# Patient Record
Sex: FEMALE | Race: WHITE | NOT HISPANIC OR LATINO | Employment: FULL TIME | URBAN - METROPOLITAN AREA
[De-identification: names, ages, dates, MRNs, and addresses within clinical notes are randomized per-mention and may not be internally consistent; named-entity substitution may affect disease eponyms.]

---

## 2020-05-10 ENCOUNTER — HOSPITAL ENCOUNTER (EMERGENCY)
Facility: HOSPITAL | Age: 55
Discharge: HOME | End: 2020-05-10
Attending: EMERGENCY MEDICINE | Admitting: EMERGENCY MEDICINE
Payer: COMMERCIAL

## 2020-05-10 ENCOUNTER — APPOINTMENT (EMERGENCY)
Dept: RADIOLOGY | Facility: HOSPITAL | Age: 55
End: 2020-05-10
Payer: COMMERCIAL

## 2020-05-10 VITALS
WEIGHT: 210 LBS | DIASTOLIC BLOOD PRESSURE: 98 MMHG | TEMPERATURE: 98.3 F | OXYGEN SATURATION: 100 % | RESPIRATION RATE: 18 BRPM | SYSTOLIC BLOOD PRESSURE: 183 MMHG | HEART RATE: 85 BPM

## 2020-05-10 DIAGNOSIS — M25.552 LEFT HIP PAIN: ICD-10-CM

## 2020-05-10 DIAGNOSIS — M46.1 SACROILIITIS (HCC): Primary | ICD-10-CM

## 2020-05-10 LAB
CLARITY, POC: NORMAL
COLOR, POC: YELLOW
EXT BILIRUBIN, UA: NEGATIVE
EXT BLOOD URINE: NEGATIVE
EXT GLUCOSE, UA: NEGATIVE
EXT KETONES: NEGATIVE
EXT NITRITE, UA: NEGATIVE
EXT PH, UA: 7
EXT PROTEIN, UA: NEGATIVE
EXT SPECIFIC GRAVITY, UA: 1.01
EXT UROBILINOGEN: 0.2
WBC # BLD EST: NEGATIVE 10*3/UL

## 2020-05-10 PROCEDURE — 96372 THER/PROPH/DIAG INJ SC/IM: CPT

## 2020-05-10 PROCEDURE — 99285 EMERGENCY DEPT VISIT HI MDM: CPT | Performed by: EMERGENCY MEDICINE

## 2020-05-10 PROCEDURE — 81002 URINALYSIS NONAUTO W/O SCOPE: CPT | Performed by: EMERGENCY MEDICINE

## 2020-05-10 PROCEDURE — 99284 EMERGENCY DEPT VISIT MOD MDM: CPT

## 2020-05-10 PROCEDURE — 73502 X-RAY EXAM HIP UNI 2-3 VIEWS: CPT

## 2020-05-10 RX ORDER — LIDOCAINE 50 MG/G
1 PATCH TOPICAL ONCE
Status: DISCONTINUED | OUTPATIENT
Start: 2020-05-10 | End: 2020-05-10 | Stop reason: HOSPADM

## 2020-05-10 RX ORDER — METHOCARBAMOL 750 MG/1
750 TABLET, FILM COATED ORAL 3 TIMES DAILY PRN
Qty: 12 TABLET | Refills: 0 | OUTPATIENT
Start: 2020-05-10 | End: 2022-01-06

## 2020-05-10 RX ORDER — KETOROLAC TROMETHAMINE 30 MG/ML
15 INJECTION, SOLUTION INTRAMUSCULAR; INTRAVENOUS ONCE
Status: COMPLETED | OUTPATIENT
Start: 2020-05-10 | End: 2020-05-10

## 2020-05-10 RX ORDER — METHOCARBAMOL 500 MG/1
750 TABLET, FILM COATED ORAL ONCE
Status: COMPLETED | OUTPATIENT
Start: 2020-05-10 | End: 2020-05-10

## 2020-05-10 RX ADMIN — LIDOCAINE 1 PATCH: 50 PATCH TOPICAL at 15:25

## 2020-05-10 RX ADMIN — KETOROLAC TROMETHAMINE 15 MG: 30 INJECTION, SOLUTION INTRAMUSCULAR at 15:03

## 2020-05-10 RX ADMIN — METHOCARBAMOL TABLETS 750 MG: 500 TABLET, COATED ORAL at 15:24

## 2022-01-05 VITALS
OXYGEN SATURATION: 98 % | RESPIRATION RATE: 18 BRPM | SYSTOLIC BLOOD PRESSURE: 166 MMHG | DIASTOLIC BLOOD PRESSURE: 98 MMHG | HEART RATE: 94 BPM

## 2022-01-05 PROCEDURE — 99284 EMERGENCY DEPT VISIT MOD MDM: CPT

## 2022-01-05 RX ORDER — IBUPROFEN 400 MG/1
400 TABLET ORAL ONCE
Status: COMPLETED | OUTPATIENT
Start: 2022-01-05 | End: 2022-01-05

## 2022-01-05 RX ADMIN — IBUPROFEN 400 MG: 400 TABLET, FILM COATED ORAL at 20:29

## 2022-01-06 ENCOUNTER — HOSPITAL ENCOUNTER (EMERGENCY)
Facility: HOSPITAL | Age: 57
Discharge: HOME | End: 2022-01-06
Attending: EMERGENCY MEDICINE | Admitting: EMERGENCY MEDICINE
Payer: COMMERCIAL

## 2022-01-06 ENCOUNTER — APPOINTMENT (EMERGENCY)
Dept: RADIOLOGY | Facility: HOSPITAL | Age: 57
End: 2022-01-06
Payer: COMMERCIAL
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
## 2022-01-06 DIAGNOSIS — S16.1XXA CERVICAL STRAIN, ACUTE, INITIAL ENCOUNTER: ICD-10-CM

## 2022-01-06 PROCEDURE — 99284 EMERGENCY DEPT VISIT MOD MDM: CPT | Performed by: EMERGENCY MEDICINE

## 2022-01-06 RX ORDER — ONDANSETRON 4 MG/1
4 TABLET, ORALLY DISINTEGRATING ORAL ONCE
Status: COMPLETED | OUTPATIENT
Start: 2022-01-06 | End: 2022-01-06

## 2022-01-06 RX ORDER — KETOROLAC TROMETHAMINE 30 MG/ML
30 INJECTION, SOLUTION INTRAMUSCULAR; INTRAVENOUS ONCE
Status: COMPLETED | OUTPATIENT
Start: 2022-01-06 | End: 2022-01-06

## 2022-01-06 RX ORDER — LIDOCAINE 50 MG/G
1 PATCH TOPICAL ONCE
Status: DISCONTINUED | OUTPATIENT
Start: 2022-01-06 | End: 2022-01-06 | Stop reason: HOSPADM

## 2022-01-06 RX ORDER — ACETAMINOPHEN 325 MG/1
650 TABLET ORAL ONCE
Status: COMPLETED | OUTPATIENT
Start: 2022-01-06 | End: 2022-01-06

## 2022-01-06 RX ORDER — METHOCARBAMOL 500 MG/1
500 TABLET, FILM COATED ORAL 2 TIMES DAILY PRN
Qty: 20 TABLET | Refills: 0 | Status: SHIPPED | OUTPATIENT
Start: 2022-01-06 | End: 2022-04-06

## 2022-01-06 RX ADMIN — ACETAMINOPHEN 650 MG: 325 TABLET, FILM COATED ORAL at 04:29

## 2022-01-06 RX ADMIN — KETOROLAC TROMETHAMINE 30 MG: 30 INJECTION, SOLUTION INTRAMUSCULAR; INTRAVENOUS at 04:29

## 2022-01-06 RX ADMIN — ONDANSETRON 4 MG: 4 TABLET, ORALLY DISINTEGRATING ORAL at 04:29

## 2022-01-06 RX ADMIN — LIDOCAINE 5% 1 PATCH: 700 PATCH TOPICAL at 04:29
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
## 2022-04-06 ENCOUNTER — HOSPITAL ENCOUNTER (OUTPATIENT)
Dept: RADIOLOGY | Facility: HOSPITAL | Age: 57
Discharge: HOME | End: 2022-04-06
Payer: COMMERCIAL

## 2022-04-06 ENCOUNTER — OFFICE VISIT (OUTPATIENT)
Dept: FAMILY MEDICINE CLINIC | Facility: HOSPITAL | Age: 57
End: 2022-04-06
Payer: COMMERCIAL

## 2022-04-06 VITALS
BODY MASS INDEX: 36.61 KG/M2 | HEART RATE: 85 BPM | WEIGHT: 206.6 LBS | SYSTOLIC BLOOD PRESSURE: 122 MMHG | TEMPERATURE: 97.6 F | DIASTOLIC BLOOD PRESSURE: 90 MMHG | HEIGHT: 63 IN

## 2022-04-06 DIAGNOSIS — M54.2 NECK PAIN: ICD-10-CM
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
## 2022-04-06 DIAGNOSIS — M25.559 HIP PAIN: ICD-10-CM

## 2022-04-06 DIAGNOSIS — M54.41 BILATERAL LOW BACK PAIN WITH BILATERAL SCIATICA, UNSPECIFIED CHRONICITY: ICD-10-CM

## 2022-04-06 DIAGNOSIS — M62.838 NECK MUSCLE SPASM: ICD-10-CM

## 2022-04-06 DIAGNOSIS — M54.42 BILATERAL LOW BACK PAIN WITH BILATERAL SCIATICA, UNSPECIFIED CHRONICITY: ICD-10-CM

## 2022-04-06 DIAGNOSIS — M62.830 SPASM OF MUSCLE OF LOWER BACK: ICD-10-CM
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
## 2022-04-06 PROCEDURE — 99204 OFFICE O/P NEW MOD 45 MIN: CPT | Performed by: FAMILY MEDICINE

## 2022-04-06 PROCEDURE — 72040 X-RAY EXAM NECK SPINE 2-3 VW: CPT

## 2022-04-06 PROCEDURE — 3008F BODY MASS INDEX DOCD: CPT | Performed by: FAMILY MEDICINE

## 2022-04-06 PROCEDURE — 1036F TOBACCO NON-USER: CPT | Performed by: FAMILY MEDICINE

## 2022-04-06 PROCEDURE — 72100 X-RAY EXAM L-S SPINE 2/3 VWS: CPT

## 2022-04-06 PROCEDURE — 3725F SCREEN DEPRESSION PERFORMED: CPT | Performed by: FAMILY MEDICINE

## 2022-04-06 PROCEDURE — 73521 X-RAY EXAM HIPS BI 2 VIEWS: CPT
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
## 2022-04-08 ENCOUNTER — TELEPHONE (OUTPATIENT)
Dept: FAMILY MEDICINE CLINIC | Facility: HOSPITAL | Age: 57
End: 2022-04-08

## 2022-04-08 DIAGNOSIS — M54.41 BILATERAL LOW BACK PAIN WITH BILATERAL SCIATICA, UNSPECIFIED CHRONICITY: ICD-10-CM
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
## 2022-04-08 DIAGNOSIS — M54.42 BILATERAL LOW BACK PAIN WITH BILATERAL SCIATICA, UNSPECIFIED CHRONICITY: ICD-10-CM

## 2022-04-08 NOTE — TELEPHONE ENCOUNTER
She was referred to PT by Dr Gato Almazan  It is going to cost her $150 /visit for PT  That is too large of an expense for her  She is asking if Dr Gato Almazan has any other suggestions for her? Also, she is asking about getting an MRI? She asked about xray results, results not in chart yet, she is aware of that     PCB

## 2022-05-10 NOTE — TELEPHONE ENCOUNTER
Patient asking why MRI was only ordered for lumbar spine? She thought it was going to be also for her neck since she is having pain there as well    PCB

## 2022-05-11 DIAGNOSIS — M54.2 NECK PAIN: Primary | ICD-10-CM

## 2022-05-11 DIAGNOSIS — M54.12 CERVICAL RADICULOPATHY: ICD-10-CM

## 2022-05-11 NOTE — TELEPHONE ENCOUNTER
Pt aware    Pt asking about the neck pain    reports  pain shoots down her arms especially the left arm   asking if an imaging would be consider       Please advise  thanks

## 2022-05-12 ENCOUNTER — HOSPITAL ENCOUNTER (OUTPATIENT)
Dept: MRI IMAGING | Facility: HOSPITAL | Age: 57
Discharge: HOME | End: 2022-05-12
Payer: COMMERCIAL

## 2022-05-12 DIAGNOSIS — M54.41 BILATERAL LOW BACK PAIN WITH BILATERAL SCIATICA, UNSPECIFIED CHRONICITY: ICD-10-CM
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
## 2022-05-12 DIAGNOSIS — M54.42 BILATERAL LOW BACK PAIN WITH BILATERAL SCIATICA, UNSPECIFIED CHRONICITY: ICD-10-CM

## 2022-05-12 PROCEDURE — 72148 MRI LUMBAR SPINE W/O DYE: CPT

## 2022-05-12 PROCEDURE — G1004 CDSM NDSC: HCPCS

## 2022-05-16 DIAGNOSIS — M51.36 DDD (DEGENERATIVE DISC DISEASE), LUMBAR: ICD-10-CM

## 2022-05-16 DIAGNOSIS — M54.41 BILATERAL LOW BACK PAIN WITH BILATERAL SCIATICA, UNSPECIFIED CHRONICITY: Primary | ICD-10-CM

## 2022-05-16 DIAGNOSIS — M54.42 BILATERAL LOW BACK PAIN WITH BILATERAL SCIATICA, UNSPECIFIED CHRONICITY: Primary | ICD-10-CM

## 2022-06-01 ENCOUNTER — TELEPHONE (OUTPATIENT)
Dept: PAIN MEDICINE | Facility: CLINIC | Age: 57
End: 2022-06-01

## 2022-06-10 ENCOUNTER — TELEPHONE (OUTPATIENT)
Dept: FAMILY MEDICINE CLINIC | Facility: HOSPITAL | Age: 57
End: 2022-06-10

## 2022-06-10 DIAGNOSIS — F41.9 ANXIETY: Primary | ICD-10-CM

## 2022-06-10 NOTE — TELEPHONE ENCOUNTER
Pt went for MRI and was not able to complete due to claustrophia  Asking for an rx for xanax or something to be able to reschedule  Asking for only one pill   PCB

## 2022-06-13 RX ORDER — ALPRAZOLAM 1 MG/1
TABLET ORAL
Qty: 1 TABLET | Refills: 0 | Status: SHIPPED | OUTPATIENT
Start: 2022-06-13 | End: 2022-06-29
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
## 2022-06-14 ENCOUNTER — TELEPHONE (OUTPATIENT)
Dept: PAIN MEDICINE | Facility: CLINIC | Age: 57
End: 2022-06-14

## 2022-06-14 NOTE — TELEPHONE ENCOUNTER
S/W Pt about rescheduling her appt with Dr Davie Ramirez    Pt said that she will call us back  Please transfer her to my office      Thank You

## 2022-06-24 ENCOUNTER — CONSULT (OUTPATIENT)
Dept: PAIN MEDICINE | Facility: CLINIC | Age: 57
End: 2022-06-24
Payer: COMMERCIAL

## 2022-06-24 ENCOUNTER — APPOINTMENT (OUTPATIENT)
Dept: RADIOLOGY | Facility: CLINIC | Age: 57
End: 2022-06-24
Payer: COMMERCIAL

## 2022-06-24 VITALS
HEIGHT: 63 IN | HEART RATE: 90 BPM | SYSTOLIC BLOOD PRESSURE: 140 MMHG | WEIGHT: 207 LBS | DIASTOLIC BLOOD PRESSURE: 92 MMHG | BODY MASS INDEX: 36.68 KG/M2 | TEMPERATURE: 98.1 F

## 2022-06-24 DIAGNOSIS — M25.561 CHRONIC PAIN OF RIGHT KNEE: ICD-10-CM

## 2022-06-24 DIAGNOSIS — G89.29 CHRONIC LEFT SHOULDER PAIN: ICD-10-CM

## 2022-06-24 DIAGNOSIS — G89.29 CHRONIC PAIN OF RIGHT KNEE: ICD-10-CM

## 2022-06-24 DIAGNOSIS — M54.12 CERVICAL RADICULOPATHY: Primary | ICD-10-CM

## 2022-06-24 DIAGNOSIS — M62.89 PSOAS SYNDROME: ICD-10-CM

## 2022-06-24 DIAGNOSIS — M25.512 CHRONIC LEFT SHOULDER PAIN: ICD-10-CM

## 2022-06-24 DIAGNOSIS — M47.816 LUMBAR SPONDYLOSIS: ICD-10-CM

## 2022-06-24 PROCEDURE — 73030 X-RAY EXAM OF SHOULDER: CPT

## 2022-06-24 PROCEDURE — 99205 OFFICE O/P NEW HI 60 MIN: CPT | Performed by: ANESTHESIOLOGY

## 2022-06-24 PROCEDURE — 73562 X-RAY EXAM OF KNEE 3: CPT

## 2022-06-24 RX ORDER — METHYLPREDNISOLONE 4 MG/1
TABLET ORAL
Qty: 21 TABLET | Refills: 0 | Status: SHIPPED | OUTPATIENT
Start: 2022-06-24 | End: 2022-07-21 | Stop reason: ALTCHOICE

## 2022-06-24 RX ORDER — IBUPROFEN 400 MG/1
TABLET ORAL EVERY 6 HOURS PRN
COMMUNITY
End: 2022-06-24 | Stop reason: ALTCHOICE

## 2022-06-24 RX ORDER — ACETAMINOPHEN 500 MG
500 TABLET ORAL EVERY 6 HOURS PRN
COMMUNITY
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
## 2022-06-29 ENCOUNTER — OFFICE VISIT (OUTPATIENT)
Dept: FAMILY MEDICINE CLINIC | Facility: HOSPITAL | Age: 57
End: 2022-06-29
Payer: COMMERCIAL

## 2022-06-29 ENCOUNTER — TELEPHONE (OUTPATIENT)
Dept: FAMILY MEDICINE CLINIC | Facility: HOSPITAL | Age: 57
End: 2022-06-29

## 2022-06-29 ENCOUNTER — TELEPHONE (OUTPATIENT)
Dept: PAIN MEDICINE | Facility: CLINIC | Age: 57
End: 2022-06-29

## 2022-06-29 VITALS
WEIGHT: 210 LBS | DIASTOLIC BLOOD PRESSURE: 102 MMHG | BODY MASS INDEX: 37.21 KG/M2 | HEIGHT: 63 IN | HEART RATE: 84 BPM | SYSTOLIC BLOOD PRESSURE: 180 MMHG | OXYGEN SATURATION: 97 %

## 2022-06-29 DIAGNOSIS — M54.12 CERVICAL RADICULOPATHY: ICD-10-CM

## 2022-06-29 DIAGNOSIS — R03.0 ELEVATED BLOOD PRESSURE READING: ICD-10-CM

## 2022-06-29 DIAGNOSIS — Z13.1 SCREENING FOR DIABETES MELLITUS: ICD-10-CM

## 2022-06-29 DIAGNOSIS — Z13.29 SCREENING FOR THYROID DISORDER: ICD-10-CM

## 2022-06-29 DIAGNOSIS — Z12.31 ENCOUNTER FOR SCREENING MAMMOGRAM FOR BREAST CANCER: ICD-10-CM

## 2022-06-29 DIAGNOSIS — Z13.220 SCREENING FOR HYPERLIPIDEMIA: ICD-10-CM

## 2022-06-29 DIAGNOSIS — N64.4 BREAST PAIN, LEFT: Primary | ICD-10-CM

## 2022-06-29 DIAGNOSIS — Z13.0 SCREENING FOR IRON DEFICIENCY ANEMIA: ICD-10-CM

## 2022-06-29 PROCEDURE — 3008F BODY MASS INDEX DOCD: CPT | Performed by: STUDENT IN AN ORGANIZED HEALTH CARE EDUCATION/TRAINING PROGRAM

## 2022-06-29 PROCEDURE — 1036F TOBACCO NON-USER: CPT | Performed by: STUDENT IN AN ORGANIZED HEALTH CARE EDUCATION/TRAINING PROGRAM

## 2022-06-29 PROCEDURE — 99214 OFFICE O/P EST MOD 30 MIN: CPT | Performed by: STUDENT IN AN ORGANIZED HEALTH CARE EDUCATION/TRAINING PROGRAM

## 2022-06-29 RX ORDER — LOSARTAN POTASSIUM 25 MG/1
25 TABLET ORAL DAILY
Qty: 30 TABLET | Refills: 1 | Status: SHIPPED | OUTPATIENT
Start: 2022-06-29 | End: 2022-07-21 | Stop reason: SDUPTHER

## 2022-06-29 NOTE — TELEPHONE ENCOUNTER
----- Message from Sarahi Pires DO sent at 6/29/2022  4:53 PM EDT -----  Please let patient know that Pain Management Dr Liss Macdonald recommended that she stop using her steroids altogether  I also ordered her losartan 25 mg to [facility], she could take this tonight, and again in the morning before her procedure to try and stay on their schedule without having to postpone it  She should call us in the morning with a home reading

## 2022-06-29 NOTE — TELEPHONE ENCOUNTER
Pt called in she has some questions about her procedure and medications  Please be advised thank you    Pt can be reached @ [phone] or [phone]

## 2022-06-29 NOTE — TELEPHONE ENCOUNTER
Pt at PCP office and they want her to tell Sl her BP is 180-102 pt is having sharp pain on the left side of her chest  She is not sure if SL will want to do the procedure because of the BP    Dr Yissel Preciado sent a text to [facility] ([facility])    Dr # [phone]    Pt # [phone]

## 2022-06-29 NOTE — TELEPHONE ENCOUNTER
Patient called, she has a 1/2 tab of Xanax left from MRI procedure  She is asking if she can take that prior to [facility]? She says she called that office and they do not have a problem with it and told her to check with her PCP    PCB, if she does not answer her cell, call her work [phone]

## 2022-06-29 NOTE — TELEPHONE ENCOUNTER
S/w pt, stated that she has a rx for xanax from her pcp for procedures  Questioned if she can take that before her procedure  Advised pt, no problem with xanax prior to procedure  Refer to the pcp's instructions re: xanax  Pt verbalized understanding and appreciation

## 2022-06-29 NOTE — PROGRESS NOTES
[facility],     Assessment/Plan:      Diagnosis ICD-10-CM Associated Orders   1  Breast pain, left  N64 4    2  Cervical radiculopathy  M54 12    3  Screening for iron deficiency anemia  Z13 0 CBC and differential     CBC and differential   4  Screening for diabetes mellitus  Z13 1 Comprehensive metabolic panel     Comprehensive metabolic panel   5  Screening for hyperlipidemia  Z13 220 Lipid Panel with Direct LDL reflex     Lipid Panel with Direct LDL reflex   6  Screening for thyroid disorder  Z13 29 TSH, 3rd generation     T4, free     TSH, 3rd generation     T4, free   7  Encounter for screening mammogram for breast cancer  Z12 31 Mammo screening bilateral w 3d & cad   8  Elevated blood pressure reading  R03 0 losartan (COZAAR) 25 mg tablet      Blood pressure is significantly elevated, advised her to start losartan today, could be secondary to the steroids which were recommended to be discontinued by pain management as well as myself   Blood work ordered and will review once obtained   Patient overdue for mammogram    Messages sent and discussed with pain management in regards to her procedure tomorrow  Follow-up in 2 weeks for annual wellness   Patient may call or return to office with any questions or concerns  ______________________________________________________________________  Subjective:     Patient ID: Anand Tiwari is a 60 y o  female  HPI  Anand Tiwari  Chief Complaint   Patient presents with    Breast Pain     Left side under breast near rib   Began yesterday      Started with pain yesterday, now on prednisone since Sunday - medrol dose pack  Intermittent & sharp  Came on today, worse with palpation  Chest felt minimally tight today  Uncomfortable - BP elevated  Blood pressures today elevated at work but using risk of in the 649 systolic  Typically in 130-140's over 90's  HR normal    Procedure on neck tmrw with Lo[facility]  In tons of pain  Dad  of stroke  Lived in [id number] [place] [address], but no new BW  Of note patient's jacklyn  suddenly and since then she has not taken as good of care of her own health  The following portions of the patient's history were reviewed and updated as appropriate: allergies, current medications, past medical history and problem list     Review of Systems   Eyes: Positive for visual disturbance (blurry over the past few weeks, worse with bending over)  Negative for pain and redness  Respiratory: Positive for chest tightness  Negative for cough and shortness of breath  Cardiovascular: Negative for chest pain and palpitations  Neurological: Positive for numbness (shoulder pain/ left side with tingling into hand) and headaches (posterior, strong on left occiput)  Negative for dizziness and light-headedness  Objective:      Vitals:    22 1511   BP: (!) 180/102   Pulse: 84   SpO2: 97%   Repeat similar 170/100     Physical Exam  Vitals reviewed  Constitutional:       Appearance: Normal appearance  She is well-developed  She is obese  She is not ill-appearing  HENT:      Head: Normocephalic and atraumatic  Eyes:      General: No scleral icterus  Right eye: No discharge  Left eye: No discharge  Cardiovascular:      Rate and Rhythm: Normal rate and regular rhythm  Pulses: Normal pulses  Heart sounds: Normal heart sounds  No murmur heard  Pulmonary:      Effort: Pulmonary effort is normal  No respiratory distress  Breath sounds: Normal breath sounds  No stridor  No wheezing  Chest:   Breasts:      Right: Normal  No mass, nipple discharge or skin change  Left: No mass, nipple discharge or skin change  Comments: No significant rib pain with motion testing bilaterally  Musculoskeletal:      Cervical back: Normal range of motion  Skin:     General: Skin is warm  Neurological:      Mental Status: She is alert and oriented to person, place, and time  Gait: Gait normal    Psychiatric:         Mood and Affect: Mood normal          Behavior: Behavior normal          Thought Content: Thought content normal          Judgment: Judgment normal            Portions of the record may have been created with voice recognition software  Occasional wrong word or "sound alike" substitutions may have occurred due to the inherent limitations of voice recognition software  Please review the chart carefully and recognize, using context, where substitutions/typographical errors may have occurred

## 2022-06-29 NOTE — TELEPHONE ENCOUNTER
----- Message from Franklin Barron DO sent at 6/29/2022  7:58 AM EDT -----  Normal shoulder x-ray, mild arthritis of the knee

## 2022-06-30 ENCOUNTER — HOSPITAL ENCOUNTER (OUTPATIENT)
Dept: RADIOLOGY | Facility: CLINIC | Age: 57
Discharge: HOME | End: 2022-06-30
Admitting: ANESTHESIOLOGY
Payer: COMMERCIAL

## 2022-06-30 VITALS
SYSTOLIC BLOOD PRESSURE: 130 MMHG | TEMPERATURE: 97.7 F | DIASTOLIC BLOOD PRESSURE: 85 MMHG | HEART RATE: 78 BPM | OXYGEN SATURATION: 97 % | RESPIRATION RATE: 20 BRPM

## 2022-06-30 DIAGNOSIS — M47.816 LUMBAR SPONDYLOSIS: ICD-10-CM

## 2022-06-30 PROCEDURE — 64494 INJ PARAVERT F JNT L/S 2 LEV: CPT | Performed by: ANESTHESIOLOGY

## 2022-06-30 PROCEDURE — 64493 INJ PARAVERT F JNT L/S 1 LEV: CPT | Performed by: ANESTHESIOLOGY

## 2022-06-30 RX ORDER — BUPIVACAINE HCL/PF 2.5 MG/ML
10 VIAL (ML) INJECTION ONCE
Status: COMPLETED | OUTPATIENT
Start: 2022-06-30 | End: 2022-06-30

## 2022-06-30 RX ADMIN — BUPIVACAINE HYDROCHLORIDE 6 ML: 2.5 INJECTION, SOLUTION EPIDURAL; INFILTRATION; INTRACAUDAL at 09:18

## 2022-06-30 NOTE — DISCHARGE INSTRUCTIONS

## 2022-06-30 NOTE — H&P
History of Present Illness: The patient is a 60 y o  female who presents with complaints of low back pain  Patient Active Problem List   Diagnosis    Lumbar spondylosis       Past Medical History:   Diagnosis Date    GERD (gastroesophageal reflux disease)        Past Surgical History:   Procedure Laterality Date    CHOLECYSTECTOMY      HERNIA REPAIR      HYSTERECTOMY      NASAL SEPTUM SURGERY      TONSILLECTOMY           Current Outpatient Medications:     acetaminophen (TYLENOL) 500 mg tablet, Take 500 mg by mouth every 6 (six) hours as needed for mild pain, Disp: , Rfl:     losartan (COZAAR) 25 mg tablet, Take 1 tablet (25 mg total) by mouth daily, Disp: 30 tablet, Rfl: 1    methylPREDNISolone 4 MG tablet therapy pack, Use as directed on package, Disp: 21 tablet, Rfl: 0    Current Facility-Administered Medications:     bupivacaine (PF) (MARCAINE) 0 25 % injection 10 mL, 10 mL, Perineural, Once, Camilo Mccormick DO    Allergies   Allergen Reactions    Ativan [Lorazepam]     Cymbalta [Duloxetine Hcl]     Erythromycin     Keflex [Cephalexin]     Penicillins     Amoxicillin Rash    Ampicillin Rash    Latex Rash       Physical Exam:   General: Awake, Alert, Oriented x 3, Mood and affect appropriate  Respiratory: Respirations even and unlabored  Cardiovascular: Peripheral pulses intact; no edema  Musculoskeletal Exam:  Pain with lumbar extension    ASA Score: III         Assessment:   1   Lumbar spondylosis        Plan: bilateral L3,4,5 MBB with 0 25% bipiv

## 2022-07-01 ENCOUNTER — TELEPHONE (OUTPATIENT)
Dept: FAMILY MEDICINE CLINIC | Facility: HOSPITAL | Age: 57
End: 2022-07-01

## 2022-07-01 NOTE — TELEPHONE ENCOUNTER
Patient wanted to let you know what her BP was before her procedure 166/110   She said she didn't take the bp med yet and when she took it, it was an hour later they rechecked and it was 130/84-   Patient says last night it was high as well but not as high as the 166/110

## 2022-07-01 NOTE — TELEPHONE ENCOUNTER
Please let her know to still start taking the blood pressure pill daily because the target is to be under 130/80  We can review at her 21st appointment

## 2022-07-05 NOTE — TELEPHONE ENCOUNTER
S/w pt, stated that she does not understand the results of her pain diary  Confirmed the pt had NO improvement in the 8 hours after the procedure  Advised pt, this conversation reinforced the pain diary as reviewed by SL  Advised pt that because the pt had NO relief, her pain actually got worse during the 8 hours after the mbb - her pain is coming from another source  This should be discussed at an ov w/ the nurse practitioners  Pt stated that her pain was severe later that evening and the pt is still having difficulty walking  Advised pt, ok to use rest, ice / heat medication as directed / prescribed  The writer will blayne her appt - call if sooner  Pt verbalized understanding and appreciation  Will cb sooner if questions / issues arise

## 2022-07-05 NOTE — TELEPHONE ENCOUNTER
Pt called in to get a Pre Cert for an MRI is needed for the insurance company  Please be advised thank you    Pt can be reached @ [phone]

## 2022-07-05 NOTE — TELEPHONE ENCOUNTER
Pt called in to let SL know that she is not able to get her EMG done until  February 2023  So she would like to know what she could do   Please be advised thank you    Pt can be reached @ [phone]

## 2022-07-21 ENCOUNTER — OFFICE VISIT (OUTPATIENT)
Dept: FAMILY MEDICINE CLINIC | Facility: HOSPITAL | Age: 57
End: 2022-07-21
Payer: COMMERCIAL

## 2022-07-21 VITALS
SYSTOLIC BLOOD PRESSURE: 144 MMHG | BODY MASS INDEX: 36.5 KG/M2 | DIASTOLIC BLOOD PRESSURE: 92 MMHG | HEIGHT: 63 IN | WEIGHT: 206 LBS | HEART RATE: 82 BPM

## 2022-07-21 DIAGNOSIS — M25.512 CHRONIC LEFT SHOULDER PAIN: ICD-10-CM

## 2022-07-21 DIAGNOSIS — R03.0 ELEVATED BLOOD PRESSURE READING: ICD-10-CM

## 2022-07-21 DIAGNOSIS — R06.02 SOB (SHORTNESS OF BREATH): ICD-10-CM

## 2022-07-21 DIAGNOSIS — T17.308A CHOKING, INITIAL ENCOUNTER: ICD-10-CM

## 2022-07-21 DIAGNOSIS — Z00.00 ANNUAL PHYSICAL EXAM: Primary | ICD-10-CM

## 2022-07-21 DIAGNOSIS — M25.552 BILATERAL HIP PAIN: ICD-10-CM

## 2022-07-21 DIAGNOSIS — M47.816 LUMBAR SPONDYLOSIS: ICD-10-CM

## 2022-07-21 DIAGNOSIS — Z12.11 SCREEN FOR COLON CANCER: ICD-10-CM

## 2022-07-21 DIAGNOSIS — G89.29 CHRONIC LEFT SHOULDER PAIN: ICD-10-CM

## 2022-07-21 DIAGNOSIS — M25.551 BILATERAL HIP PAIN: ICD-10-CM

## 2022-07-21 DIAGNOSIS — J30.2 SEASONAL ALLERGIES: ICD-10-CM

## 2022-07-21 PROCEDURE — 99396 PREV VISIT EST AGE 40-64: CPT | Performed by: STUDENT IN AN ORGANIZED HEALTH CARE EDUCATION/TRAINING PROGRAM

## 2022-07-21 PROCEDURE — 99213 OFFICE O/P EST LOW 20 MIN: CPT | Performed by: STUDENT IN AN ORGANIZED HEALTH CARE EDUCATION/TRAINING PROGRAM

## 2022-07-21 RX ORDER — LOSARTAN POTASSIUM 25 MG/1
50 TABLET ORAL DAILY
Qty: 90 TABLET | Refills: 1 | Status: SHIPPED | OUTPATIENT
Start: 2022-07-21 | End: 2022-10-18

## 2022-07-21 RX ORDER — ALBUTEROL SULFATE 90 UG/1
2 AEROSOL, METERED RESPIRATORY (INHALATION) EVERY 6 HOURS PRN
Qty: 18 G | Refills: 0 | Status: SHIPPED | OUTPATIENT
Start: 2022-07-21

## 2022-07-21 RX ORDER — CYCLOBENZAPRINE HCL 5 MG
5 TABLET ORAL
Qty: 30 TABLET | Refills: 0 | Status: SHIPPED | OUTPATIENT
Start: 2022-07-21 | End: 2022-10-24

## 2022-07-21 RX ORDER — OMEPRAZOLE 20 MG/1
20 TABLET, DELAYED RELEASE ORAL DAILY
COMMUNITY
End: 2022-10-24

## 2022-07-21 NOTE — PATIENT INSTRUCTIONS
Call central scheduling - [phone]  [facility] - More open MRI Consider    Dr Lauryn Quintana for Adults   AMBULATORY CARE:   A wellness visit  is when you see your healthcare provider to get screened for health problems  Your healthcare provider will also give you advice on how to stay healthy  Write down your questions so you remember to ask them  Ask your healthcare provider how often you should have a wellness visit  What happens at a wellness visit:  Your healthcare provider will ask about your health, and your family history of health problems  This includes high blood pressure, heart disease, and cancer  He or she will ask if you have symptoms that concern you, if you smoke, and about your mood  You may also be asked about your intake of medicines, supplements, food, and alcohol  Any of the following may be done: Your weight  will be checked  Your height may also be checked so your body mass index (BMI) can be calculated  Your BMI shows if you are at a healthy weight  Your blood pressure  and heart rate will be checked  Your temperature may also be checked  Blood and urine tests  may be done  Blood tests may be done to check your cholesterol levels  Abnormal cholesterol levels increase your risk for heart disease and stroke  You may also need a blood or urine test to check for diabetes if you are at increased risk  Urine tests may be done to look for signs of an infection or kidney disease  A physical exam  includes checking your heartbeat and lungs with a stethoscope  Your healthcare provider may also check your skin to look for sun damage  Screening tests  may be recommended  A screening test is done to check for diseases that may not cause symptoms  The screening tests you may need depend on your age, gender, family history, and lifestyle habits   For example, colorectal screening may be recommended if you are 48years old or older  Screening tests you need if you are a woman:   A Pap smear  is used to screen for cervical cancer  Pap smears are usually done every 3 to 5 years depending on your age  You may need them more often if you have had abnormal Pap smear test results in the past  Ask your healthcare provider how often you should have a Pap smear  A mammogram  is an x-ray of your breasts to screen for breast cancer  Experts recommend mammograms every 2 years starting at age 48 years  You may need a mammogram at age 52 years or younger if you have an increased risk for breast cancer  Talk to your healthcare provider about when you should start having mammograms and how often you need them  Vaccines you may need:   Get an influenza vaccine  every year  The influenza vaccine protects you from the flu  Several types of viruses cause the flu  The viruses change over time, so new vaccines are made each year  Get a tetanus-diphtheria (Td) booster vaccine  every 10 years  This vaccine protects you against tetanus and diphtheria  Tetanus is a severe infection that may cause painful muscle spasms and lockjaw  Diphtheria is a severe bacterial infection that causes a thick covering in the back of your mouth and throat  Get a human papillomavirus (HPV) vaccine  if you are female and aged 23 to 32 or male 23 to 24 and never received it  This vaccine protects you from HPV infection  HPV is the most common infection spread by sexual contact  HPV may also cause vaginal, penile, and anal cancers  Get a pneumococcal vaccine  if you are aged 72 years or older  The pneumococcal vaccine is an injection given to protect you from pneumococcal disease  Pneumococcal disease is an infection caused by pneumococcal bacteria  The infection may cause pneumonia, meningitis, or an ear infection  Get a shingles vaccine  if you are 60 or older, even if you have had shingles before   The shingles vaccine is an injection to protect you from the varicella-zoster virus  This is the same virus that causes chickenpox  Shingles is a painful rash that develops in people who had chickenpox or have been exposed to the virus  How to eat healthy:  My Plate is a model for planning healthy meals  It shows the types and amounts of foods that should go on your plate  Fruits and vegetables make up about half of your plate, and grains and protein make up the other half  A serving of dairy is included on the side of your plate  The amount of calories and serving sizes you need depends on your age, gender, weight, and height  Examples of healthy foods are listed below:  Eat a variety of vegetables  such as dark green, red, and orange vegetables  You can also include canned vegetables low in sodium (salt) and frozen vegetables without added butter or sauces  Eat a variety of fresh fruits , canned fruit in 100% juice, frozen fruit, and dried fruit  Include whole grains  At least half of the grains you eat should be whole grains  Examples include whole-wheat bread, wheat pasta, brown rice, and whole-grain cereals such as oatmeal     Eat a variety of protein foods such as seafood (fish and shellfish), lean meat, and poultry without skin (turkey and chicken)  Examples of lean meats include pork leg, shoulder, or tenderloin, and beef round, sirloin, tenderloin, and extra lean ground beef  Other protein foods include eggs and egg substitutes, beans, peas, soy products, nuts, and seeds  Choose low-fat dairy products such as skim or 1% milk or low-fat yogurt, cheese, and cottage cheese  Limit unhealthy fats  such as butter, hard margarine, and shortening  Exercise:  Exercise at least 30 minutes per day on most days of the week  Some examples of exercise include walking, biking, dancing, and swimming  You can also fit in more physical activity by taking the stairs instead of the elevator or parking farther away from stores   Include muscle strengthening activities 2 days each week  Regular exercise provides many health benefits  It helps you manage your weight, and decreases your risk for type 2 diabetes, heart disease, stroke, and high blood pressure  Exercise can also help improve your mood  Ask your healthcare provider about the best exercise plan for you  General health and safety guidelines:   Do not smoke  Nicotine and other chemicals in cigarettes and cigars can cause lung damage  Ask your healthcare provider for information if you currently smoke and need help to quit  E-cigarettes or smokeless tobacco still contain nicotine  Talk to your healthcare provider before you use these products  Limit alcohol  A drink of alcohol is 12 ounces of beer, 5 ounces of wine, or 1½ ounces of liquor  Lose weight, if needed  Being overweight increases your risk of certain health conditions  These include heart disease, high blood pressure, type 2 diabetes, and certain types of cancer  Protect your skin  Do not sunbathe or use tanning beds  Use sunscreen with a SPF 15 or higher  Apply sunscreen at least 15 minutes before you go outside  Reapply sunscreen every 2 hours  Wear protective clothing, hats, and sunglasses when you are outside  Drive safely  Always wear your seatbelt  Make sure everyone in your car wears a seatbelt  A seatbelt can save your life if you are in an accident  Do not use your cell phone when you are driving  This could distract you and cause an accident  Pull over if you need to make a call or send a text message  Practice safe sex  Use latex condoms if are sexually active and have more than one partner  Your healthcare provider may recommend screening tests for sexually transmitted infections (STIs)  Wear helmets, lifejackets, and protective gear  Always wear a helmet when you ride a bike or motorcycle, go skiing, or play sports that could cause a head injury  Wear protective equipment when you play sports  Wear a lifejacket when you are on a boat or doing water sports  © Copyright [employer] 2022 Information is for End User's use only and may not be sold, redistributed or otherwise used for commercial purposes  All illustrations and images included in CareNotes® are the copyrighted property of [employer]  or Johan Ceedno  The above information is an  only  It is not intended as medical advice for individual conditions or treatments  Talk to your doctor, nurse or pharmacist before following any medical regimen to see if it is safe and effective for you

## 2022-07-21 NOTE — PROGRESS NOTES
[facility] PRIMARY CARE [address]    NAME: Andrew Baldwin  AGE: 59 y o  SEX: female  : 1965   DATE: 2022     Assessment and Plan:      Diagnosis ICD-10-CM Associated Orders   1  Annual physical exam  Z00 00    2  Elevated blood pressure reading  R03 0 losartan (COZAAR) 25 mg tablet   3  Lumbar spondylosis  M47 816 cyclobenzaprine (FLEXERIL) 5 mg tablet   4  Chronic left shoulder pain  M25 512 cyclobenzaprine (FLEXERIL) 5 mg tablet    G89 29    5  Bilateral hip pain  M25 551 cyclobenzaprine (FLEXERIL) 5 mg tablet    M25 552    6  Screen for colon cancer  Z12 11 Cologuard   7  SOB (shortness of breath)  R06 02 albuterol (Proventil HFA) 90 mcg/act inhaler   8  Seasonal allergies  J30 2 albuterol (Proventil HFA) 90 mcg/act inhaler     FL barium swallow ROUTINE esophagus     Allergen Profile, Basic Food     Allergen Profile, Basic Food   9  Choking, initial encounter  T17 308A FL barium swallow ROUTINE esophagus     Allergen Profile, Basic Food     Allergen Profile, Basic Food     Screening & diagnostic bloodwork ordered, our office will reach out with results once obtained  Barium swallow ordered for choking sensation  Due for Cologuard  Medications reviewed and reconciled  Immunizations and preventive care screenings were discussed with patient today  Appropriate education was printed on patient's after visit summary  Counseling:  Alcohol/drug use: discussed moderation in alcohol intake, the recommendations for healthy alcohol use, and avoidance of illicit drug use  Dental Health: discussed importance of regular tooth brushing, flossing, and dental visits  Injury prevention: discussed safety/seat belts, safety helmets, smoke detectors, carbon dioxide detectors, and smoking near bedding or upholstery    Sexual health: discussed sexually transmitted diseases, partner selection, use of condoms, avoidance of unintended pregnancy, and contraceptive alternatives  · Exercise: the importance of regular exercise/physical activity was discussed  Recommend exercise 3-5 times per week for at least 30 minutes  Follow-up in 1 year     Chief Complaint:     Chief Complaint   Patient presents with    Annual Exam      History of Present Illness:     Diet and Physical Activity  · Diet/Nutrition: Using [employer] with breakfast shake  Bowl of veggies at times  · Exercise: Not able to due to pain  · Weight worse since loss of partner - Nya Bye   · Typically weighed 130 or less     General Health  · Sleep: sleeps poorly due to pain  · Hearing: normal - bilateral   · Vision: goes for regular eye exams and readers  · Dental: regular dental visits and brushes teeth twice daily  /GYN Health  · Patient is: postmenopausal, & hysterectomy     Review of Systems:     Review of Systems   Constitutional: Negative for chills and fever  HENT: Negative for congestion and rhinorrhea  Respiratory: Positive for shortness of breath (new, hx of allergies)  Negative for cough  Cardiovascular: Positive for palpitations (occasional)  Negative for chest pain and leg swelling  Genitourinary: Negative for dysuria and frequency  Musculoskeletal: Positive for arthralgias, back pain, gait problem and neck pain  Neurological: Negative for light-headedness and headaches  Psychiatric/Behavioral: Negative for dysphoric mood  The patient is not nervous/anxious            Past Medical History:     Past Medical History:   Diagnosis Date    GERD (gastroesophageal reflux disease)       Past Surgical History:     Past Surgical History:   Procedure Laterality Date    CHOLECYSTECTOMY      HERNIA REPAIR      HYSTERECTOMY      NASAL SEPTUM SURGERY      TONSILLECTOMY        Social History:     Social History     Socioeconomic History    Marital status:      Spouse name: None    Number of children: None    Years of education: None    Highest education level: None   Occupational History    None   Tobacco Use    Smoking status: Former Smoker    Smokeless tobacco: Never Used   Vaping Use    Vaping Use: Never used   Substance and Sexual Activity    Alcohol use: Yes    Drug use: Never    Sexual activity: Never   Other Topics Concern    None   Social History Narrative    None     Social Determinants of Health     Financial Resource Strain: Not on file   Food Insecurity: Not on file   Transportation Needs: Not on file   Physical Activity: Not on file   Stress: Not on file   Social Connections: Not on file   Intimate Partner Violence: Not on file   Housing Stability: Not on file      Family History:     Family History   Problem Relation Age of Onset    Hypertension Mother     Skin cancer Mother     Stroke Father     Hypertension Son       Current Medications:     Current Outpatient Medications   Medication Sig Dispense Refill    acetaminophen (TYLENOL) 500 mg tablet Take 500 mg by mouth every 6 (six) hours as needed for mild pain      albuterol (Proventil HFA) 90 mcg/act inhaler Inhale 2 puffs every 6 (six) hours as needed for wheezing or shortness of breath 18 g 0    cyclobenzaprine (FLEXERIL) 5 mg tablet Take 1 tablet (5 mg total) by mouth daily at bedtime as needed for muscle spasms (Patient not taking: Reported on 7/29/2022) 30 tablet 0    losartan (COZAAR) 25 mg tablet Take 2 tablets (50 mg total) by mouth daily 90 tablet 1    omeprazole (PriLOSEC OTC) 20 MG tablet Take 20 mg by mouth daily      gabapentin (NEURONTIN) 300 mg capsule Take 1 PO HS x 10 days, then 2 PO HS x 10 days, then 1 in AM and 2 PO HS  90 capsule 1     No current facility-administered medications for this visit  Allergies:      Allergies   Allergen Reactions    Ativan [Lorazepam]     Cymbalta [Duloxetine Hcl]     Erythromycin     Keflex [Cephalexin]     Penicillins     Amoxicillin Rash    Ampicillin Rash    Latex Rash      Physical Exam:     /92 Pulse 82   Ht 5' 3" (1 6 m)   Wt 93 4 kg (206 lb)   BMI 36 49 kg/m²     Physical Exam  Vitals and nursing note reviewed  Constitutional:       General: She is not in acute distress  Appearance: Normal appearance  She is well-developed  She is obese  She is not ill-appearing  HENT:      Head: Normocephalic and atraumatic  Right Ear: Tympanic membrane and ear canal normal       Left Ear: Tympanic membrane and ear canal normal       Nose: Nose normal  No congestion  Eyes:      Conjunctiva/sclera: Conjunctivae normal    Cardiovascular:      Rate and Rhythm: Normal rate and regular rhythm  Pulses: Normal pulses  Heart sounds: Normal heart sounds  No murmur heard  No friction rub  Pulmonary:      Effort: Pulmonary effort is normal  No respiratory distress  Breath sounds: Normal breath sounds  Abdominal:      Tenderness: There is no abdominal tenderness  Musculoskeletal:      Cervical back: Normal range of motion and neck supple  No rigidity  Skin:     General: Skin is warm and dry  Neurological:      Mental Status: She is alert and oriented to person, place, and time  Psychiatric:         Mood and Affect: Mood normal          Behavior: Behavior normal          Thought Content:  Thought content normal          Judgment: Judgment normal           DO Josh Cordoba 55 101

## 2022-07-29 ENCOUNTER — OFFICE VISIT (OUTPATIENT)
Dept: PAIN MEDICINE | Facility: CLINIC | Age: 57
End: 2022-07-29
Payer: COMMERCIAL

## 2022-07-29 VITALS
SYSTOLIC BLOOD PRESSURE: 136 MMHG | DIASTOLIC BLOOD PRESSURE: 88 MMHG | HEIGHT: 63 IN | WEIGHT: 207 LBS | BODY MASS INDEX: 36.68 KG/M2 | TEMPERATURE: 98.3 F | HEART RATE: 99 BPM

## 2022-07-29 DIAGNOSIS — M79.18 MYOFASCIAL PAIN SYNDROME: ICD-10-CM

## 2022-07-29 DIAGNOSIS — M70.62 GREATER TROCHANTERIC BURSITIS OF BOTH HIPS: ICD-10-CM

## 2022-07-29 DIAGNOSIS — M25.552 BILATERAL HIP PAIN: Primary | ICD-10-CM

## 2022-07-29 DIAGNOSIS — M25.551 BILATERAL HIP PAIN: Primary | ICD-10-CM

## 2022-07-29 DIAGNOSIS — M70.61 GREATER TROCHANTERIC BURSITIS OF BOTH HIPS: ICD-10-CM

## 2022-07-29 PROCEDURE — 99214 OFFICE O/P EST MOD 30 MIN: CPT | Performed by: ANESTHESIOLOGY

## 2022-07-29 NOTE — PROGRESS NOTES
Assessment  1  Bilateral hip pain    2  Greater trochanteric bursitis of both hips    3  Myofascial pain syndrome        Plan    17-year-old female with low back and lower extremity pain also reports left upper extremity pain underwent diagnostic medial branch blocks which was distinctly negative  Patient was evaluated by Dr Le Abreu, and I agree with assessment that she has right greater than left greater trochanteric bursitis  Will set her up for greater trochanteric bursa injection under fluoroscopic guidance that would serve both diagnostic and hopefully therapeutic purposes  With a negative medial branch block and non impressive lumbar MRI, and physical exam, I believe she has a great deal of myofascial pain, I believe consideration of trigger point therapy would be appropriate, will refer her to Dr Jeffrey Link for evaluation    My impressions and treatment recommendations were discussed in detail with the patient who verbalized understanding and had no further questions  Discharge instructions were provided  I personally saw and examined the patient and I agree with the above discussed plan of care  Orders Placed This Encounter   Procedures    FL spine and pain procedure     Standing Status:   Future     Standing Expiration Date:   7/29/2026     Order Specific Question:   Reason for Exam:     Answer:   bilateral GT bursa injection under fluro     Order Specific Question:   Is the patient pregnant? Answer:   Unknown     Order Specific Question:   Anticoagulant hold needed?      Answer:   no    Ambulatory Referral to Neurology     Standing Status:   Future     Standing Expiration Date:   7/29/2023     Referral Priority:   Routine     Referral Type:   Consult - AMB     Referral Reason:   Specialty Services Required     Referred to Provider:   Colletta Bras Requested Specialty:   Neurology     Number of Visits Requested:   1     Expiration Date:   7/29/2023         History of Present Venkat Gould is a 60 y o  female who was evaluated initially treated chiropractic treatment  Noted to have greater trochanteric bursitis she is presents for evaluation regarding chronic low back and lower extremity pain and a negative diagnostic medial branch block  She rates her pain as 10/10 on the visual analog scale throughout the day and constant describes achy sharp throbbing with shooting and numbing sensation she denies any weakness her pain almost completely interferes with daily living activities  Standing and walking increases symptoms rest does not significantly reduce her pain  Denies any bowel or bladder dysfunction  I have personally reviewed and/or updated the patient's past medical history, past surgical history, family history, social history, current medications, allergies, and vital signs today  Review of Systems   Constitutional: Negative for fever and unexpected weight change  HENT: Negative for trouble swallowing  Eyes: Negative for visual disturbance  Respiratory: Positive for shortness of breath  Negative for wheezing  Cardiovascular: Negative for chest pain and palpitations  Gastrointestinal: Positive for nausea  Negative for constipation, diarrhea and vomiting  Endocrine: Negative for cold intolerance, heat intolerance and polydipsia  Genitourinary: Negative for difficulty urinating and frequency  Musculoskeletal: Positive for gait problem  Negative for arthralgias, joint swelling (joint stiffness) and myalgias  Skin: Negative for rash  Neurological: Negative for dizziness, seizures, syncope, weakness and headaches  Hematological: Does not bruise/bleed easily  Psychiatric/Behavioral: Negative for dysphoric mood  All other systems reviewed and are negative        Patient Active Problem List   Diagnosis    Lumbar spondylosis       Past Medical History:   Diagnosis Date    GERD (gastroesophageal reflux disease)        Past Surgical History:   Procedure Laterality Date    CHOLECYSTECTOMY      HERNIA REPAIR      HYSTERECTOMY      NASAL SEPTUM SURGERY      TONSILLECTOMY         History reviewed  No pertinent family history  Social History     Occupational History    Not on file   Tobacco Use    Smoking status: Former Smoker    Smokeless tobacco: Never Used   Vaping Use    Vaping Use: Never used   Substance and Sexual Activity    Alcohol use: Yes    Drug use: Never    Sexual activity: Never       Current Outpatient Medications on File Prior to Visit   Medication Sig    acetaminophen (TYLENOL) 500 mg tablet Take 500 mg by mouth every 6 (six) hours as needed for mild pain    albuterol (Proventil HFA) 90 mcg/act inhaler Inhale 2 puffs every 6 (six) hours as needed for wheezing or shortness of breath    losartan (COZAAR) 25 mg tablet Take 2 tablets (50 mg total) by mouth daily    omeprazole (PriLOSEC OTC) 20 MG tablet Take 20 mg by mouth daily    cyclobenzaprine (FLEXERIL) 5 mg tablet Take 1 tablet (5 mg total) by mouth daily at bedtime as needed for muscle spasms (Patient not taking: Reported on 7/29/2022)     No current facility-administered medications on file prior to visit  Allergies   Allergen Reactions    Ativan [Lorazepam]     Cymbalta [Duloxetine Hcl]     Erythromycin     Keflex [Cephalexin]     Penicillins     Amoxicillin Rash    Ampicillin Rash    Latex Rash       Physical Exam    /88 (BP Location: Left arm, Patient Position: Sitting, Cuff Size: Standard)   Pulse 99   Temp 98 3 °F (36 8 °C)   Ht 5' 3" (1 6 m)   Wt 93 9 kg (207 lb)   BMI 36 67 kg/m²     Constitutional: normal, well developed, well nourished, alert, in no distress and non-toxic and no overt pain behavior   and obese  Eyes: anicteric  HEENT: grossly intact  Neck: supple, symmetric, trachea midline and no masses   Pulmonary:even and unlabored  Cardiovascular:No edema or pitting edema present  Skin:Normal without rashes or lesions and well hydrated  Psychiatric:Mood and affect appropriate  Neurologic:Cranial Nerves II-XII grossly intact  Musculoskeletal:normal, Difficulty going from sitting to standing sitting position; no obvious skin lesions or erythema lumbar sacral spine; mild tenderness in lumbar paravertebrals, no sacroiliac tenderness, positive greater trochanteric tenderness bilateral; deep tendon reflexes are diminished but symmetrical bilateral patellar and achilles; no focal motor deficit appreciated lower limbs; negative bilateral straight leg raising  Imaging  MRI LUMBAR SPINE WITHOUT CONTRAST  [facility] 5-12-22  INDICATION: X-ray lumbar spine 4/6/2022  COMPARISON: X-ray lumbar spine 4/6/2022   TECHNIQUE: Sagittal T1, sagittal T2, sagittal inversion recovery, axial T1 and axial T2, coronal T2    IMAGE QUALITY: Diagnostic   FINDINGS:   VERTEBRAL BODIES: There are 5 lumbar type vertebral bodies  There is preserved normal lumbar lordosis  Minimal retrolisthesis of L5 on S1  Levoscoliosis with apex at L3-4  There is Modic type III endplate degenerative change at level L4-5  There is L4 vertebral body hemangioma  SACRUM: Normal signal within the sacrum  No evidence of insufficiency or stress fracture  DISTAL CORD AND CONUS: Normal size and signal within the distal cord and conus  PARASPINAL SOFT TISSUES: Paraspinal soft tissues are unremarkable  Left renal upper pole cyst    LOWER THORACIC DISC SPACES: Normal disc height and signal  No disc herniation, canal stenosis or foraminal narrowing  LUMBAR DISC SPACES:   L1-L2: No disc bulge  Mild facet arthropathy  No canal or foraminal stenosis  L2-L3: Significant disc height loss  Left paracentral and foraminal disc protrusion with endplate marginal spurring  Mild facet arthropathy  Left eccentric mild canal narrowing  Mild left foraminal stenosis  L3-L4: No disc bulge  Mild facet arthropathy  No canal or foraminal stenosis  L4-L5: Disc height loss  Mild disc bulge   Bilateral facet arthropathy  Mild left lateral recess narrowing without significant canal stenosis  Mild left foraminal narrowing  L5-S1: No disc bulge  Mild facet arthropathy  No canal or foraminal stenosis  IMPRESSION:   Mild degenerative change as described, worse at level L2-3, without significant canal stenosis  Mild left foraminal narrowing at levels L2-3 and L4-5  BILATERAL HIPS AND PELVIS @  4-6-22  INDICATION: M25 559: Pain in unspecified hip  COMPARISON: 5/10/2020   VIEWS: XR HIPS BILATERAL 2 VW W PELVIS IF PERFORMED   FINDINGS:   The bony pelvis appears intact  Degenerative changes visualized lower lumbar spine  LEFT HIP:   There is no acute fracture or dislocation  Moderate left hip osteoarthritis is seen  No lytic or blastic osseous lesion  Soft tissues are unremarkable  RIGHT HIP:   There is no acute fracture or dislocation  Moderate right hip osteoarthritis is seen  No lytic or blastic osseous lesion  Soft tissues are unremarkable  IMPRESSION:   Moderate bilateral hip degenerative changes slightly worse than 5/10/2020  I have personally reviewed pertinent films in PACS and my interpretation is Moderate bilateral hip arthritis

## 2022-08-01 ENCOUNTER — HOSPITAL ENCOUNTER (OUTPATIENT)
Dept: RADIOLOGY | Facility: CLINIC | Age: 57
Discharge: HOME | End: 2022-08-01
Payer: COMMERCIAL

## 2022-08-01 VITALS
SYSTOLIC BLOOD PRESSURE: 153 MMHG | OXYGEN SATURATION: 91 % | DIASTOLIC BLOOD PRESSURE: 85 MMHG | TEMPERATURE: 97.7 F | RESPIRATION RATE: 18 BRPM | HEART RATE: 68 BPM

## 2022-08-01 DIAGNOSIS — M25.551 BILATERAL HIP PAIN: ICD-10-CM

## 2022-08-01 DIAGNOSIS — M70.61 GREATER TROCHANTERIC BURSITIS OF BOTH HIPS: ICD-10-CM

## 2022-08-01 DIAGNOSIS — M70.62 GREATER TROCHANTERIC BURSITIS OF BOTH HIPS: ICD-10-CM

## 2022-08-01 DIAGNOSIS — M25.552 BILATERAL HIP PAIN: ICD-10-CM

## 2022-08-01 PROCEDURE — A9585 GADOBUTROL INJECTION: HCPCS | Performed by: ANESTHESIOLOGY

## 2022-08-01 PROCEDURE — 77002 NEEDLE LOCALIZATION BY XRAY: CPT

## 2022-08-01 PROCEDURE — 20610 DRAIN/INJ JOINT/BURSA W/O US: CPT | Performed by: ANESTHESIOLOGY

## 2022-08-01 PROCEDURE — 77002 NEEDLE LOCALIZATION BY XRAY: CPT | Performed by: ANESTHESIOLOGY

## 2022-08-01 RX ORDER — METHYLPREDNISOLONE ACETATE 80 MG/ML
80 INJECTION, SUSPENSION INTRA-ARTICULAR; INTRALESIONAL; INTRAMUSCULAR; PARENTERAL; SOFT TISSUE ONCE
Status: COMPLETED | OUTPATIENT
Start: 2022-08-01 | End: 2022-08-01

## 2022-08-01 RX ADMIN — METHYLPREDNISOLONE ACETATE 80 MG: 80 INJECTION, SUSPENSION INTRA-ARTICULAR; INTRALESIONAL; INTRAMUSCULAR; SOFT TISSUE at 09:32

## 2022-08-01 RX ADMIN — LIDOCAINE HYDROCHLORIDE 4 ML: 20 INJECTION, SOLUTION EPIDURAL; INFILTRATION; INTRACAUDAL at 09:32

## 2022-08-01 RX ADMIN — GADOBUTROL 2 ML: 604.72 INJECTION INTRAVENOUS at 09:32

## 2022-08-01 NOTE — H&P
History of Present Illness:  The patient is a 60 y o  female who presents with complaints of bilateral hip pain    Patient Active Problem List   Diagnosis    Lumbar spondylosis    Bilateral hip pain    Greater trochanteric bursitis of both hips       Past Medical History:   Diagnosis Date    GERD (gastroesophageal reflux disease)        Past Surgical History:   Procedure Laterality Date    CHOLECYSTECTOMY      HERNIA REPAIR      HYSTERECTOMY      NASAL SEPTUM SURGERY      TONSILLECTOMY           Current Outpatient Medications:     acetaminophen (TYLENOL) 500 mg tablet, Take 500 mg by mouth every 6 (six) hours as needed for mild pain, Disp: , Rfl:     albuterol (Proventil HFA) 90 mcg/act inhaler, Inhale 2 puffs every 6 (six) hours as needed for wheezing or shortness of breath, Disp: 18 g, Rfl: 0    cyclobenzaprine (FLEXERIL) 5 mg tablet, Take 1 tablet (5 mg total) by mouth daily at bedtime as needed for muscle spasms (Patient not taking: Reported on 7/29/2022), Disp: 30 tablet, Rfl: 0    losartan (COZAAR) 25 mg tablet, Take 2 tablets (50 mg total) by mouth daily, Disp: 90 tablet, Rfl: 1    omeprazole (PriLOSEC OTC) 20 MG tablet, Take 20 mg by mouth daily, Disp: , Rfl:     Current Facility-Administered Medications:     Gadobutrol injection (SINGLE-DOSE) SOLN 2 mL, 2 mL, Other, Once, Camilo Mccormick DO    lidocaine (PF) (XYLOCAINE-MPF) 2 % injection 5 mL, 5 mL, Intra-articular, Once, Camilo Mccormick DO    methylPREDNISolone acetate (DEPO-MEDROL) injection 80 mg, 80 mg, Intra-articular, Once, Camilo Mccormick DO    Allergies   Allergen Reactions    Ativan [Lorazepam]     Cymbalta [Duloxetine Hcl]     Erythromycin     Keflex [Cephalexin]     Penicillins     Amoxicillin Rash    Ampicillin Rash    Latex Rash       Physical Exam: General: Awake, Alert, Oriented x 3, Mood and affect appropriate  Respiratory: Respirations even and unlabored  Cardiovascular: Peripheral pulses intact; no edema  Musculoskeletal Exam: Decreased range of motion lumbar spine    ASA Score: III         Assessment:   1  Bilateral hip pain    2   Greater trochanteric bursitis of both hips        Plan: bilateral GT bursa injection under fluro

## 2022-08-01 NOTE — DISCHARGE INSTRUCTIONS
Do not apply heat to any area that is numb  If you have discomfort or soreness at the injection site, you may apply ice today, 20 minutes on and 20 minutes off  Tomorrow you may use ice or warm, moist heat  Do not apply ice or heat directly to the skin  If you experience severe shortness of breath, go to the Emergency Room  You may have numbness for several hours from the local anesthetic  Please use caution and common sense, especially with weight-bearing activities  You may have an increase or change in the discomfort for 36-48 hours after your treatment  Apply ice and continue with any pain medicine you have been prescribed  Do not do anything strenuous today  You may shower, but no tub baths or hot tubs today  You may resume your normal activities tomorrow, but do not overdo it  Resume normal activities slowly when you are feeling better  If you experience redness, drainage or swelling at the injection site, or if you develop a fever above 100 degrees, please call [facility] at [phone] or go to the Emergency Room  Continue to take all routine medicines prescribed by your primary care physician unless otherwise instructed by our staff  Most blood thinners should be started again according to your regularly scheduled dosing  If you have any questions, please give our office a call  As no general anesthesia was used in today's procedure, you should not experience any side effects related to anesthesia  If you have a problem specifically related to your procedure, please call our office at [phone]  Problems not related to your procedure should be directed to your primary care physician

## 2022-08-03 ENCOUNTER — TELEPHONE (OUTPATIENT)
Dept: NEUROLOGY | Facility: CLINIC | Age: 57
End: 2022-08-03

## 2022-08-08 ENCOUNTER — TELEPHONE (OUTPATIENT)
Dept: PAIN MEDICINE | Facility: CLINIC | Age: 57
End: 2022-08-08

## 2022-08-08 DIAGNOSIS — M25.551 BILATERAL HIP PAIN: Primary | ICD-10-CM

## 2022-08-08 DIAGNOSIS — M16.0 PRIMARY OSTEOARTHRITIS OF BOTH HIPS: ICD-10-CM

## 2022-08-08 DIAGNOSIS — M25.552 BILATERAL HIP PAIN: Primary | ICD-10-CM

## 2022-08-08 DIAGNOSIS — G89.4 CHRONIC PAIN SYNDROME: ICD-10-CM

## 2022-08-08 NOTE — TELEPHONE ENCOUNTER
Pt reports 30% improvement post inj on the right side  She said her left side its hard to walk   She's also having pain in the groin area  Pain level 8/10  Pt aware I will call next week for an update    bilateral GT bursa injection under fluro 8/1, No F/u

## 2022-08-15 RX ORDER — GABAPENTIN 300 MG/1
CAPSULE ORAL
Qty: 90 CAPSULE | Refills: 1 | Status: SHIPPED | OUTPATIENT
Start: 2022-08-15 | End: 2022-10-24

## 2022-08-15 NOTE — TELEPHONE ENCOUNTER
Can you please call the patient and advise her that looking at her hip x-rays, it does show moderate b/l OA  This could definitely explain the groin pain  At this point, she may want to consider a consultation with orthopedics for further evaluation and possible tx/surgical options  We could also look at starting her on a neuropathic medication such as Gabapentin or Cymbalta to see if that helps to manage her pain  Let me know how she would like to proceed  Thank you

## 2022-08-15 NOTE — TELEPHONE ENCOUNTER
I put in a referral to orthopedics down stairs, please provide that contact information  Since it appears she has an allergy to Cymbalta or Duloxetine, we will try Gabapentin 100 mg at bed time x 10 days, then 200 mg HS x 10 days, then 100 mg in AM and 200 HS  Please let her know that this is a very low and subtherapeutic dose of gabapentin and that we are slowly and steadily increasing the medication over time as per her request with the hope that it helps to provide some relief, not all, but more moderate stable relief with minimal to no side effects  Please review with the patient that she should not drive or operate machinery until she sees how the medication of affects her and she should not discontinue this medication abruptly but rather call our office if she has any side effects or issues  I did send a 30 day supply with a refill  The patient is schedule follow-up office visit in 6 weeks for medication follow-up and refill with the 1st available nurse practitioner  Thank you

## 2022-08-15 NOTE — TELEPHONE ENCOUNTER
S/w pt, informed her of below  Pt is agreeable to Ortho referral, please place  Pt also agreeable to starting a neuropathic medication, but would like a low dose if possible as she does not like taking meds

## 2022-08-15 NOTE — TELEPHONE ENCOUNTER
Patient states that she has felt horrible since last week  Patient states that she has a pain scale of 9/10 with 10% improvement  Patient states that she has a sharp pain in her groin area  Patient states that she often has a gait problem and has to stop and rest before walking  Patient finds it hard to walk because of the pain

## 2022-08-16 NOTE — TELEPHONE ENCOUNTER
Patient states her low back bilateral hip / legs pain is worsening; limiting her mobility & she feels unstable as if she can fall at times  Her next OVS is scheduled on 9/29/22  She's requesting an earlier appointment availability is possible, or [facility]   Please reach out to her at # [phone], thx

## 2022-08-16 NOTE — TELEPHONE ENCOUNTER
S/w pt, c/o significant pain, difficulty walking and poor bladder control  Per pt, she did start gabapentin last night and has been referred to ortho but has no contact info  Pt confirmed that she has an urge to urinate but looses control of her bladder unknowingly  Advised pt, she should go to the ER for evaluation  Pt stated that she does not want to go to the ER for fear of being admitted  Advised pt, that if her pain is beyond her control she should to to the ER for pain control and possible neurologic deficit  The alternative is to schedule a cons with ortho - contact info provided and allow more time for gabapentin  Advised pt that it could take weeks or longer to get relief with this medication  Pt verbalized understanding  Stated that she will cb if additional questions or issues arise

## 2022-08-20 LAB — COLOGUARD RESULT REPORTABLE: NEGATIVE

## 2022-09-01 ENCOUNTER — OFFICE VISIT (OUTPATIENT)
Dept: OBGYN CLINIC | Facility: CLINIC | Age: 57
End: 2022-09-01
Payer: COMMERCIAL

## 2022-09-01 VITALS
DIASTOLIC BLOOD PRESSURE: 80 MMHG | BODY MASS INDEX: 36.68 KG/M2 | WEIGHT: 207 LBS | HEIGHT: 63 IN | SYSTOLIC BLOOD PRESSURE: 128 MMHG

## 2022-09-01 DIAGNOSIS — M16.0 PRIMARY OSTEOARTHRITIS OF BOTH HIPS: Primary | ICD-10-CM

## 2022-09-01 DIAGNOSIS — M70.61 GREATER TROCHANTERIC BURSITIS OF BOTH HIPS: ICD-10-CM

## 2022-09-01 DIAGNOSIS — M70.62 GREATER TROCHANTERIC BURSITIS OF BOTH HIPS: ICD-10-CM

## 2022-09-01 PROCEDURE — 99203 OFFICE O/P NEW LOW 30 MIN: CPT | Performed by: ORTHOPAEDIC SURGERY

## 2022-09-01 NOTE — LETTER
September 1, 2022     Lawson Samuel 20  [address]  [address] [place] [address] [address] [id number]    Patient: Fernando Lam   YOB: 1965   Date of Visit: 9/1/2022       Dear Dr Lamont Felix:    Thank you for referring Diego Neves to me for evaluation  Below are my notes for this consultation  If you have questions, please do not hesitate to call me  I look forward to following your patient along with you           Sincerely,        Jeremy Menjivar MD        CC: No Recipients
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
## 2022-09-16 ENCOUNTER — HOSPITAL ENCOUNTER (OUTPATIENT)
Dept: RADIOLOGY | Facility: CLINIC | Age: 57
Discharge: HOME | End: 2022-09-16
Payer: COMMERCIAL

## 2022-09-16 VITALS
DIASTOLIC BLOOD PRESSURE: 88 MMHG | RESPIRATION RATE: 20 BRPM | OXYGEN SATURATION: 95 % | HEART RATE: 78 BPM | SYSTOLIC BLOOD PRESSURE: 135 MMHG | TEMPERATURE: 97.7 F

## 2022-09-16 DIAGNOSIS — M16.0 PRIMARY OSTEOARTHRITIS OF BOTH HIPS: ICD-10-CM

## 2022-09-16 PROCEDURE — 20610 DRAIN/INJ JOINT/BURSA W/O US: CPT | Performed by: ANESTHESIOLOGY

## 2022-09-16 PROCEDURE — 77002 NEEDLE LOCALIZATION BY XRAY: CPT

## 2022-09-16 PROCEDURE — 77002 NEEDLE LOCALIZATION BY XRAY: CPT | Performed by: ANESTHESIOLOGY

## 2022-09-16 RX ORDER — METHYLPREDNISOLONE ACETATE 80 MG/ML
80 INJECTION, SUSPENSION INTRA-ARTICULAR; INTRALESIONAL; INTRAMUSCULAR; PARENTERAL; SOFT TISSUE ONCE
Status: COMPLETED | OUTPATIENT
Start: 2022-09-16 | End: 2022-09-16

## 2022-09-16 RX ADMIN — IOHEXOL 1 ML: 300 INJECTION, SOLUTION INTRAVENOUS at 15:13

## 2022-09-16 RX ADMIN — METHYLPREDNISOLONE ACETATE 80 MG: 80 INJECTION, SUSPENSION INTRA-ARTICULAR; INTRALESIONAL; INTRAMUSCULAR; SOFT TISSUE at 15:13

## 2022-09-16 RX ADMIN — LIDOCAINE HYDROCHLORIDE 3 ML: 20 INJECTION, SOLUTION EPIDURAL; INFILTRATION; INTRACAUDAL at 15:13

## 2022-09-16 NOTE — H&P
History of Present Illness: The patient is a 59 y o  female who presents with complaints of right hip pain      Patient Active Problem List   Diagnosis    Lumbar spondylosis    Bilateral hip pain    Greater trochanteric bursitis of both hips    Primary osteoarthritis of both hips       Past Medical History:   Diagnosis Date    GERD (gastroesophageal reflux disease)        Past Surgical History:   Procedure Laterality Date    CHOLECYSTECTOMY      HERNIA REPAIR      HYSTERECTOMY      NASAL SEPTUM SURGERY      TONSILLECTOMY           Current Outpatient Medications:     acetaminophen (TYLENOL) 500 mg tablet, Take 500 mg by mouth every 6 (six) hours as needed for mild pain, Disp: , Rfl:     albuterol (Proventil HFA) 90 mcg/act inhaler, Inhale 2 puffs every 6 (six) hours as needed for wheezing or shortness of breath, Disp: 18 g, Rfl: 0    cyclobenzaprine (FLEXERIL) 5 mg tablet, Take 1 tablet (5 mg total) by mouth daily at bedtime as needed for muscle spasms (Patient not taking: Reported on 7/29/2022), Disp: 30 tablet, Rfl: 0    gabapentin (NEURONTIN) 300 mg capsule, Take 1 PO HS x 10 days, then 2 PO HS x 10 days, then 1 in AM and 2 PO HS , Disp: 90 capsule, Rfl: 1    losartan (COZAAR) 25 mg tablet, Take 2 tablets (50 mg total) by mouth daily, Disp: 90 tablet, Rfl: 1    omeprazole (PriLOSEC OTC) 20 MG tablet, Take 20 mg by mouth daily, Disp: , Rfl:     Current Facility-Administered Medications:     iohexol (OMNIPAQUE) 300 mg/mL injection 50 mL, 50 mL, Intra-articular, Once, Camilo Mccormick DO    lidocaine (PF) (XYLOCAINE-MPF) 2 % injection 5 mL, 5 mL, Intra-articular, Once, Camilo Mccormick DO    methylPREDNISolone acetate (DEPO-MEDROL) injection 80 mg, 80 mg, Intra-articular, Once, Camilo Mccormick DO    Allergies   Allergen Reactions    Ativan [Lorazepam]     Cymbalta [Duloxetine Hcl]     Erythromycin     Keflex [Cephalexin]     Penicillins     Amoxicillin Rash    Ampicillin Rash    Latex Rash       Physical Exam:   General: Awake, Alert, Oriented x 3, Mood and affect appropriate  Respiratory: Respirations even and unlabored  Cardiovascular: Peripheral pulses intact; no edema  Musculoskeletal Exam:  Decreased range of motion right hip    ASA Score: II         Assessment:   1   Primary osteoarthritis of both hips        Plan: Right hip inj

## 2022-09-16 NOTE — DISCHARGE INSTRUCTIONS
Steroid Joint Injection   WHAT YOU NEED TO KNOW:   A steroid joint injection is a procedure to inject steroid medicine into a joint  Steroid medicine decreases pain and inflammation  The injection may also contain an anesthetic (numbing medicine) to decrease pain  It may be done to treat conditions such as arthritis, gout, or carpal tunnel syndrome  The injections may be given in your knee, ankle, shoulder, elbow, wrist, ankle or sacroiliac joint  Do not apply heat to any area that is numb  If you have discomfort or soreness at the injection site, you may apply ice today, 20 minutes on and 20 minutes off  Tomorrow you may use ice or warm, moist heat  Do not apply ice or heat directly to the skin  You may have an increase or change in the discomfort for 36-48 hours after your treatment  Apply ice and continue with any pain medicine you have been prescribed  Do not do anything strenuous today  You may shower, but no tub baths or hot tubs today  You may resume your normal activities tomorrow, but do not overdo it  Resume normal activities slowly when you are feeling better  If you experience redness, drainage or swelling at the injection site, or if you develop a fever above 100 degrees, please call [facility] at [phone] or go to the Emergency Room  Continue to take all routine medicines prescribed by your primary care physician unless otherwise instructed by our staff  Most blood thinners should be started again according to your regularly scheduled dosing  If you have any questions, please give our office a call  As no general anesthesia was used in today's procedure, you should not experience any side effects related to anesthesia  If you are diabetic, the steroids used in today's injection may temporarily increase your blood sugar levels after the first few days after your injection   Please keep a close eye on your sugars and alert the doctor who manages your diabetes if your sugars are significantly high from your baseline or you are symptomatic  If you have a problem specifically related to your procedure, please call our office at [phone]  Problems not related to your procedure should be directed to your primary care physician

## 2022-09-23 ENCOUNTER — TELEPHONE (OUTPATIENT)
Dept: PAIN MEDICINE | Facility: CLINIC | Age: 57
End: 2022-09-23

## 2022-09-23 NOTE — TELEPHONE ENCOUNTER
Pt reports no improvement post inj   Pain level 10/10  She said she can barely walk       Right hip inj 9/16,  F/u 9/29

## 2022-09-30 ENCOUNTER — HOSPITAL ENCOUNTER (OUTPATIENT)
Dept: RADIOLOGY | Facility: CLINIC | Age: 57
Discharge: HOME | End: 2022-09-30
Payer: COMMERCIAL

## 2022-09-30 VITALS
TEMPERATURE: 97.2 F | DIASTOLIC BLOOD PRESSURE: 80 MMHG | SYSTOLIC BLOOD PRESSURE: 126 MMHG | RESPIRATION RATE: 18 BRPM | HEART RATE: 83 BPM | OXYGEN SATURATION: 94 %

## 2022-09-30 DIAGNOSIS — M16.0 PRIMARY OSTEOARTHRITIS OF BOTH HIPS: ICD-10-CM

## 2022-09-30 PROCEDURE — 77002 NEEDLE LOCALIZATION BY XRAY: CPT

## 2022-09-30 PROCEDURE — 20610 DRAIN/INJ JOINT/BURSA W/O US: CPT | Performed by: ANESTHESIOLOGY

## 2022-09-30 PROCEDURE — 77002 NEEDLE LOCALIZATION BY XRAY: CPT | Performed by: ANESTHESIOLOGY

## 2022-09-30 RX ORDER — METHYLPREDNISOLONE ACETATE 80 MG/ML
80 INJECTION, SUSPENSION INTRA-ARTICULAR; INTRALESIONAL; INTRAMUSCULAR; PARENTERAL; SOFT TISSUE ONCE
Status: COMPLETED | OUTPATIENT
Start: 2022-09-30 | End: 2022-09-30

## 2022-09-30 RX ADMIN — METHYLPREDNISOLONE ACETATE 80 MG: 80 INJECTION, SUSPENSION INTRA-ARTICULAR; INTRALESIONAL; INTRAMUSCULAR; SOFT TISSUE at 14:37

## 2022-09-30 RX ADMIN — LIDOCAINE HYDROCHLORIDE 2 ML: 20 INJECTION, SOLUTION EPIDURAL; INFILTRATION; INTRACAUDAL at 14:37

## 2022-09-30 RX ADMIN — IOHEXOL 1 ML: 300 INJECTION, SOLUTION INTRAVENOUS at 14:37

## 2022-09-30 NOTE — H&P
History of Present Illness:  The patient is a 59 y o  female who presents with complaints of hip pain    Past Medical History:   Diagnosis Date    GERD (gastroesophageal reflux disease)        Past Surgical History:   Procedure Laterality Date    CHOLECYSTECTOMY      HERNIA REPAIR      HYSTERECTOMY      NASAL SEPTUM SURGERY      TONSILLECTOMY           Current Outpatient Medications:     acetaminophen (TYLENOL) 500 mg tablet, Take 500 mg by mouth every 6 (six) hours as needed for mild pain, Disp: , Rfl:     albuterol (Proventil HFA) 90 mcg/act inhaler, Inhale 2 puffs every 6 (six) hours as needed for wheezing or shortness of breath, Disp: 18 g, Rfl: 0    cyclobenzaprine (FLEXERIL) 5 mg tablet, Take 1 tablet (5 mg total) by mouth daily at bedtime as needed for muscle spasms (Patient not taking: Reported on 7/29/2022), Disp: 30 tablet, Rfl: 0    gabapentin (NEURONTIN) 300 mg capsule, Take 1 PO HS x 10 days, then 2 PO HS x 10 days, then 1 in AM and 2 PO HS , Disp: 90 capsule, Rfl: 1    losartan (COZAAR) 25 mg tablet, Take 2 tablets (50 mg total) by mouth daily, Disp: 90 tablet, Rfl: 1    omeprazole (PriLOSEC OTC) 20 MG tablet, Take 20 mg by mouth daily, Disp: , Rfl:     Current Facility-Administered Medications:     iohexol (OMNIPAQUE) 300 mg/mL injection 50 mL, 50 mL, Intra-articular, Once, Camilo Mccormick DO    lidocaine (PF) (XYLOCAINE-MPF) 2 % injection 5 mL, 5 mL, Intra-articular, Once, Camilo Mccormick DO    methylPREDNISolone acetate (DEPO-MEDROL) injection 80 mg, 80 mg, Intra-articular, Once, Chrystie Antonio, DO    Allergies   Allergen Reactions    Ativan [Lorazepam]     Cymbalta [Duloxetine Hcl]     Erythromycin     Keflex [Cephalexin]     Penicillins     Amoxicillin Rash    Ampicillin Rash    Latex Rash       Physical Exam:   Vitals:    09/30/22 1416   BP: 125/79   Pulse: 88   Resp: 20   Temp: (!) 97 2 °F (36 2 °C)   SpO2: 98%     General: Awake, Alert, Oriented x 3, Mood and affect appropriate  Respiratory: Respirations even and unlabored  Cardiovascular: Peripheral pulses intact; no edema  Musculoskeletal Exam:  Decreased range of motion l left hip    ASA Score: III    Patient/Chart Verification  Patient ID Verified: Verbal  ID Band Applied: No  Consents Confirmed: Procedural  H&P( within 30 days) Verified: To be obtained in the Pre-Procedure area  Interval H&P(within 24 hr) Complete (required for Outpatients and Surgery Admit only): To be obtained in the Pre-Procedure area  Allergies Reviewed: Yes  Anticoag/NSAID held?: No  Currently on antibiotics?: No  Pre-op Lab/Test Results Available: N/A  Pregnancy Lab Collected: N/A comment    Assessment:   1   Primary osteoarthritis of both hips        Plan: Left hip inj

## 2022-09-30 NOTE — DISCHARGE INSTRUCTIONS
Steroid Joint Injection   WHAT YOU NEED TO KNOW:   A steroid joint injection is a procedure to inject steroid medicine into a joint  Steroid medicine decreases pain and inflammation  The injection may also contain an anesthetic (numbing medicine) to decrease pain  It may be done to treat conditions such as arthritis, gout, or carpal tunnel syndrome  The injections may be given in your knee, ankle, shoulder, elbow, wrist, ankle or sacroiliac joint  Do not apply heat to any area that is numb  If you have discomfort or soreness at the injection site, you may apply ice today, 20 minutes on and 20 minutes off  Tomorrow you may use ice or warm, moist heat  Do not apply ice or heat directly to the skin  You may have an increase or change in the discomfort for 36-48 hours after your treatment  Apply ice and continue with any pain medicine you have been prescribed  Do not do anything strenuous today  You may shower, but no tub baths or hot tubs today  You may resume your normal activities tomorrow, but do not overdo it  Resume normal activities slowly when you are feeling better  If you experience redness, drainage or swelling at the injection site, or if you develop a fever above 100 degrees, please call [facility] at [phone] or go to the Emergency Room  Continue to take all routine medicines prescribed by your primary care physician unless otherwise instructed by our staff  Most blood thinners should be started again according to your regularly scheduled dosing  If you have any questions, please give our office a call  As no general anesthesia was used in today's procedure, you should not experience any side effects related to anesthesia  If you are diabetic, the steroids used in today's injection may temporarily increase your blood sugar levels after the first few days after your injection   Please keep a close eye on your sugars and alert the doctor who manages your diabetes if your sugars are significantly high from your baseline or you are symptomatic  If you have a problem specifically related to your procedure, please call our office at [phone]  Problems not related to your procedure should be directed to your primary care physician

## 2022-10-07 ENCOUNTER — TELEPHONE (OUTPATIENT)
Dept: PAIN MEDICINE | Facility: CLINIC | Age: 57
End: 2022-10-07

## 2022-10-07 NOTE — TELEPHONE ENCOUNTER
I do not believe it asked to do with the injection may want to follow-up with PCP   Pt w/o abd pain at present. Tolerated PO. Aware of CT findings. Will f/u with PMD this week. Given allergy to PCN and intolerance to Flagyl per guidelines Moxiflox can be used as alternative. Called pts pharmacy and confirmed it is available in store for . Daughter at bedside. Copy of results provided. Patient stable for discharge.  Follow up instructions given, return to ED precautions reviewed. Importance of follow up emphasized, patient verbalized understanding.  All questions answered. Anh Galindo PA-C

## 2022-10-07 NOTE — TELEPHONE ENCOUNTER
Pt reports no improvement post inj   Pain level 8-9/10  She said after both injections shes had severe abdominal pain and cramping, she wants to know if it has to do with the injection.  Pt aware I will call next week for an update    Left hip inj 9/30 , 11/3 F/u

## 2022-10-07 NOTE — TELEPHONE ENCOUNTER
S/w pt, informed her of SL's message. RN reminded pt that injection can take up to 2 weeks for full effects. Please call pt on 10/14 for 2 week update

## 2022-10-24 ENCOUNTER — OFFICE VISIT (OUTPATIENT)
Dept: FAMILY MEDICINE CLINIC | Facility: HOSPITAL | Age: 57
End: 2022-10-24

## 2022-10-24 VITALS
WEIGHT: 206 LBS | HEIGHT: 63 IN | DIASTOLIC BLOOD PRESSURE: 108 MMHG | OXYGEN SATURATION: 99 % | SYSTOLIC BLOOD PRESSURE: 160 MMHG | BODY MASS INDEX: 36.5 KG/M2 | HEART RATE: 90 BPM

## 2022-10-24 DIAGNOSIS — Z23 NEED FOR INFLUENZA VACCINATION: Primary | ICD-10-CM

## 2022-10-24 DIAGNOSIS — R42 VERTIGO: ICD-10-CM

## 2022-10-24 DIAGNOSIS — R03.0 ELEVATED BLOOD PRESSURE READING: ICD-10-CM

## 2022-10-24 DIAGNOSIS — R42 EPISODIC LIGHTHEADEDNESS: ICD-10-CM

## 2022-10-24 DIAGNOSIS — I10 PRIMARY HYPERTENSION: ICD-10-CM

## 2022-10-24 DIAGNOSIS — K21.9 GASTROESOPHAGEAL REFLUX DISEASE WITHOUT ESOPHAGITIS: ICD-10-CM

## 2022-10-24 LAB — SL AMB POCT HEMOGLOBIN AIC: 4.9 (ref ?–6.5)

## 2022-10-24 RX ORDER — LOSARTAN POTASSIUM 25 MG/1
50 TABLET ORAL DAILY
Qty: 30 TABLET | Refills: 0 | Status: SHIPPED | OUTPATIENT
Start: 2022-10-24 | End: 2022-10-24

## 2022-10-24 RX ORDER — OMEPRAZOLE 40 MG/1
40 CAPSULE, DELAYED RELEASE ORAL DAILY
Qty: 90 CAPSULE | Refills: 1 | Status: SHIPPED | OUTPATIENT
Start: 2022-10-24

## 2022-10-24 RX ORDER — LOSARTAN POTASSIUM AND HYDROCHLOROTHIAZIDE 12.5; 1 MG/1; MG/1
1 TABLET ORAL DAILY
Qty: 30 TABLET | Refills: 5 | Status: SHIPPED | OUTPATIENT
Start: 2022-10-24

## 2022-10-24 NOTE — PROGRESS NOTES
[facility],     Assessment/Plan:      Diagnosis ICD-10-CM Associated Orders   1  Need for influenza vaccination  Z23 influenza vaccine, quadrivalent, recombinant, PF, 0 5 mL, for patients 18 yr+ (FLUBLOK)      2  Elevated blood pressure reading  R03 0       3  Gastroesophageal reflux disease without esophagitis  K21 9 omeprazole (PriLOSEC) 40 MG capsule     Ambulatory Referral to Gastroenterology      4  Episodic lightheadedness  R42 POCT hemoglobin A1c      5  Vertigo  R42 POCT hemoglobin A1c      6  Primary hypertension  I10 losartan-hydrochlorothiazide (HYZAAR) 100-12 5 MG per tablet        • A1c 4 9 today  Losartan 50 mg daily to 100 mg with HCTZ 12 5 mg daily  • Flu vaccine given today  • Referral placed for Gastroenterology given her concerns about reflux  F/U in 6 months  • Patient may call or return to office with any questions or concerns  ______________________________________________________________________  Subjective:     Patient ID: Orville Lopez is a 59 y o  female  HPI  Orville Lopez  Chief Complaint   Patient presents with   • Follow-up     3 month    • Headache   • Dizziness   • Hypertension   • Heartburn     Seeing Dr Nita Koehler for hips & shoulders struggling with pain, getting injections  No relief provided from - Diagnostic Lumbar Medial Branch Block of bilateral L3, L4, L5 dorsal ramus under fluoroscopic guidance  Trochanteric bursa injections - no big relief  Hip injections 2 weeks apart, had episodes of severe cramping & diarrhea  Hx of IBS  - Methylprednisolone with 2% Lidocaine was then injected  Shoulders also painful  Horrible GERD  Prilosec OTC not helping anymore, breakthroughs  Worse with weight gain  BP now elevated - sometimes 120/80, but this am with DZ yazan, 163/97  Normal 120-150 in the past  Depressed and lazy 2/2 fiance's passing     Wt Readings from Last 3 Encounters:   11/25/22 94 8 kg (209 lb)   11/17/22 94 8 kg (209 lb)   11/03/22 93 kg (205 lb)     BMI Counseling: Body mass index is 36 49 kg/m²  The BMI is above normal  Nutrition recommendations include decreasing portion sizes and encouraging healthy choices of fruits and vegetables  Exercise recommendations include moderate physical activity 150 minutes/week and exercising 3-5 times per week  Rationale for BMI follow-up plan is due to patient being overweight or obese  Depression Screening and Follow-up Plan: Patient was screened for depression during today's encounter  They screened negative with a PHQ-2 score of 0  The following portions of the patient's history were reviewed and updated as appropriate: allergies, current medications, past medical history, and problem list     Review of Systems   Constitutional: Negative for chills and fever  HENT: Positive for postnasal drip (on/off mild)  Negative for congestion and rhinorrhea  Eyes: Negative for pain and redness  Respiratory: Positive for shortness of breath (mild at times)  Negative for cough  Cardiovascular: Negative for chest pain and palpitations  Gastrointestinal: Positive for nausea  Negative for constipation, diarrhea and vomiting  Reflux, discomfort in stomach  Endocrine: Positive for polydipsia (more recently)  Negative for polyphagia and polyuria  Genitourinary: Positive for frequency (some genetics )  Negative for dysuria and urgency  Musculoskeletal: Positive for arthralgias and back pain  Neurological: Positive for dizziness (vertigo) and light-headedness  Negative for headaches  Objective:      Vitals:    10/24/22 1239   BP: (!) 160/108   Pulse: 90   SpO2: 99%      Physical Exam  Vitals reviewed  Constitutional:       General: She is not in acute distress  Appearance: Normal appearance  She is well-developed  She is obese  She is not ill-appearing  HENT:      Head: Normocephalic and atraumatic  Eyes:      General: No scleral icterus  Right eye: No discharge  Left eye: No discharge  Cardiovascular:      Rate and Rhythm: Normal rate and regular rhythm  Pulses: Normal pulses  Heart sounds: Normal heart sounds  No murmur heard  Pulmonary:      Effort: Pulmonary effort is normal  No respiratory distress  Breath sounds: Normal breath sounds  No stridor  No wheezing  Musculoskeletal:         General: Tenderness (b/l hips & low back  ) present  Cervical back: Normal range of motion  Skin:     General: Skin is warm  Neurological:      Mental Status: She is alert and oriented to person, place, and time  Gait: Gait normal    Psychiatric:         Mood and Affect: Mood normal          Behavior: Behavior normal          Thought Content: Thought content normal          Judgment: Judgment normal          Portions of the record may have been created with voice recognition software  Occasional wrong word or "sound alike" substitutions may have occurred due to the inherent limitations of voice recognition software  Please review the chart carefully and recognize, using context, where substitutions/typographical errors may have occurred

## 2022-10-28 ENCOUNTER — TELEPHONE (OUTPATIENT)
Dept: FAMILY MEDICINE CLINIC | Facility: HOSPITAL | Age: 57
End: 2022-10-28

## 2022-10-28 NOTE — TELEPHONE ENCOUNTER
Has been using hydrocortisone on her abdomen -- it is extremely blood pressure came down, but pulse is in the 100's

## 2022-10-31 NOTE — TELEPHONE ENCOUNTER
Since she was started on Bpmed with water pill, and increased omeprzole, she has ad a red, itchy bumpy rash oll over lower abdomen, left breast and right arm fold  Not sure which med may be causing it  Using Hyrdrocortisone cream w/no relief  Also Sunday started w/N/V/D, fever, chills  Did receive flu shot at ov  Please advise

## 2022-11-03 ENCOUNTER — OFFICE VISIT (OUTPATIENT)
Dept: PAIN MEDICINE | Facility: CLINIC | Age: 57
End: 2022-11-03

## 2022-11-03 VITALS
DIASTOLIC BLOOD PRESSURE: 82 MMHG | TEMPERATURE: 98.5 F | WEIGHT: 205 LBS | BODY MASS INDEX: 36.32 KG/M2 | SYSTOLIC BLOOD PRESSURE: 130 MMHG | HEART RATE: 83 BPM | HEIGHT: 63 IN

## 2022-11-03 DIAGNOSIS — M70.62 GREATER TROCHANTERIC BURSITIS OF BOTH HIPS: ICD-10-CM

## 2022-11-03 DIAGNOSIS — M25.552 BILATERAL HIP PAIN: ICD-10-CM

## 2022-11-03 DIAGNOSIS — M25.551 BILATERAL HIP PAIN: ICD-10-CM

## 2022-11-03 DIAGNOSIS — M16.0 PRIMARY OSTEOARTHRITIS OF BOTH HIPS: ICD-10-CM

## 2022-11-03 DIAGNOSIS — M70.61 GREATER TROCHANTERIC BURSITIS OF BOTH HIPS: ICD-10-CM

## 2022-11-03 DIAGNOSIS — G89.4 CHRONIC PAIN SYNDROME: Primary | ICD-10-CM

## 2022-11-03 DIAGNOSIS — G89.29 CHRONIC BILATERAL LOW BACK PAIN WITHOUT SCIATICA: ICD-10-CM

## 2022-11-03 DIAGNOSIS — M54.50 CHRONIC BILATERAL LOW BACK PAIN WITHOUT SCIATICA: ICD-10-CM

## 2022-11-03 DIAGNOSIS — M47.816 LUMBAR SPONDYLOSIS: ICD-10-CM

## 2022-11-03 DIAGNOSIS — R52 DIFFUSE PAIN: ICD-10-CM

## 2022-11-03 NOTE — PROGRESS NOTES
Assessment:  1  Chronic pain syndrome    2  Diffuse pain    3  Chronic bilateral low back pain without sciatica    4  Bilateral hip pain    5  Primary osteoarthritis of both hips    6  Greater trochanteric bursitis of both hips    7  Lumbar spondylosis        Plan:  While the patient was in the office today, I did have a thorough conversation with the patient regarding their chronic pain syndrome, symptoms, medication regimen, and treatment plan  I discussed with the patient that at this point in time since the most recent intra-articular joint and greater trochanter bursa injections have not provided even moderate or stable relief, I do not feel that there is any other injections or procedures we would have to offer as the previous medial branch blocks for the lumbar pain also were not helpful  The patient was agreeable and verbalized an understanding  I discussed with the patient at this point in time it would be in her best interest to make sure she has a follow-up office visit scheduled for 3 months after her last injection and at this point we checked her chart while she was in the office and she did not have a follow-up office visit scheduled so I encouraged the patient to schedule a follow-up office visit for the 1st week in January with orthopedics for re-evaluation and to discuss any possible surgical options  The patient was agreeable and verbalized an understanding  In the meantime, I did explain to the patient that since I feel there is definitely significant osteoarthritic, neuropathic, and myofascial component to her pain symptoms and she is previously tried and failed gabapentin and is allergic to Cymbalta we could try different neuropathic medications such as Lyrica or nortriptyline to see if that would provide better overall and more stable relief of her chronic pain symptoms    I reviewed with the patient that these types of medications are not medications that work instantly and do require to be started on lower doses and slowly titrated up over a few weeks' time  However, the patient reports that at this point since she is also having some other gastrointestinal issues and changes with her blood pressure medications, she would like to hold off the medication options, but does understand that if in the next few weeks to month or 2 if she decides she would like to try the Lyrica or nortriptyline, she should call our office and we will start her on the medication and proceed from there as appropriate  The patient was agreeable and verbalized an understanding  I discussed with the patient that at this point time she can followup with our office on an as-needed basis  I did review the patient that if her pain symptoms should change, worsen, and/or if she would experience any new symptoms she would like to be evaluated for, she should give our office a call  The patient was agreeable and verbalized an understanding  I attest that I have spent at least 30 minutes face to face with the patient and that at least 50% of the time was educating and/or discussing the patient's symptoms and treatment plan options until the patient was satisfied with the plan  History of Present Illness: The patient is a 59 y o  female last seen on 9/30/2022 who presents for a follow up office visit in regards to chronic pain syndrome, as the patient's pain has been ongoing for greater than a year, secondary to lumbar spondylosis with primary osteoarthritis of the bilateral hips with greater trochanter bursitis and diffuse pain symptoms  The patient currently reports that since her last office visit as she is status post bilateral hip injections with the left side being the 2nd side completed on September 30, 2022 with Dr Mary Dan and prior to that she had bilateral greater trochanter bursa injections none of which provided even moderate or any kind of sustainable relief    The patient reports she continues with the chronic low back, hip and groin pain that seems to switch from side-to-side depending on the day and sometimes it is both sides that are bad  The patient reports that depending on how she is sitting and in what kind of chair she is sitting her pain is worse  She has followed up with Orthopedics, however, that was prior to her most recent hip injection and they wanted her to proceed with the hip injection and then follow-up 3 months later for re-evaluation and to discuss options, which most likely could include possible surgical options  Since her last office visit the patient did try the gabapentin as we had prescribed, however, could not tolerate the gabapentin with the side effects of worsening dreams and dizziness and did not felt that it was helping her pain so she discontinued the gabapentin  The patient presents today for regular medication and procedure follow-up visit and to discuss any possible treatment plan options at this time  I have personally reviewed and/or updated the patient's past medical history, past surgical history, family history, social history, current medications, allergies, and vital signs today  Review of Systems:    Review of Systems   Respiratory: Positive for shortness of breath  Cardiovascular: Negative for chest pain  Gastrointestinal: Positive for diarrhea, nausea and vomiting  Negative for constipation  Musculoskeletal: Positive for gait problem  Negative for arthralgias, joint swelling (joint stiffness) and myalgias  Skin: Positive for rash  Neurological: Positive for dizziness and weakness  Negative for seizures  All other systems reviewed and are negative          Past Medical History:   Diagnosis Date   • GERD (gastroesophageal reflux disease)        Past Surgical History:   Procedure Laterality Date   • CHOLECYSTECTOMY     • HERNIA REPAIR     • HYSTERECTOMY     • NASAL SEPTUM SURGERY     • TONSILLECTOMY         Family History Problem Relation Age of Onset   • Hypertension Mother    • Skin cancer Mother    • Stroke Father    • Hypertension Son        Social History     Occupational History   • Not on file   Tobacco Use   • Smoking status: Former Smoker   • Smokeless tobacco: Never Used   Vaping Use   • Vaping Use: Never used   Substance and Sexual Activity   • Alcohol use: Yes   • Drug use: Never   • Sexual activity: Never         Current Outpatient Medications:   •  acetaminophen (TYLENOL) 500 mg tablet, Take 500 mg by mouth every 6 (six) hours as needed for mild pain, Disp: , Rfl:   •  albuterol (Proventil HFA) 90 mcg/act inhaler, Inhale 2 puffs every 6 (six) hours as needed for wheezing or shortness of breath, Disp: 18 g, Rfl: 0  •  losartan-hydrochlorothiazide (HYZAAR) 100-12 5 MG per tablet, Take 1 tablet by mouth daily, Disp: 30 tablet, Rfl: 5  •  omeprazole (PriLOSEC) 40 MG capsule, Take 1 capsule (40 mg total) by mouth daily, Disp: 90 capsule, Rfl: 1    Allergies   Allergen Reactions   • Ativan [Lorazepam]    • Cymbalta [Duloxetine Hcl]    • Erythromycin    • Keflex [Cephalexin]    • Penicillins    • Amoxicillin Rash   • Ampicillin Rash   • Latex Rash       Physical Exam:    /82 (BP Location: Left arm, Patient Position: Sitting, Cuff Size: Standard)   Pulse 83   Temp 98 5 °F (36 9 °C)   Ht 5' 3" (1 6 m)   Wt 93 kg (205 lb)   BMI 36 31 kg/m²     Constitutional:normal, well developed, well nourished, alert, in no distress and non-toxic and no overt pain behavior  and overweight  Eyes:anicteric  HEENT:grossly intact  Neck:supple, symmetric, trachea midline and no masses   Pulmonary:even and unlabored  Cardiovascular:No edema or pitting edema present  Skin:Normal without rashes or lesions and well hydrated  Psychiatric:Mood and affect appropriate  Neurologic:Cranial Nerves II-XII grossly intact  Musculoskeletal:The patient's gait is antalgic, painful, but steady with the use of a single cane        Imaging  No orders to display         No orders of the defined types were placed in this encounter

## 2022-11-03 NOTE — TELEPHONE ENCOUNTER
Pt states she thinks some of the bumps on her lower ab are just her normal itchy from dry weather  Is using hydrocortisone    cr

## 2022-11-15 ENCOUNTER — TELEPHONE (OUTPATIENT)
Dept: PAIN MEDICINE | Facility: CLINIC | Age: 57
End: 2022-11-15

## 2022-11-15 NOTE — TELEPHONE ENCOUNTER
S/w pt, advised of above  Pt verbalized understanding and appreciation  stated that she is scheduled with Dr Richardson in January - she will call them to fu

## 2022-11-15 NOTE — TELEPHONE ENCOUNTER
Her options are to f/u with orthopedics and/OR try the previously discussed Lyrica or Nortriptyline that we discussed at her last OV  Thank you

## 2022-11-15 NOTE — TELEPHONE ENCOUNTER
Caller: patient     Doctor: Micky Jordan    Reason for call: pt is in severe bilateral hip pain & requesting what she can do for pain relief   Please advise asap, thx    Call back#: [phone]

## 2022-11-15 NOTE — TELEPHONE ENCOUNTER
S/w pt, stated that she is having severe pain in her b/l hips and legs  R > L  Per pt, pain is in the outside of her hips and goes down into her groin and into her low back  Pain goes down the front of her b/l legs to her ankles  Pt stated that it has been bad for months but it has been unmanageable since the weekend with no obvious cause  Per pt, she takes tylenol and occasional ibuprofen for her pain with min relief  Advised pt, the writer will d/w DG and cb to advise  Pt verbalized understanding and appreciation

## 2022-11-17 ENCOUNTER — OFFICE VISIT (OUTPATIENT)
Dept: OBGYN CLINIC | Facility: CLINIC | Age: 57
End: 2022-11-17

## 2022-11-17 VITALS
DIASTOLIC BLOOD PRESSURE: 84 MMHG | SYSTOLIC BLOOD PRESSURE: 128 MMHG | WEIGHT: 209 LBS | BODY MASS INDEX: 37.03 KG/M2 | HEIGHT: 63 IN | HEART RATE: 93 BPM

## 2022-11-17 DIAGNOSIS — M70.62 GREATER TROCHANTERIC BURSITIS OF BOTH HIPS: ICD-10-CM

## 2022-11-17 DIAGNOSIS — M70.61 GREATER TROCHANTERIC BURSITIS OF BOTH HIPS: ICD-10-CM

## 2022-11-17 DIAGNOSIS — M16.0 PRIMARY OSTEOARTHRITIS OF BOTH HIPS: Primary | ICD-10-CM

## 2022-11-17 NOTE — PROGRESS NOTES
Assessment:     1  Primary osteoarthritis of both hips    2  Greater trochanteric bursitis of both hips        Plan:     Problem List Items Addressed This Visit        Musculoskeletal and Integument    Greater trochanteric bursitis of both hips    Relevant Orders    Ambulatory Referral to Orthopedic Surgery    Primary osteoarthritis of both hips - Primary    Relevant Orders    Ambulatory Referral to Orthopedic Surgery     Findings consistent with bilateral hip severe osteoarthritis  I discussed with patient that given her multiple unsuccessful attempts with non operative treatment management that surgical intervention is warranted at this time  I discussed with her that given her body habitus with increased difficulty for posterior approach, I will refer her to Dr Aida Leslie for possible anterior approach  I did provide her with a referral for this discussion  I also discussed with her that given her recent cortisone injections she would have to wait till at least December 16th for her right hip replacement or until at least December 30th for her left hip replacement  Patient also cannot take NSAIDs due to her hypertension  I advised patient to use to Aspercreme or Voltaren gel over have hip  All patient's questions were answered to her satisfaction  This note is created using dictation transcription  It may contain typographical errors, grammatical errors, improperly dictated words, background noise and other errors  Subjective:   Patient ID: Manny Thurman is a 59 y o  female  Chief Complaint:  57-year-old female presents today for follow-up evaluation bilateral hip pain  She states that over the last few days her hip pain has been progressively gotten worse  She states that over the weekend she did go to the grocery store and was then able to use a scooter so had to use a push cart and [facility] through her grocery store shopping her to leave due to increased pain    She describes her pain as significant and sharp along the lateral aspect of both thighs that radiates into her groin and occasionally down her thighs  She has attempted multiple cortisone injections:  Greater trochanter bilaterally on August 2, 2022, right hip September 16, 2022 and her left hip September 30th 2022  She states that she had no relief her symptoms following any of these injections  She does report that the pain has become progressively worse that is hindering her quality of life and ability to perform activities daily living  Allergy:  Allergies   Allergen Reactions   • Ativan [Lorazepam]    • Cymbalta [Duloxetine Hcl]    • Erythromycin    • Keflex [Cephalexin]    • Penicillins    • Amoxicillin Rash   • Ampicillin Rash   • Latex Rash     Medications:  all current active meds have been reviewed  Past Medical History:  Past Medical History:   Diagnosis Date   • GERD (gastroesophageal reflux disease)      Past Surgical History:  Past Surgical History:   Procedure Laterality Date   • CHOLECYSTECTOMY     • HERNIA REPAIR     • HYSTERECTOMY     • NASAL SEPTUM SURGERY     • TONSILLECTOMY       Family History:  Family History   Problem Relation Age of Onset   • Hypertension Mother    • Skin cancer Mother    • Stroke Father    • Hypertension Son      Social History:  Social History     Substance and Sexual Activity   Alcohol Use Yes     Social History     Substance and Sexual Activity   Drug Use Never     Social History     Tobacco Use   Smoking Status Former   Smokeless Tobacco Never     Review of Systems   Constitutional: Negative for chills and fever  HENT: Negative for ear pain and sore throat  Eyes: Negative for pain and visual disturbance  Respiratory: Negative for cough and shortness of breath  Cardiovascular: Negative for chest pain and palpitations  Gastrointestinal: Negative for abdominal pain and vomiting  Genitourinary: Negative for dysuria and hematuria     Musculoskeletal: Positive for arthralgias (Bilateral hip) and gait problem (Antalgic, using a cane)  Negative for back pain  Skin: Negative for color change and rash  Neurological: Negative for seizures and syncope  Psychiatric/Behavioral: Negative  All other systems reviewed and are negative  Objective:  BP Readings from Last 1 Encounters:   11/17/22 128/84      Wt Readings from Last 1 Encounters:   11/17/22 94 8 kg (209 lb)      BMI:   Estimated body mass index is 37 02 kg/m² as calculated from the following:    Height as of this encounter: 5' 3" (1 6 m)  Weight as of this encounter: 94 8 kg (209 lb)  BSA:   Estimated body surface area is 1 97 meters squared as calculated from the following:    Height as of this encounter: 5' 3" (1 6 m)  Weight as of this encounter: 94 8 kg (209 lb)  Physical Exam  Vitals and nursing note reviewed  Constitutional:       Appearance: Normal appearance  She is well-developed  HENT:      Head: Normocephalic and atraumatic  Right Ear: External ear normal       Left Ear: External ear normal       Nose: Nose normal    Eyes:      Extraocular Movements: Extraocular movements intact  Conjunctiva/sclera: Conjunctivae normal    Pulmonary:      Effort: Pulmonary effort is normal    Musculoskeletal:      Cervical back: Normal range of motion and neck supple  Comments: See HPI   Skin:     General: Skin is warm and dry  Neurological:      Mental Status: She is alert and oriented to person, place, and time  Deep Tendon Reflexes: Reflexes are normal and symmetric  Psychiatric:         Mood and Affect: Mood normal          Behavior: Behavior normal        Right Hip Exam     Tenderness   The patient is experiencing tenderness in the greater trochanter      Range of Motion   Flexion: 120   External rotation: 20 (Pain)   Internal rotation: 10 (Pain)     Muscle Strength   Adduction: 4/5   Flexion: 4/5     Tests   FÉLIX: positive    Other   Erythema: absent  Scars: absent  Sensation: normal  Pulse: present      Left Hip Exam     Tenderness   The patient is experiencing tenderness in the greater trochanter      Range of Motion   Flexion: 120   External rotation: 20 (Pain)   Internal rotation: 10 (Pain)     Muscle Strength   Adduction: 4/5   Flexion: 4/5     Tests   FÉLIX: positive    Other   Erythema: absent  Scars: absent  Sensation: normal  Pulse: present            No new images for review today

## 2022-11-25 ENCOUNTER — OFFICE VISIT (OUTPATIENT)
Dept: OBGYN CLINIC | Facility: CLINIC | Age: 57
End: 2022-11-25

## 2022-11-25 VITALS
WEIGHT: 209 LBS | SYSTOLIC BLOOD PRESSURE: 160 MMHG | DIASTOLIC BLOOD PRESSURE: 92 MMHG | BODY MASS INDEX: 37.03 KG/M2 | HEIGHT: 63 IN

## 2022-11-25 DIAGNOSIS — M16.0 PRIMARY OSTEOARTHRITIS OF BOTH HIPS: Primary | ICD-10-CM

## 2022-11-25 DIAGNOSIS — M16.11 PRIMARY OSTEOARTHRITIS OF RIGHT HIP: Primary | ICD-10-CM

## 2022-11-25 DIAGNOSIS — M54.16 BILATERAL LUMBAR RADICULOPATHY: ICD-10-CM

## 2022-11-25 DIAGNOSIS — M16.12 PRIMARY OSTEOARTHRITIS OF ONE HIP, LEFT: ICD-10-CM

## 2022-11-25 RX ORDER — ASCORBIC ACID 500 MG
500 TABLET ORAL 2 TIMES DAILY
Qty: 30 TABLET | Refills: 3 | Status: SHIPPED | OUTPATIENT
Start: 2022-11-25

## 2022-11-25 RX ORDER — ONDANSETRON 2 MG/ML
4 INJECTION INTRAMUSCULAR; INTRAVENOUS ONCE
OUTPATIENT
Start: 2022-11-25 | End: 2022-11-25

## 2022-11-25 RX ORDER — SODIUM CHLORIDE, SODIUM LACTATE, POTASSIUM CHLORIDE, CALCIUM CHLORIDE 600; 310; 30; 20 MG/100ML; MG/100ML; MG/100ML; MG/100ML
125 INJECTION, SOLUTION INTRAVENOUS CONTINUOUS
OUTPATIENT
Start: 2022-11-25

## 2022-11-25 RX ORDER — FOLIC ACID 1 MG/1
1 TABLET ORAL DAILY
Qty: 30 TABLET | Refills: 3 | Status: SHIPPED | OUTPATIENT
Start: 2022-11-25

## 2022-11-25 RX ORDER — CHLORHEXIDINE GLUCONATE 4 G/100ML
SOLUTION TOPICAL DAILY PRN
OUTPATIENT
Start: 2022-11-25

## 2022-11-25 RX ORDER — CHLORHEXIDINE GLUCONATE 0.12 MG/ML
15 RINSE ORAL ONCE
OUTPATIENT
Start: 2022-11-25 | End: 2022-11-25

## 2022-11-25 RX ORDER — ACETAMINOPHEN 325 MG/1
975 TABLET ORAL ONCE
OUTPATIENT
Start: 2022-11-25 | End: 2022-11-25

## 2022-11-25 RX ORDER — MELATONIN
2000 DAILY
Qty: 60 TABLET | Refills: 0 | Status: SHIPPED | OUTPATIENT
Start: 2022-11-25

## 2022-11-25 RX ORDER — MULTIVIT-MIN/IRON FUM/FOLIC AC 7.5 MG-4
1 TABLET ORAL DAILY
Qty: 30 TABLET | Refills: 3 | Status: SHIPPED | OUTPATIENT
Start: 2022-11-25

## 2022-11-25 RX ORDER — GABAPENTIN 100 MG/1
300 CAPSULE ORAL ONCE
OUTPATIENT
Start: 2022-11-25 | End: 2022-11-25

## 2022-11-25 NOTE — PROGRESS NOTES
Hip New Office Note    Assessment:     1  Primary osteoarthritis of right hip    2  Primary osteoarthritis of one hip, left    3  Bilateral lumbar radiculopathy        Plan:   Diagnoses and all orders for this visit:    Primary osteoarthritis of right hip    Primary osteoarthritis of one hip, left    Bilateral lumbar radiculopathy    60 y/o female who has severe bilateral hip osteoarthritis with her right hip being more symptomatic then her left  She has tried and failed non operative treatments including NSAIDs, low-impact exercises, IA cortisone injections and weight-loss  The pain is affecting her daily activities and life  She wishes to proceed with a total hip arthroplasty of her Right hip  Patient will proceed with a right total hip arthroplasty after 12/16/22 due to recent CS injection on 9/16/22  It was explained that the patient also has bilateral lumbar radiculopathy that would not be solved by a hip arthroplasty  It was explained that due to her limited hip mobility that a hip replacement would increase her hip ROM and may alleviate some of her lumbar back symptoms over time  The patient has elected to proceed with right MAME through the posterior approach  Risks and benefits of surgery to include but not limited to bleeding, infection, damage to surrounding structures, hardware failure, instability, fracture, dislocation, leg length inequality, need for further surgery, continued pain, stiffness, blood clots, stroke, heart attack was discussed with the patient  Patient at increased risk due to her obesity  Informed consent was signed today in the office  The patient has met with our surgical schedulers and our preoperative joint replacement pathway has been initiated  All questions were answered  Patient will follow-up 2 weeks post operatively  Patient is a nonsmoker and appropriate BMI  Subjective:     Patient ID: Alyssa Calabrese is a 59 y o  female    Chief Complaint: Right Hip pain  HPI:    58 y/o female who presents today for an initial visit for the bilateral hips  Right worse than left  Patient reports that her pain has been present for several months without acute injury or trauma  She localizes her pain about the anterolateral aspect of the hip and groin  Pain is worse when first rising from a seated position, getting in and out of a car and ambulating  She has had IA cortisone injections into her right hip with initial benefit that quickly ceased  She reports that her quality of life is affected by the pain in her hip  She is interested in a total hip arthroplasty  She also mentions that she is treating with Dr Fortino Tijerina for chronic lower back pain with bilateral lumbar radiculopathy  No fevers or chills        Allergy:  Allergies   Allergen Reactions   • Ativan [Lorazepam]    • Cymbalta [Duloxetine Hcl]    • Erythromycin    • Keflex [Cephalexin]    • Penicillins    • Amoxicillin Rash   • Ampicillin Rash   • Latex Rash     Medications:  all current active meds have been reviewed  Past Medical History:  Past Medical History:   Diagnosis Date   • GERD (gastroesophageal reflux disease)      Past Surgical History:  Past Surgical History:   Procedure Laterality Date   • CHOLECYSTECTOMY     • HERNIA REPAIR     • HYSTERECTOMY     • NASAL SEPTUM SURGERY     • TONSILLECTOMY       Family History:  Family History   Problem Relation Age of Onset   • Hypertension Mother    • Skin cancer Mother    • Stroke Father    • Hypertension Son      Social History:  Social History     Substance and Sexual Activity   Alcohol Use Yes     Social History     Substance and Sexual Activity   Drug Use Never     Social History     Tobacco Use   Smoking Status Former   Smokeless Tobacco Never           ROS:  General: Per HPI  Skin: Negative, except if noted below  HEENT: Negative  Respiratory: Negative  Cardiovascular: Negative  Gastrointestinal: Negative  Urinary: Negative  Vascular: Negative  Musculoskeletal: Positive per HPI   Neurologic: Positive per HPI  Endocrine: Negative    Objective:  BP Readings from Last 1 Encounters:   11/25/22 160/92      Wt Readings from Last 1 Encounters:   11/25/22 94 8 kg (209 lb)        Respiratory:   non-labored respirations    Lymphatics:  no palpable lymph nodes    Gait and Station:   Antalgic with single point cane    Neurologic:   Alert and oriented times  Patient with normal sensation except as noted below  Deep tendon reflexes 2+ except as noted in MSK exam    Bilateral Lower Extremity:  Left Hip     Inspection: Skin is intact without erythema or ecchymosis    Range of Motion: Flexion: 110, ER: 20, IR: 10 with pain in all planes    + log roll    - Trendelenburg sign    Motor: 5/5 IP/Q/HS/TA/GS    Pulses: 2+ DP / 2+ PT    SILT DP/SP/S/S/TN    Right Hip     Inspection: Skin is intact without erythema or ecchymosis    Range of Motion: Flexion: 100, ER: 20, IR: 10 with pain in all planes    + log roll    - Trendelenburg sign    Motor: 4/5 IP/Q/HS/TA/GS    Pulses: 2+ DP / 2+ PT    SILT DP/SP/S/S/TN    Imaging:  My interpretation XR AP pelvis/ bilateral hips: Severe joint space narrowing, subchondral sclerosis, subchondral cysts, osteophyte formation  No fracture or dislocation  BMI:   Estimated body mass index is 37 02 kg/m² as calculated from the following:    Height as of this encounter: 5' 3" (1 6 m)  Weight as of this encounter: 94 8 kg (209 lb)  BSA:   Estimated body surface area is 1 97 meters squared as calculated from the following:    Height as of this encounter: 5' 3" (1 6 m)  Weight as of this encounter: 94 8 kg (209 lb)             Scribe Attestation    I,:  Michael Menezes am acting as a scribe while in the presence of the attending physician :       I,:  Trino Rodríguez, DO personally performed the services described in this documentation    as scribed in my presence :

## 2022-11-26 LAB
ABO GROUP BLD: NORMAL
ALBUMIN SERPL-MCNC: 4.4 G/DL (ref 3.8–4.9)
ALBUMIN/GLOB SERPL: 1.8 {RATIO} (ref 1.2–2.2)
ALP SERPL-CCNC: 95 IU/L (ref 44–121)
ALT SERPL-CCNC: 23 IU/L (ref 0–32)
APTT PPP: 27 SEC (ref 24–33)
AST SERPL-CCNC: 26 IU/L (ref 0–40)
BASOPHILS # BLD AUTO: 0 X10E3/UL (ref 0–0.2)
BASOPHILS NFR BLD AUTO: 1 %
BILIRUB SERPL-MCNC: 0.3 MG/DL (ref 0–1.2)
BLD GP AB SCN SERPL QL: NEGATIVE
BUN SERPL-MCNC: 8 MG/DL (ref 6–24)
BUN/CREAT SERPL: 11 (ref 9–23)
CALCIUM SERPL-MCNC: 9.7 MG/DL (ref 8.7–10.2)
CHLORIDE SERPL-SCNC: 105 MMOL/L (ref 96–106)
CO2 SERPL-SCNC: 24 MMOL/L (ref 20–29)
CREAT SERPL-MCNC: 0.7 MG/DL (ref 0.57–1)
EGFR: 100 ML/MIN/1.73
EOSINOPHIL # BLD AUTO: 0.2 X10E3/UL (ref 0–0.4)
EOSINOPHIL NFR BLD AUTO: 3 %
ERYTHROCYTE [DISTWIDTH] IN BLOOD BY AUTOMATED COUNT: 13 % (ref 11.7–15.4)
EST. AVERAGE GLUCOSE BLD GHB EST-MCNC: 100 MG/DL
GLOBULIN SER-MCNC: 2.4 G/DL (ref 1.5–4.5)
GLUCOSE SERPL-MCNC: 92 MG/DL (ref 70–99)
HBA1C MFR BLD: 5.1 % (ref 4.8–5.6)
HCT VFR BLD AUTO: 39.7 % (ref 34–46.6)
HGB BLD-MCNC: 13.4 G/DL (ref 11.1–15.9)
IMM GRANULOCYTES # BLD: 0 X10E3/UL (ref 0–0.1)
IMM GRANULOCYTES NFR BLD: 0 %
INR PPP: 1 (ref 0.9–1.2)
IRON SATN MFR SERPL: 27 % (ref 15–55)
IRON SERPL-MCNC: 78 UG/DL (ref 27–159)
LYMPHOCYTES # BLD AUTO: 1.4 X10E3/UL (ref 0.7–3.1)
LYMPHOCYTES NFR BLD AUTO: 25 %
MCH RBC QN AUTO: 30.2 PG (ref 26.6–33)
MCHC RBC AUTO-ENTMCNC: 33.8 G/DL (ref 31.5–35.7)
MCV RBC AUTO: 90 FL (ref 79–97)
MONOCYTES # BLD AUTO: 0.5 X10E3/UL (ref 0.1–0.9)
MONOCYTES NFR BLD AUTO: 9 %
NEUTROPHILS # BLD AUTO: 3.5 X10E3/UL (ref 1.4–7)
NEUTROPHILS NFR BLD AUTO: 62 %
PLATELET # BLD AUTO: 303 X10E3/UL (ref 150–450)
POTASSIUM SERPL-SCNC: 4.4 MMOL/L (ref 3.5–5.2)
PROT SERPL-MCNC: 6.8 G/DL (ref 6–8.5)
PROTHROMBIN TIME: 10.4 SEC (ref 9.1–12)
RBC # BLD AUTO: 4.43 X10E6/UL (ref 3.77–5.28)
RETICS/RBC NFR AUTO: 2.2 % (ref 0.6–2.6)
RH BLD: POSITIVE
SODIUM SERPL-SCNC: 144 MMOL/L (ref 134–144)
TIBC SERPL-MCNC: 290 UG/DL (ref 250–450)
UIBC SERPL-MCNC: 212 UG/DL (ref 131–425)
WBC # BLD AUTO: 5.5 X10E3/UL (ref 3.4–10.8)

## 2022-11-29 LAB
CHOLEST SERPL-MCNC: 280 MG/DL (ref 100–199)
HDLC SERPL-MCNC: 47 MG/DL
LDLC SERPL CALC-MCNC: 186 MG/DL (ref 0–99)
LDLC/HDLC SERPL: 4 RATIO (ref 0–3.2)
SL AMB VLDL CHOLESTEROL CALC: 47 MG/DL (ref 5–40)
T4 FREE SERPL-MCNC: 1.17 NG/DL (ref 0.82–1.77)
TRIGL SERPL-MCNC: 247 MG/DL (ref 0–149)
TSH SERPL DL<=0.005 MIU/L-ACNC: 1.5 UIU/ML (ref 0.45–4.5)

## 2022-11-30 ENCOUNTER — OFFICE VISIT (OUTPATIENT)
Dept: FAMILY MEDICINE CLINIC | Facility: HOSPITAL | Age: 57
End: 2022-11-30

## 2022-11-30 VITALS
HEIGHT: 63 IN | SYSTOLIC BLOOD PRESSURE: 128 MMHG | WEIGHT: 208 LBS | TEMPERATURE: 97.3 F | BODY MASS INDEX: 36.86 KG/M2 | HEART RATE: 102 BPM | DIASTOLIC BLOOD PRESSURE: 90 MMHG | OXYGEN SATURATION: 96 %

## 2022-11-30 DIAGNOSIS — R10.84 GENERALIZED ABDOMINAL PAIN: Primary | ICD-10-CM

## 2022-11-30 DIAGNOSIS — M25.551 BILATERAL HIP PAIN: ICD-10-CM

## 2022-11-30 DIAGNOSIS — K21.9 GASTROESOPHAGEAL REFLUX DISEASE WITHOUT ESOPHAGITIS: Chronic | ICD-10-CM

## 2022-11-30 DIAGNOSIS — E78.2 MIXED HYPERLIPIDEMIA: Chronic | ICD-10-CM

## 2022-11-30 DIAGNOSIS — M25.552 BILATERAL HIP PAIN: ICD-10-CM

## 2022-11-30 DIAGNOSIS — R11.2 NAUSEA AND VOMITING, UNSPECIFIED VOMITING TYPE: ICD-10-CM

## 2022-11-30 DIAGNOSIS — R10.9 ABDOMINAL CRAMPING: ICD-10-CM

## 2022-11-30 DIAGNOSIS — I10 PRIMARY HYPERTENSION: ICD-10-CM

## 2022-11-30 DIAGNOSIS — G89.4 CHRONIC PAIN SYNDROME: ICD-10-CM

## 2022-11-30 RX ORDER — ONDANSETRON 4 MG/1
4 TABLET, ORALLY DISINTEGRATING ORAL EVERY 6 HOURS PRN
Qty: 20 TABLET | Refills: 0 | Status: SHIPPED | OUTPATIENT
Start: 2022-11-30

## 2022-11-30 RX ORDER — ATORVASTATIN CALCIUM 20 MG/1
TABLET, FILM COATED ORAL
Qty: 90 TABLET | Refills: 3 | Status: SHIPPED | OUTPATIENT
Start: 2022-11-30

## 2022-11-30 NOTE — PROGRESS NOTES
[facility],     Assessment/Plan:      Diagnosis ICD-10-CM Associated Orders   1  Generalized abdominal pain  R10 84 CT abdomen pelvis w contrast      2  Abdominal cramping  R10 9 CT abdomen pelvis w contrast      3  Primary hypertension  I10       4  Mixed hyperlipidemia  E78 2 atorvastatin (LIPITOR) 20 mg tablet      5  Chronic pain syndrome  G89 4       6  Bilateral hip pain  M25 551     M25 552       7  Gastroesophageal reflux disease without esophagitis  K21 9       • Patient being set up with stat CT scan abdomen and pelvis  Will reach out to patient with results  In the meantime would recommend clear liquid diet  Based on the results of the CT scan can also add on antibiotics needed  Follow-up to be determined by outcome of results  Can also consider stool studies  Blood work reviewed and reconciled, cholesterol still significantly out of range  Other labs overall normal   Will start Lipitor 20 mg with an up titration plan, patient not on this in the past because of concerns for muscle aches in addition to her chronic pain  Patient may call or return to office with any questions or concerns  ______________________________________________________________________  Subjective:     Patient ID: Hossein Almaguer is a 59 y o  female  HPI  Hossein Almaguer  Chief Complaint   Patient presents with   • Follow-up   • Hypertension   • Headache   • Sinus Problem   • swollen glads    • Chills   • Diarrhea     X 1 month yellow in color      • Abdominal Cramping     Worst abd pain & cramping to tears  Barely can make it to her BR  Watery clear, yellow  Started roughly 1 month ago  Hx of IBS  Doesn't matter what she eats  Yogurt & vitamins - 30 mins BR  Granola & candy cane & felt LH - in the past 1 5 hrs & had to leave room urgently for diarrhea  Sinuses are full, HA, pressure in sinuses  HR typically 80-90's sitting & now shooting up with motion to 120's       Sugars still seem to drop after meals  A1c normal  Feels shakey though  Labs reviewed  Hip surgery on Jan 18th  Cane for walking  Blood pressure well controlled at 128/90  The following portions of the patient's history were reviewed and updated as appropriate: allergies, current medications, past medical history, and problem list     Review of Systems   Constitutional: Negative for chills and fever  Respiratory: Positive for shortness of breath  Negative for cough  Cardiovascular: Negative for chest pain and palpitations  Faster HR   Gastrointestinal: Positive for abdominal distention, abdominal pain, diarrhea, nausea and vomiting  Negative for blood in stool  Genitourinary: Negative for dysuria and frequency  Musculoskeletal: Positive for arthralgias and gait problem  Neurological: Positive for light-headedness and headaches  Objective:      Vitals:    11/30/22 1410   BP: 128/90   Pulse: 102   Temp: (!) 97 3 °F (36 3 °C)   SpO2: 96%      Physical Exam  Vitals reviewed  Constitutional:       General: She is not in acute distress  Appearance: Normal appearance  She is well-developed  She is obese  She is not ill-appearing  HENT:      Head: Normocephalic and atraumatic  Eyes:      General: No scleral icterus  Right eye: No discharge  Left eye: No discharge  Cardiovascular:      Rate and Rhythm: Normal rate and regular rhythm  Pulses: Normal pulses  Heart sounds: Normal heart sounds  No murmur heard  Pulmonary:      Effort: Pulmonary effort is normal  No respiratory distress  Breath sounds: Normal breath sounds  No stridor  No wheezing  Abdominal:      General: Bowel sounds are normal  There is no distension  Palpations: There is no mass  Tenderness: There is abdominal tenderness (Diffuse, worst in the epigastric region, and periumbilically)  There is no right CVA tenderness or left CVA tenderness     Musculoskeletal:      Cervical back: Normal range of motion  Skin:     General: Skin is warm  Neurological:      Mental Status: She is alert and oriented to person, place, and time  Gait: Gait abnormal (Uses a cane at baseline)  Psychiatric:         Mood and Affect: Mood normal          Behavior: Behavior normal          Thought Content: Thought content normal          Judgment: Judgment normal            Portions of the record may have been created with voice recognition software  Occasional wrong word or "sound alike" substitutions may have occurred due to the inherent limitations of voice recognition software  Please review the chart carefully and recognize, using context, where substitutions/typographical errors may have occurred

## 2022-12-04 ENCOUNTER — HOSPITAL ENCOUNTER (OUTPATIENT)
Dept: CT IMAGING | Facility: HOSPITAL | Age: 57
Discharge: HOME | End: 2022-12-04
Attending: STUDENT IN AN ORGANIZED HEALTH CARE EDUCATION/TRAINING PROGRAM

## 2022-12-04 DIAGNOSIS — R10.84 GENERALIZED ABDOMINAL PAIN: ICD-10-CM

## 2022-12-04 DIAGNOSIS — R10.9 ABDOMINAL CRAMPING: ICD-10-CM

## 2022-12-04 RX ADMIN — IOHEXOL 100 ML: 350 INJECTION, SOLUTION INTRAVENOUS at 11:53

## 2022-12-14 ENCOUNTER — TELEPHONE (OUTPATIENT)
Dept: FAMILY MEDICINE CLINIC | Facility: HOSPITAL | Age: 57
End: 2022-12-14

## 2022-12-14 NOTE — TELEPHONE ENCOUNTER
Pt has been being seen for GI and diarrhea issues, but now states that she is extremely constipated to the poin she is having to pull it out   PCB

## 2022-12-14 NOTE — TELEPHONE ENCOUNTER
Spoke with patient she cant see GI till February  Has been up since 2 am this morning with dry heaves  Constant burping  Thinks she has a hiatal hernia  Stomach is hard from breast down   Please advise

## 2022-12-20 ENCOUNTER — TELEPHONE (OUTPATIENT)
Dept: OBGYN CLINIC | Facility: HOSPITAL | Age: 57
End: 2022-12-20

## 2022-12-20 NOTE — TELEPHONE ENCOUNTER
Preoperative Elective Admission Assessment- spoke to patient     "[employer]"     Living Situation:    Who does pt live with: relative, son- daughter coming from [place] to stay with patient (for about 10 days to 2 weeks)  What kind of home: ranch  How do they enter the home: front  How many levels in home: 1 level home    # of steps to enter home: 4 to enter   # of steps to second floor: N/A   Are there handrails: Yes  Sleeping arrangement: first/entry floor  Where is Bathroom: Walk in shower on first floor  With grab bar and shower seat  First Floor Setup:   Is there a bathroom: Yes  Where would pt sleep: bed     DME: frame walker and cane  Patient's Current Level of Function: Ambulates with walker, Ambulates with cane and ADLs: Independent    Post-op Caregiver: child  Caregiver Name and phone number for Inpatient discharge needs: Stephen Jay, daughter- [phone]  Currently receive any HHC/aides/community supports: No     Post-op Transport: child  To/from hospital: child  To/from PT 2-3x/week: child  Uses community transport now: No     Outpatient Physical Therapy Site:  Site: [facility]  pre and post-op appts scheduled? No     Medication Management: self  Preferred Pharmacy for Post-op Medications: [facility]   Blood Management Vitamin Regimen: Pt confirms taking as prescribed  Post-op anticoagulant: to be determined by surgical team postoperatively     DC Plan: Pt plans to be discharged home  Pt educated that our goal, if at all possible, is to appropriately discharge patient based off their post-op function while striving to maintain maximal independence  If possible, the goal is to discharge patient to home and for them to attend outpatient physical therapy      Barriers to DC identified preoperatively: none identified    BMI: 36 85     Enrolled in [employer]     Patient Education:  Pt educated on post-op pain, early mobilization (POD0), Average inpt LOS, OP PT goal   Patient educated that our goal is to appropriately discharge patient based off their post-op function while striving to maintain maximal independence  The goal is to discharge patient to home and for them to attend outpatient physical therapy

## 2023-01-04 ENCOUNTER — TELEPHONE (OUTPATIENT)
Dept: GASTROENTEROLOGY | Facility: CLINIC | Age: 58
End: 2023-01-04

## 2023-01-04 ENCOUNTER — OFFICE VISIT (OUTPATIENT)
Dept: GASTROENTEROLOGY | Facility: CLINIC | Age: 58
End: 2023-01-04

## 2023-01-04 VITALS
SYSTOLIC BLOOD PRESSURE: 158 MMHG | DIASTOLIC BLOOD PRESSURE: 90 MMHG | BODY MASS INDEX: 37 KG/M2 | HEIGHT: 63 IN | WEIGHT: 208.8 LBS

## 2023-01-04 DIAGNOSIS — R10.84 GENERALIZED ABDOMINAL PAIN: ICD-10-CM

## 2023-01-04 DIAGNOSIS — K21.9 GASTROESOPHAGEAL REFLUX DISEASE WITHOUT ESOPHAGITIS: ICD-10-CM

## 2023-01-04 DIAGNOSIS — Z12.11 COLON CANCER SCREENING: ICD-10-CM

## 2023-01-04 DIAGNOSIS — F41.9 ANXIETY: ICD-10-CM

## 2023-01-04 DIAGNOSIS — F41.9 ANXIETY: Primary | ICD-10-CM

## 2023-01-04 DIAGNOSIS — R19.7 DIARRHEA, UNSPECIFIED TYPE: Primary | ICD-10-CM

## 2023-01-04 DIAGNOSIS — R13.19 ESOPHAGEAL DYSPHAGIA: ICD-10-CM

## 2023-01-04 RX ORDER — HYOSCYAMINE SULFATE 0.125 MG
0.12 TABLET ORAL EVERY 4 HOURS PRN
Qty: 30 TABLET | Refills: 0 | Status: SHIPPED | OUTPATIENT
Start: 2023-01-04

## 2023-01-04 RX ORDER — MONTELUKAST SODIUM 4 MG/1
2 TABLET, CHEWABLE ORAL 2 TIMES DAILY
Qty: 60 TABLET | Refills: 2 | Status: SHIPPED | OUTPATIENT
Start: 2023-01-04 | End: 2023-01-11 | Stop reason: HOSPADM

## 2023-01-04 RX ORDER — ALPRAZOLAM 1 MG/1
1 TABLET, EXTENDED RELEASE ORAL ONCE
Qty: 1 TABLET | Refills: 0 | Status: SHIPPED | OUTPATIENT
Start: 2023-01-04 | End: 2023-01-04

## 2023-01-04 RX ORDER — ALPRAZOLAM 0.5 MG/1
TABLET ORAL
Qty: 2 TABLET | Refills: 0 | Status: SHIPPED | OUTPATIENT
Start: 2023-01-04

## 2023-01-04 NOTE — TELEPHONE ENCOUNTER
Scheduled date of EGD/colonoscopy (as of today): 1/11/23  Physician performing EGD/colonoscopy: Dr Ely Mohan  Location of EGD/colonoscopy: [facility]  Desired bowel prep reviewed with patient: Miralax/dulcolax  Instructions reviewed with patient by: Gave patient packet  Clearances:  N

## 2023-01-04 NOTE — TELEPHONE ENCOUNTER
Call from [facility] pharmacist at [facility] needing clarification for xanax xr  Directions came over as: Take 1 tablet (1 mg total) by mouth 1 (one) time for 1 dose Take 1/2 tablet procedure, may repeat x1 if needed    Unfortunately the xanax xr cannot be cut in half  I asked if we could order xanax 0 5 mg tablet (immediate release) Qty 2 tablets  Directions: Take (1) 0 5mg tablet preprocedure, may repeat x1 if needed  What is the timing? Take 1 tablet 1 hour prior to procedure? Repeat 1/2 prior?

## 2023-01-04 NOTE — PROGRESS NOTES
[address] [facility] - Outpatient Consultation  Crystal Mtz 59 y o  female MRN: [id number]  Encounter: [id number]    ASSESSMENT AND PLAN:      1  Diarrhea, unspecified type  2  Generalized abdominal pain  Diagnosed with IBS at age 11  Increasing symptoms over the past 2 years, particularly in the past 6 months  Describes yellow-tan stool, particularly after meals  Will start Colestid for bile salt diarrhea  Differential also includes IBS-D, inflammatory bowel disease, microscopic colitis, celiac disease, or small intestinal bacterial overgrowth  We will monitor symptoms on Colestid  Plan colonoscopy to assess for IBD and microscopic colitis  I prescribed Levsin to be used as needed for abdominal cramping  3  Gastroesophageal reflux disease without esophagitis  Progressive complaint  Correlates with weight gain in the setting of hip arthritis and decreased mobility  Improvement but not resolution with starting Prilosec and stopping NSAIDs  We will assess further with EGD  Reviewed antireflux diet      4  Esophageal dysphagia  Intermittent, particularly with breads  Will assess for stricture and eosinophilic esophagitis on upcoming EGD    5  Colon cancer screening  Colonoscopy about 20 years ago  Negative Cologuard 2022    6  Anxiety  Complains of preprocedure anxiety  At her request, I prescribed Xanax 0 5mg preprocedure, may repeat x1 if needed  PDMP reviewed  Followup Appointment: Pending EGD/colonoscopy  ______________________________________________________________________    Chief Complaint   Patient presents with   • Irritable Bowel Syndrome     Bouts of diarrhea and constipation, early satiety         HPI:   Crystal Mtz is a 59y o  year old female who presents for consultation at the request of her PCP for multiple progressive GI complaints including diarrhea, intermittent constipation, reflux, and dysphagia  She was diagnosed with IBS at age 11  Symptoms have been worsening recently, particularly over the past 6 months  She finds that with any types of food she will have a urgent stool  Starting in October she began to experience abdominal cramping that would double her over and resolved with bowel movements  Her stools are often yellow water or soft yellowish-green  There are no specific food triggers  She has intermittent flares of constipation and has had to disimpact herself  She has times of extreme nausea that improves with Zofran  She has not vomited  Her PCP stopped NSAIDs in October and started Prilosec with improvement but not resolution of epigastric pain and reflux  She has intermittent difficulty swallowing particularly with breads  Her son has the same issue  Recent reflux of laying down in bed  She is gained about 60 pounds over the past year and is due for hip replacement in a few weeks, with the other hip to follow afterwards      Historical Information   Past Medical History:   Diagnosis Date   • GERD (gastroesophageal reflux disease)    • Hyperlipidemia    • Hypertension    • Irritable bowel syndrome      Past Surgical History:   Procedure Laterality Date   • CATARACT EXTRACTION Bilateral    •  SECTION N/A    • CHOLECYSTECTOMY     • COLONOSCOPY     • HERNIA REPAIR     • HYSTERECTOMY     • NASAL SEPTUM SURGERY     • TONSILLECTOMY     • UPPER GASTROINTESTINAL ENDOSCOPY       Social History     Substance and Sexual Activity   Alcohol Use Yes     Social History     Substance and Sexual Activity   Drug Use Never     Social History     Tobacco Use   Smoking Status Former   Smokeless Tobacco Never     Family History   Problem Relation Age of Onset   • Hypertension Mother    • Skin cancer Mother    • Stroke Father    • Colon cancer Cousin    • Hypertension Son    • Skin cancer Daughter    • Cancer Daughter    • Colon polyps Neg Hx        Meds/Allergies     Current Outpatient Medications:   •  acetaminophen (TYLENOL) 500 mg tablet  •  albuterol (Proventil HFA) 90 mcg/act inhaler  •  ascorbic acid (VITAMIN C) 500 MG tablet  •  atorvastatin (LIPITOR) 20 mg tablet  •  cholecalciferol (VITAMIN D3) 1,000 units tablet  •  colestipol (COLESTID) 1 g tablet  •  folic acid (FOLVITE) 1 mg tablet  •  hyoscyamine (ANASPAZ,LEVSIN) 0 125 MG tablet  •  losartan-hydrochlorothiazide (HYZAAR) 100-12 5 MG per tablet  •  Multiple Vitamins-Minerals (multivitamin with minerals) tablet  •  omeprazole (PriLOSEC) 40 MG capsule  •  ondansetron (ZOFRAN-ODT) 4 mg disintegrating tablet    Allergies   Allergen Reactions   • Ativan [Lorazepam]    • Cymbalta [Duloxetine Hcl]    • Erythromycin    • Keflex [Cephalexin]    • Amoxicillin Rash   • Ampicillin Rash   • Latex Rash       PHYSICAL EXAM:    Blood pressure 158/90, height 5' 3" (1 6 m), weight 94 7 kg (208 lb 12 8 oz), not currently breastfeeding  Body mass index is 36 99 kg/m²  General Appearance: NAD, cooperative, alert  Eyes: Anicteric, PERRLA, EOMI  ENT:  Normocephalic, atraumatic, normal mucosa  Neck:  Supple, symmetrical, trachea midline,   Resp:  Clear to auscultation bilaterally; no rales, rhonchi or wheezing; respirations unlabored   CV:  S1 S2, Regular rate and rhythm; no murmur, rub, or gallop  GI:  Soft, non-tender, non-distended; normal bowel sounds; no masses, no organomegaly   Rectal: Deferred  Musculoskeletal: No cyanosis, clubbing or edema  Normal ROM    Skin:  No jaundice, rashes, or lesions   Heme/Lymph: No palpable cervical lymphadenopathy  Psych: Normal affect, good eye contact  Neuro: No gross deficits, AAOx3    Lab Results:   Lab Results   Component Value Date    WBC 5 5 11/25/2022    HGB 13 4 11/25/2022    HCT 39 7 11/25/2022    MCV 90 11/25/2022     11/25/2022     Lab Results   Component Value Date    K 4 4 11/25/2022     11/25/2022    CO2 24 11/25/2022    BUN 8 11/25/2022    CREATININE 0 70 11/25/2022    AST 26 11/25/2022    ALT 23 11/25/2022    EGFR 100 11/25/2022 Lab Results   Component Value Date    IRON 78 11/25/2022    TIBC 290 11/25/2022     No results found for: LIPASE    Radiology Results:   No results found  REVIEW OF SYSTEMS:    CONSTITUTIONAL: Denies any fever, chills, rigors, and weight loss  HEENT: No earache or tinnitus  Denies hearing loss or visual disturbances  CARDIOVASCULAR: No chest pain or palpitations  RESPIRATORY: Denies any cough, hemoptysis, shortness of breath or dyspnea on exertion  GASTROINTESTINAL: As noted in the History of Present Illness  GENITOURINARY: No problems with urination  Denies any hematuria or dysuria  NEUROLOGIC: No dizziness or vertigo, denies headaches  MUSCULOSKELETAL: Denies any muscle or joint pain  SKIN: Denies skin rashes or itching  ENDOCRINE: Denies excessive thirst  Denies intolerance to heat or cold  PSYCHOSOCIAL: Denies depression or anxiety  Denies any recent memory loss

## 2023-01-05 ENCOUNTER — EVALUATION (OUTPATIENT)
Dept: PHYSICAL THERAPY | Facility: CLINIC | Age: 58
End: 2023-01-05

## 2023-01-05 DIAGNOSIS — M16.11 PRIMARY OSTEOARTHRITIS OF RIGHT HIP: Primary | ICD-10-CM

## 2023-01-05 NOTE — PROGRESS NOTES
PT Evaluation     Today's date: 2023  Patient name: Beatriz Rizzo  : 1965  MRN: [id number]  Referring provider: Ashley Atkins PA-C  Dx:   Encounter Diagnosis     ICD-10-CM    1  Primary osteoarthritis of right hip  M16 11 Ambulatory referral to Physical Therapy                     Assessment  Assessment details: Beatriz Rizzo is a 59 y o  female presenting to outpatient physical therapy at [place] [address] with complaints of B hip pain  She presents with decreased range of motion, decreased strength, limited flexibility, altered gait pattern, poor balance, decreased tolerance to activity and decreased functional mobility due to Primary osteoarthritis of right hip (primary encounter diagnosis)  She will have R MAME on 23 and L MAME soon after  She would benefit from skilled PT services in order to address these deficits and reach maximum level of function  She was given a full HEP for pre-op and 2 weeks post-op until she sees her MD   Thank you for the referral!  Impairments: abnormal gait, abnormal or restricted ROM, activity intolerance, impaired balance, impaired physical strength, lacks appropriate home exercise program, pain with function and safety issue  Barriers to therapy: None  Understanding of Dx/Px/POC: good  Goals  ST    Independent with HEP in 2 weeks - Met    Plan  Patient would benefit from: skilled PT and PT eval  Planned modality interventions: cryotherapy  Planned therapy interventions: abdominal trunk stabilization, ADL retraining, balance/weight bearing training, body mechanics training, flexibility, functional ROM exercises, home exercise program, joint mobilization, manual therapy, neuromuscular re-education, strengthening, stretching, therapeutic activities, therapeutic exercise and gait training  Frequency: 1x week  Duration in weeks: 1  Treatment plan discussed with: patient        Subjective Evaluation    History of Present Illness  Mechanism of injury: Pt reports having B hip pain for at least 1 year  She tried injections, PT and weight loss without significant pain relief  She is planning to have R MAME on 23 with posterior approach  Plans to have a L MAME ASAP  Has been walking with a SPC recently  Has gained 60# in the past year due to inactivity  Working as an  for a dental practice  Recurrent probem    Quality of life: fair    Pain  Current pain ratin  At best pain ratin  At worst pain rating: 10  Quality: dull ache, sharp and tight  Relieving factors: rest, medications and ice  Aggravating factors: walking, standing and stair climbing  Progression: worsening    Social Support  Steps to enter house: yes  Stairs in house: no   Lives in: [facility] [facility]  Lives with: adult children    Employment status: working  Treatments  Previous treatment: medication, injection treatment and physical therapy  Current treatment: medication  Patient Goals  Patient goals for therapy: decreased pain, improved balance, increased motion, increased strength, independence with ADLs/IADLs and return to sport/leisure activities          Objective     Palpation     Additional Palpation Details  Severe tightness B hip adductors and flexors  Tenderness     Left Hip   Tenderness in the greater trochanter  Right Hip   Tenderness in the greater trochanter  Neurological Testing     Sensation     Hip   Left Hip   Intact: light touch    Right Hip   Intact: light touch    Ambulation     Ambulation: Level Surfaces   Ambulation with assistive device: independent  Ambulation without assistive device: unable    Ambulation: Stairs   Ascend stairs: independent  Pattern: non-reciprocal  Railings: one rail  Descend stairs: independent  Pattern: non-reciprocal  Railings: one rail  Curbs: unable    Observational Gait   Decreased walking speed and stride length  Comments   Poor B LE balance           POC EXPIRES On:  23  PRECAUTIONS: None  CO-MORBIDITES:  B hip OA  PERSONAL FACTORS:  R MAME scheduled for 1/18/23  Access Code [email or web address]       Manuals HEP                                                                Neuro Re-Ed                                                                                                Ther Ex    Standing hip adductor stretch L/R 1/5 15" 3 ea           Standing gastroc stretch L/R 1/5 15" 3 ea           LAQ L/R 1/5 20           Marching standing L/R 1/5 20           Standing hip ext L/R 1/5 20           Standing hip abd L/R 1/5 20           Quad sets L/R 1/5 20           Glut sets L/R 1/5 20                                     Ther Activity                              Gait Training                              Modalities

## 2023-01-06 ENCOUNTER — TELEPHONE (OUTPATIENT)
Dept: OBGYN CLINIC | Facility: HOSPITAL | Age: 58
End: 2023-01-06

## 2023-01-06 NOTE — TELEPHONE ENCOUNTER
Caller: Patient    Doctor: Dr Luigi Lane    Reason for call: Patient calling asking if she is required to have a dental release form filled prior to surgery on 1/18/23    Patient asking to speak to clinical team     Call back#: [phone]

## 2023-01-09 ENCOUNTER — OFFICE VISIT (OUTPATIENT)
Dept: FAMILY MEDICINE CLINIC | Facility: HOSPITAL | Age: 58
End: 2023-01-09

## 2023-01-09 ENCOUNTER — APPOINTMENT (OUTPATIENT)
Dept: PREADMISSION TESTING | Facility: HOSPITAL | Age: 58
End: 2023-01-09

## 2023-01-09 VITALS
BODY MASS INDEX: 37.21 KG/M2 | SYSTOLIC BLOOD PRESSURE: 116 MMHG | TEMPERATURE: 98.5 F | HEIGHT: 63 IN | HEART RATE: 92 BPM | WEIGHT: 210 LBS | DIASTOLIC BLOOD PRESSURE: 80 MMHG | OXYGEN SATURATION: 98 %

## 2023-01-09 DIAGNOSIS — M25.551 BILATERAL HIP PAIN: ICD-10-CM

## 2023-01-09 DIAGNOSIS — Z01.818 PREOP EXAMINATION: Primary | ICD-10-CM

## 2023-01-09 DIAGNOSIS — M25.552 BILATERAL HIP PAIN: ICD-10-CM

## 2023-01-09 DIAGNOSIS — G89.4 CHRONIC PAIN SYNDROME: ICD-10-CM

## 2023-01-09 DIAGNOSIS — M16.0 PRIMARY OSTEOARTHRITIS OF BOTH HIPS: ICD-10-CM

## 2023-01-09 DIAGNOSIS — E78.2 MIXED HYPERLIPIDEMIA: ICD-10-CM

## 2023-01-09 DIAGNOSIS — I10 PRIMARY HYPERTENSION: ICD-10-CM

## 2023-01-09 DIAGNOSIS — Z98.890 PONV (POSTOPERATIVE NAUSEA AND VOMITING): ICD-10-CM

## 2023-01-09 DIAGNOSIS — R10.84 GENERALIZED ABDOMINAL PAIN: ICD-10-CM

## 2023-01-09 DIAGNOSIS — R11.2 PONV (POSTOPERATIVE NAUSEA AND VOMITING): ICD-10-CM

## 2023-01-09 NOTE — PROGRESS NOTES
[facility] [address]     NAME: Manny Thurman  AGE: 59 y o  SEX: female  : 1965   DATE: 2023    History of Present Illness:     Manny Thurman is a 59 y o  female who presents to the office today for a preoperative consultation at the request of surgeon, Dr Aida Leslie, who plans on performing TRHR on 23  Planned anesthesia is general  Patient has a bleeding risk of: no recent abnormal bleeding and no remote history of abnormal bleeding  Patient does not have objections to receiving blood products if needed  Current anti-platelet/anti-coagulation medications that the patient is prescribed includes: None  Assessment of Chronic Conditions:   - Hypertension: controlled on meds     Assessment of Cardiac Risk:  · Denies unstable or severe angina or MI in the last 6 weeks or history of stent placement in the last year   · Denies decompensated heart failure (e g  New onset heart failure, NYHA functional class IV heart failure, or worsening existing heart failure)  · Denies significant arrhythmias such as high grade AV block, symptomatic ventricular arrhythmia, newly recognized ventricular tachycardia, supraventricular tachycardia with resting heart rate >100, or symptomatic bradycardia  · Denies severe heart valve disease including aortic stenosis or symptomatic mitral stenosis     Exercise Capacity:  · Able to walk 4 blocks without symptoms?: Yes, limited by hips & weight gain  · Able to walk 2 flights without symptoms?: No, does get some SOB    Prior Anesthesia Reactions: Yes, can take a while to wake up & vomiting  Personal history of venous thromboembolic disease? No  History of steroid use for >2 weeks within last year? No    Review of Systems:     Review of Systems   Respiratory: Negative for cough  BELLE +, SOB mildly at rest    Cardiovascular: Negative for chest pain and palpitations  Musculoskeletal: Positive for arthralgias and gait problem (uses cane)  Neurological: Positive for headaches (on/off)  Negative for dizziness and light-headedness  Current Problem List:     Patient Active Problem List   Diagnosis   • Lumbar spondylosis   • Bilateral hip pain   • Greater trochanteric bursitis of both hips   • Primary osteoarthritis of both hips   • GERD (gastroesophageal reflux disease)   • Chronic pain syndrome   • Chronic bilateral low back pain without sciatica   • Diffuse pain   • Primary hypertension   • Mixed hyperlipidemia   • PONV (postoperative nausea and vomiting)     Allergies: Allergies   Allergen Reactions   • Ativan [Lorazepam]    • Cymbalta [Duloxetine Hcl]    • Erythromycin    • Keflex [Cephalexin]    • Amoxicillin Rash   • Ampicillin Rash   • Latex Rash       Current Medications:       Current Outpatient Medications:   •  acetaminophen (TYLENOL) 500 mg tablet, Take 500 mg by mouth every 6 (six) hours as needed for mild pain, Disp: , Rfl:   •  albuterol (Proventil HFA) 90 mcg/act inhaler, Inhale 2 puffs every 6 (six) hours as needed for wheezing or shortness of breath, Disp: 18 g, Rfl: 0  •  ALPRAZolam (XANAX) 0 5 mg tablet, Take (1) 0 5 mg tablet preprocedure, may repeat x1 if needed  , Disp: 2 tablet, Rfl: 0  •  ascorbic acid (VITAMIN C) 500 MG tablet, Take 1 tablet (500 mg total) by mouth 2 (two) times a day, Disp: 30 tablet, Rfl: 3  •  atorvastatin (LIPITOR) 20 mg tablet, Take half tab every other day x2 weeks then half tab daily x2 weeks & then full tab daily afterwards  , Disp: 90 tablet, Rfl: 3  •  cholecalciferol (VITAMIN D3) 1,000 units tablet, Take 2 tablets (2,000 Units total) by mouth daily, Disp: 60 tablet, Rfl: 0  •  colestipol (COLESTID) 1 g tablet, Take 2 tablets (2 g total) by mouth 2 (two) times a day, Disp: 60 tablet, Rfl: 2  •  folic acid (FOLVITE) 1 mg tablet, Take 1 tablet (1 mg total) by mouth daily, Disp: 30 tablet, Rfl: 3  •  hyoscyamine (ANASPAZ,LEVSIN) 0 125 MG tablet, Take 1 tablet (0 125 mg total) by mouth every 4 (four) hours as needed for cramping, Disp: 30 tablet, Rfl: 0  •  losartan-hydrochlorothiazide (HYZAAR) 100-12 5 MG per tablet, Take 1 tablet by mouth daily, Disp: 30 tablet, Rfl: 5  •  Multiple Vitamins-Minerals (multivitamin with minerals) tablet, Take 1 tablet by mouth daily, Disp: 30 tablet, Rfl: 3  •  omeprazole (PriLOSEC) 40 MG capsule, Take 1 capsule (40 mg total) by mouth daily, Disp: 90 capsule, Rfl: 1  •  ondansetron (ZOFRAN-ODT) 4 mg disintegrating tablet, Take 1 tablet (4 mg total) by mouth every 6 (six) hours as needed for nausea or vomiting, Disp: 20 tablet, Rfl: 0    Past Medical History:       Past Medical History:   Diagnosis Date   • GERD (gastroesophageal reflux disease)    • Hyperlipidemia    • Hypertension    • Irritable bowel syndrome         Past Surgical History:   Procedure Laterality Date   • CATARACT EXTRACTION Bilateral    •  SECTION N/A    • CHOLECYSTECTOMY     • COLONOSCOPY     • HERNIA REPAIR     • HYSTERECTOMY     • NASAL SEPTUM SURGERY     • TONSILLECTOMY     • UPPER GASTROINTESTINAL ENDOSCOPY          Family History   Problem Relation Age of Onset   • Hypertension Mother    • Skin cancer Mother    • Stroke Father    • Colon cancer Cousin    • Hypertension Son    • Skin cancer Daughter    • Cancer Daughter    • Colon polyps Neg Hx         Social History     Socioeconomic History   • Marital status:      Spouse name: Not on file   • Number of children: Not on file   • Years of education: Not on file   • Highest education level: Not on file   Occupational History   • Not on file   Tobacco Use   • Smoking status: Former   • Smokeless tobacco: Never   Vaping Use   • Vaping Use: Never used   Substance and Sexual Activity   • Alcohol use:  Yes   • Drug use: Never   • Sexual activity: Never   Other Topics Concern   • Not on file   Social History Narrative   • Not on file     Social Determinants of Health     Financial Resource Strain: Not on file   Food Insecurity: Not on file   Transportation Needs: Not on file   Physical Activity: Not on file   Stress: Not on file   Social Connections: Not on file   Intimate Partner Violence: Not on file   Housing Stability: Not on file        Physical Exam:     /80   Pulse 92   Temp 98 5 °F (36 9 °C)   Ht 5' 3" (1 6 m)   Wt 95 3 kg (210 lb)   SpO2 98%   BMI 37 20 kg/m²     Physical Exam  Vitals reviewed  Constitutional:       General: She is not in acute distress  Appearance: Normal appearance  She is well-developed  She is obese  She is not ill-appearing  HENT:      Head: Normocephalic and atraumatic  Right Ear: Tympanic membrane, ear canal and external ear normal       Left Ear: Tympanic membrane, ear canal and external ear normal       Nose: Nose normal  No congestion  Mouth/Throat:      Mouth: Mucous membranes are moist       Pharynx: Oropharynx is clear  No oropharyngeal exudate  Eyes:      General: No scleral icterus  Right eye: No discharge  Left eye: No discharge  Cardiovascular:      Rate and Rhythm: Normal rate and regular rhythm  Pulses: Normal pulses  Heart sounds: Normal heart sounds  No murmur heard  Pulmonary:      Effort: Pulmonary effort is normal  No respiratory distress  Breath sounds: Normal breath sounds  No stridor  No wheezing  Musculoskeletal:      Cervical back: Normal range of motion and neck supple  No rigidity or tenderness  Right lower leg: No edema  Left lower leg: No edema  Lymphadenopathy:      Cervical: No cervical adenopathy  Skin:     General: Skin is warm  Neurological:      Mental Status: She is alert and oriented to person, place, and time  Gait: Gait normal    Psychiatric:         Mood and Affect: Mood normal          Behavior: Behavior normal          Thought Content:  Thought content normal          Judgment: Judgment normal           Data: Pre-operative work-up  Laboratory Results: I have personally reviewed the pertinent laboratory results/reports    -  CMP nml, eGFR 100, Na 144, K 4 4  Hb 13 4, Plt 303    EKG: I have personally reviewed pertinent reports  - 1/9/2023 - NSR 89 bpm      Assessment & Recommendations:     1  Preop examination        2  Bilateral hip pain        3  Primary osteoarthritis of both hips        4  Primary hypertension        5  Mixed hyperlipidemia        6  Generalized abdominal pain        7  Chronic pain syndrome        8  PONV (postoperative nausea and vomiting)            Pre-Op Evaluation Assessment  59 y o  female with planned surgery: RTHR  Known risk factors for perioperative complications: None  Current medications which may produce withdrawal symptoms if withheld perioperatively: None  Pre-Op Evaluation Plan  1  Further preoperative workup as follows:   - None; no further preoperative work-up is required    2  Medication Management/Recommendations:   - None, continue medication regimen including morning of surgery, with sip of water   - Xanax discussed, ordered for nervousness both procedure days  3  Prophylaxis for cardiac events with perioperative beta-blockers: not indicated  4  Patient requires further consultation with: None    Clearance  Patient is CLEARED for surgery without any additional cardiac testing       Belen Falcon DO  [facility] [address] [place] [id number]  Phone#  [phone]  Fax#  [phone]

## 2023-01-09 NOTE — H&P (VIEW-ONLY)
[address] [facility] [facility] [address]     NAME: Jorge Santamaria  AGE: 59 y o  SEX: female  : 1965   DATE: 2023    History of Present Illness:     Jorge Santamaria is a 59 y o  female who presents to the office today for a preoperative consultation at the request of surgeon, Dr Sampson Cranker, who plans on performing TRHR on 23  Planned anesthesia is general  Patient has a bleeding risk of: no recent abnormal bleeding and no remote history of abnormal bleeding  Patient does not have objections to receiving blood products if needed  Current anti-platelet/anti-coagulation medications that the patient is prescribed includes: None  Assessment of Chronic Conditions:   - Hypertension: controlled on meds     Assessment of Cardiac Risk:  · Denies unstable or severe angina or MI in the last 6 weeks or history of stent placement in the last year   · Denies decompensated heart failure (e g  New onset heart failure, NYHA functional class IV heart failure, or worsening existing heart failure)  · Denies significant arrhythmias such as high grade AV block, symptomatic ventricular arrhythmia, newly recognized ventricular tachycardia, supraventricular tachycardia with resting heart rate >100, or symptomatic bradycardia  · Denies severe heart valve disease including aortic stenosis or symptomatic mitral stenosis     Exercise Capacity:  · Able to walk 4 blocks without symptoms?: Yes, limited by hips & weight gain  · Able to walk 2 flights without symptoms?: No, does get some SOB    Prior Anesthesia Reactions: Yes, can take a while to wake up & vomiting  Personal history of venous thromboembolic disease? No  History of steroid use for >2 weeks within last year? No    Review of Systems:     Review of Systems   Respiratory: Negative for cough  BELLE +, SOB mildly at rest    Cardiovascular: Negative for chest pain and palpitations  Musculoskeletal: Positive for arthralgias and gait problem (uses cane)  Neurological: Positive for headaches (on/off)  Negative for dizziness and light-headedness  Current Problem List:     Patient Active Problem List   Diagnosis   • Lumbar spondylosis   • Bilateral hip pain   • Greater trochanteric bursitis of both hips   • Primary osteoarthritis of both hips   • GERD (gastroesophageal reflux disease)   • Chronic pain syndrome   • Chronic bilateral low back pain without sciatica   • Diffuse pain   • Primary hypertension   • Mixed hyperlipidemia   • PONV (postoperative nausea and vomiting)     Allergies: Allergies   Allergen Reactions   • Ativan [Lorazepam]    • Cymbalta [Duloxetine Hcl]    • Erythromycin    • Keflex [Cephalexin]    • Amoxicillin Rash   • Ampicillin Rash   • Latex Rash       Current Medications:       Current Outpatient Medications:   •  acetaminophen (TYLENOL) 500 mg tablet, Take 500 mg by mouth every 6 (six) hours as needed for mild pain, Disp: , Rfl:   •  albuterol (Proventil HFA) 90 mcg/act inhaler, Inhale 2 puffs every 6 (six) hours as needed for wheezing or shortness of breath, Disp: 18 g, Rfl: 0  •  ALPRAZolam (XANAX) 0 5 mg tablet, Take (1) 0 5 mg tablet preprocedure, may repeat x1 if needed  , Disp: 2 tablet, Rfl: 0  •  ascorbic acid (VITAMIN C) 500 MG tablet, Take 1 tablet (500 mg total) by mouth 2 (two) times a day, Disp: 30 tablet, Rfl: 3  •  atorvastatin (LIPITOR) 20 mg tablet, Take half tab every other day x2 weeks then half tab daily x2 weeks & then full tab daily afterwards  , Disp: 90 tablet, Rfl: 3  •  cholecalciferol (VITAMIN D3) 1,000 units tablet, Take 2 tablets (2,000 Units total) by mouth daily, Disp: 60 tablet, Rfl: 0  •  colestipol (COLESTID) 1 g tablet, Take 2 tablets (2 g total) by mouth 2 (two) times a day, Disp: 60 tablet, Rfl: 2  •  folic acid (FOLVITE) 1 mg tablet, Take 1 tablet (1 mg total) by mouth daily, Disp: 30 tablet, Rfl: 3  •  hyoscyamine (ANASPAZ,LEVSIN) 0 125 MG tablet, Take 1 tablet (0 125 mg total) by mouth every 4 (four) hours as needed for cramping, Disp: 30 tablet, Rfl: 0  •  losartan-hydrochlorothiazide (HYZAAR) 100-12 5 MG per tablet, Take 1 tablet by mouth daily, Disp: 30 tablet, Rfl: 5  •  Multiple Vitamins-Minerals (multivitamin with minerals) tablet, Take 1 tablet by mouth daily, Disp: 30 tablet, Rfl: 3  •  omeprazole (PriLOSEC) 40 MG capsule, Take 1 capsule (40 mg total) by mouth daily, Disp: 90 capsule, Rfl: 1  •  ondansetron (ZOFRAN-ODT) 4 mg disintegrating tablet, Take 1 tablet (4 mg total) by mouth every 6 (six) hours as needed for nausea or vomiting, Disp: 20 tablet, Rfl: 0    Past Medical History:       Past Medical History:   Diagnosis Date   • GERD (gastroesophageal reflux disease)    • Hyperlipidemia    • Hypertension    • Irritable bowel syndrome         Past Surgical History:   Procedure Laterality Date   • CATARACT EXTRACTION Bilateral    •  SECTION N/A    • CHOLECYSTECTOMY     • COLONOSCOPY     • HERNIA REPAIR     • HYSTERECTOMY     • NASAL SEPTUM SURGERY     • TONSILLECTOMY     • UPPER GASTROINTESTINAL ENDOSCOPY          Family History   Problem Relation Age of Onset   • Hypertension Mother    • Skin cancer Mother    • Stroke Father    • Colon cancer Cousin    • Hypertension Son    • Skin cancer Daughter    • Cancer Daughter    • Colon polyps Neg Hx         Social History     Socioeconomic History   • Marital status:      Spouse name: Not on file   • Number of children: Not on file   • Years of education: Not on file   • Highest education level: Not on file   Occupational History   • Not on file   Tobacco Use   • Smoking status: Former   • Smokeless tobacco: Never   Vaping Use   • Vaping Use: Never used   Substance and Sexual Activity   • Alcohol use:  Yes   • Drug use: Never   • Sexual activity: Never   Other Topics Concern   • Not on file   Social History Narrative   • Not on file     Social Determinants of Health     Financial Resource Strain: Not on file   Food Insecurity: Not on file   Transportation Needs: Not on file   Physical Activity: Not on file   Stress: Not on file   Social Connections: Not on file   Intimate Partner Violence: Not on file   Housing Stability: Not on file        Physical Exam:     /80   Pulse 92   Temp 98 5 °F (36 9 °C)   Ht 5' 3" (1 6 m)   Wt 95 3 kg (210 lb)   SpO2 98%   BMI 37 20 kg/m²     Physical Exam  Vitals reviewed  Constitutional:       General: She is not in acute distress  Appearance: Normal appearance  She is well-developed  She is obese  She is not ill-appearing  HENT:      Head: Normocephalic and atraumatic  Right Ear: Tympanic membrane, ear canal and external ear normal       Left Ear: Tympanic membrane, ear canal and external ear normal       Nose: Nose normal  No congestion  Mouth/Throat:      Mouth: Mucous membranes are moist       Pharynx: Oropharynx is clear  No oropharyngeal exudate  Eyes:      General: No scleral icterus  Right eye: No discharge  Left eye: No discharge  Cardiovascular:      Rate and Rhythm: Normal rate and regular rhythm  Pulses: Normal pulses  Heart sounds: Normal heart sounds  No murmur heard  Pulmonary:      Effort: Pulmonary effort is normal  No respiratory distress  Breath sounds: Normal breath sounds  No stridor  No wheezing  Musculoskeletal:      Cervical back: Normal range of motion and neck supple  No rigidity or tenderness  Right lower leg: No edema  Left lower leg: No edema  Lymphadenopathy:      Cervical: No cervical adenopathy  Skin:     General: Skin is warm  Neurological:      Mental Status: She is alert and oriented to person, place, and time  Gait: Gait normal    Psychiatric:         Mood and Affect: Mood normal          Behavior: Behavior normal          Thought Content:  Thought content normal          Judgment: Judgment normal           Data: Pre-operative work-up  Laboratory Results: I have personally reviewed the pertinent laboratory results/reports    -  CMP nml, eGFR 100, Na 144, K 4 4  Hb 13 4, Plt 303    EKG: I have personally reviewed pertinent reports  - 1/9/2023 - NSR 89 bpm      Assessment & Recommendations:     1  Preop examination        2  Bilateral hip pain        3  Primary osteoarthritis of both hips        4  Primary hypertension        5  Mixed hyperlipidemia        6  Generalized abdominal pain        7  Chronic pain syndrome        8  PONV (postoperative nausea and vomiting)            Pre-Op Evaluation Assessment  59 y o  female with planned surgery: RTHR  Known risk factors for perioperative complications: None  Current medications which may produce withdrawal symptoms if withheld perioperatively: None  Pre-Op Evaluation Plan  1  Further preoperative workup as follows:   - None; no further preoperative work-up is required    2  Medication Management/Recommendations:   - None, continue medication regimen including morning of surgery, with sip of water   - Xanax discussed, ordered for nervousness both procedure days  3  Prophylaxis for cardiac events with perioperative beta-blockers: not indicated  4  Patient requires further consultation with: None    Clearance  Patient is CLEARED for surgery without any additional cardiac testing       Reginaldo Lopez DO  [facility] [address] [place] [id number]  Phone#  [phone]  Fax#  [phone]

## 2023-01-11 ENCOUNTER — ANESTHESIA EVENT (OUTPATIENT)
Dept: GASTROENTEROLOGY | Facility: HOSPITAL | Age: 58
End: 2023-01-11

## 2023-01-11 ENCOUNTER — ANESTHESIA (OUTPATIENT)
Dept: GASTROENTEROLOGY | Facility: HOSPITAL | Age: 58
End: 2023-01-11

## 2023-01-11 ENCOUNTER — HOSPITAL ENCOUNTER (OUTPATIENT)
Dept: GASTROENTEROLOGY | Facility: HOSPITAL | Age: 58
Setting detail: OUTPATIENT SURGERY
Discharge: HOME | End: 2023-01-11
Attending: INTERNAL MEDICINE

## 2023-01-11 VITALS
DIASTOLIC BLOOD PRESSURE: 57 MMHG | TEMPERATURE: 98.2 F | RESPIRATION RATE: 18 BRPM | OXYGEN SATURATION: 95 % | SYSTOLIC BLOOD PRESSURE: 125 MMHG | HEART RATE: 76 BPM

## 2023-01-11 DIAGNOSIS — K90.89 BILE SALT-INDUCED DIARRHEA: Primary | ICD-10-CM

## 2023-01-11 DIAGNOSIS — R19.7 DIARRHEA, UNSPECIFIED TYPE: ICD-10-CM

## 2023-01-11 DIAGNOSIS — K21.9 GASTROESOPHAGEAL REFLUX DISEASE WITHOUT ESOPHAGITIS: ICD-10-CM

## 2023-01-11 RX ORDER — SODIUM CHLORIDE, SODIUM LACTATE, POTASSIUM CHLORIDE, CALCIUM CHLORIDE 600; 310; 30; 20 MG/100ML; MG/100ML; MG/100ML; MG/100ML
INJECTION, SOLUTION INTRAVENOUS CONTINUOUS PRN
Status: DISCONTINUED | OUTPATIENT
Start: 2023-01-11 | End: 2023-01-11

## 2023-01-11 RX ORDER — CHOLESTYRAMINE 4 G/9G
1 POWDER, FOR SUSPENSION ORAL
Qty: 30 PACKET | Refills: 2 | Status: SHIPPED | OUTPATIENT
Start: 2023-01-11

## 2023-01-11 RX ORDER — PROPOFOL 10 MG/ML
INJECTION, EMULSION INTRAVENOUS AS NEEDED
Status: DISCONTINUED | OUTPATIENT
Start: 2023-01-11 | End: 2023-01-11

## 2023-01-11 RX ORDER — LIDOCAINE HYDROCHLORIDE 20 MG/ML
INJECTION, SOLUTION EPIDURAL; INFILTRATION; INTRACAUDAL; PERINEURAL AS NEEDED
Status: DISCONTINUED | OUTPATIENT
Start: 2023-01-11 | End: 2023-01-11

## 2023-01-11 RX ORDER — ONDANSETRON 2 MG/ML
INJECTION INTRAMUSCULAR; INTRAVENOUS AS NEEDED
Status: DISCONTINUED | OUTPATIENT
Start: 2023-01-11 | End: 2023-01-11

## 2023-01-11 RX ADMIN — PROPOFOL 50 MG: 10 INJECTION, EMULSION INTRAVENOUS at 09:26

## 2023-01-11 RX ADMIN — ONDANSETRON 4 MG: 2 INJECTION INTRAMUSCULAR; INTRAVENOUS at 09:04

## 2023-01-11 RX ADMIN — PROPOFOL 50 MG: 10 INJECTION, EMULSION INTRAVENOUS at 09:18

## 2023-01-11 RX ADMIN — PROPOFOL 200 MG: 10 INJECTION, EMULSION INTRAVENOUS at 09:16

## 2023-01-11 RX ADMIN — PROPOFOL 100 MG: 10 INJECTION, EMULSION INTRAVENOUS at 09:22

## 2023-01-11 RX ADMIN — SODIUM CHLORIDE, SODIUM LACTATE, POTASSIUM CHLORIDE, AND CALCIUM CHLORIDE: .6; .31; .03; .02 INJECTION, SOLUTION INTRAVENOUS at 09:01

## 2023-01-11 RX ADMIN — PROPOFOL 100 MG: 10 INJECTION, EMULSION INTRAVENOUS at 09:31

## 2023-01-11 RX ADMIN — LIDOCAINE HYDROCHLORIDE 100 MG: 20 INJECTION, SOLUTION EPIDURAL; INFILTRATION; INTRACAUDAL; PERINEURAL at 09:16

## 2023-01-11 NOTE — ANESTHESIA PREPROCEDURE EVALUATION
Procedure:  COLONOSCOPY  EGD    Relevant Problems   ANESTHESIA   (+) PONV (postoperative nausea and vomiting)      CARDIO   (+) Mixed hyperlipidemia   (+) Primary hypertension      GI/HEPATIC   (+) GERD (gastroesophageal reflux disease)      MUSCULOSKELETAL   (+) Chronic bilateral low back pain without sciatica   (+) Lumbar spondylosis   (+) Primary osteoarthritis of both hips      NEURO/PSYCH   (+) Chronic bilateral low back pain without sciatica   (+) Chronic pain syndrome             Anesthesia Plan  ASA Score- 3     Anesthesia Type- IV sedation with anesthesia with ASA Monitors  Additional Monitors:   Airway Plan:     Comment: IV sedation,  standard ASA monitors  Risks and benefits discussed with patient; patient consented and agrees to proceed  I saw and evaluated the patient  If seen with CRNA, we have discussed the anesthetic plan and I am in agreement that the plan is appropriate for the patient          Plan Factors-    Chart reviewed  Existing labs reviewed  Induction- intravenous  Postoperative Plan-     Informed Consent- Anesthetic plan and risks discussed with patient  I personally reviewed this patient with the CRNA  Discussed and agreed on the Anesthesia Plan with the CRNA  Michael Le

## 2023-01-11 NOTE — ANESTHESIA POSTPROCEDURE EVALUATION
Post-Op Assessment Note    CV Status:  Stable  Pain Score: 0    Pain management: adequate     Mental Status:  Arousable and sleepy   Hydration Status:  Stable   PONV Controlled:  Controlled   Airway Patency:  Patent      Post Op Vitals Reviewed: Yes      Staff: CRNA         No notable events documented      BP   143/73   Temp 97 6   Pulse 82   Resp 14   SpO2 99

## 2023-01-12 NOTE — PRE-PROCEDURE INSTRUCTIONS
Pre-Surgery Instructions:   Medication Instructions   • acetaminophen (TYLENOL) 500 mg tablet Uses PRN- OK to take day of surgery   • albuterol (Proventil HFA) 90 mcg/act inhaler Take day of surgery  • ALPRAZolam (XANAX) 0 5 mg tablet Take day of surgery  • ascorbic acid (VITAMIN C) 500 MG tablet Hold day of surgery  • atorvastatin (LIPITOR) 20 mg tablet Take night before surgery   • cholecalciferol (VITAMIN D3) 1,000 units tablet Hold day of surgery  • cholestyramine (QUESTRAN) 4 g packet Take night before surgery   • folic acid (FOLVITE) 1 mg tablet Hold day of surgery  • hyoscyamine (ANASPAZ,LEVSIN) 0 125 MG tablet Take night before surgery   • losartan-hydrochlorothiazide (HYZAAR) 100-12 5 MG per tablet Hold day of surgery  • Multiple Vitamins-Minerals (multivitamin with minerals) tablet Hold day of surgery  • omeprazole (PriLOSEC) 40 MG capsule Take day of surgery  • ondansetron (ZOFRAN-ODT) 4 mg disintegrating tablet Uses PRN- OK to take day of surgery    Ortho  Patient: Reviewed with patient, in detail, instructions from "My Surgical Experience"  Verified with patient that she received TMLJ patient education from surgeon's office  Advised to call ortho office/surgery coordinator with any questions and/or concerns  Confirmed that patient has hibiclens soap and CHG wipes  Incentive spirometer received  Patient verbalized understanding of current visitor restrictions due to Covid and will clarify with nurse DOS  Instructed to avoid all ASA and OTC Vit/Supp 1 week prior to surgery and to avoid NSAIDs 7 days prior to surgery per anesthesia guidelines  Tylenol ok to take prn  No alcohol 24 hours prior to surgery  Patient aware Lovenox or other Blood Thinner prescribed is for POST OP ONLY  Instructed to call surgeon's office in meantime with any new illness  Patient verbalized an understanding of all instructions reviewed and offers no concerns at this time

## 2023-01-14 ENCOUNTER — APPOINTMENT (EMERGENCY)
Dept: RADIOLOGY | Facility: HOSPITAL | Age: 58
End: 2023-01-14

## 2023-01-14 ENCOUNTER — HOSPITAL ENCOUNTER (EMERGENCY)
Facility: HOSPITAL | Age: 58
Discharge: HOME | End: 2023-01-14
Attending: EMERGENCY MEDICINE

## 2023-01-14 ENCOUNTER — NURSE TRIAGE (OUTPATIENT)
Dept: OTHER | Facility: OTHER | Age: 58
End: 2023-01-14

## 2023-01-14 VITALS
WEIGHT: 210 LBS | TEMPERATURE: 97.6 F | SYSTOLIC BLOOD PRESSURE: 163 MMHG | BODY MASS INDEX: 37.21 KG/M2 | HEART RATE: 77 BPM | RESPIRATION RATE: 18 BRPM | OXYGEN SATURATION: 95 % | DIASTOLIC BLOOD PRESSURE: 86 MMHG | HEIGHT: 63 IN

## 2023-01-14 DIAGNOSIS — K44.9 HIATAL HERNIA: ICD-10-CM

## 2023-01-14 DIAGNOSIS — R07.9 CHEST PAIN WITH LOW RISK OF ACUTE CORONARY SYNDROME: Primary | ICD-10-CM

## 2023-01-14 DIAGNOSIS — K21.9 GERD (GASTROESOPHAGEAL REFLUX DISEASE): ICD-10-CM

## 2023-01-14 LAB
ALBUMIN SERPL BCP-MCNC: 3.6 G/DL (ref 3.5–5)
ALP SERPL-CCNC: 86 U/L (ref 46–116)
ALT SERPL W P-5'-P-CCNC: 26 U/L (ref 12–78)
ANION GAP SERPL CALCULATED.3IONS-SCNC: 8 MMOL/L (ref 4–13)
AST SERPL W P-5'-P-CCNC: 23 U/L (ref 5–45)
BASOPHILS # BLD AUTO: 0.03 THOUSANDS/ÂΜL (ref 0–0.1)
BASOPHILS NFR BLD AUTO: 1 % (ref 0–1)
BILIRUB SERPL-MCNC: 0.5 MG/DL (ref 0.2–1)
BUN SERPL-MCNC: 9 MG/DL (ref 5–25)
CALCIUM SERPL-MCNC: 9.2 MG/DL (ref 8.3–10.1)
CARDIAC TROPONIN I PNL SERPL HS: 3 NG/L
CHLORIDE SERPL-SCNC: 105 MMOL/L (ref 96–108)
CO2 SERPL-SCNC: 32 MMOL/L (ref 21–32)
CREAT SERPL-MCNC: 0.76 MG/DL (ref 0.6–1.3)
EOSINOPHIL # BLD AUTO: 0.19 THOUSAND/ÂΜL (ref 0–0.61)
EOSINOPHIL NFR BLD AUTO: 3 % (ref 0–6)
ERYTHROCYTE [DISTWIDTH] IN BLOOD BY AUTOMATED COUNT: 12.1 % (ref 11.6–15.1)
GFR SERPL CREATININE-BSD FRML MDRD: 86 ML/MIN/1.73SQ M
GLUCOSE SERPL-MCNC: 82 MG/DL (ref 65–140)
HCT VFR BLD AUTO: 38.5 % (ref 34.8–46.1)
HGB BLD-MCNC: 13 G/DL (ref 11.5–15.4)
IMM GRANULOCYTES # BLD AUTO: 0.02 THOUSAND/UL (ref 0–0.2)
IMM GRANULOCYTES NFR BLD AUTO: 0 % (ref 0–2)
LIPASE SERPL-CCNC: 63 U/L (ref 73–393)
LYMPHOCYTES # BLD AUTO: 1.3 THOUSANDS/ÂΜL (ref 0.6–4.47)
LYMPHOCYTES NFR BLD AUTO: 22 % (ref 14–44)
MCH RBC QN AUTO: 30.7 PG (ref 26.8–34.3)
MCHC RBC AUTO-ENTMCNC: 33.8 G/DL (ref 31.4–37.4)
MCV RBC AUTO: 91 FL (ref 82–98)
MONOCYTES # BLD AUTO: 0.53 THOUSAND/ÂΜL (ref 0.17–1.22)
MONOCYTES NFR BLD AUTO: 9 % (ref 4–12)
NEUTROPHILS # BLD AUTO: 3.8 THOUSANDS/ÂΜL (ref 1.85–7.62)
NEUTS SEG NFR BLD AUTO: 65 % (ref 43–75)
NRBC BLD AUTO-RTO: 0 /100 WBCS
PLATELET # BLD AUTO: 308 THOUSANDS/UL (ref 149–390)
PMV BLD AUTO: 10.7 FL (ref 8.9–12.7)
POTASSIUM SERPL-SCNC: 3.5 MMOL/L (ref 3.5–5.3)
PROT SERPL-MCNC: 6.7 G/DL (ref 6.4–8.4)
RBC # BLD AUTO: 4.23 MILLION/UL (ref 3.81–5.12)
SODIUM SERPL-SCNC: 145 MMOL/L (ref 135–147)
WBC # BLD AUTO: 5.87 THOUSAND/UL (ref 4.31–10.16)

## 2023-01-14 RX ORDER — MAGNESIUM HYDROXIDE/ALUMINUM HYDROXICE/SIMETHICONE 120; 1200; 1200 MG/30ML; MG/30ML; MG/30ML
30 SUSPENSION ORAL ONCE
Status: COMPLETED | OUTPATIENT
Start: 2023-01-14 | End: 2023-01-14

## 2023-01-14 RX ORDER — LIDOCAINE HYDROCHLORIDE 20 MG/ML
15 SOLUTION OROPHARYNGEAL ONCE
Status: COMPLETED | OUTPATIENT
Start: 2023-01-14 | End: 2023-01-14

## 2023-01-14 RX ORDER — ONDANSETRON 4 MG/1
4 TABLET, ORALLY DISINTEGRATING ORAL ONCE
Status: COMPLETED | OUTPATIENT
Start: 2023-01-14 | End: 2023-01-14

## 2023-01-14 RX ORDER — FAMOTIDINE 20 MG/1
20 TABLET, FILM COATED ORAL ONCE
Status: COMPLETED | OUTPATIENT
Start: 2023-01-14 | End: 2023-01-14

## 2023-01-14 RX ADMIN — FAMOTIDINE 20 MG: 20 TABLET, FILM COATED ORAL at 14:06

## 2023-01-14 RX ADMIN — ONDANSETRON 4 MG: 4 TABLET, ORALLY DISINTEGRATING ORAL at 14:06

## 2023-01-14 RX ADMIN — LIDOCAINE HYDROCHLORIDE 15 ML: 20 SOLUTION ORAL at 15:13

## 2023-01-14 RX ADMIN — ALUMINUM HYDROXIDE, MAGNESIUM HYDROXIDE, AND DIMETHICONE 30 ML: 200; 20; 200 SUSPENSION ORAL at 15:13

## 2023-01-14 NOTE — TELEPHONE ENCOUNTER
Regarding: post procedure pain  ----- Message from Isaac Baca sent at 1/14/2023 10:24 AM EST -----  " I had an EGD on 1/11/23, I still can't swallow anything, not even yogurt  When I lay down I start to regurgitate and having really bad heartburn   My throat is still in a lot of pain "

## 2023-01-14 NOTE — TELEPHONE ENCOUNTER
Spoke with Dr John Thurman, advised to increase omeprazole to twice a day and okay to take Tums  Follow up in office on Monday morning  If feels may be cardiac go to ED  Patient made aware and verbalized understanding  She will go to [place] [address] ED [facility]  Placed on board  Advised to call back with questions or concerns  Dr John Thurman made aware  Reason for Disposition  • [1] Caller has URGENT question AND [2] triager unable to answer question    Answer Assessment - Initial Assessment Questions  1  SYMPTOM: "What's the main symptom you're concerned about?" (e g , pain, fever, vomiting)      Hurts to swallow, eat and talk  left side hurts more than right  Regurgitation with heartburn last night  2  ONSET: "When did started start?"      Started immediately after, but regurgitations started last night  3  SURGERY: "What surgery was performed?"      1 11 23  4  DATE of SURGERY: "When was surgery performed?"      EGD  5  ANESTHESIA: " What type of anesthesia did you have?" (e g , general, spinal, epidural, local)     Sedation  6  PAIN: "Is there any pain?" If Yes, ask: "How bad is it?"  (Scale 1-10; or mild, moderate, severe)      Moderate  7  FEVER: "Do you have a fever?" If Yes, ask: "What is your temperature, how was it measured, and when did it start?"      Felt feverish yesterday, but temp 98 8 forehead  8  VOMITING: "Is there any vomiting?" If yes, ask: "How many times?"     Denies just has regurgitation  9  BLEEDING: "Is there any bleeding?" If Yes, ask: "How much?" and "Where?"     Denies  10  OTHER SYMPTOMS: "Do you have any other symptoms?" (e g , drainage from wound, painful urination, constipation)        Heartburn, nasty taste in mouth with regurgitation, when swallow and breathing feels pain radiating to left  Clavicle  Hurts to cough          Only able to eat jello, ice cream, yogurt and apple sauce      Protocols used: POST-OP SYMPTOMS AND QUESTIONS-[facility]

## 2023-01-14 NOTE — ED PROVIDER NOTES
History  Chief Complaint   Patient presents with   • Chest Pain     Pt to ED c/o chest pain  Pt states she had EGD done on Wednesday and since then has had upper chest pain that goes into her neck  Pt states when she swallows it feels like something Is lodged in her throat  Pt states she is having a lot of issues with regurgitation, took tums and prilosec with no improvement  38-year-old female presents for evaluation of left-sided chest pain that has been ongoing since the patient had an EGD on Wednesday  The patient states that she had an EGD completed for an esophageal stricture and was dilated at that time  The patient has pain that radiates up towards her neck  She states that it hurts when she swallows  She has had increasing regurgitation with this she has been taking Tums and Prilosec without relief  She states that it hurts worse when she coughs  Denies any fevers or chills  She states the pain will radiate into her back as well  Prior to Admission Medications   Prescriptions Last Dose Informant Patient Reported? Taking? ALPRAZolam (XANAX) 0 5 mg tablet   No No   Sig: Take (1) 0 5 mg tablet preprocedure, may repeat x1 if needed  Multiple Vitamins-Minerals (multivitamin with minerals) tablet   No No   Sig: Take 1 tablet by mouth daily   acetaminophen (TYLENOL) 500 mg tablet   Yes No   Sig: Take 500 mg by mouth every 6 (six) hours as needed for mild pain   albuterol (Proventil HFA) 90 mcg/act inhaler   No No   Sig: Inhale 2 puffs every 6 (six) hours as needed for wheezing or shortness of breath   ascorbic acid (VITAMIN C) 500 MG tablet   No No   Sig: Take 1 tablet (500 mg total) by mouth 2 (two) times a day   atorvastatin (LIPITOR) 20 mg tablet   No No   Sig: Take half tab every other day x2 weeks then half tab daily x2 weeks & then full tab daily afterwards     cholecalciferol (VITAMIN D3) 1,000 units tablet   No No   Sig: Take 2 tablets (2,000 Units total) by mouth daily cholestyramine (QUESTRAN) 4 g packet   No No   Sig: Take 1 packet (4 g total) by mouth daily at bedtime   folic acid (FOLVITE) 1 mg tablet   No No   Sig: Take 1 tablet (1 mg total) by mouth daily   hyoscyamine (ANASPAZ,LEVSIN) 0 125 MG tablet   No No   Sig: Take 1 tablet (0 125 mg total) by mouth every 4 (four) hours as needed for cramping   losartan-hydrochlorothiazide (HYZAAR) 100-12 5 MG per tablet   No No   Sig: Take 1 tablet by mouth daily   omeprazole (PriLOSEC) 40 MG capsule   No No   Sig: Take 1 capsule (40 mg total) by mouth daily   ondansetron (ZOFRAN-ODT) 4 mg disintegrating tablet   No No   Sig: Take 1 tablet (4 mg total) by mouth every 6 (six) hours as needed for nausea or vomiting      Facility-Administered Medications: None       Past Medical History:   Diagnosis Date   • Arthritis    • GERD (gastroesophageal reflux disease)    • Hyperlipidemia    • Hypertension    • Irritable bowel syndrome    • Low back pain    • PONV (postoperative nausea and vomiting)        Past Surgical History:   Procedure Laterality Date   • CATARACT EXTRACTION Bilateral    •  SECTION N/A    • CHOLECYSTECTOMY     • COLONOSCOPY     • HERNIA REPAIR     • HYSTERECTOMY     • NASAL SEPTUM SURGERY     • TONSILLECTOMY     • UPPER GASTROINTESTINAL ENDOSCOPY         Family History   Problem Relation Age of Onset   • Hypertension Mother    • Skin cancer Mother    • Stroke Father    • Colon cancer Cousin    • Hypertension Son    • Skin cancer Daughter    • Cancer Daughter    • Colon polyps Neg Hx      I have reviewed and agree with the history as documented      E-Cigarette/Vaping   • E-Cigarette Use Never User      E-Cigarette/Vaping Substances   • Nicotine No    • THC No    • CBD No    • Flavoring No    • Other No    • Unknown No      Social History     Tobacco Use   • Smoking status: Former     Types: Cigarettes     Quit date:      Years since quitting: 10 0   • Smokeless tobacco: Never   Vaping Use   • Vaping Use: Never used   Substance Use Topics   • Alcohol use: Not Currently     Comment: extremely rare social occasion   • Drug use: Never       Review of Systems   Respiratory: Positive for cough and chest tightness  Cardiovascular: Positive for chest pain  Gastrointestinal: Positive for abdominal pain  Physical Exam  Physical Exam  Vitals and nursing note reviewed  Constitutional:       General: She is not in acute distress  Appearance: She is well-developed  HENT:      Head: Normocephalic and atraumatic  Right Ear: External ear normal       Left Ear: External ear normal    Eyes:      General: No scleral icterus  Conjunctiva/sclera: Conjunctivae normal       Pupils: Pupils are equal, round, and reactive to light  Neck:      Vascular: No carotid bruit  Trachea: Trachea normal  No tracheal tenderness  Cardiovascular:      Rate and Rhythm: Normal rate and regular rhythm  Heart sounds: Normal heart sounds  Pulmonary:      Effort: Pulmonary effort is normal  No respiratory distress  Breath sounds: Normal breath sounds  Chest:      Chest wall: Tenderness present  Abdominal:      General: Bowel sounds are normal       Palpations: Abdomen is soft  Tenderness: There is no abdominal tenderness  There is no guarding or rebound  Musculoskeletal:         General: Normal range of motion  Cervical back: Normal range of motion  No crepitus  Normal range of motion  Lymphadenopathy:      Cervical: No cervical adenopathy  Skin:     General: Skin is warm and dry  Findings: No rash  Neurological:      Mental Status: She is alert and oriented to person, place, and time           Vital Signs  ED Triage Vitals [01/14/23 1215]   Temperature Pulse Respirations Blood Pressure SpO2   97 6 °F (36 4 °C) 90 18 144/80 97 %      Temp Source Heart Rate Source Patient Position - Orthostatic VS BP Location FiO2 (%)   Temporal Monitor Sitting Left arm --      Pain Score       5 Vitals:    01/14/23 1300 01/14/23 1400 01/14/23 1500 01/14/23 1530   BP: 150/80 142/73 153/79 163/86   Pulse: 77 76 79 77   Patient Position - Orthostatic VS: Lying Lying Lying Lying         Visual Acuity  Visual Acuity    Flowsheet Row Most Recent Value   L Pupil Size (mm) 4   R Pupil Size (mm) 4          ED Medications  Medications   ondansetron (ZOFRAN-ODT) dispersible tablet 4 mg (4 mg Oral Given 1/14/23 1406)   famotidine (PEPCID) tablet 20 mg (20 mg Oral Given 1/14/23 1406)   aluminum-magnesium hydroxide-simethicone (MYLANTA) oral suspension 30 mL (30 mL Oral Given 1/14/23 1513)   Lidocaine Viscous HCl (XYLOCAINE) 2 % mucosal solution 15 mL (15 mL Swish & Swallow Given 1/14/23 1513)       Diagnostic Studies  Results Reviewed     Procedure Component Value Units Date/Time    HS Troponin 0hr (reflex protocol) [[id number]]  (Normal) Collected: 01/14/23 1335    Lab Status: Final result Specimen: Blood from Arm, Left Updated: 01/14/23 1410     hs TnI 0hr 3 ng/L     Comprehensive metabolic panel [[id number]] Collected: 01/14/23 1335    Lab Status: Final result Specimen: Blood from Arm, Left Updated: 01/14/23 1403     Sodium 145 mmol/L      Potassium 3 5 mmol/L      Chloride 105 mmol/L      CO2 32 mmol/L      ANION GAP 8 mmol/L      BUN 9 mg/dL      Creatinine 0 76 mg/dL      Glucose 82 mg/dL      Calcium 9 2 mg/dL      AST 23 U/L      ALT 26 U/L      Alkaline Phosphatase 86 U/L      Total Protein 6 7 g/dL      Albumin 3 6 g/dL      Total Bilirubin 0 50 mg/dL      eGFR 86 ml/min/1 73sq m     Narrative:      [place] guidelines for Chronic Kidney Disease (CKD):   •  Stage 1 with normal or high GFR (GFR > 90 mL/min/1 73 square meters)  •  Stage 2 Mild CKD (GFR = 60-89 mL/min/1 73 square meters)  •  Stage 3A Moderate CKD (GFR = 45-59 mL/min/1 73 square meters)  •  Stage 3B Moderate CKD (GFR = 30-44 mL/min/1 73 square meters)  •  Stage 4 Severe CKD (GFR = 15-29 mL/min/1 73 square meters)  • Stage 5 End Stage CKD (GFR <15 mL/min/1 73 square meters)  Note: GFR calculation is accurate only with a steady state creatinine    Lipase [[id number]]  (Abnormal) Collected: 01/14/23 1335    Lab Status: Final result Specimen: Blood from Arm, Left Updated: 01/14/23 1403     Lipase 63 u/L     CBC and differential [[id number]] Collected: 01/14/23 1335    Lab Status: Final result Specimen: Blood from Arm, Left Updated: 01/14/23 1349     WBC 5 87 Thousand/uL      RBC 4 23 Million/uL      Hemoglobin 13 0 g/dL      Hematocrit 38 5 %      MCV 91 fL      MCH 30 7 pg      MCHC 33 8 g/dL      RDW 12 1 %      MPV 10 7 fL      Platelets 337 Thousands/uL      nRBC 0 /100 WBCs      Neutrophils Relative 65 %      Immat GRANS % 0 %      Lymphocytes Relative 22 %      Monocytes Relative 9 %      Eosinophils Relative 3 %      Basophils Relative 1 %      Neutrophils Absolute 3 80 Thousands/µL      Immature Grans Absolute 0 02 Thousand/uL      Lymphocytes Absolute 1 30 Thousands/µL      Monocytes Absolute 0 53 Thousand/µL      Eosinophils Absolute 0 19 Thousand/µL      Basophils Absolute 0 03 Thousands/µL                  XR chest 1 view portable   ED Interpretation by Julissa Camejo DO (01/14 1415)   My X-ray interpretation of the Chest: was negative for infiltrate, effusion, pneumothorax, or wide mediastinum      Final Result by Lynell Dandy, MD (01/15 2584)      No acute cardiopulmonary disease  Findings are stable            Workstation performed: [id number]                    Procedures  Procedures         ED Course  ED Course as of 01/15/23 0750   Sat Jan 14, 2023   1505 I discussed the unremarkable cardiac work-up at this time as well as the unremarkable chest x-ray    Plan to trial GI cocktail for discomfort which may be related to recent esophageal dilatation as there is no evidence of pneumomediastinum on chest x-ray nor auscultation of the heart   2684  I discussed the results of the laboratories and the plan for discharge  The patient is advised to follow-up with her GI doc             HEART Risk Score    Flowsheet Row Most Recent Value   Heart Score Risk Calculator    History 0 Filed at: 01/14/2023 1546   ECG 0 Filed at: 01/14/2023 1546   Age 1 Filed at: 01/14/2023 1546   Risk Factors 1 Filed at: 01/14/2023 1546   Troponin 0 Filed at: 01/14/2023 1546   HEART Score 2 Filed at: 01/14/2023 1546                        SBIRT 20yo+    Flowsheet Row Most Recent Value   SBIRT (25 yo +)    In order to provide better care to our patients, we are screening all of our patients for alcohol and drug use  Would it be okay to ask you these screening questions? Yes Filed at: 01/14/2023 1252   Initial Alcohol Screen: US AUDIT-C     1  How often do you have a drink containing alcohol? 0 Filed at: 01/14/2023 1252   2  How many drinks containing alcohol do you have on a typical day you are drinking? 0 Filed at: 01/14/2023 1252   3a  Male UNDER 65: How often do you have five or more drinks on one occasion? 0 Filed at: 01/14/2023 1252   3b  FEMALE Any Age, or MALE 65+: How often do you have 4 or more drinks on one occassion? 0 Filed at: 01/14/2023 1252   Audit-C Score 0 Filed at: 01/14/2023 1252   NANCY: How many times in the past year have you    Used an illegal drug or used a prescription medication for non-medical reasons? Never Filed at: 01/14/2023 1252                    Medical Decision Making  Differential diagnosis: Chest wall pain, esophagitis, muscle strain, doubt acute coronary syndrome pneumothorax or pneumomediastinum  Plan for cardiac work-up, chest x-ray EKG    Discussed the diagnostics in the emergency department and the plan for the patient to follow-up with her gastroenterologist prior to her planned hip replacement this coming Wednesday    The patient (and any family present) verbalized understanding of the discharge instructions and warnings that would necessitate return to the Emergency Department      All questions were answered prior to discharge  Chest pain with low risk of acute coronary syndrome: acute illness or injury  GERD (gastroesophageal reflux disease): self-limited or minor problem  Hiatal hernia: acute illness or injury  Amount and/or Complexity of Data Reviewed  Labs: ordered  Radiology: ordered and independent interpretation performed  Risk  OTC drugs  Prescription drug management  Disposition  Final diagnoses:   Chest pain with low risk of acute coronary syndrome   GERD (gastroesophageal reflux disease)   Hiatal hernia     Time reflects when diagnosis was documented in both MDM as applicable and the Disposition within this note     Time User Action Codes Description Comment    1/14/2023  3:45 PM Marcheta Simmonds Add [R07 9] Chest pain with low risk of acute coronary syndrome     1/14/2023  3:45 PM Marcheta Simmonds Add [K21 9] GERD (gastroesophageal reflux disease)     1/14/2023  3:45 PM Marcheta Simmonds Add [K44 9] Hiatal hernia       ED Disposition     ED Disposition   Discharge    Condition   Stable    Date/Time   Sat Jan 14, 2023  3:45 PM    Comment   [address] discharge to home/self care                 Follow-up Information     Follow up With Specialties Details Why Contact Info Additional Jose 3, DO Gastroenterology Schedule an appointment as soon as possible for a visit  For further evaluation [address] [facility] Emergency Department Emergency Medicine Go to  If symptoms worsen [address] [place], [id number]  [address] [facility] Emergency Department, [address] [facility] 10    Jose Alfredo Barron DO Family Medicine Schedule an appointment as soon as possible for a visit  For further evaluation [facility]   [address] [facility] [address]  [phone]             Discharge Medication List as of 1/14/2023  3:47 PM      CONTINUE these medications which have NOT CHANGED    Details   acetaminophen (TYLENOL) 500 mg tablet Take 500 mg by mouth every 6 (six) hours as needed for mild pain, Historical Med      albuterol (Proventil HFA) 90 mcg/act inhaler Inhale 2 puffs every 6 (six) hours as needed for wheezing or shortness of breath, Starting u 7/21/2022, Normal      ALPRAZolam (XANAX) 0 5 mg tablet Take (1) 0 5 mg tablet preprocedure, may repeat x1 if needed , Normal      ascorbic acid (VITAMIN C) 500 MG tablet Take 1 tablet (500 mg total) by mouth 2 (two) times a day, Starting Fri 11/25/2022, Normal      atorvastatin (LIPITOR) 20 mg tablet Take half tab every other day x2 weeks then half tab daily x2 weeks & then full tab daily afterwards , Normal      cholecalciferol (VITAMIN D3) 1,000 units tablet Take 2 tablets (2,000 Units total) by mouth daily, Starting Fri 11/25/2022, Normal      cholestyramine (QUESTRAN) 4 g packet Take 1 packet (4 g total) by mouth daily at bedtime, Starting Wed 9/43/9517, Normal      folic acid (FOLVITE) 1 mg tablet Take 1 tablet (1 mg total) by mouth daily, Starting Fri 11/25/2022, Normal      hyoscyamine (ANASPAZ,LEVSIN) 0 125 MG tablet Take 1 tablet (0 125 mg total) by mouth every 4 (four) hours as needed for cramping, Starting Wed 1/4/2023, Normal      losartan-hydrochlorothiazide (HYZAAR) 100-12 5 MG per tablet Take 1 tablet by mouth daily, Starting Mon 10/24/2022, Normal      Multiple Vitamins-Minerals (multivitamin with minerals) tablet Take 1 tablet by mouth daily, Starting Fri 11/25/2022, Normal      omeprazole (PriLOSEC) 40 MG capsule Take 1 capsule (40 mg total) by mouth daily, Starting Mon 10/24/2022, Normal      ondansetron (ZOFRAN-ODT) 4 mg disintegrating tablet Take 1 tablet (4 mg total) by mouth every 6 (six) hours as needed for nausea or vomiting, Starting Wed 11/30/2022, Normal             No discharge procedures on file      PDMP Review       Value Time User    PDMP Reviewed  Yes 1/4/2023 12:41 PM Keisha Ramirez DO          ED Provider  Electronically Signed by           Alton Perez DO  01/15/23 6249

## 2023-01-16 ENCOUNTER — OFFICE VISIT (OUTPATIENT)
Dept: GASTROENTEROLOGY | Facility: CLINIC | Age: 58
End: 2023-01-16

## 2023-01-16 ENCOUNTER — ANESTHESIA EVENT (OUTPATIENT)
Dept: PERIOP | Facility: HOSPITAL | Age: 58
End: 2023-01-16

## 2023-01-16 VITALS
BODY MASS INDEX: 37.03 KG/M2 | HEIGHT: 63 IN | DIASTOLIC BLOOD PRESSURE: 82 MMHG | WEIGHT: 209 LBS | SYSTOLIC BLOOD PRESSURE: 120 MMHG

## 2023-01-16 DIAGNOSIS — K90.9 DIARRHEA DUE TO MALABSORPTION: ICD-10-CM

## 2023-01-16 DIAGNOSIS — Z12.11 SCREENING FOR COLON CANCER: ICD-10-CM

## 2023-01-16 DIAGNOSIS — R11.2 NAUSEA AND VOMITING, UNSPECIFIED VOMITING TYPE: ICD-10-CM

## 2023-01-16 DIAGNOSIS — R13.19 ESOPHAGEAL DYSPHAGIA: Primary | ICD-10-CM

## 2023-01-16 DIAGNOSIS — R19.7 DIARRHEA DUE TO MALABSORPTION: ICD-10-CM

## 2023-01-16 DIAGNOSIS — K21.9 GASTROESOPHAGEAL REFLUX DISEASE WITHOUT ESOPHAGITIS: ICD-10-CM

## 2023-01-16 PROBLEM — R11.0 NAUSEA: Status: ACTIVE | Noted: 2023-01-16

## 2023-01-16 PROBLEM — R13.10 DYSPHAGIA: Status: ACTIVE | Noted: 2023-01-16

## 2023-01-16 RX ORDER — OMEPRAZOLE 40 MG/1
40 CAPSULE, DELAYED RELEASE ORAL 2 TIMES DAILY
Qty: 180 CAPSULE | Refills: 1 | Status: SHIPPED | OUTPATIENT
Start: 2023-01-16

## 2023-01-16 RX ORDER — ONDANSETRON 4 MG/1
4 TABLET, ORALLY DISINTEGRATING ORAL EVERY 6 HOURS PRN
Qty: 60 TABLET | Refills: 1 | Status: SHIPPED | OUTPATIENT
Start: 2023-01-16

## 2023-01-16 RX ORDER — SUCRALFATE ORAL 1 G/10ML
1 SUSPENSION ORAL 2 TIMES DAILY
Qty: 600 ML | Refills: 0 | Status: SHIPPED | OUTPATIENT
Start: 2023-01-16 | End: 2023-02-15

## 2023-01-16 NOTE — PATIENT INSTRUCTIONS
I submitted a prescription for more Zofran and omeprazole  I sent in a prescription for sucralfate/Carafate liquid, take it twice a day about 30 minutes before your pills  If you need additional doses, you can use it up to 4 times a day total   As we discussed, use Gaviscon liquid as needed for heartburn type symptoms  Okay to take a Dulcolax tonight  I recommend holding the cholestyramine powder this evening as well  Okay to resume it tomorrow if you have a bowel movement, otherwise hold it again    As we discussed, it is okay to reduce that medication as needed to customize your optimal dose

## 2023-01-16 NOTE — PROGRESS NOTES
[address] [facility] - Outpatient Follow-up Note  Ramirez Bishop 59 y o  female MRN: [id number]  Encounter: [id number]    ASSESSMENT AND PLAN:      1  Esophageal dysphagia  Longstanding intermittent dysphagia  Had EGD last week  Biopsies negative for eosinophilic esophagitis  I dilated the esophagus during the procedure, no further dysphagia but has painful swallowing on the left  Suspect small ulceration  We will treat with liquid Carafate and she will contact me if symptoms fail to improve    2  Nausea and vomiting, unspecified vomiting type  3  Gastroesophageal reflux disease without esophagitis  EGD showed H  pylori negative gastritis  Will increase PPI to twice daily, she will contact me if symptoms fail to improve  Continue Zofran as needed  Reviewed reflux diet  Recommended Gaviscon as needed for nighttime reflux  4  Diarrhea due to malabsorption  Improving with cholestyramine powder  Colon biopsies negative for microscopic colitis    5  Screening for colon cancer  Negative colonoscopy 2023, 10-year recall      Followup Appointment: 1 month  ______________________________________________________________________    Chief Complaint   Patient presents with   • ER follow up-lower throat pain, dysphagia only on the left     Also has not had a bowel movement since colonoscopy     HPI:    The patient is seen urgently for throat pain after EGD  There went EGD and colonoscopy January 11 for dysphagia, GERD, and diarrhea  Esophagus was structurally normal   She had empiric dilatation  She feels the pills are going down easier but it hurts to swallow solids and pills  Is only on the left side  She denies any melena  She was seen in the ER and x-ray did not show a wide mediastinum  She still has nausea  She denies vomiting  She has been taking cholestyramine  She has not had a bowel movement since the colonoscopy      Historical Information   Past Medical History: Diagnosis Date   • Arthritis    • GERD (gastroesophageal reflux disease)    • Hyperlipidemia    • Hypertension    • Irritable bowel syndrome    • Low back pain    • PONV (postoperative nausea and vomiting)      Past Surgical History:   Procedure Laterality Date   • CATARACT EXTRACTION Bilateral    •  SECTION N/A    • CHOLECYSTECTOMY     • COLONOSCOPY     • HERNIA REPAIR     • HYSTERECTOMY     • NASAL SEPTUM SURGERY     • TONSILLECTOMY     • UPPER GASTROINTESTINAL ENDOSCOPY       Social History     Substance and Sexual Activity   Alcohol Use Not Currently    Comment: extremely rare social occasion     Social History     Substance and Sexual Activity   Drug Use Never     Social History     Tobacco Use   Smoking Status Former   • Types: Cigarettes   • Quit date:    • Years since quitting: 10 0   Smokeless Tobacco Never     Family History   Problem Relation Age of Onset   • Hypertension Mother    • Skin cancer Mother    • Stroke Father    • Colon cancer Cousin    • Hypertension Son    • Skin cancer Daughter    • Cancer Daughter    • Colon polyps Neg Hx          Current Outpatient Medications:   •  acetaminophen (TYLENOL) 500 mg tablet  •  albuterol (Proventil HFA) 90 mcg/act inhaler  •  ALPRAZolam (XANAX) 0 5 mg tablet  •  ascorbic acid (VITAMIN C) 500 MG tablet  •  atorvastatin (LIPITOR) 20 mg tablet  •  cholecalciferol (VITAMIN D3) 1,000 units tablet  •  cholestyramine (QUESTRAN) 4 g packet  •  folic acid (FOLVITE) 1 mg tablet  •  hyoscyamine (ANASPAZ,LEVSIN) 0 125 MG tablet  •  losartan-hydrochlorothiazide (HYZAAR) 100-12 5 MG per tablet  •  Multiple Vitamins-Minerals (multivitamin with minerals) tablet  •  omeprazole (PriLOSEC) 40 MG capsule  •  ondansetron (ZOFRAN-ODT) 4 mg disintegrating tablet  Allergies   Allergen Reactions   • Ativan [Lorazepam] Myalgia     When taken  Along w/ cymbalta   • Cymbalta [Duloxetine Hcl] Myalgia     When taken w/ ativan x 1 occurrence   • Erythromycin    • Keflex [Cephalexin]    • Amoxicillin Rash   • Ampicillin Rash   • Latex Rash     Reviewed medications and allergies and updated as indicated    PHYSICAL EXAM:    Blood pressure 120/82, height 5' 3" (1 6 m), weight 94 8 kg (209 lb), not currently breastfeeding  Body mass index is 37 02 kg/m²  General Appearance: NAD, cooperative, alert  Eyes: Anicteric, PERRLA, EOMI  ENT:  Normocephalic, atraumatic, normal mucosa  Neck:  Supple, symmetrical, trachea midline  Resp:  Clear to auscultation bilaterally; no rales, rhonchi or wheezing; respirations unlabored   CV:  S1 S2, Regular rate and rhythm; no murmur, rub, or gallop  GI:  Soft, non-tender, non-distended; normal bowel sounds; no masses, no organomegaly   Rectal: Deferred  Musculoskeletal: No cyanosis, clubbing or edema  Normal ROM  Skin:  No jaundice, rashes, or lesions   Heme/Lymph: No palpable cervical lymphadenopathy  Psych: Normal affect, good eye contact  Neuro: No gross deficits, AAOx3    Lab Results:   Lab Results   Component Value Date    WBC 5 87 01/14/2023    HGB 13 0 01/14/2023    HCT 38 5 01/14/2023    MCV 91 01/14/2023     01/14/2023     Lab Results   Component Value Date    K 3 5 01/14/2023     01/14/2023    CO2 32 01/14/2023    BUN 9 01/14/2023    CREATININE 0 76 01/14/2023    CALCIUM 9 2 01/14/2023    AST 23 01/14/2023    ALT 26 01/14/2023    ALKPHOS 86 01/14/2023    EGFR 86 01/14/2023     Lab Results   Component Value Date    IRON 78 11/25/2022    TIBC 290 11/25/2022     Lab Results   Component Value Date    LIPASE 63 (L) 01/14/2023       Radiology Results:   EGD    Result Date: 1/11/2023  Narrative: Table formatting from the original result was not included   [address]  [place] [id number] [phone] DATE OF SERVICE: 1/11/23 PHYSICIAN(S): Attending: Alexandria Segovia DO Fellow: No Staff Documented INDICATION: Gastroesophageal reflux disease without esophagitis, dysphagia POST-OP DIAGNOSIS: See the impression below  PREPROCEDURE: Informed consent was obtained for the procedure, including sedation  Risks of perforation, hemorrhage, adverse drug reaction and aspiration were discussed  The patient was placed in the left lateral decubitus position  Patient was explained about the risks and benefits of the procedure  Risks including but not limited to bleeding, infection, and perforation were explained in detail  Also explained about less than 100% sensitivity with the exam and other alternatives  PROCEDURE: EGD DETAILS OF PROCEDURE: Patient was taken to the procedure room where a time out was performed to confirm correct patient and correct procedure  The patient underwent monitored anesthesia care, which was administered by an anesthesia professional  The patient's blood pressure, heart rate, level of consciousness, respirations and oxygen were monitored throughout the procedure  The scope was advanced to the second part of the duodenum  Retroflexion was performed in the fundus  The patient experienced no blood loss  The procedure was not difficult  The patient tolerated the procedure well  There were no apparent complications  ANESTHESIA INFORMATION: ASA: III Anesthesia Type: IV Sedation with Anesthesia MEDICATIONS: No administrations occurring from 0904 to 0927 on 01/11/23 FINDINGS: Small sliding hiatal hernia (type I hiatal hernia) Mild erythematous mucosa in the antrum; performed cold forceps biopsy  Biopsies obtained to assess for H pylori  The duodenum appeared normal  Performed random biopsy using biopsy forceps   Duodenal biopsies obtained to assess for celiac disease Non-obstructing Schatzki ring in the GE junction; dilated with Lema dilator from 50 Fr starting size to 64 Fr ending size SPECIMENS: ID Type Source Tests Collected by Time Destination 1 : duodenal biopsy, RO celiac Tissue Duodenum TISSUE EXAM Ruth Branch DO 1/11/2023  9:19 AM  2 : antrum biopsy, RO H  pylori Tissue Stomach TISSUE EXAM Kusum Rainey DO 1/11/2023  9:20 AM      Impression: Schatzki's ring, dilated to 48 and 64 Urdu Small sliding hiatal hernia Mild antral gastritis, biopsied for H  Pylori Normal duodenum biopsied for celiac RECOMMENDATION: Endoscopist will call with biopsy results within 2 weeks Office follow-up in about 2 months If you experience sore throat from the dilation, gargle with warm salt water  Continue same medications   Kusum Rainey DO     Colonoscopy    Result Date: 1/11/2023  Narrative: Table formatting from the original result was not included  [address] [id number] [phone] DATE OF SERVICE: 1/11/23 PHYSICIAN(S): Attending: Kusum Rainey DO Fellow: No Staff Documented INDICATION: Diarrhea, unspecified type POST-OP DIAGNOSIS: See the impression below  HISTORY: Prior colonoscopy: No prior colonoscopy  BOWEL PREPARATION: Miralax/Dulcolax PREPROCEDURE: Informed consent was obtained for the procedure, including sedation  Risks including but not limited to bleeding, infection, perforation, adverse drug reaction and aspiration were explained in detail  Also explained about less than 100% sensitivity with the exam and other alternatives  The patient was placed in the left lateral decubitus position  Procedure: Colonoscopy DETAILS OF PROCEDURE: Patient was taken to the procedure room where a time out was performed to confirm correct patient and correct procedure  The patient underwent monitored anesthesia care, which was administered by an anesthesia professional  The patient's blood pressure, heart rate, level of consciousness, oxygen and respirations were monitored throughout the procedure  A digital rectal exam was performed  The scope was introduced through the anus and advanced to the terminal ileum  Retroflexion was performed in the cecum and rectum   The quality of bowel preparation was evaluated using the [facility] Bowel Preparation Scale with scores of: right colon = 3, transverse colon = 3, left colon = 3  The total BBPS score was 9  Bowel prep was adequate  The patient experienced no blood loss  The procedure was not difficult  The patient tolerated the procedure well  There were no apparent complications  ANESTHESIA INFORMATION: ASA: III Anesthesia Type: IV Sedation with Anesthesia MEDICATIONS: No administrations occurring from 0904 to 0949 on 01/11/23 FINDINGS: Few moderate scattered diverticula in the sigmoid colon The terminal ileum appeared normal  The entire colon appeared normal  Performed random biopsy using biopsy forceps  Random biopsies obtained to assess for microscopic colitis EVENTS: Procedure Events Event Event Time ENDO SCOPE OUT TIME 1/11/2023  9:23 AM ENDO CECUM REACHED 1/11/2023  9:33 AM ENDO SCOPE OUT TIME 1/11/2023  9:42 AM SPECIMENS: ID Type Source Tests Collected by Time Destination 1 : duodenal biopsy, RO celiac Tissue Duodenum TISSUE EXAM Gene Sherman, DO 1/11/2023  9:19 AM  2 : antrum biopsy, RO H  pylori Tissue Stomach TISSUE EXAM Gene Eye, DO 1/11/2023  9:20 AM  3 : random colon biopsy Tissue Colon TISSUE EXAM Gene Sherman, DO 1/11/2023  9:37 AM  EQUIPMENT: Colonoscope -     Impression: Mild sigmoid diverticulosis Normal ileum Normal colon, biopsied for microscopic colitis RECOMMENDATION:  Repeat screening colonoscopy in 10 years Endoscopist will call with biopsy results within 2 weeks Start cholestyramine for suspected bile salt diarrhea prescription sent to your pharmacy  Gene Sherman DO     XR chest 1 view portable    Result Date: 1/15/2023  Narrative: CHEST INDICATION:   chest pain  COMPARISON:  4/20/2012 EXAM PERFORMED/VIEWS:  XR CHEST PORTABLE Single view FINDINGS: Cardiomediastinal silhouette appears unremarkable  The lungs are clear  No pneumothorax or pleural effusion  Osseous structures appear within normal limits for patient age  Impression: No acute cardiopulmonary disease  Findings are stable Workstation performed: [id number]

## 2023-01-16 NOTE — TELEPHONE ENCOUNTER
Called pt back, her symptoms are slightly better but continues with pain and swallowing issues  Scheduled her for an ov today

## 2023-01-17 ENCOUNTER — TELEPHONE (OUTPATIENT)
Dept: OBGYN CLINIC | Facility: MEDICAL CENTER | Age: 58
End: 2023-01-17

## 2023-01-17 ENCOUNTER — TELEPHONE (OUTPATIENT)
Dept: PAIN MEDICINE | Facility: MEDICAL CENTER | Age: 58
End: 2023-01-17

## 2023-01-17 NOTE — TELEPHONE ENCOUNTER
Caller: Donavon Law    Doctor: Dr Vitaly Nunez     Reason for call: The patient called to obtain a new script for a commode chair  The old one is under her old insurance  She is asking to pick this up today, as her surgery is tomorrow  Thank you  Please advise the patient when the script is ready       Call back#: [phone]

## 2023-01-17 NOTE — TELEPHONE ENCOUNTER
S/w Pt to let her know that Pepe Dye and myself () did not call her     Pt states that she is having Orthopedic surgery tomorrow, maybe they called her

## 2023-01-18 ENCOUNTER — ANESTHESIA (OUTPATIENT)
Dept: PERIOP | Facility: HOSPITAL | Age: 58
End: 2023-01-18

## 2023-01-18 ENCOUNTER — APPOINTMENT (OUTPATIENT)
Dept: RADIOLOGY | Facility: HOSPITAL | Age: 58
End: 2023-01-18

## 2023-01-18 ENCOUNTER — HOSPITAL ENCOUNTER (OUTPATIENT)
Facility: HOSPITAL | Age: 58
Discharge: HOME | End: 2023-01-19
Attending: STUDENT IN AN ORGANIZED HEALTH CARE EDUCATION/TRAINING PROGRAM | Admitting: STUDENT IN AN ORGANIZED HEALTH CARE EDUCATION/TRAINING PROGRAM

## 2023-01-18 DIAGNOSIS — M16.0 PRIMARY OSTEOARTHRITIS OF BOTH HIPS: Primary | ICD-10-CM

## 2023-01-18 DIAGNOSIS — Z01.818 PRE-OP TESTING: ICD-10-CM

## 2023-01-18 LAB
ABO GROUP BLD: NORMAL
BLD GP AB SCN SERPL QL: NEGATIVE
RH BLD: POSITIVE
SPECIMEN EXPIRATION DATE: NORMAL

## 2023-01-18 DEVICE — TRIDENT II TRITANIUM CLUSTER 52E
Type: IMPLANTABLE DEVICE | Site: HIP | Status: FUNCTIONAL
Brand: TRIDENT II

## 2023-01-18 DEVICE — TRIDENT X3 0 DEGREE POLYETHYLENE INSERT
Type: IMPLANTABLE DEVICE | Site: HIP | Status: FUNCTIONAL
Brand: TRIDENT X3 INSERT

## 2023-01-18 DEVICE — CERAMIC V40 FEMORAL HEAD
Type: IMPLANTABLE DEVICE | Site: HIP | Status: FUNCTIONAL
Brand: BIOLOX

## 2023-01-18 DEVICE — 6.5MM LOW PROFILE HEX SCREW 20MM
Type: IMPLANTABLE DEVICE | Site: HIP | Status: FUNCTIONAL
Brand: TRIDENT II

## 2023-01-18 DEVICE — 6.5MM LOW PROFILE HEX SCREW 25MM
Type: IMPLANTABLE DEVICE | Site: HIP | Status: FUNCTIONAL
Brand: TRIDENT II

## 2023-01-18 DEVICE — 132 DEGREE NECK ANGLE HIP STEM
Type: IMPLANTABLE DEVICE | Site: HIP | Status: FUNCTIONAL
Brand: ACCOLADE

## 2023-01-18 RX ORDER — PANTOPRAZOLE SODIUM 40 MG/1
40 TABLET, DELAYED RELEASE ORAL
Status: DISCONTINUED | OUTPATIENT
Start: 2023-01-19 | End: 2023-01-19 | Stop reason: HOSPADM

## 2023-01-18 RX ORDER — GABAPENTIN 300 MG/1
300 CAPSULE ORAL ONCE
Status: COMPLETED | OUTPATIENT
Start: 2023-01-18 | End: 2023-01-18

## 2023-01-18 RX ORDER — ACETAMINOPHEN 325 MG/1
975 TABLET ORAL ONCE
Status: COMPLETED | OUTPATIENT
Start: 2023-01-18 | End: 2023-01-18

## 2023-01-18 RX ORDER — ASPIRIN 81 MG/1
81 TABLET ORAL 2 TIMES DAILY
Qty: 60 TABLET | Refills: 0 | Status: SHIPPED | OUTPATIENT
Start: 2023-01-18

## 2023-01-18 RX ORDER — ACETAMINOPHEN 500 MG
1000 TABLET ORAL EVERY 8 HOURS
Qty: 60 TABLET | Refills: 0 | Status: SHIPPED | OUTPATIENT
Start: 2023-01-18

## 2023-01-18 RX ORDER — VANCOMYCIN HYDROCHLORIDE 1 G/200ML
1000 INJECTION, SOLUTION INTRAVENOUS EVERY 12 HOURS
Status: COMPLETED | OUTPATIENT
Start: 2023-01-19 | End: 2023-01-19

## 2023-01-18 RX ORDER — SUCRALFATE 1 G/1
1 TABLET ORAL
Status: DISCONTINUED | OUTPATIENT
Start: 2023-01-18 | End: 2023-01-19 | Stop reason: HOSPADM

## 2023-01-18 RX ORDER — ASPIRIN 81 MG/1
81 TABLET ORAL 2 TIMES DAILY
Status: DISCONTINUED | OUTPATIENT
Start: 2023-01-18 | End: 2023-01-19 | Stop reason: HOSPADM

## 2023-01-18 RX ORDER — OXYCODONE HYDROCHLORIDE 5 MG/1
5 TABLET ORAL EVERY 4 HOURS PRN
Qty: 42 TABLET | Refills: 0 | Status: SHIPPED | OUTPATIENT
Start: 2023-01-18 | End: 2023-01-28

## 2023-01-18 RX ORDER — SODIUM CHLORIDE, SODIUM LACTATE, POTASSIUM CHLORIDE, CALCIUM CHLORIDE 600; 310; 30; 20 MG/100ML; MG/100ML; MG/100ML; MG/100ML
100 INJECTION, SOLUTION INTRAVENOUS CONTINUOUS
Status: DISCONTINUED | OUTPATIENT
Start: 2023-01-18 | End: 2023-01-19

## 2023-01-18 RX ORDER — SENNOSIDES 8.6 MG
1 TABLET ORAL DAILY
Status: DISCONTINUED | OUTPATIENT
Start: 2023-01-19 | End: 2023-01-19 | Stop reason: HOSPADM

## 2023-01-18 RX ORDER — MELATONIN
2000 DAILY
Status: DISCONTINUED | OUTPATIENT
Start: 2023-01-19 | End: 2023-01-19 | Stop reason: HOSPADM

## 2023-01-18 RX ORDER — FENTANYL CITRATE 50 UG/ML
INJECTION, SOLUTION INTRAMUSCULAR; INTRAVENOUS AS NEEDED
Status: DISCONTINUED | OUTPATIENT
Start: 2023-01-18 | End: 2023-01-18

## 2023-01-18 RX ORDER — HYDROCHLOROTHIAZIDE 12.5 MG/1
12.5 TABLET ORAL DAILY
Status: DISCONTINUED | OUTPATIENT
Start: 2023-01-19 | End: 2023-01-19 | Stop reason: HOSPADM

## 2023-01-18 RX ORDER — GABAPENTIN 300 MG/1
300 CAPSULE ORAL
Status: DISCONTINUED | OUTPATIENT
Start: 2023-01-18 | End: 2023-01-19 | Stop reason: HOSPADM

## 2023-01-18 RX ORDER — LOSARTAN POTASSIUM 50 MG/1
100 TABLET ORAL DAILY
Status: DISCONTINUED | OUTPATIENT
Start: 2023-01-19 | End: 2023-01-19 | Stop reason: HOSPADM

## 2023-01-18 RX ORDER — ONDANSETRON 2 MG/ML
INJECTION INTRAMUSCULAR; INTRAVENOUS AS NEEDED
Status: DISCONTINUED | OUTPATIENT
Start: 2023-01-18 | End: 2023-01-18

## 2023-01-18 RX ORDER — SODIUM CHLORIDE, SODIUM LACTATE, POTASSIUM CHLORIDE, CALCIUM CHLORIDE 600; 310; 30; 20 MG/100ML; MG/100ML; MG/100ML; MG/100ML
125 INJECTION, SOLUTION INTRAVENOUS CONTINUOUS
Status: DISCONTINUED | OUTPATIENT
Start: 2023-01-18 | End: 2023-01-18

## 2023-01-18 RX ORDER — CHOLESTYRAMINE LIGHT 4 G/5.7G
4 POWDER, FOR SUSPENSION ORAL
Status: DISCONTINUED | OUTPATIENT
Start: 2023-01-18 | End: 2023-01-19 | Stop reason: HOSPADM

## 2023-01-18 RX ORDER — PROPOFOL 10 MG/ML
INJECTION, EMULSION INTRAVENOUS AS NEEDED
Status: DISCONTINUED | OUTPATIENT
Start: 2023-01-18 | End: 2023-01-18

## 2023-01-18 RX ORDER — HYDROMORPHONE HCL/PF 1 MG/ML
0.5 SYRINGE (ML) INJECTION
Status: DISCONTINUED | OUTPATIENT
Start: 2023-01-18 | End: 2023-01-18 | Stop reason: HOSPADM

## 2023-01-18 RX ORDER — ALBUTEROL SULFATE 90 UG/1
2 AEROSOL, METERED RESPIRATORY (INHALATION) EVERY 6 HOURS PRN
Status: DISCONTINUED | OUTPATIENT
Start: 2023-01-18 | End: 2023-01-19 | Stop reason: HOSPADM

## 2023-01-18 RX ORDER — CALCIUM CARBONATE 200(500)MG
1000 TABLET,CHEWABLE ORAL DAILY PRN
Status: DISCONTINUED | OUTPATIENT
Start: 2023-01-18 | End: 2023-01-19 | Stop reason: HOSPADM

## 2023-01-18 RX ORDER — CELECOXIB 200 MG/1
200 CAPSULE ORAL 2 TIMES DAILY
Qty: 60 CAPSULE | Refills: 0 | Status: SHIPPED | OUTPATIENT
Start: 2023-01-18

## 2023-01-18 RX ORDER — DOXYCYCLINE 100 MG/1
100 CAPSULE ORAL 2 TIMES DAILY
Qty: 10 CAPSULE | Refills: 0 | Status: SHIPPED | OUTPATIENT
Start: 2023-01-18 | End: 2023-01-23

## 2023-01-18 RX ORDER — ONDANSETRON 2 MG/ML
4 INJECTION INTRAMUSCULAR; INTRAVENOUS ONCE AS NEEDED
Status: COMPLETED | OUTPATIENT
Start: 2023-01-18 | End: 2023-01-18

## 2023-01-18 RX ORDER — BUPIVACAINE HYDROCHLORIDE AND EPINEPHRINE 2.5; 5 MG/ML; UG/ML
INJECTION, SOLUTION EPIDURAL; INFILTRATION; INTRACAUDAL; PERINEURAL AS NEEDED
Status: DISCONTINUED | OUTPATIENT
Start: 2023-01-18 | End: 2023-01-18 | Stop reason: HOSPADM

## 2023-01-18 RX ORDER — ALBUTEROL SULFATE 2.5 MG/3ML
2.5 SOLUTION RESPIRATORY (INHALATION) EVERY 4 HOURS PRN
Status: DISCONTINUED | OUTPATIENT
Start: 2023-01-18 | End: 2023-01-18 | Stop reason: HOSPADM

## 2023-01-18 RX ORDER — ONDANSETRON 2 MG/ML
4 INJECTION INTRAMUSCULAR; INTRAVENOUS EVERY 6 HOURS PRN
Status: DISCONTINUED | OUTPATIENT
Start: 2023-01-18 | End: 2023-01-19 | Stop reason: HOSPADM

## 2023-01-18 RX ORDER — ALBUMIN, HUMAN INJ 5% 5 %
SOLUTION INTRAVENOUS CONTINUOUS PRN
Status: DISCONTINUED | OUTPATIENT
Start: 2023-01-18 | End: 2023-01-18

## 2023-01-18 RX ORDER — DOCUSATE SODIUM 100 MG/1
100 CAPSULE, LIQUID FILLED ORAL 2 TIMES DAILY
Status: DISCONTINUED | OUTPATIENT
Start: 2023-01-18 | End: 2023-01-19 | Stop reason: HOSPADM

## 2023-01-18 RX ORDER — ROCURONIUM BROMIDE 10 MG/ML
INJECTION, SOLUTION INTRAVENOUS AS NEEDED
Status: DISCONTINUED | OUTPATIENT
Start: 2023-01-18 | End: 2023-01-18

## 2023-01-18 RX ORDER — VANCOMYCIN HYDROCHLORIDE 1 G/20ML
INJECTION, POWDER, LYOPHILIZED, FOR SOLUTION INTRAVENOUS AS NEEDED
Status: DISCONTINUED | OUTPATIENT
Start: 2023-01-18 | End: 2023-01-18 | Stop reason: HOSPADM

## 2023-01-18 RX ORDER — DEXAMETHASONE SODIUM PHOSPHATE 10 MG/ML
INJECTION, SOLUTION INTRAMUSCULAR; INTRAVENOUS AS NEEDED
Status: DISCONTINUED | OUTPATIENT
Start: 2023-01-18 | End: 2023-01-18 | Stop reason: HOSPADM

## 2023-01-18 RX ORDER — PROPOFOL 10 MG/ML
INJECTION, EMULSION INTRAVENOUS CONTINUOUS PRN
Status: DISCONTINUED | OUTPATIENT
Start: 2023-01-18 | End: 2023-01-18

## 2023-01-18 RX ORDER — ONDANSETRON 2 MG/ML
4 INJECTION INTRAMUSCULAR; INTRAVENOUS ONCE
Status: COMPLETED | OUTPATIENT
Start: 2023-01-18 | End: 2023-01-18

## 2023-01-18 RX ORDER — OXYCODONE HYDROCHLORIDE 5 MG/1
10 TABLET ORAL EVERY 4 HOURS PRN
Status: DISCONTINUED | OUTPATIENT
Start: 2023-01-18 | End: 2023-01-19 | Stop reason: HOSPADM

## 2023-01-18 RX ORDER — CHLORHEXIDINE GLUCONATE 0.12 MG/ML
15 RINSE ORAL ONCE
Status: COMPLETED | OUTPATIENT
Start: 2023-01-18 | End: 2023-01-18

## 2023-01-18 RX ORDER — LIDOCAINE HYDROCHLORIDE 10 MG/ML
INJECTION, SOLUTION EPIDURAL; INFILTRATION; INTRACAUDAL; PERINEURAL AS NEEDED
Status: DISCONTINUED | OUTPATIENT
Start: 2023-01-18 | End: 2023-01-18

## 2023-01-18 RX ORDER — DEXAMETHASONE SODIUM PHOSPHATE 10 MG/ML
INJECTION, SOLUTION INTRAMUSCULAR; INTRAVENOUS AS NEEDED
Status: DISCONTINUED | OUTPATIENT
Start: 2023-01-18 | End: 2023-01-18

## 2023-01-18 RX ORDER — CHLORHEXIDINE GLUCONATE 4 G/100ML
SOLUTION TOPICAL DAILY PRN
Status: DISCONTINUED | OUTPATIENT
Start: 2023-01-18 | End: 2023-01-18

## 2023-01-18 RX ORDER — AMOXICILLIN 250 MG
1 CAPSULE ORAL DAILY
Qty: 30 TABLET | Refills: 0 | Status: SHIPPED | OUTPATIENT
Start: 2023-01-18

## 2023-01-18 RX ORDER — FENTANYL CITRATE/PF 50 MCG/ML
25 SYRINGE (ML) INJECTION
Status: DISCONTINUED | OUTPATIENT
Start: 2023-01-18 | End: 2023-01-18 | Stop reason: HOSPADM

## 2023-01-18 RX ORDER — KETOROLAC TROMETHAMINE 30 MG/ML
INJECTION, SOLUTION INTRAMUSCULAR; INTRAVENOUS AS NEEDED
Status: DISCONTINUED | OUTPATIENT
Start: 2023-01-18 | End: 2023-01-18 | Stop reason: HOSPADM

## 2023-01-18 RX ORDER — ACETAMINOPHEN 325 MG/1
975 TABLET ORAL EVERY 8 HOURS
Status: DISCONTINUED | OUTPATIENT
Start: 2023-01-18 | End: 2023-01-19 | Stop reason: HOSPADM

## 2023-01-18 RX ORDER — MAGNESIUM HYDROXIDE/ALUMINUM HYDROXICE/SIMETHICONE 120; 1200; 1200 MG/30ML; MG/30ML; MG/30ML
30 SUSPENSION ORAL EVERY 6 HOURS PRN
Status: DISCONTINUED | OUTPATIENT
Start: 2023-01-18 | End: 2023-01-19 | Stop reason: HOSPADM

## 2023-01-18 RX ORDER — OXYCODONE HYDROCHLORIDE 5 MG/1
5 TABLET ORAL EVERY 4 HOURS PRN
Status: DISCONTINUED | OUTPATIENT
Start: 2023-01-18 | End: 2023-01-19 | Stop reason: HOSPADM

## 2023-01-18 RX ADMIN — CHLORHEXIDINE GLUCONATE 15 ML: 1.2 SOLUTION ORAL at 10:56

## 2023-01-18 RX ADMIN — PROPOFOL 160 MG: 10 INJECTION, EMULSION INTRAVENOUS at 13:02

## 2023-01-18 RX ADMIN — FENTANYL CITRATE 25 MCG: 50 INJECTION, SOLUTION INTRAMUSCULAR; INTRAVENOUS at 15:30

## 2023-01-18 RX ADMIN — ASPIRIN 81 MG: 81 TABLET, COATED ORAL at 21:25

## 2023-01-18 RX ADMIN — ACETAMINOPHEN 975 MG: 325 TABLET ORAL at 10:58

## 2023-01-18 RX ADMIN — MIDAZOLAM 2 MG: 1 INJECTION INTRAMUSCULAR; INTRAVENOUS at 12:56

## 2023-01-18 RX ADMIN — ACETAMINOPHEN 975 MG: 325 TABLET ORAL at 17:08

## 2023-01-18 RX ADMIN — ROCURONIUM BROMIDE 50 MG: 10 INJECTION INTRAVENOUS at 13:02

## 2023-01-18 RX ADMIN — ONDANSETRON 4 MG: 2 INJECTION INTRAMUSCULAR; INTRAVENOUS at 10:56

## 2023-01-18 RX ADMIN — SODIUM CHLORIDE, SODIUM LACTATE, POTASSIUM CHLORIDE, AND CALCIUM CHLORIDE 100 ML/HR: .6; .31; .03; .02 INJECTION, SOLUTION INTRAVENOUS at 16:35

## 2023-01-18 RX ADMIN — HYDROMORPHONE HYDROCHLORIDE 0.5 MG: 1 INJECTION, SOLUTION INTRAMUSCULAR; INTRAVENOUS; SUBCUTANEOUS at 15:48

## 2023-01-18 RX ADMIN — SODIUM CHLORIDE, SODIUM LACTATE, POTASSIUM CHLORIDE, AND CALCIUM CHLORIDE 125 ML/HR: .6; .31; .03; .02 INJECTION, SOLUTION INTRAVENOUS at 11:03

## 2023-01-18 RX ADMIN — FENTANYL CITRATE 50 MCG: 50 INJECTION, SOLUTION INTRAMUSCULAR; INTRAVENOUS at 13:02

## 2023-01-18 RX ADMIN — FENTANYL CITRATE 25 MCG: 50 INJECTION, SOLUTION INTRAMUSCULAR; INTRAVENOUS at 15:35

## 2023-01-18 RX ADMIN — MIDAZOLAM 2 MG: 1 INJECTION INTRAMUSCULAR; INTRAVENOUS at 12:52

## 2023-01-18 RX ADMIN — LIDOCAINE HYDROCHLORIDE 50 MG: 10 INJECTION, SOLUTION EPIDURAL; INFILTRATION; INTRACAUDAL; PERINEURAL at 13:02

## 2023-01-18 RX ADMIN — TRANEXAMIC ACID 1000 MG: 100 INJECTION, SOLUTION INTRAVENOUS at 10:59

## 2023-01-18 RX ADMIN — FENTANYL CITRATE 25 MCG: 50 INJECTION, SOLUTION INTRAMUSCULAR; INTRAVENOUS at 15:19

## 2023-01-18 RX ADMIN — PROPOFOL 100 MCG/KG/MIN: 10 INJECTION, EMULSION INTRAVENOUS at 13:04

## 2023-01-18 RX ADMIN — FENTANYL CITRATE 50 MCG: 50 INJECTION, SOLUTION INTRAMUSCULAR; INTRAVENOUS at 13:35

## 2023-01-18 RX ADMIN — SODIUM CHLORIDE, SODIUM LACTATE, POTASSIUM CHLORIDE, AND CALCIUM CHLORIDE: .6; .31; .03; .02 INJECTION, SOLUTION INTRAVENOUS at 14:38

## 2023-01-18 RX ADMIN — SUCRALFATE 1 G: 1 TABLET ORAL at 17:08

## 2023-01-18 RX ADMIN — CALCIUM CARBONATE (ANTACID) CHEW TAB 500 MG 1000 MG: 500 CHEW TAB at 21:30

## 2023-01-18 RX ADMIN — ONDANSETRON 4 MG: 2 INJECTION INTRAMUSCULAR; INTRAVENOUS at 16:16

## 2023-01-18 RX ADMIN — Medication 1500 MG: at 12:53

## 2023-01-18 RX ADMIN — Medication 20 MG: at 13:02

## 2023-01-18 RX ADMIN — GABAPENTIN 300 MG: 300 CAPSULE ORAL at 10:58

## 2023-01-18 RX ADMIN — PHENYLEPHRINE HYDROCHLORIDE 20 MCG/MIN: 10 INJECTION, SOLUTION INTRAVENOUS at 13:20

## 2023-01-18 RX ADMIN — SUGAMMADEX 200 MG: 100 INJECTION, SOLUTION INTRAVENOUS at 14:52

## 2023-01-18 RX ADMIN — DEXAMETHASONE SODIUM PHOSPHATE 10 MG: 10 INJECTION, SOLUTION INTRAMUSCULAR; INTRAVENOUS at 13:10

## 2023-01-18 RX ADMIN — FENTANYL CITRATE 25 MCG: 50 INJECTION, SOLUTION INTRAMUSCULAR; INTRAVENOUS at 15:25

## 2023-01-18 RX ADMIN — ROCURONIUM BROMIDE 50 MG: 10 INJECTION, SOLUTION INTRAVENOUS at 13:02

## 2023-01-18 RX ADMIN — GABAPENTIN 300 MG: 300 CAPSULE ORAL at 21:25

## 2023-01-18 RX ADMIN — OXYCODONE HYDROCHLORIDE 5 MG: 5 TABLET ORAL at 20:56

## 2023-01-18 RX ADMIN — ALBUMIN (HUMAN): 12.5 INJECTION, SOLUTION INTRAVENOUS at 14:23

## 2023-01-18 RX ADMIN — ONDANSETRON 4 MG: 2 INJECTION INTRAMUSCULAR; INTRAVENOUS at 14:40

## 2023-01-18 RX ADMIN — ALBUMIN (HUMAN): 12.5 INJECTION, SOLUTION INTRAVENOUS at 14:06

## 2023-01-18 NOTE — OCCUPATIONAL THERAPY NOTE
Occupational Therapy Cx Note     Patient Name: Nahed Emery  KCRFJ'D Date: 1/18/2023  Problem List  Principal Problem:    Primary osteoarthritis of both hips            01/18/23 1638   OT Last Visit   OT Visit Date 01/18/23   Note Type   Note type Cancelled Session   Cancel Reasons Medical status   Additional Comments OT orders received & chart reviewed  Pt with high pain level & nausea  RN reports pt is not appropriate for therapy intervention at this time  Will follow-up as able & appropriate       Sage Bland OTR/L

## 2023-01-18 NOTE — OP NOTE
OPERATIVE REPORT  PATIENT NAME: Vini Baker    :  1965  MRN: [id number]  Pt Location: [facility] OR ROOM 03    SURGERY DATE: 2023    Surgeon(s) and Role:     * Brigid Cerrato DO - Primary     * Jocelyn Masters PA-C - Assisting    Preop Diagnosis:  Primary osteoarthritis of right hip [M16 11]    Post-Op Diagnosis Codes:     * Primary osteoarthritis of right hip [M16 11]    Procedure(s) (LRB):  ARTHROPLASTY HIP TOTAL (Right)    Specimen(s):  * No specimens in log *    Estimated Blood Loss:   400 mL    Drains:  * No LDAs found *    Anesthesia Type:   Spinal    Operative Indications:  Primary osteoarthritis of right hip [M16 11]  60 y/o female who has severe bilateral hip osteoarthritis with her right hip being more symptomatic then her left  She has tried and failed non operative treatments including NSAIDs, low-impact exercises, IA cortisone injections and weight-loss  The pain is affecting her daily activities and life  The patient has elected to proceed with right MAME through the posterior approach  Risks and benefits of surgery to include but not limited to bleeding, infection, damage to surrounding structures, hardware failure, instability, fracture, dislocation, leg length inequality, need for further surgery, continued pain, stiffness, blood clots, stroke, heart attack was discussed with the patient  Operative Findings:  Severe osteoarthritis  Complete loss of cartilage  Large peripheral osteophytes    Implant Name Type Inv   Item Serial No   Lot No  LRB No  Used Action   SHELL ACETABULAR CLUSTERHOLE 52MM TRIDENT II - [id number]  SHELL ACETABULAR CLUSTERHOLE 52MM [facility] [id number] Right 1 Implanted   INSERT ACTB 36MM 0DEG TAE E TRIDENT - [id number]  INSERT ACTB 36MM 0DEG SZ E TRIDENT  [employer] [id number] Right 1 Implanted   SCREW HEX 6 5 X 25MM LOPRFL - [id number]  SCREW HEX 6 5 X 25MM LOPRFL  ANSELMO [employer]TH[employer] [id number] Right 1 Implanted   SCREW HEX 6 5 X 20MM LOPRFL - [id number]  SCREW HEX 6 5 X 20MM LOPRFL  ANSELMO ORTH[employer] [id number] Right 1 Implanted   132 NECK ANGLE HIP STEM   [id number] [employer]andra [employer] [id number] Right 1 Implanted   Biolox delta Ceramic V40 Femoral head     [id number] Right 1 Implanted         Complications:   None    Procedure and Technique:  The patient seen in the preoperative area  The informed consent was confirmed  The operative site was confirmed and marked  Patient was taken to the operating room and spinal anesthesia was performed by the anesthesiologist   Once the spinal was complete the patient was transferred to the operating room table  Patient was placed in the lateral decubitus position with the operative hip up held in place with stulberg hip positioners  All bony prominences were well-padded  The right lower extremity was prepped and draped in typical sterile fashion  Perioperative antibiotics were given per scip protocol prior to incision  A formal timeout was performed identifying the patient and surgical site  A standard posterolateral approach to the hip was drawn  The skin was sharply dissected with a 10 blade  Electrocautery was used down to the level of iliotibial band fascia  This was incised in line with the femur and along the fibers of the gluteus keira  A cobra retractor was placed underneath the medius exposing the posterior capsule and short external rotators  The piriformis tendon was found and taken down off of its insertion at the greater trochanter  Capsule was incised in L-shaped fashion along the back of the trochanter and along where piriformis tendon would have been  The sciatic nerve was palpated and protected  The hip was internally rotated and dislocated  There was significant deformity of the femoral head  Retractors were placed around the neck and the neck was resected in line with preoperative template  Femoral head was removed and sized to be approximately 50 mm   At this time the leg was placed in a position of sleep and acetabular retractors were carefully placed  The labrum was removed with sharp dissection  There was significant dwayne-acetabular osteophytes which were carefully taken down with an osteotome  Sequential reaming was initiated with a 45 Reamer to the medial wall  Sequential reaming was then performed up to a size 52 reamer with excellent chatter  The [place] trident II 52 mm cluster hole cup was impacted into place aiming for an anteversion of 25° and abduction angle of 40 just anteverted to his native transverse acetabular ligament  There was excellent fixation of the acetabular component  2 screws were drilled and placed to augment fixation of the posterior column  At this time the neutral 52 mm liner was placed and assured to be flush at all points along the liner  Acetabular retractors were carefully removed and a femoral neck elevator and Cobra was placed to expose the proximal femur  A box osteotome was used to clear the lateral neck followed by canal finer  Hip was sequentially broached to a size 3 Accolate 2  The hip was trialed with a 132 neck angle trial per preoperative template  The hip had excellent stability in position of sleep, no impingement with external rotation, flexion to 90 with 70° of internal rotation before the hip started to dislocate  This time hip was copiously irrigated and the real size 3 132 degree neck angle Accolate 2 stem was impacted into the femur  Hip was again trialed and found have excellent stability with the 36 -2 5 femoral head  The trial was removed and the trunnion was well dried  The real 36 -2 5 head was impacted on the dry trunnion  The hip was reduced and again had excellent stability and restoration of leg lengths  Aricept solution was used  The hip and the remainder of the 3 L of normal saline solution  The posterior capsule was repaired in anatomic fashion with figure-of-eight #1 Vicryl sutures   The piriformis tendon was re-approximated to the abductor tendon  The ITB band was closed with #1 Stratafix  The subcutaneous tissues closed with interrupted 2-0 Vicryl  The skin was closed with 3-0 Stratafix suture  All instrument counts and sponge counts were correct  Patient was awoken from anesthesia in stable condition when taken to the recovery room        I was present for the entire procedure, A qualified resident physician was not available and A physician assistant was required during the procedure for retraction tissue handling,dissection and suturing     Patient Disposition:  PACU      57F s/p right MAME 1/18  - multi-modal pain control  - ancef pre-op, duricef post op x 5 days  - DVT ppx: aspirin 81mg BID x 4 weeks  - PT/OT  - WBAT  - posterior hip precautions  - f/u 10-14 days      I was present for the entire procedure, A qualified resident physician was not available and A physician assistant was required during the procedure for retraction tissue handling,dissection and suturing    Patient Disposition:  PACU         SIGNATURE: Violetta Cheek DO  DATE: January 18, 2023  TIME: 3:23 PM

## 2023-01-18 NOTE — ANESTHESIA PREPROCEDURE EVALUATION
Procedure:  ARTHROPLASTY HIP TOTAL (Right: Hip)  EGD/ Colonoscopy one week ago, Residual sore throart L Side of throat, Very Anxious  Relevant Problems   ANESTHESIA   (+) PONV (postoperative nausea and vomiting)      CARDIO   (+) Mixed hyperlipidemia   (+) Primary hypertension      GI/HEPATIC   (+) Dysphagia   (+) GERD (gastroesophageal reflux disease)      MUSCULOSKELETAL   (+) Chronic bilateral low back pain without sciatica   (+) Lumbar spondylosis   (+) Primary osteoarthritis of both hips      NEURO/PSYCH   (+) Chronic bilateral low back pain without sciatica   (+) Chronic pain syndrome        Physical Exam    Airway    Mallampati score: II  TM Distance: >3 FB  Neck ROM: full     Dental   No notable dental hx     Cardiovascular      Pulmonary  Breath sounds clear to auscultation,     Other Findings        Anesthesia Plan  ASA Score- 2     Anesthesia Type- general with ASA Monitors  Additional Monitors:   Airway Plan: ETT  Plan Factors-Exercise tolerance (METS): >4 METS  Chart reviewed  EKG reviewed  Imaging results reviewed  Existing labs reviewed  Patient summary reviewed  Patient is not a current smoker  Obstructive sleep apnea risk education given perioperatively  Induction- intravenous  Postoperative Plan-     Informed Consent- Anesthetic plan and risks discussed with patient  I personally reviewed this patient with the CRNA  Discussed and agreed on the Anesthesia Plan with the CRNA             Lab Results   Component Value Date    GLUC 82 01/14/2023    ALT 26 01/14/2023    AST 23 01/14/2023    BUN 9 01/14/2023    CALCIUM 9 2 01/14/2023     01/14/2023    CO2 32 01/14/2023    CREATININE 0 76 01/14/2023    HDL 47 11/25/2022    INR 1 0 11/25/2022    HCT 38 5 01/14/2023    HGB 13 0 01/14/2023    HGBA1C 5 1 11/25/2022     01/14/2023    K 3 5 01/14/2023    TRIG 247 (H) 11/25/2022    WBC 5 87 01/14/2023

## 2023-01-18 NOTE — PLAN OF CARE
Problem: PAIN - ADULT  Goal: Verbalizes/displays adequate comfort level or baseline comfort level  Description: Interventions:  - Encourage patient to monitor pain and request assistance  - Assess pain using appropriate pain scale  - Administer analgesics based on type and severity of pain and evaluate response  - Implement non-pharmacological measures as appropriate and evaluate response  - Consider cultural and social influences on pain and pain management  - Notify physician/advanced practitioner if interventions unsuccessful or patient reports new pain  Outcome: Progressing     Problem: INFECTION - ADULT  Goal: Absence or prevention of progression during hospitalization  Description: INTERVENTIONS:  - Assess and monitor for signs and symptoms of infection  - Monitor lab/diagnostic results  - Monitor all insertion sites, i e  indwelling lines, tubes, and drains  - Monitor endotracheal if appropriate and nasal secretions for changes in amount and color  - Swain appropriate cooling/warming therapies per order  - Administer medications as ordered  - Instruct and encourage patient and family to use good hand hygiene technique  - Identify and instruct in appropriate isolation precautions for identified infection/condition  Outcome: Progressing     Problem: SAFETY ADULT  Goal: Patient will remain free of falls  Description: INTERVENTIONS:  - Educate patient/family on patient safety including physical limitations  - Instruct patient to call for assistance with activity   - Consult OT/PT to assist with strengthening/mobility   - Keep Call bell within reach  - Keep bed low and locked with side rails adjusted as appropriate  - Keep care items and personal belongings within reach  - Initiate and maintain comfort rounds  - Make Fall Risk Sign visible to staff  - Offer Toileting every 2 Hours, in advance of need  - Initiate/Maintain bed alarm  - Obtain necessary fall risk management equipment: socks  - Apply yellow socks and bracelet for high fall risk patients  - Consider moving patient to room near nurses station  Outcome: Progressing  Goal: Maintain or return to baseline ADL function  Description: INTERVENTIONS:  -  Assess patient's ability to carry out ADLs; assess patient's baseline for ADL function and identify physical deficits which impact ability to perform ADLs (bathing, care of mouth/teeth, toileting, grooming, dressing, etc )  - Assess/evaluate cause of self-care deficits   - Assess range of motion  - Assess patient's mobility; develop plan if impaired  - Assess patient's need for assistive devices and provide as appropriate  - Encourage maximum independence but intervene and supervise when necessary  - Involve family in performance of ADLs  - Assess for home care needs following discharge   - Consider OT consult to assist with ADL evaluation and planning for discharge  - Provide patient education as appropriate  Outcome: Progressing  Goal: Maintains/Returns to pre admission functional level  Description: INTERVENTIONS:  - Perform BMAT or MOVE assessment daily    - Set and communicate daily mobility goal to care team and patient/family/caregiver  - Collaborate with rehabilitation services on mobility goals if consulted  - Perform Range of Motion 3 times a day  - Reposition patient every 2 hours    - Dangle patient 3 times a day  - Stand patient 3 times a day  - Ambulate patient 3 times a day  - Out of bed to chair 3 times a day   - Out of bed for meals 3 times a day  - Out of bed for toileting  - Record patient progress and toleration of activity level   Outcome: Progressing     Problem: DISCHARGE PLANNING  Goal: Discharge to home or other facility with appropriate resources  Description: INTERVENTIONS:  - Identify barriers to discharge w/patient and caregiver  - Arrange for needed discharge resources and transportation as appropriate  - Identify discharge learning needs (meds, wound care, etc )  - Arrange for interpretive services to assist at discharge as needed  - Refer to Case Management Department for coordinating discharge planning if the patient needs post-hospital services based on physician/advanced practitioner order or complex needs related to functional status, cognitive ability, or social support system  Outcome: Progressing     Problem: Knowledge Deficit  Goal: Patient/family/caregiver demonstrates understanding of disease process, treatment plan, medications, and discharge instructions  Description: Complete learning assessment and assess knowledge base    Interventions:  - Provide teaching at level of understanding  - Provide teaching via preferred learning methods  Outcome: Progressing

## 2023-01-18 NOTE — DISCHARGE INSTR - AVS FIRST PAGE
Dr Juan Hein Hip Replacement    What to Expect/Activity  You are weight bearing as tolerated to your operative leg unless otherwise instructed  Use your walker or crutches  It is important to get up and walk for 10 minutes every hour, if possible  Initial recovery therapy is focused on walking  If in your follow-up a referral to formal physical therapy is required, this will be provided based on your recovery  You may sleep however you would like  Many patients feel more comfortable with a pillow between their legs and find it uncomfortably to lay on the operative side  If you do roll on that side at night you will not damage the replacement  You may use a regular or elevated toilet seat, whichever is more comfortable  We do not routinely require any specific precautions in terms of positioning of your leg and if so this will be taught to you by the physical therapists at the hospital  This means that you can dress as you are comfortable, including shoes and socks  You can bend down carefully to get items from the floor  Swelling and discomfort causes pain  Elevate your surgical leg on 1-2 pillows placed behind your ankle/calf throughout the day, especially when you are laying down  Please use ice packs for 20-30 minutes every 1-2 hours for 48-72 hours on the operative hip  Please make sure there is a barrier directly between your skin and your ice/ice pack  Please use incentive spirometer 10 times per hour while awake (see diagram below)  Dressing/Wound Care/Bathing  There is a surgical dressing over your incision that stays in place for 7 days after surgery  You may start showering 24 hours after surgery, the surgical dressing will remain in place  Please pat the dressing dry  If you notice the dressing appears saturated or is starting to come off, please replace with a dry dressing  Keep the dressing on until your follow-up in the office  There may be dried blood around the incision   It is ok to continue showering after removing the dressing but do not scrub the incision  Pat incision dry  Do not place any creams, ointments or gels on or around the incision  No baths, swimming or submerging until cleared by Dr Roberto Rose  Pain Management/Medications  You may resume your usual medications  Please take the following medications:  Anti-coagulation (blood clot prevention) - aspirin 81mg twice daily for 4 weeks  Pain medication:  Narcotic Medication: Take as directed  NSAID (Anti-inflammatory): Take as directed  Tylenol 1000mg every 8 hours  Zofran (ondasetron) - 4mg every 8 hours as needed for nausea  Stool Softeners (senna/colace)- take daily to help prevent constipation as narcotic pain medication causes constipation  Antibiotic - take as directed if prescribed  If you have questions or pain concerns, please contact the office  Pain medication cannot remove all post-operative pain  Follow up/Call if:  The findings of your surgery will be explained to you and your family immediately after surgery  However, in the post-operative period, during recovery from anesthesia you may not fully remember or fully understand what was said  We will go over this again when you return for your post-op appointment    Please contact Dr Mame Damon office if you experience the following:  Excessive bleeding (bleeding through your dressing)  Fever greater than 101 degrees F after the first 2 days (a slight fever is normal the first few days after surgery)  Persistent nausea or vomiting  Decreased sensation or discoloration of the operative limb  Pain or swelling that is getting worse and not better with medication    Dr Ge Jurist: [phone]

## 2023-01-18 NOTE — PHYSICAL THERAPY NOTE
Physical Therapy Cancellation Note       01/18/23 1635   PT Last Visit   PT Visit Date 01/18/23   Note Type   Note type Cancelled Session   Additional Comments Chart review completed  RN reports that patient is not appropriate for P T  session at this time  Patient with high pain level and nausea  Will continue to follow and see when medically stable  Georgina Garcia

## 2023-01-18 NOTE — INTERVAL H&P NOTE
H&P reviewed  After examining the patient I find no changes in the patients condition since the H&P had been written  Plan for right MAME

## 2023-01-18 NOTE — ANESTHESIA POSTPROCEDURE EVALUATION
Post-Op Assessment Note    CV Status:  Stable  Pain Score: 1    Pain management: adequate     Mental Status:  Awake   Hydration Status:  Stable   PONV Controlled:  None   Airway Patency:  Patent      Post Op Vitals Reviewed: Yes      Staff: CRNA         No notable events documented      BP  150/100   Temp      Pulse  86   Resp   12   SpO2   100

## 2023-01-19 VITALS
RESPIRATION RATE: 18 BRPM | HEART RATE: 84 BPM | SYSTOLIC BLOOD PRESSURE: 117 MMHG | HEIGHT: 63 IN | WEIGHT: 216.05 LBS | DIASTOLIC BLOOD PRESSURE: 56 MMHG | OXYGEN SATURATION: 95 % | BODY MASS INDEX: 38.28 KG/M2 | TEMPERATURE: 98.1 F

## 2023-01-19 LAB
ANION GAP SERPL CALCULATED.3IONS-SCNC: 0 MMOL/L (ref 4–13)
ATRIAL RATE: 68 BPM
BUN SERPL-MCNC: 10 MG/DL (ref 5–25)
CALCIUM SERPL-MCNC: 8.7 MG/DL (ref 8.3–10.1)
CHLORIDE SERPL-SCNC: 108 MMOL/L (ref 96–108)
CO2 SERPL-SCNC: 27 MMOL/L (ref 21–32)
CREAT SERPL-MCNC: 0.88 MG/DL (ref 0.6–1.3)
DME PARACHUTE DELIVERY DATE ACTUAL: NORMAL
DME PARACHUTE DELIVERY DATE EXPECTED: NORMAL
DME PARACHUTE DELIVERY DATE REQUESTED: NORMAL
DME PARACHUTE ITEM DESCRIPTION: NORMAL
DME PARACHUTE ORDER STATUS: NORMAL
DME PARACHUTE SUPPLIER NAME: NORMAL
DME PARACHUTE SUPPLIER PHONE: NORMAL
ERYTHROCYTE [DISTWIDTH] IN BLOOD BY AUTOMATED COUNT: 11.9 % (ref 11.6–15.1)
GFR SERPL CREATININE-BSD FRML MDRD: 72 ML/MIN/1.73SQ M
GLUCOSE SERPL-MCNC: 181 MG/DL (ref 65–140)
HCT VFR BLD AUTO: 32.5 % (ref 34.8–46.1)
HGB BLD-MCNC: 10.7 G/DL (ref 11.5–15.4)
MCH RBC QN AUTO: 29.9 PG (ref 26.8–34.3)
MCHC RBC AUTO-ENTMCNC: 32.9 G/DL (ref 31.4–37.4)
MCV RBC AUTO: 91 FL (ref 82–98)
P AXIS: 32 DEGREES
PLATELET # BLD AUTO: 286 THOUSANDS/UL (ref 149–390)
PMV BLD AUTO: 11.2 FL (ref 8.9–12.7)
POTASSIUM SERPL-SCNC: 4.1 MMOL/L (ref 3.5–5.3)
PR INTERVAL: 182 MS
QRS AXIS: 40 DEGREES
QRSD INTERVAL: 80 MS
QT INTERVAL: 392 MS
QTC INTERVAL: 416 MS
RBC # BLD AUTO: 3.58 MILLION/UL (ref 3.81–5.12)
SODIUM SERPL-SCNC: 135 MMOL/L (ref 135–147)
T WAVE AXIS: 36 DEGREES
VENTRICULAR RATE: 68 BPM
WBC # BLD AUTO: 11.76 THOUSAND/UL (ref 4.31–10.16)

## 2023-01-19 RX ORDER — DOXYCYCLINE HYCLATE 100 MG/1
100 CAPSULE ORAL EVERY 12 HOURS SCHEDULED
Status: DISCONTINUED | OUTPATIENT
Start: 2023-01-19 | End: 2023-01-19 | Stop reason: HOSPADM

## 2023-01-19 RX ADMIN — MORPHINE SULFATE 2 MG: 2 INJECTION, SOLUTION INTRAMUSCULAR; INTRAVENOUS at 07:46

## 2023-01-19 RX ADMIN — PANTOPRAZOLE SODIUM 40 MG: 40 TABLET, DELAYED RELEASE ORAL at 06:33

## 2023-01-19 RX ADMIN — OXYCODONE HYDROCHLORIDE 5 MG: 5 TABLET ORAL at 10:33

## 2023-01-19 RX ADMIN — ONDANSETRON 4 MG: 2 INJECTION INTRAMUSCULAR; INTRAVENOUS at 11:24

## 2023-01-19 RX ADMIN — HYDROCHLOROTHIAZIDE 12.5 MG: 12.5 TABLET ORAL at 10:02

## 2023-01-19 RX ADMIN — DOCUSATE SODIUM 100 MG: 100 CAPSULE, LIQUID FILLED ORAL at 10:02

## 2023-01-19 RX ADMIN — LOSARTAN POTASSIUM 100 MG: 50 TABLET, FILM COATED ORAL at 10:02

## 2023-01-19 RX ADMIN — SENNOSIDES 8.6 MG: 8.6 TABLET, FILM COATED ORAL at 10:04

## 2023-01-19 RX ADMIN — ASPIRIN 81 MG: 81 TABLET, COATED ORAL at 10:04

## 2023-01-19 RX ADMIN — OXYCODONE HYDROCHLORIDE 5 MG: 5 TABLET ORAL at 06:37

## 2023-01-19 RX ADMIN — ACETAMINOPHEN 975 MG: 325 TABLET ORAL at 01:02

## 2023-01-19 RX ADMIN — VANCOMYCIN HYDROCHLORIDE 1000 MG: 1 INJECTION, SOLUTION INTRAVENOUS at 01:03

## 2023-01-19 RX ADMIN — ACETAMINOPHEN 975 MG: 325 TABLET ORAL at 10:01

## 2023-01-19 RX ADMIN — SUCRALFATE 1 G: 1 TABLET ORAL at 06:37

## 2023-01-19 RX ADMIN — Medication 2000 UNITS: at 10:04

## 2023-01-19 NOTE — ASSESSMENT & PLAN NOTE
· POD 1 right MAME on 1/18 by Dr Faheem Grady, ortho  · Pain management as needed  · Encourage incentive spirometer use  · Trend H&H  · DVT prophylaxis per Ortho - ASA 81 mg twice daily x4 weeks  · Ancef preop and doxycycline postop in 5 days per Ortho  · Weight-bear as tolerated  · PT/OT  · Posterior hip precautions  · Wound care and dressing changes per orthopedics

## 2023-01-19 NOTE — PROGRESS NOTES
[place]  Progress Note - Ute Rivera 1965, 59 y o  female MRN: [id number]  Unit/Bed#: -01 Encounter: [id number]  Primary Care Provider: Mickie Rowan DO   Date and time admitted to hospital: 1/18/2023 10:07 AM    Primary hypertension  Assessment & Plan  · Regimen: Hyzaar 100-12 5 mg daily  · Blood pressure controlled postop  · Recommend to restart Hyzaar if blood pressure remains stable    GERD (gastroesophageal reflux disease)  Assessment & Plan  · Follows with [facility]  · Recent EGD 1/11 showing antral gastritis, negative for H  pylori on biopsy  · Maintained on omeprazole 40 mg twice daily -continue with formulary equivalent pantoprazole    * Primary osteoarthritis of both hips  Assessment & Plan  · POD 1 right MAME on 1/18 by Dr Eliana Rush, ortho  · Pain management as needed  · Encourage incentive spirometer use  · Trend H&H  · DVT prophylaxis per Ortho - ASA 81 mg twice daily x4 weeks  · Ancef preop and doxycycline postop in 5 days per Ortho  · Weight-bear as tolerated  · PT/OT  · Posterior hip precautions  · Wound care and dressing changes per orthopedics        Labs & Imaging: I have personally reviewed pertinent reports  VTE Prophylaxis: in place  Code Status:   No Order    Patient Centered Rounds: I have performed bedside rounds with nursing staff today  Discussions with Specialists or Other Care Team Provider: Orthopedics    Current Length of Stay: 0 day(s)    Current Patient Status: Outpatient Procedure   Certification Statement: The patient will continue to require additional inpatient hospital stay due to see my assessment and plan  Subjective:   Patient is seen and examined at bedside  Complaints of postoperative pain which is 8/10 in intensity this morning  Denies any nausea or vomiting  Afebrile  Saturating well on room air  All other ROS are negative      Objective:    Vitals: Blood pressure 117/56, pulse 84, temperature 98 1 °F (36 7 °C), temperature source Oral, resp  rate 18, height 5' 3" (1 6 m), weight 98 kg (216 lb 0 8 oz), SpO2 95 %, not currently breastfeeding  ,Body mass index is 38 27 kg/m²  SPO2 RA Rest    Flowsheet Row Admission (Current) from 1/18/2023 in [facility] [facility]   SpO2 95 %   SpO2 Activity At Rest   O2 Device None (Room air)   O2 Flow Rate 6 L/min        I&O:     Intake/Output Summary (Last 24 hours) at 1/19/2023 0932  Last data filed at 1/19/2023 0601  Gross per 24 hour   Intake 1990 ml   Output 1550 ml   Net 440 ml       Physical Exam:    General- Alert, lying comfortably in bed  Not in any acute distress  Neck- Supple, No JVD  CVS- regular, S1 and S2 normal  Chest- Bilateral Air entry, No rhochi, crackles or wheezing present  Abdomen- soft, nontender, not distended, no guarding or rigidity, BS+  Extremities-  No pedal edema, No calf tenderness  Right hip-dressing in place which is C/D/I   CNS-   Alert, awake and orientedx3  No focal deficits present  Invasive Devices     Peripheral Intravenous Line  Duration           Peripheral IV 01/18/23 Dorsal (posterior); Left Hand <1 day    Peripheral IV 01/18/23 Dorsal (posterior); Right Hand <1 day          Drain  Duration           External Urinary Catheter <1 day                      Social History  reviewed  Family History   Problem Relation Age of Onset   • Hypertension Mother    • Skin cancer Mother    • Stroke Father    • Colon cancer Cousin    • Hypertension Son    • Skin cancer Daughter    • Cancer Daughter    • Colon polyps Neg Hx     reviewed    Meds:  Current Facility-Administered Medications   Medication Dose Route Frequency Provider Last Rate Last Admin   • acetaminophen (TYLENOL) tablet 975 mg  975 mg Oral Q8H Ora ABBY Arana   975 mg at 01/19/23 0102   • albuterol (PROVENTIL HFA,VENTOLIN HFA) inhaler 2 puff  2 puff Inhalation Q6H PRN Ora ABBY Arana       • aluminum-magnesium hydroxide-simethicone (MYLANTA) oral suspension 30 mL  30 mL Oral Q6H PRN Gennette Pu, PA-C       • aspirin (ECOTRIN LOW STRENGTH) EC tablet 81 mg  81 mg Oral BID Gennette Pu, PA-C   81 mg at 01/18/23 2125   • calcium carbonate (TUMS) chewable tablet 1,000 mg  1,000 mg Oral Daily PRN Gennette Pu, PA-C   1,000 mg at 01/18/23 2130   • cholecalciferol (VITAMIN D3) tablet 2,000 Units  2,000 Units Oral Daily Gennette Duyen, PA-C       • cholestyramine sugar free (QUESTRAN LIGHT) packet 4 g  4 g Oral HS Gennette Pu, PA-C       • docusate sodium (COLACE) capsule 100 mg  100 mg Oral BID Gennette Pu, PA-C       • gabapentin (NEURONTIN) capsule 300 mg  300 mg Oral HS Gennette Pu, PA-C   300 mg at 01/18/23 2125   • losartan (COZAAR) tablet 100 mg  100 mg Oral Daily Kingsleyice Brush, PA-C        And   • hydrochlorothiazide (HYDRODIURIL) tablet 12 5 mg  12 5 mg Oral Daily Vanice Brush, PA-C       • hyoscyamine (LEVSIN/SL) SL tablet 0 125 mg  0 125 mg Oral Q4H PRN Gennette Duyen, PA-C       • lactated ringers bolus 1,000 mL  1,000 mL Intravenous Once PRN Gennette Duyen, PA-C        And   • lactated ringers bolus 1,000 mL  1,000 mL Intravenous Once PRN Gennette Duyen, PA-C       • morphine injection 2 mg  2 mg Intravenous Q2H PRN Gennette Duyen, PA-C   2 mg at 01/19/23 0746   • ondansetron (ZOFRAN) injection 4 mg  4 mg Intravenous Q6H PRN Gennette Pu, PA-C       • oxyCODONE (ROXICODONE) IR tablet 10 mg  10 mg Oral Q4H PRN Gennette Pu, PA-C       • oxyCODONE (ROXICODONE) IR tablet 5 mg  5 mg Oral Q4H PRN Gennette Pu, PA-C   5 mg at 01/19/23 8927   • pantoprazole (PROTONIX) EC tablet 40 mg  40 mg Oral BID AC Vanice Wainwright, PA-C   40 mg at 01/19/23 5026   • senna (SENOKOT) tablet 8 6 mg  1 tablet Oral Daily Favian Geller PA-C       • sodium chloride 0 9 % bolus 1,000 mL  1,000 mL Intravenous Once PRN Favian Geller PA-C        And   • sodium chloride 0 9 % bolus 1,000 mL  1,000 mL Intravenous Once PRN Ward Choudhury ABBY Patel       • sucralfate (CARAFATE) tablet 1 g  1 g Oral BID AC Mony Aguilera PA-C   1 g at 01/19/23 8282      Medications Prior to Admission   Medication   • albuterol (Proventil HFA) 90 mcg/act inhaler   • ALPRAZolam (XANAX) 0 5 mg tablet   • ascorbic acid (VITAMIN C) 500 MG tablet   • atorvastatin (LIPITOR) 20 mg tablet   • cholecalciferol (VITAMIN D3) 1,000 units tablet   • folic acid (FOLVITE) 1 mg tablet   • hyoscyamine (ANASPAZ,LEVSIN) 0 125 MG tablet   • losartan-hydrochlorothiazide (HYZAAR) 100-12 5 MG per tablet   • Multiple Vitamins-Minerals (multivitamin with minerals) tablet   • omeprazole (PriLOSEC) 40 MG capsule   • ondansetron (ZOFRAN-ODT) 4 mg disintegrating tablet   • sucralfate (CARAFATE) 1 g/10 mL suspension   • [DISCONTINUED] acetaminophen (TYLENOL) 500 mg tablet   • cholestyramine (QUESTRAN) 4 g packet       Labs:  Results from last 7 days   Lab Units 01/19/23  0344 01/14/23  1335   WBC Thousand/uL 11 76* 5 87   HEMOGLOBIN g/dL 10 7* 13 0   HEMATOCRIT % 32 5* 38 5   PLATELETS Thousands/uL 286 308   NEUTROS PCT %  --  65   LYMPHS PCT %  --  22   MONOS PCT %  --  9   EOS PCT %  --  3     Results from last 7 days   Lab Units 01/19/23  0344 01/14/23  1335   POTASSIUM mmol/L 4 1 3 5   CHLORIDE mmol/L 108 105   CO2 mmol/L 27 32   BUN mg/dL 10 9   CREATININE mg/dL 0 88 0 76   CALCIUM mg/dL 8 7 9 2   ALK PHOS U/L  --  86   ALT U/L  --  26   AST U/L  --  23     No results found for: TROPONINI, CKMB, CKTOTAL      No results found for: Mary Christopher, SPUTUMCULTUR      Imaging:  Results for orders placed during the hospital encounter of 01/14/23    XR chest 1 view portable    Narrative  CHEST    INDICATION:   chest pain  COMPARISON:  4/20/2012    EXAM PERFORMED/VIEWS:  XR CHEST PORTABLE  Single view    FINDINGS:    Cardiomediastinal silhouette appears unremarkable  The lungs are clear  No pneumothorax or pleural effusion      Osseous structures appear within normal limits for patient age  Impression  No acute cardiopulmonary disease  Findings are stable        Workstation performed: [id number]    No results found for this or any previous visit        Last 24 Hours Medication List:   Current Facility-Administered Medications   Medication Dose Route Frequency Provider Last Rate   • acetaminophen  975 mg Oral Q8H Ruth Ann Long, PA-C     • albuterol  2 puff Inhalation Q6H PRN Ruth Ann Long, PA-C     • aluminum-magnesium hydroxide-simethicone  30 mL Oral Q6H PRN Ruth Ann Long, PA-C     • aspirin  81 mg Oral BID Ruth Ann Long, PA-C     • calcium carbonate  1,000 mg Oral Daily PRN Ruth Ann Long, PA-C     • cholecalciferol  2,000 Units Oral Daily Ruth Ann Long, PA-C     • cholestyramine sugar free  4 g Oral HS Ruth Ann Long, PA-C     • docusate sodium  100 mg Oral BID Ruth Ann Long, PA-C     • gabapentin  300 mg Oral HS Ruth Ann Long, PA-C     • losartan  100 mg Oral Daily Haily Lomeli PA-C      And   • hydrochlorothiazide  12 5 mg Oral Daily Haily Lomeli PA-C     • hyoscyamine  0 125 mg Oral Q4H PRN Ruth Ann Long, PA-C     • lactated ringers  1,000 mL Intravenous Once PRN Ruth Ann ABBY Long      And   • lactated ringers  1,000 mL Intravenous Once PRN Ruth Ann Long, PA-C     • morphine injection  2 mg Intravenous Q2H PRN Ruth Ann Long, PA-C     • ondansetron  4 mg Intravenous Q6H PRN Ruth Ann CATALINO LnogC     • oxyCODONE  10 mg Oral Q4H PRN Ruth Ann Long, PA-C     • oxyCODONE  5 mg Oral Q4H PRN Ruth Ann Long, PA-C     • pantoprazole  40 mg Oral BID AC Haily Lomeli PA-C     • senna  1 tablet Oral Daily Ruth Ann LongABBY padilla     • sodium chloride  1,000 mL Intravenous Once PRN Ruth Ann CATALINO LongC      And   • sodium chloride  1,000 mL Intravenous Once PRN Ruth Ann Long, PA-C     • sucralfate  1 g Oral BID AC Ruth Ann Long PA-C          Today, Patient Was Seen By: Marianne Beasley MD    ** Please Note: Dictation voice to text software may have been used in the creation of this document   **

## 2023-01-19 NOTE — CONSULTS
[address] [place] [address] 1965, 59 y o  female MRN: [id number]  Unit/Bed#: -01 Encounter: [id number]  Primary Care Provider: Mickie Rowan DO   Date and time admitted to hospital: 1/18/2023 10:07 AM    Inpatient consult to Internal Medicine  Consult performed by: Daja Ibrahim PA-C  Consult ordered by: Pasquale Piña PA-C          * Primary osteoarthritis of both hips  Assessment & Plan  · POD0 from right MAME on 1/18 by leighton Mcbride  · 400 mL blood loss documented in op note -check H&H in morning, type and screen already obtained  · Multimodal pain regimen  · DVT prophylaxis per Ortho - ASA 81 mg twice daily x4 weeks  · Ancef preop and Duricef postop in 5 days per Ortho  · Weight-bear as tolerated  · PT/OT  · Posterior hip precautions    Primary hypertension  Assessment & Plan  · Regimen: Hyzaar 100-12 5 mg daily  · Blood pressure controlled postop  · Restart Hyzaar in the morning    GERD (gastroesophageal reflux disease)  Assessment & Plan  · Follows with [facility]  · Recent EGD 1/11 showing antral gastritis, negative for H  pylori on biopsy  · Maintained on omeprazole 40 mg twice daily -continue with formulary equivalent pantoprazole      VTE Prophylaxis: VTE Score: 9 High Risk (Score >/= 5) - Pharmacological DVT Prophylaxis Ordered: ASA 81mg BID  Sequential Compression Devices Ordered  Recommendations for Discharge:  · Resume home BP medications     Counseling / Coordination of Care Time: 30 minutes Greater than 50% of total time spent on patient counseling and coordination of care  Collaboration of Care: Were Recommendations Directly Discussed with Primary Treatment Team? Yes Message sent to ortho PA on-call, Hamlet Reynaga     History of Present Illness:  Ute Rivera is a 59 y o  female who is originally admitted to the orthopedic service due to right MAME on 1/18 for primary OA of both hips   We are consulted for medical management of hypertension  Patient seen and examined at bedside  She reports pain in her right hip after using bedpan  Discussed utilizing pure wick, which she is agreeable  Patient reports she is compliant with all of her home medications  She last took Hyzaar yesterday, she was told to hold preop  PPI was increased to twice daily by GI last week status post EGD showing antritis  Other than hip pain, patient feeling well  Slightly drowsy  Review of Systems:  Review of Systems   Constitutional: Negative for chills and fever  HENT: Negative for congestion  Respiratory: Negative for shortness of breath  Cardiovascular: Negative for chest pain  Gastrointestinal: Negative for abdominal pain  Musculoskeletal: Positive for arthralgias  All other systems reviewed and are negative  Past Medical and Surgical History:   Past Medical History:   Diagnosis Date   • Arthritis    • GERD (gastroesophageal reflux disease)    • Hyperlipidemia    • Hypertension    • Irritable bowel syndrome    • Low back pain    • PONV (postoperative nausea and vomiting)        Past Surgical History:   Procedure Laterality Date   • CATARACT EXTRACTION Bilateral    •  SECTION N/A    • CHOLECYSTECTOMY     • COLONOSCOPY     • HERNIA REPAIR     • HYSTERECTOMY     • NASAL SEPTUM SURGERY     • TONSILLECTOMY     • UPPER GASTROINTESTINAL ENDOSCOPY         Meds/Allergies:  all medications and allergies reviewed    Allergies: Allergies   Allergen Reactions   • Ativan [Lorazepam] Myalgia     When taken  Along w/ cymbalta   • Cymbalta [Duloxetine Hcl] Myalgia     When taken w/ ativan x 1 occurrence   • Erythromycin    • Keflex [Cephalexin]    • Amoxicillin Rash   • Ampicillin Rash   • Latex Rash       Social History:  Marital Status:    Substance Use History:   Social History     Substance and Sexual Activity   Alcohol Use Not Currently    Comment: extremely rare social occasion     Social History     Tobacco Use   Smoking Status Former   • Types: Cigarettes   • Quit date: 2013   • Years since quitting: 10 0   Smokeless Tobacco Never     Social History     Substance and Sexual Activity   Drug Use Never       Family History:  Family History   Problem Relation Age of Onset   • Hypertension Mother    • Skin cancer Mother    • Stroke Father    • Colon cancer Cousin    • Hypertension Son    • Skin cancer Daughter    • Cancer Daughter    • Colon polyps Neg Hx        Physical Exam:   Vitals:   Blood Pressure: 140/80 (01/18/23 1833)  Pulse: 92 (01/18/23 1833)  Temperature: (!) 97 2 °F (36 2 °C) (01/18/23 1833)  Temp Source: Oral (01/18/23 1833)  Respirations: 16 (01/18/23 1833)  Height: 5' 3" (160 cm) (01/18/23 1036)  Weight - Scale: 94 2 kg (207 lb 9 6 oz) (01/18/23 1036)  SpO2: 95 % (01/18/23 1833)    Physical Exam  Vitals and nursing note reviewed  Constitutional:       General: She is not in acute distress  Appearance: Normal appearance  She is not ill-appearing  Comments: Seen resting in bed  Does have pain utilizing bedpan  Pleasant and conversational   HENT:      Head: Normocephalic  Mouth/Throat:      Mouth: Mucous membranes are moist    Eyes:      Conjunctiva/sclera: Conjunctivae normal    Cardiovascular:      Rate and Rhythm: Normal rate  Pulmonary:      Effort: Pulmonary effort is normal    Abdominal:      Palpations: Abdomen is soft  Musculoskeletal:      Cervical back: Normal range of motion  Right lower leg: No edema  Left lower leg: No edema  Comments: Right hip with lateral incision  Dressing is clean dry and intact  No active drainage  Skin:     General: Skin is warm and dry  Coloration: Skin is not pale  Neurological:      General: No focal deficit present  Mental Status: She is alert and oriented to person, place, and time  Psychiatric:         Mood and Affect: Mood normal          Thought Content:  Thought content normal           Additional Data:   Lab Results:    Results from last 7 days   Lab Units 01/14/23  1335   WBC Thousand/uL 5 87   HEMOGLOBIN g/dL 13 0   HEMATOCRIT % 38 5   PLATELETS Thousands/uL 308   NEUTROS PCT % 65   LYMPHS PCT % 22   MONOS PCT % 9   EOS PCT % 3     Results from last 7 days   Lab Units 01/14/23  1335   SODIUM mmol/L 145   POTASSIUM mmol/L 3 5   CHLORIDE mmol/L 105   CO2 mmol/L 32   BUN mg/dL 9   CREATININE mg/dL 0 76   ANION GAP mmol/L 8   CALCIUM mg/dL 9 2   ALBUMIN g/dL 3 6   TOTAL BILIRUBIN mg/dL 0 50   ALK PHOS U/L 86   ALT U/L 26   AST U/L 23   GLUCOSE RANDOM mg/dL 82             Lab Results   Component Value Date/Time    HGBA1C 5 1 11/25/2022 10:51 AM    HGBA1C 4 9 10/24/2022 01:23 PM               Imaging: No pertinent imaging reviewed  XR pelvis ap only 1 or 2 vw    (Results Pending)       EKG, Pathology, and Other Studies Reviewed on Admission:   · EKG: NSR with normal QT from 1/9 EKG  ** Please Note: This note may have been constructed using a voice recognition system   **

## 2023-01-19 NOTE — PHYSICAL THERAPY NOTE
PHYSICAL THERAPY NOTE       01/19/23 1400   PT Last Visit   PT Visit Date 01/19/23   Note Type   Note Type Treatment   Pain Assessment   Pain Assessment Tool Rodriguez-Baker FACES   Rodriguez-Baker FACES Pain Rating 4   Pain Location/Orientation Orientation: Right;Location: Hip   Hospital Pain Intervention(s) Ambulation/increased activity   Precautions   Total Hip Replacement ADduction; Internal rotation; Flexion   Restrictions/Precautions   Weight Bearing Precautions Per Order Yes   RLE Weight Bearing Per Order WBAT   Other Precautions Pain; Fall Risk;WBS;THR; Bed Alarm; Restraints   General   Chart Reviewed Yes   Response to Previous Treatment Patient with no complaints from previous session  Family/Caregiver Present No   Cognition   Overall Cognitive Status WFL   Arousal/Participation Alert   Attention Within functional limits   Orientation Level Oriented X4   Memory Within functional limits   Following Commands Follows all commands and directions without difficulty   Comments Less anxious this session  Good recall of hip precautions  Subjective   Subjective "Can I go home today?"   Bed Mobility   Supine to Sit 5  Supervision   Additional items HOB elevated; Bedrails   Transfers   Sit to Stand 5  Supervision   Additional items Armrests; Verbal cues   Stand to Sit 5  Supervision   Additional items Armrests; Verbal cues   Ambulation/Elevation   Gait pattern   (Step to pattern, decreased step length and decreased R stance)   Gait Assistance 5  Supervision   Assistive Device Rolling walker   Distance 150ft   Stair Management Assistance 4  Minimal assist   Additional items Assist x 1;Verbal cues; Tactile cues  (Min A for CG)   Stair Management Technique One rail R;Nonreciprocal  (2 hands on one railing)   Number of Stairs 4   Ambulation/Elevation Additional Comments Demonstration and verbal cues for proper stair technique     Balance   Static Sitting Normal   Dynamic Sitting Good   Static Standing Fair +   Dynamic Standing Fair +   Ambulatory Fair +   Endurance Deficit   Endurance Deficit Yes   Endurance Deficit Description Limited by pain   Activity Tolerance   Activity Tolerance Patient limited by pain;Treatment limited secondary to medical complications (Comment)  (nausea)   Medical Staff Made Aware Co-treat with Nadiya GARDNER   Nurse Made Aware Armida MACHADO   Assessment   Prognosis Good   Problem List Decreased strength;Decreased endurance; Impaired balance;Decreased mobility;Obesity;Orthopedic restrictions;Pain   Assessment Patient was received supine in bed  Reports that she is feeling better  BP stable throughout session  Some nausea present in stance and after stair trial  Patient was able to recall 2/3 hip precautions  Continued education provided  Patient progressed significantly this session and was able to perform bed mobility, transfers, and ambulation all at a supervision level  She ambulated further than a household distance  She also performed stairs with min A for CG  Dtr to assist as needed  Also discussed possible bedrail for bed mobility  Patient was issued a handout which contains hip precautions, proper use of RW and stair technique  At this time, patient can return home with outpatient P T  services  The patient's AM-PAC Basic Mobility Inpatient Short Form Raw Score is 19  A Raw score of greater than 17suggests the patient may benefit from discharge to home  Please also refer to the recommendation of the Physical Therapist for safe discharge planning  Barriers to Discharge None   Barriers to Discharge Comments Daughter will be staying with her to assist as needed  Goals   Patient Goals To go home   STG Expiration Date 01/23/23   PT Treatment Day 2   Plan   Treatment/Interventions Functional transfer training;LE strengthening/ROM; Elevations; Therapeutic exercise; Endurance training;Patient/family training;Equipment eval/education; Bed mobility;Gait training;Spoke to nursing;OT;Spoke to case management   Progress Progressing toward goals   PT Frequency Twice a day   Recommendation   PT Discharge Recommendation Home with outpatient rehabilitation   Additional Comments Owns RW  Sized appropriately   AM-PAC Basic Mobility Inpatient   Turning in Flat Bed Without Bedrails 4   Lying on Back to Sitting on Edge of Flat Bed Without Bedrails 3   Moving Bed to Chair 3   Standing Up From Chair Using Arms 3   Walk in Room 3   Climb 3-5 Stairs With Railing 3   Basic Mobility Inpatient Raw Score 19   Basic Mobility Standardized Score 42 48   Highest Level Of Mobility   JH-HLM Goal 6: Walk 10 steps or more   JH-HLM Achieved 7: Walk 25 feet or more   Education   Education Provided Mobility training;Precautions for total hip arthroplasty (MAME); Assistive device  (MAME handout given to patient)   Patient Demonstrates acceptance/verbal understanding   End of Consult   Patient Position at End of Consult Bedside chair; All needs within reach  (OT with patient at end of P T  session )   Carolina Garcia            Patient Name: Claudette Curt  UVBBU'C Date: 1/19/2023

## 2023-01-19 NOTE — PLAN OF CARE
Problem: PAIN - ADULT  Goal: Verbalizes/displays adequate comfort level or baseline comfort level  Description: Interventions:  - Encourage patient to monitor pain and request assistance  - Assess pain using appropriate pain scale  - Administer analgesics based on type and severity of pain and evaluate response  - Implement non-pharmacological measures as appropriate and evaluate response  - Consider cultural and social influences on pain and pain management  - Notify physician/advanced practitioner if interventions unsuccessful or patient reports new pain  Outcome: Progressing     Problem: INFECTION - ADULT  Goal: Absence or prevention of progression during hospitalization  Description: INTERVENTIONS:  - Assess and monitor for signs and symptoms of infection  - Monitor lab/diagnostic results  - Monitor all insertion sites, i e  indwelling lines, tubes, and drains  - Monitor endotracheal if appropriate and nasal secretions for changes in amount and color  - Dobson appropriate cooling/warming therapies per order  - Administer medications as ordered  - Instruct and encourage patient and family to use good hand hygiene technique  - Identify and instruct in appropriate isolation precautions for identified infection/condition  Outcome: Progressing     Problem: SAFETY ADULT  Goal: Patient will remain free of falls  Description: INTERVENTIONS:  - Educate patient/family on patient safety including physical limitations  - Instruct patient to call for assistance with activity   - Consult OT/PT to assist with strengthening/mobility   - Keep Call bell within reach  - Keep bed low and locked with side rails adjusted as appropriate  - Keep care items and personal belongings within reach  - Initiate and maintain comfort rounds  - Make Fall Risk Sign visible to staff  - Offer Toileting every 2 Hours, in advance of need  - Initiate/Maintain bed alarm  - Obtain necessary fall risk management equipment: socks  - Apply yellow socks and bracelet for high fall risk patients  - Consider moving patient to room near nurses station  Outcome: Progressing  Goal: Maintain or return to baseline ADL function  Description: INTERVENTIONS:  -  Assess patient's ability to carry out ADLs; assess patient's baseline for ADL function and identify physical deficits which impact ability to perform ADLs (bathing, care of mouth/teeth, toileting, grooming, dressing, etc )  - Assess/evaluate cause of self-care deficits   - Assess range of motion  - Assess patient's mobility; develop plan if impaired  - Assess patient's need for assistive devices and provide as appropriate  - Encourage maximum independence but intervene and supervise when necessary  - Involve family in performance of ADLs  - Assess for home care needs following discharge   - Consider OT consult to assist with ADL evaluation and planning for discharge  - Provide patient education as appropriate  Outcome: Progressing  Goal: Maintains/Returns to pre admission functional level  Description: INTERVENTIONS:  - Perform BMAT or MOVE assessment daily    - Set and communicate daily mobility goal to care team and patient/family/caregiver  - Collaborate with rehabilitation services on mobility goals if consulted  - Perform Range of Motion 3 times a day  - Reposition patient every 2 hours    - Dangle patient 3 times a day  - Stand patient 3 times a day  - Ambulate patient 3 times a day  - Out of bed to chair 3 times a day   - Out of bed for meals 3 times a day  - Out of bed for toileting  - Record patient progress and toleration of activity level   Outcome: Progressing     Problem: DISCHARGE PLANNING  Goal: Discharge to home or other facility with appropriate resources  Description: INTERVENTIONS:  - Identify barriers to discharge w/patient and caregiver  - Arrange for needed discharge resources and transportation as appropriate  - Identify discharge learning needs (meds, wound care, etc )  - Arrange for interpretive services to assist at discharge as needed  - Refer to Case Management Department for coordinating discharge planning if the patient needs post-hospital services based on physician/advanced practitioner order or complex needs related to functional status, cognitive ability, or social support system  Outcome: Progressing     Problem: Knowledge Deficit  Goal: Patient/family/caregiver demonstrates understanding of disease process, treatment plan, medications, and discharge instructions  Description: Complete learning assessment and assess knowledge base  Interventions:  - Provide teaching at level of understanding  - Provide teaching via preferred learning methods  Outcome: Progressing     Problem: MOBILITY - ADULT  Goal: Maintain or return to baseline ADL function  Description: INTERVENTIONS:  -  Assess patient's ability to carry out ADLs; assess patient's baseline for ADL function and identify physical deficits which impact ability to perform ADLs (bathing, care of mouth/teeth, toileting, grooming, dressing, etc )  - Assess/evaluate cause of self-care deficits   - Assess range of motion  - Assess patient's mobility; develop plan if impaired  - Assess patient's need for assistive devices and provide as appropriate  - Encourage maximum independence but intervene and supervise when necessary  - Involve family in performance of ADLs  - Assess for home care needs following discharge   - Consider OT consult to assist with ADL evaluation and planning for discharge  - Provide patient education as appropriate  Outcome: Progressing  Goal: Maintains/Returns to pre admission functional level  Description: INTERVENTIONS:  - Perform BMAT or MOVE assessment daily    - Set and communicate daily mobility goal to care team and patient/family/caregiver  - Collaborate with rehabilitation services on mobility goals if consulted  - Perform Range of Motion 3 times a day  - Reposition patient every 2 hours    - Dangle patient 3 times a day  - Stand patient 3 times a day  - Ambulate patient 3 times a day  - Out of bed to chair 3 times a day   - Out of bed for meals 3 times a day  - Out of bed for toileting  - Record patient progress and toleration of activity level   Outcome: Progressing     Problem: Prexisting or High Potential for Compromised Skin Integrity  Goal: Skin integrity is maintained or improved  Description: INTERVENTIONS:  - Identify patients at risk for skin breakdown  - Assess and monitor skin integrity  - Assess and monitor nutrition and hydration status  - Monitor labs   - Assess for incontinence   - Turn and reposition patient  - Assist with mobility/ambulation  - Relieve pressure over bony prominences  - Avoid friction and shearing  - Provide appropriate hygiene as needed including keeping skin clean and dry  - Evaluate need for skin moisturizer/barrier cream  - Collaborate with interdisciplinary team   - Patient/family teaching  - Consider wound care consult   Outcome: Progressing

## 2023-01-19 NOTE — OCCUPATIONAL THERAPY NOTE
Occupational Therapy Tx Note     Patient Name: Rose LEHMAN Date: 1/19/2023  Problem List  Principal Problem:    Primary osteoarthritis of both hips  Active Problems:    GERD (gastroesophageal reflux disease)    Primary hypertension              01/19/23 1440   OT Last Visit   OT Visit Date 01/19/23   Note Type   Note Type Treatment   Pain Assessment   Pain Assessment Tool Rodriguez-Baker FACES   Rodriguez-Baker FACES Pain Rating 4   Pain Location/Orientation Orientation: Right;Location: Hip   Patient's Stated Pain Goal No pain   Hospital Pain Intervention(s) Ambulation/increased activity;Repositioned;Elevated   Restrictions/Precautions   Weight Bearing Precautions Per Order Yes   RLE Weight Bearing Per Order WBAT   Other Precautions Pain; Fall Risk;WBS;THR   ADL   Where Assessed Chair   Equipment Provided Reacher;Sock aid;Long-handled shoe horn;Dressing stick   Eating Assistance 7  Independent   Grooming Assistance 5  Supervision/Setup   Grooming Deficit Wash/dry hands;Verbal cueing   LB Dressing Assistance 5  Supervision/Setup   LB Dressing Deficit Don/doff R sock; Don/doff L sock; Thread RLE into pants; Thread LLE into pants; Don/doff R shoe;Don/doff L shoe;Use of adaptive equipment;Verbal cueing   Toileting Assistance  5  Supervision/Setup   Toileting Deficit Bedside commode;Perineal hygiene;Supervison/safety; Increased time to complete;Grab bar use  (Commode over toilet)   Bed Mobility   Supine to Sit 5  Supervision   Additional items HOB elevated; Bedrails; Increased time required;Verbal cues   Transfers   Sit to Stand 5  Supervision   Additional items Armrests; Verbal cues   Stand to Sit 5  Supervision   Additional items Armrests; Verbal cues   Toilet transfer 5  Supervision   Additional items Raised toilet seat;Armrests; Increased time required;Verbal cues  (Commode over toilet)   Additional Comments Able to ascend/descend standard height step 4x with S & VC's to demo safe entry/exit into home   Functional Mobility   Functional Mobility 5  Supervision   Additional Comments Functional household distance 150 ft x1, 75 ft x1   Additional items Rolling walker   Subjective   Subjective Pt agreeable to session   Cognition   Overall Cognitive Status WFL   Arousal/Participation Alert   Attention Within functional limits   Orientation Level Oriented X4   Memory Within functional limits   Following Commands Follows all commands and directions without difficulty   Comments Pt less anxious this session, able to attend to directions appropriately   Activity Tolerance   Activity Tolerance Patient tolerated treatment well   Medical Staff Made Aware Partial co-treat with PT JEANNETTE Elizalde, IFEOMA Gonzales   Assessment   Assessment Pt seen for OT tx session with focus on functional balance, functional mobility, ADL status, and transfer safety  Patient agreeable to OT treatment session  Pt received supine in bed  Pt completed bed mobility with S & bed rails  Pt progressed all functional txfers to supervision with VC's for hand placement  Pt able to demo mobility of a functional household distance with S & RW  Able to perform LB dressing with S & AD s/p education on LHAD  Pt provided with hip kit, forms signed  Pt completed toileting with S & grooming independently  Pt has made significant improvement in overall functional txfers, mobility & ADL performance since OT IE  Patient continues to be functioning below baseline level, occupational performance remains limited secondary to factors listed above, and pt at increased risk for falls and injury  The patient's raw score on the AM-PAC Daily Activity inpatient short form is 21, standardized score is 44 27, greater than 39 4  Patients at this level are likely to benefit from DC to home  Please refer to the recommendation of the Occupational Therapist for safe DC planning  From OT standpoint, recommendation at time of D/C would be Home with family support   Patient to benefit from continued Occupational Therapy treatment while in the hospital to address deficits as defined above and maximize level of functional independence with ADLs and functional mobility  Pt left seated OOB to Recliner with call bell in reach, tray table in reach, needs met, RN informed  Plan   Treatment Interventions ADL retraining;Functional transfer training;Patient/family training;Equipment evaluation/education; Compensatory technique education;Continued evaluation   Goal Expiration Date 01/29/23   OT Treatment Day 1   OT Frequency 1-2x/wk   Recommendation   OT Discharge Recommendation No rehabilitation needs   Equipment Recommended Hip Kit ($)  (Pt provided with hip kit, forms signed)   AM-PAC Daily Activity Inpatient   Lower Body Dressing 3   Bathing 3   Toileting 3   Upper Body Dressing 4   Grooming 4   Eating 4   Daily Activity Raw Score 21   Daily Activity Standardized Score (Calc for Raw Score >=11) 44 27   AM-PAC Applied Cognition Inpatient   Following a Speech/Presentation 4   Understanding Ordinary Conversation 4   Taking Medications 4   Remembering Where Things Are Placed or Put Away 4   Remembering List of 4-5 Errands 4   Taking Care of Complicated Tasks 4   Applied Cognition Raw Score 24   Applied Cognition Standardized Score 62 21   End of Consult   Education Provided Yes;Family or social support of family present for education by provider   Patient Position at End of Consult Bedside chair; All needs within reach   Nurse Communication Nurse aware of consult   End of Consult Comments Reviewed posterior hip precaution handout with pt & dtr  Reviewed home DME recommendations for commode & shower chair  Educated on LB dressing with [address]  Educated on icing & mobility for pain relief       Delmi Dickson, OTR/L

## 2023-01-19 NOTE — ASSESSMENT & PLAN NOTE
· POD0 from right MAME on 1/18 by Dr Juan Hein, ortho  · 400 mL blood loss documented in op note -check H&H in morning, type and screen already obtained  · Multimodal pain regimen  · DVT prophylaxis per Ortho - ASA 81 mg twice daily x4 weeks  · Ancef preop and Duricef postop in 5 days per Ortho  · Weight-bear as tolerated  · PT/OT  · Posterior hip precautions

## 2023-01-19 NOTE — ASSESSMENT & PLAN NOTE
· Follows with [facility]  · Recent EGD 1/11 showing antral gastritis, negative for H  pylori on biopsy  · Maintained on omeprazole 40 mg twice daily -continue with formulary equivalent pantoprazole

## 2023-01-19 NOTE — PHYSICAL THERAPY NOTE
PHYSICAL THERAPY EVAL/TX  Physical Therapy Evaluation    Performed at least 2 patient identifiers during session:  Patient Active Problem List   Diagnosis    Lumbar spondylosis    Bilateral hip pain    Greater trochanteric bursitis of both hips    Primary osteoarthritis of both hips    GERD (gastroesophageal reflux disease)    Chronic pain syndrome    Chronic bilateral low back pain without sciatica    Diffuse pain    Primary hypertension    Mixed hyperlipidemia    PONV (postoperative nausea and vomiting)    Nausea    Dysphagia       Past Medical History:   Diagnosis Date    Arthritis     GERD (gastroesophageal reflux disease)     Hyperlipidemia     Hypertension     Irritable bowel syndrome     Low back pain     PONV (postoperative nausea and vomiting)        Past Surgical History:   Procedure Laterality Date    CATARACT EXTRACTION Bilateral      SECTION N/A     CHOLECYSTECTOMY      COLONOSCOPY      HERNIA REPAIR      HYSTERECTOMY      NASAL SEPTUM SURGERY      TONSILLECTOMY      UPPER GASTROINTESTINAL ENDOSCOPY            23 1118   PT Last Visit   PT Visit Date 23   Note Type   Note type Evaluation   Pain Assessment   Pain Assessment Tool 0-10   Pain Score 10 - Worst Possible Pain   Pain Location/Orientation Orientation: Right;Location: Knee; Location: Hip   Hospital Pain Intervention(s) Medication (See MAR); Repositioned   Restrictions/Precautions   Weight Bearing Precautions Per Order Yes   RLE Weight Bearing Per Order WBAT   Other Precautions Pain; Fall Risk;Bed Alarm;THR   Home Living   Type of [address] [place] [address] One level  (2+2 JOE 2 rails)   Bathroom Shower/Tub Walk-in shower   [address]   Home Equipment Cane;Walker   Additional Comments used the cane prior to admission   Prior Function   Level of [place] Independent with ADLs; Independent with functional mobility; Needs assistance with IADLS   Lives With Son   IADLs Independent with driving; Independent with medication management  (Reports that she does not cook  Son sometimes cooks)   Falls in the last 6 months 0   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  General   Family/Caregiver Present No   Cognition   Overall Cognitive Status WFL   Arousal/Participation Alert   Orientation Level Oriented X4   Memory Within functional limits   Following Commands Follows one step commands without difficulty   Comments Forgetful   Subjective   Subjective "I really want to walk "   LLE Assessment   LLE Assessment WFL   Bed Mobility   Supine to Sit 3  Moderate assistance   Additional items Assist x 2;HOB elevated; Bedrails; Increased time required;Verbal cues;LE management   Additional Comments BP supine 131/64  BP sitting 138/63  Patient with c/o nausea and lightheadedness in sitting  Refer to treatment session for additional mobility   Balance   Static Sitting Fair +   Dynamic Sitting Fair   Static Standing Fair -   Dynamic Standing Fair -   Endurance Deficit   Endurance Deficit Yes   Endurance Deficit Description Limited by pain, nausea, lightheadedness   Activity Tolerance   Activity Tolerance Patient limited by pain;Treatment limited secondary to medical complications (Comment)  (nausea, lightheadedness)   Medical Staff Made Aware Co-treat with Nadiya GARDNER   Nurse Made Aware RNArmida   Assessment   Prognosis Guarded   Problem List Decreased strength;Decreased endurance; Impaired balance;Decreased mobility; Decreased safety awareness; Obesity;Orthopedic restrictions;Pain   Assessment Patient is a 57y/o F who is POD1 R MAME  Patient resides with son in a [facility] with 2+2 JOE  Patient reports independence at baseline but states that she has difficulty with ADL's and IADL's  She was using a RW at times   Current medical status includes obesity, pain, hip precautions, fall risk, nausea, lightheadedness,anxiety, decreased strength, endurance, balance and mobility  Patient required assistance of 2 for bed mobility, transfers  She was unable to tolerate further mobility due to feeling like she was going to faint  At this time, due to patients current functional status, recommending rehab  The patient's AM-PAC Basic Mobility Inpatient Short Form Raw Score is 9  A Raw score of less than or equal to 17suggests the patient may benefit from discharge to post-acute rehabilitation services  Please also refer to the recommendation of the Physical Therapist for safe discharge planning  Barriers to Discharge Inaccessible home environment;Decreased caregiver support   Barriers to Discharge Comments Currently requiring assistance of 2 for mobility   Goals   Patient Goals To walk   STG Expiration Date 01/23/23   Short Term Goal #1 1  Perform supine<>sit with HOB flat without the juan manuel of bedrails ind 2  Peform sit<>stand transfers mod I 3  Ambulate 100ft with a RW mod I level 4  Ascend/descend 4 stairs with 2 rails nonreciprocal pattern supervision level   PT Treatment Day 1   Plan   Treatment/Interventions Functional transfer training;LE strengthening/ROM; Elevations; Therapeutic exercise; Endurance training;Patient/family training;Equipment eval/education; Bed mobility;Gait training;Spoke to nursing;OT   PT Frequency Twice a day   Recommendation   PT Discharge Recommendation Post acute rehabilitation services   AM-PAC Basic Mobility Inpatient   Turning in Flat Bed Without Bedrails 2   Lying on Back to Sitting on Edge of Flat Bed Without Bedrails 2   Moving Bed to Chair 1   Standing Up From Chair Using Arms 2   Walk in Room 1   Climb 3-5 Stairs With Railing 1   Basic Mobility Inpatient Raw Score 9   Turning Head Towards Sound 4   Follow Simple Instructions 3   Low Function Basic Mobility Raw Score 16   Low Function Basic Mobility Standardized Score 25 72   Highest Level Of Mobility   [facility] Goal 3: Sit at edge of bed   -M Achieved 3: Sit at edge of bed   Additional Treatment Session   Start Time 1130   End Time 1145   Treatment Assessment Performed sit<>stand transfer with mod A x 2  Verbal cues for hand placement and preparatory posture  Stood for approx 1 minute  Unable to obtain BP  Patient with increased nausea, lightheadedness  Reporting that she felt like she was going to pass out  Returned immediately to supine position with max A x 2  BP stable 135/64  Equipment Use RW   End of Consult   Patient Position at End of Consult Supine; All needs within reach;Bed/Chair alarm activated     Carolina Garcia PT             Patient Name: Paul DUNBAR Date: 1/19/2023

## 2023-01-19 NOTE — OCCUPATIONAL THERAPY NOTE
Occupational Therapy Evaluation ([id number]) & Tx ([id number])     Patient Name: Regis Aviles  SSVOJ'U Date: 2023  Problem List  Principal Problem:    Primary osteoarthritis of both hips  Active Problems:    GERD (gastroesophageal reflux disease)    Primary hypertension    Past Medical History  Past Medical History:   Diagnosis Date   • Arthritis    • GERD (gastroesophageal reflux disease)    • Hyperlipidemia    • Hypertension    • Irritable bowel syndrome    • Low back pain    • PONV (postoperative nausea and vomiting)      Past Surgical History  Past Surgical History:   Procedure Laterality Date   • CATARACT EXTRACTION Bilateral    •  SECTION N/A    • CHOLECYSTECTOMY     • COLONOSCOPY     • HERNIA REPAIR     • HYSTERECTOMY     • NASAL SEPTUM SURGERY     • TONSILLECTOMY     • UPPER GASTROINTESTINAL ENDOSCOPY               23 1147   OT Last Visit   OT Visit Date 23   Note Type   Note type Evaluation   Pain Assessment   Pain Assessment Tool 0-10   Pain Score 10 - Worst Possible Pain   Pain Location/Orientation Orientation: Right;Location: Knee; Location: Hip   Patient's Stated Pain Goal No pain   Restrictions/Precautions   Weight Bearing Precautions Per Order Yes   RLE Weight Bearing Per Order WBAT   Other Precautions THR;WBS;Fall Risk;Pain;Bed Alarm  (Posterior hip precautions)   Home Living   Type of [address] [place] [address] One level  (2+2 JOE)   Bathroom Shower/Tub Walk-in shower   Bathroom Equipment Shower chair;Grab bars in [facility];Walker   Additional Comments SPC PTA   Prior Function   Level of [place] Independent with ADLs; Independent with functional mobility; Needs assistance with IADLS   Lives With Son  (Dtr will be staying to assist)   Receives Help From Family   IADLs Independent with driving; Independent with medication management   Falls in the last 6 months 0   Vocational Full time employment   General   Family/Caregiver Present No   Subjective Subjective Pt received supine in bed  Pt reports high pain & nauseous, appears nervous & anxious  ADL   Eating Assistance 7  Independent   Grooming Assistance 5  Supervision/Setup   UB Bathing Assistance 5  Supervision/Setup   LB Bathing Assistance 2  Maximal Assistance   UB Dressing Assistance 5  Supervision/Setup   LB Dressing Assistance 2  Maximal [address]  2  Maximal Assistance   Bed Mobility   Supine to Sit 3  Moderate assistance   Additional items Assist x 2; Increased time required;Verbal cues;LE management; Bedrails;HOB elevated   Sit to Supine 2  Maximal assistance   Additional Comments See additional tx session for bed mobility   Transfers   Sit to Stand 3  Moderate assistance   Additional items Assist x 2; Increased time required;Verbal cues;Armrests   Stand to Sit 3  Moderate assistance   Additional items Assist x 2; Increased time required;Verbal cues;Armrests   Additional Comments VC's for safety & hand placement   Balance   Static Sitting Fair   Dynamic Sitting Fair   Static Standing Poor +   Dynamic Standing Poor +   Activity Tolerance   Activity Tolerance Patient limited by pain;Treatment limited secondary to medical complications (Comment); Patient limited by fatigue   Medical Staff Made Aware Co-treat with PT Fide   Nurse Made Aware JEANNETTE Huffman   RUE Assessment   RUE Assessment WFL   LUE Assessment   LUE Assessment WFL   Cognition   Overall Cognitive Status WFL   Arousal/Participation Alert   Attention Attends with cues to redirect   Orientation Level Oriented X4   Memory Within functional limits   Following Commands Follows one step commands with increased time or repetition   Assessment   Limitation Decreased ADL status; Decreased Safe judgement during ADL;Decreased endurance;Decreased self-care trans;Decreased high-level ADLs   Prognosis Fair   Assessment Pt is a 59 y o  female seen for OT evaluation at [facility] [address], admitted 1/18/2023 w/ Primary osteoarthritis of both hips  Pt is POD #1 R THR with posterior hip precautions  OT completed extensive review of pt's medical and social history  Comorbidities affecting pt's functional performance at time of assessment include: GERD, HTN, lumbar spondylosis, BL hip pain, chronic pain syndrome, dysphagia, PONV, diffuse pain  Personal factors affecting pt at time of IE include: steps to enter environment, limited home support, behavioral pattern, difficulty performing ADLS, difficulty performing IADLS , decreased initiation and engagement , health management  and environment  Prior to admission, pt was living with son in a [facility] with 2+2 JOE  Pt was I w/  ADLS and A w/ IADLS, (+) drove, & required use of cane PTA  Upon evaluation: Pt requires Mod-Max Ax2 for bed mobility, Mod Ax2 for functional mobility/transfers, S for UB ADLs and Max A for LB ADLS 2* the following deficits impacting occupational performance: weakness, decreased strength, decreased balance, decreased tolerance, impaired attention, decreased safety awareness, increased pain, orthopedic restrictions and decreased coping skills  Full objective findings from OT assessment regarding body systems outlined above  Pt to benefit from continued skilled OT tx while in the hospital to address deficits as defined above and maximize level of functional independence w/ ADL's and functional mobility  Occupational Performance areas to address include: grooming, bathing/shower, toilet hygiene, dressing, health maintenance, functional mobility, community mobility and clothing management  Based on findings, pt is of high complexity  The patient's raw score on the AM-PAC Daily Activity inpatient short form is 16, standardized score is 35 96, less than 39 4  Patients at this level are likely to benefit from DC to post-acute rehabilitation services, which does coincide with current above OT recommendations   However, please refer to therapist recommendation for discharge planning given other factors that may influence destination  At this time, OT recommendations at time of discharge are STR  Goals   Patient Goals Pt wants to go home today   Plan   Treatment Interventions ADL retraining;Functional transfer training;Patient/family training;Equipment evaluation/education; Compensatory technique education;Continued evaluation; Energy conservation; Endurance training   Goal Expiration Date 01/29/23   OT Frequency 4-6x/wk   Recommendation   OT Discharge Recommendation Post acute rehabilitation services  (Pending progress)   AM-PAC Daily Activity Inpatient   Lower Body Dressing 2   Bathing 2   Toileting 2   Upper Body Dressing 3   Grooming 3   Eating 4   Daily Activity Raw Score 16   Daily Activity Standardized Score (Calc for Raw Score >=11) 35 96   AM-PAC Applied Cognition Inpatient   Following a Speech/Presentation 3   Understanding Ordinary Conversation 4   Taking Medications 4   Remembering Where Things Are Placed or Put Away 4   Remembering List of 4-5 Errands 4   Taking Care of Complicated Tasks 3   Applied Cognition Raw Score 22   Applied Cognition Standardized Score 47 83   Additional Treatment Session   Start Time 1139   End Time 1147   Treatment Assessment Pt in standing for BP reading  Pt becoming increasingly diaphoretic & nauseous  Pt stood for approx 2 min but unable to tolerate further  Pt reported feeling as if she was going to pass & needing to sit  Mod Ax2 for stand > sit, VC's for safety  Unable to obtain standing BP  Upon sitting pt continues to report she's "going to pass out " Max Ax2 for sit > supine & placed in [place]  Symptoms began to resolve  Pt able to boost self toward [facility] with use of bedrails & LLE  Pt left supine in bed with bed alarm on, all needs in reach  RN informed     End of Consult   Education Provided Yes;Family or social support of family present for education by provider  (Posterior hip precautions MAME hand out provided to pt & dtr)   Patient Position at End of Consult Supine;Bed/Chair alarm activated; All needs within reach     Pt will achieve the following goals within 10 days  *Pt will complete LB bathing and dressing with S & DME PRN  *Pt will complete toileting w/ S w/ G hygiene/thoroughness using DME PRN    *Pt will complete bed mobility independently, with bed flat and no side rail to prep for purposeful tasks    *Pt will perform functional transfers with on/off all surfaces with S using DME as needed w/ G balance/safety  *Pt will demonstrate increased activity tolerance >20 min in order to complete ADL routine  *Pt will improve functional mobility during ADL/IADL/leisure tasks to S using DME as needed w/ G balance/safety  *Pt will demonstrate 100% carryover of precautions s/p review w/o cues w/ Mod I w/ G tolerance/participation t/o functional ADL/IADL/leisure tasks      *Assess DME needs     Jaja Daniels OTR/L

## 2023-01-19 NOTE — PLAN OF CARE
Problem: PHYSICAL THERAPY ADULT  Goal: Performs mobility at highest level of function for planned discharge setting  See evaluation for individualized goals  Description: Treatment/Interventions: Functional transfer training, LE strengthening/ROM, Elevations, Therapeutic exercise, Endurance training, Patient/family training, Equipment eval/education, Bed mobility, Gait training, Spoke to nursing, OT          See flowsheet documentation for full assessment, interventions and recommendations  Note: Prognosis: Guarded  Problem List: Decreased strength, Decreased endurance, Impaired balance, Decreased mobility, Decreased safety awareness, Obesity, Orthopedic restrictions, Pain  Assessment: Patient is a 55y/o F who is POD1 R MAME  Patient resides with son in a [facility] with 2+2 JOE  Patient reports independence at baseline but states that she has difficulty with ADL's and IADL's  She was using a RW at times  Current medical status includes obesity, pain, hip precautions, fall risk, nausea, lightheadedness,anxiety, decreased strength, endurance, balance and mobility  Patient required assistance of 2 for bed mobility, transfers  She was unable to tolerate further mobility due to feeling like she was going to faint  At this time, due to patients current functional status, recommending rehab  The patient's AM-PAC Basic Mobility Inpatient Short Form Raw Score is 9  A Raw score of less than or equal to 17suggests the patient may benefit from discharge to post-acute rehabilitation services  Please also refer to the recommendation of the Physical Therapist for safe discharge planning  Barriers to Discharge: Inaccessible home environment, Decreased caregiver support  Barriers to Discharge Comments: Currently requiring assistance of 2 for mobility  PT Discharge Recommendation: Post acute rehabilitation services    See flowsheet documentation for full assessment

## 2023-01-19 NOTE — PLAN OF CARE
Problem: OCCUPATIONAL THERAPY ADULT  Goal: Performs self-care activities at highest level of function for planned discharge setting  See evaluation for individualized goals  Description: Treatment Interventions: ADL retraining, Functional transfer training, Patient/family training, Equipment evaluation/education, Compensatory technique education, Continued evaluation, Energy conservation, Endurance training          See flowsheet documentation for full assessment, interventions and recommendations  Note: Limitation: Decreased ADL status, Decreased Safe judgement during ADL, Decreased endurance, Decreased self-care trans, Decreased high-level ADLs  Prognosis: Fair  Assessment: Pt is a 59 y o  female seen for OT evaluation at [facility], admitted 1/18/2023 w/ Primary osteoarthritis of both hips  Pt is POD #1 R THR with posterior hip precautions  OT completed extensive review of pt's medical and social history  Comorbidities affecting pt's functional performance at time of assessment include: GERD, HTN, lumbar spondylosis, BL hip pain, chronic pain syndrome, dysphagia, PONV, diffuse pain  Personal factors affecting pt at time of IE include: steps to enter environment, limited home support, behavioral pattern, difficulty performing ADLS, difficulty performing IADLS , decreased initiation and engagement , health management  and environment  Prior to admission, pt was living with son in a [facility] with 2+2 JOE  Pt was I w/  ADLS and A w/ IADLS, (+) drove, & required use of cane PTA  Upon evaluation: Pt requires Mod-Max Ax2 for bed mobility, Mod Ax2 for functional mobility/transfers, S for UB ADLs and Max A for LB ADLS 2* the following deficits impacting occupational performance: weakness, decreased strength, decreased balance, decreased tolerance, impaired attention, decreased safety awareness, increased pain, orthopedic restrictions and decreased coping skills   Full objective findings from OT assessment regarding body systems outlined above  Pt to benefit from continued skilled OT tx while in the hospital to address deficits as defined above and maximize level of functional independence w/ ADL's and functional mobility  Occupational Performance areas to address include: grooming, bathing/shower, toilet hygiene, dressing, health maintenance, functional mobility, community mobility and clothing management  Based on findings, pt is of high complexity  The patient's raw score on the AM-PAC Daily Activity inpatient short form is 16, standardized score is 35 96, less than 39 4  Patients at this level are likely to benefit from DC to post-acute rehabilitation services, which does coincide with current above OT recommendations  However, please refer to therapist recommendation for discharge planning given other factors that may influence destination  At this time, OT recommendations at time of discharge are STR       OT Discharge Recommendation: Post acute rehabilitation services (Pending progress) normal...

## 2023-01-19 NOTE — PLAN OF CARE
Head,  normocephalic,  atraumatic,  Face,  Face within normal limits,  Ears,  External ears within normal limits,  Nose/Nasopharynx,  External nose  normal appearance,  nares patent,  no nasal discharge,  Mouth and Throat,  Oral cavity appearance normal,  Breath odor normal,  Lips,  Appearance normal Problem: PAIN - ADULT  Goal: Verbalizes/displays adequate comfort level or baseline comfort level  Description: Interventions:  - Encourage patient to monitor pain and request assistance  - Assess pain using appropriate pain scale  - Administer analgesics based on type and severity of pain and evaluate response  - Implement non-pharmacological measures as appropriate and evaluate response  - Consider cultural and social influences on pain and pain management  - Notify physician/advanced practitioner if interventions unsuccessful or patient reports new pain  Outcome: Progressing     Problem: INFECTION - ADULT  Goal: Absence or prevention of progression during hospitalization  Description: INTERVENTIONS:  - Assess and monitor for signs and symptoms of infection  - Monitor lab/diagnostic results  - Monitor all insertion sites, i e  indwelling lines, tubes, and drains  - Monitor endotracheal if appropriate and nasal secretions for changes in amount and color  - [place] appropriate cooling/warming therapies per order  - Administer medications as ordered  - Instruct and encourage patient and family to use good hand hygiene technique  - Identify and instruct in appropriate isolation precautions for identified infection/condition  Outcome: Progressing     Problem: Knowledge Deficit  Goal: Patient/family/caregiver demonstrates understanding of disease process, treatment plan, medications, and discharge instructions  Description: Complete learning assessment and assess knowledge base    Interventions:  - Provide teaching at level of understanding  - Provide teaching via preferred learning methods  Outcome: Progressing     Problem: DISCHARGE PLANNING  Goal: Discharge to home or other facility with appropriate resources  Description: INTERVENTIONS:  - Identify barriers to discharge w/patient and caregiver  - Arrange for needed discharge resources and transportation as appropriate  - Identify discharge learning needs (meds, wound care, etc )  - Arrange for interpretive services to assist at discharge as needed  - Refer to Case Management Department for coordinating discharge planning if the patient needs post-hospital services based on physician/advanced practitioner order or complex needs related to functional status, cognitive ability, or social support system  Outcome: Progressing     Problem: MOBILITY - ADULT  Goal: Maintain or return to baseline ADL function  Description: INTERVENTIONS:  -  Assess patient's ability to carry out ADLs; assess patient's baseline for ADL function and identify physical deficits which impact ability to perform ADLs (bathing, care of mouth/teeth, toileting, grooming, dressing, etc )  - Assess/evaluate cause of self-care deficits   - Assess range of motion  - Assess patient's mobility; develop plan if impaired  - Assess patient's need for assistive devices and provide as appropriate  - Encourage maximum independence but intervene and supervise when necessary  - Involve family in performance of ADLs  - Assess for home care needs following discharge   - Consider OT consult to assist with ADL evaluation and planning for discharge  - Provide patient education as appropriate  Outcome: Progressing  Goal: Maintains/Returns to pre admission functional level  Description: INTERVENTIONS:  - Perform BMAT or MOVE assessment daily    - Set and communicate daily mobility goal to care team and patient/family/caregiver  - Collaborate with rehabilitation services on mobility goals if consulted  - Perform Range of Motion 3 times a day  - Reposition patient every 2 hours    - Dangle patient 3 times a day  - Stand patient 3 times a day  - Ambulate patient 3 times a day  - Out of bed to chair 3 times a day   - Out of bed for meals 3 times a day  - Out of bed for toileting  - Record patient progress and toleration of activity level   Outcome: Progressing

## 2023-01-19 NOTE — CASE MANAGEMENT
Case Management Assessment & Discharge Planning Note    Patient name Giulia Murphy  Location [address] 220/-10 MRN [id number]  : 1965 Date 2023       Current Admission Date: 2023  Current Admission Diagnosis:Primary osteoarthritis of both hips   Patient Active Problem List    Diagnosis Date Noted   • Nausea 2023   • Dysphagia 2023   • PONV (postoperative nausea and vomiting) 2023   • Primary hypertension 2022   • Mixed hyperlipidemia 2022   • Chronic pain syndrome 2022   • Chronic bilateral low back pain without sciatica 2022   • Diffuse pain 2022   • GERD (gastroesophageal reflux disease)    • Primary osteoarthritis of both hips 2022   • Bilateral hip pain    • Greater trochanteric bursitis of both hips    • Lumbar spondylosis       LOS (days): 0  Geometric Mean LOS (GMLOS) (days):   Days to GMLOS:     OBJECTIVE:              Current admission status: Outpatient Procedure       Preferred Pharmacy:   [facility] DR CONNOR Velasquez  [address] - [address]  [address]    [facility] [address]  Phone: [phone] Fax: [phone]    Primary Care Provider: Norman Calles DO    Primary Insurance: BLUE CROSS  Secondary Insurance:     ASSESSMENT:  [address] [place] [address]   Primary Phone: [phone] (Mobile)               Advance Directives  Does patient have a [address] [place] [address] Avenue?: No  Was patient offered paperwork?: Yes (declined)  Does patient currently have a Health Care decision maker?: Yes, please see Health Care Proxy section  Does patient have Advance Directives?: No  Was patient offered paperwork?: Yes (declined)  Primary Contact: Tamie Mata         Readmission Root Cause  30 Day Readmission: No    Patient Information  Admitted from[de-identified] Home  Mental Status: Alert  During Assessment patient was accompanied by: Not accompanied during assessment  Assessment information provided by[de-identified] Patient  Primary Caregiver: Self  Support Systems: Family members, Josh  Armin 59 of Residence: [address] [place] [address] do you live in?: [address] access options   Select all that apply : Stairs  Number of steps to enter home : 4  Do the steps have railings?: Yes  Type of Current Residence: [place]  In the last 12 months, was there a time when you were not able to pay the mortgage or rent on time?: No  In the last 12 months, how many places have you lived?: 1  In the last 12 months, was there a time when you did not have a steady place to sleep or slept in a shelter (including now)?: No  Homeless/housing insecurity resource given?: N/A  Living Arrangements: Lives w/ Son  Is patient a ?: No    Activities of Daily Living Prior to Admission  Functional Status: Independent  Completes ADLs independently?: Yes  Ambulates independently?: Yes  Does patient use assisted devices?: Yes  Assisted Devices (DME) used: Caden Grigsby  Does patient currently own DME?: Yes  What DME does the patient currently own?: Racquel Reggie Pereira  Does patient have a history of Outpatient Therapy (PT/OT)?: No  Does the patient have a history of Short-Term Rehab?: No  Does patient have a history of HHC?: No  Does patient currently have [facility]?: No         Patient Information Continued  Income Source: Employed  Does patient have prescription coverage?: Yes  Within the past 12 months, you worried that your food would run out before you got the money to buy more : Never true  Within the past 12 months, the food you bought just didn't last and you didn't have money to get more : Never true  Food insecurity resource given?: N/A  Does patient receive dialysis treatments?: No  Does patient have a history of substance abuse?: No  Does patient have a history of Mental Health Diagnosis?: No         Means of Transportation  Means of Transport to Appts[de-identified] Drives Self  In the past 12 months, has lack of transportation kept you from medical appointments or from getting medications?: No  In the past 12 months, has lack of transportation kept you from meetings, work, or from getting things needed for daily living?: No  Was application for public transport provided?: N/A        DISCHARGE DETAILS:    Discharge planning discussed with[de-identified] patient  Freedom of Choice: Yes  Comments - Freedom of Choice: discussed dc needs with pt  CM contacted family/caregiver?: No- see comments (pt indpt in room)  Were Treatment Team discharge recommendations reviewed with patient/caregiver?: Yes  Did patient/caregiver verbalize understanding of patient care needs?: Yes       Contacts  Reason/Outcome: Discharge [address]         Is the patient interested in [facility] at discharge?: No    DME Referral Provided  Referral made for DME?: No    Other Referral/Resources/Interventions Provided:  Interventions: None Indicated  Referral Comments: Pt ambulating and cleared by pt/ot to go home         Treatment Team Recommendation: Home  Discharge Destination Plan[de-identified] Home  Transport at Discharge : Family                                      Additional Comments: Cm met with pt at bedside  Pt lives with son in [facility] with 2 stairs to porch and 2 stairs in the home  Pt has a walker and cane at home  She had a BSC delivered today  Pt reports that she has no hx of HHC, STR,PT,OT,MH,DA  Pt states she drives and works but currently not driving due to total hip replacement for this stay  Pt uses [facility] and sees SL PCP  PT/OT cleared pt for home

## 2023-01-19 NOTE — ASSESSMENT & PLAN NOTE
· Regimen: Hyzaar 100-12 5 mg daily  · Blood pressure controlled postop  · Recommend to restart Hyzaar if blood pressure remains stable

## 2023-01-19 NOTE — PLAN OF CARE
Problem: OCCUPATIONAL THERAPY ADULT  Goal: Performs self-care activities at highest level of function for planned discharge setting  See evaluation for individualized goals  Description: Treatment Interventions: ADL retraining, Functional transfer training, Patient/family training, Equipment evaluation/education, Compensatory technique education, Continued evaluation  Equipment Recommended: Hip Kit ($) (Pt provided with hip kit, forms signed)       See flowsheet documentation for full assessment, interventions and recommendations  1/19/2023 1518 by Kapil Cain OT  Outcome: Progressing  Note: Limitation: Decreased ADL status, Decreased Safe judgement during ADL, Decreased endurance, Decreased self-care trans, Decreased high-level ADLs  Prognosis: Fair  Assessment: Pt seen for OT tx session with focus on functional balance, functional mobility, ADL status, and transfer safety  Patient agreeable to OT treatment session  Pt received supine in bed  Pt completed bed mobility with S & bed rails  Pt progressed all functional txfers to supervision with VC's for hand placement  Pt able to demo mobility of a functional household distance with S & RW  Able to perform LB dressing with S & AD s/p education on LHAD  Pt provided with hip kit, forms signed  Pt completed toileting with S & grooming independently  Pt has made significant improvement in overall functional txfers, mobility & ADL performance since OT IE  Patient continues to be functioning below baseline level, occupational performance remains limited secondary to factors listed above, and pt at increased risk for falls and injury  The patient's raw score on the AM-PAC Daily Activity inpatient short form is 21, standardized score is 44 27, greater than 39 4  Patients at this level are likely to benefit from DC to home  Please refer to the recommendation of the Occupational Therapist for safe DC planning    From OT standpoint, recommendation at time of D/C would be Home with family support  Patient to benefit from continued Occupational Therapy treatment while in the hospital to address deficits as defined above and maximize level of functional independence with ADLs and functional mobility  Pt left seated OOB to Recliner with call bell in reach, tray table in reach, needs met, RN informed  OT Discharge Recommendation: No rehabilitation needs       1/19/2023 1254 by Hussain Deras, OT  Note: Limitation: Decreased ADL status, Decreased Safe judgement during ADL, Decreased endurance, Decreased self-care trans, Decreased high-level ADLs  Prognosis: Fair  Assessment: Pt is a 59 y o  female seen for OT evaluation at [facility], admitted 1/18/2023 w/ Primary osteoarthritis of both hips  Pt is POD #1 R THR with posterior hip precautions  OT completed extensive review of pt's medical and social history  Comorbidities affecting pt's functional performance at time of assessment include: GERD, HTN, lumbar spondylosis, BL hip pain, chronic pain syndrome, dysphagia, PONV, diffuse pain  Personal factors affecting pt at time of IE include: steps to enter environment, limited home support, behavioral pattern, difficulty performing ADLS, difficulty performing IADLS , decreased initiation and engagement , health management  and environment  Prior to admission, pt was living with son in a [facility] with 2+2 JOE  Pt was I w/  ADLS and A w/ IADLS, (+) drove, & required use of cane PTA  Upon evaluation: Pt requires Mod-Max Ax2 for bed mobility, Mod Ax2 for functional mobility/transfers, S for UB ADLs and Max A for LB ADLS 2* the following deficits impacting occupational performance: weakness, decreased strength, decreased balance, decreased tolerance, impaired attention, decreased safety awareness, increased pain, orthopedic restrictions and decreased coping skills  Full objective findings from OT assessment regarding body systems outlined above   Pt to benefit from continued skilled OT tx while in the hospital to address deficits as defined above and maximize level of functional independence w/ ADL's and functional mobility  Occupational Performance areas to address include: grooming, bathing/shower, toilet hygiene, dressing, health maintenance, functional mobility, community mobility and clothing management  Based on findings, pt is of high complexity  The patient's raw score on the AM-PAC Daily Activity inpatient short form is 16, standardized score is 35 96, less than 39 4  Patients at this level are likely to benefit from DC to post-acute rehabilitation services, which does coincide with current above OT recommendations  However, please refer to therapist recommendation for discharge planning given other factors that may influence destination  At this time, OT recommendations at time of discharge are STR       OT Discharge Recommendation: Post acute rehabilitation services (Pending progress)

## 2023-01-19 NOTE — PLAN OF CARE
Problem: PHYSICAL THERAPY ADULT  Goal: Performs mobility at highest level of function for planned discharge setting  See evaluation for individualized goals  Description: Treatment/Interventions: Functional transfer training, LE strengthening/ROM, Elevations, Therapeutic exercise, Endurance training, Patient/family training, Equipment eval/education, Bed mobility, Gait training, Spoke to nursing, OT          See flowsheet documentation for full assessment, interventions and recommendations  1/19/2023 1417 by Binta Marks, PT  Outcome: Progressing  Note: Prognosis: Good  Problem List: Decreased strength, Decreased endurance, Impaired balance, Decreased mobility, Obesity, Orthopedic restrictions, Pain  Assessment: Patient was received supine in bed  Reports that she is feeling better  BP stable throughout session  Some nausea present in stance and after stair trial  Patient was able to recall 2/3 hip precautions  Continued education provided  Patient progressed significantly this session and was able to perform bed mobility, transfers, and ambulation all at a supervision level  She ambulated further than a household distance  She also performed stairs with min A for CG  Dtr to assist as needed  Also discussed possible bedrail for bed mobility  Patient was issued a handout which contains hip precautions, proper use of RW and stair technique  At this time, patient can return home with outpatient P T  services  The patient's AM-PAC Basic Mobility Inpatient Short Form Raw Score is 19  A Raw score of greater than 17suggests the patient may benefit from discharge to home  Please also refer to the recommendation of the Physical Therapist for safe discharge planning  Barriers to Discharge: None  Barriers to Discharge Comments: Daughter will be staying with her to assist as needed  PT Discharge Recommendation: Home with outpatient rehabilitation    See flowsheet documentation for full assessment       1/19/2023 1310 by Alexandria Hagen PT  Note: Prognosis: Guarded  Problem List: Decreased strength, Decreased endurance, Impaired balance, Decreased mobility, Decreased safety awareness, Obesity, Orthopedic restrictions, Pain  Assessment: Patient is a 55y/o F who is POD1 R MAME  Patient resides with son in a [facility] with 2+2 JOE  Patient reports independence at baseline but states that she has difficulty with ADL's and IADL's  She was using a RW at times  Current medical status includes obesity, pain, hip precautions, fall risk, nausea, lightheadedness,anxiety, decreased strength, endurance, balance and mobility  Patient required assistance of 2 for bed mobility, transfers  She was unable to tolerate further mobility due to feeling like she was going to faint  At this time, due to patients current functional status, recommending rehab  The patient's AM-PAC Basic Mobility Inpatient Short Form Raw Score is 9  A Raw score of less than or equal to 17suggests the patient may benefit from discharge to post-acute rehabilitation services  Please also refer to the recommendation of the Physical Therapist for safe discharge planning  Barriers to Discharge: Inaccessible home environment, Decreased caregiver support  Barriers to Discharge Comments: Currently requiring assistance of 2 for mobility  PT Discharge Recommendation: Post acute rehabilitation services    See flowsheet documentation for full assessment

## 2023-01-19 NOTE — PROGRESS NOTES
Progress Note - Orthopedics   Domo Torres 59 y o  female MRN: [id number]  Unit/Bed#: -01      Subjective:    59 y  o female postop day 1 status post right hip total arthroplasty with Dr Juan Hein  No acute events, no new complaints  Patient doing well  Is having pain in the operative leg however she just took pain medication and feels it is resolving  Denies fevers, chills, CP, SOB, N/V, numbness or tingling       Labs:  0   Lab Value Date/Time    HCT 32 5 (L) 01/19/2023 0344    HCT 38 5 01/14/2023 1335    HCT 39 7 11/25/2022 1051    HGB 10 7 (L) 01/19/2023 0344    HGB 13 0 01/14/2023 1335    HGB 13 4 11/25/2022 1051    INR 1 0 11/25/2022 1051    WBC 11 76 (H) 01/19/2023 0344    WBC 5 87 01/14/2023 1335    WBC 5 5 11/25/2022 1051       Meds:    Current Facility-Administered Medications:   •  acetaminophen (TYLENOL) tablet 975 mg, 975 mg, Oral, Q8H, [employer] PA-C, 975 mg at 01/19/23 0102  •  albuterol (PROVENTIL HFA,VENTOLIN HFA) inhaler 2 puff, 2 puff, Inhalation, Q6H PRN, [employer] PAXiangC  •  aluminum-magnesium hydroxide-simethicone (MYLANTA) oral suspension 30 mL, 30 mL, Oral, Q6H PRN, [employer] PAXiangC  •  aspirin (ECOTRIN LOW STRENGTH) EC tablet 81 mg, 81 mg, Oral, BID, Madelyn Patel PA-C, 81 mg at 01/18/23 2125  •  calcium carbonate (TUMS) chewable tablet 1,000 mg, 1,000 mg, Oral, Daily PRN, [employer] PA-C, 1,000 mg at 01/18/23 2130  •  cholecalciferol (VITAMIN D3) tablet 2,000 Units, 2,000 Units, Oral, Daily, [employer] PAXiangC  •  cholestyramine sugar free (QUESTRAN LIGHT) packet 4 g, 4 g, Oral, HS, [employer] ABBY Mix  •  docusate sodium (COLACE) capsule 100 mg, 100 mg, Oral, BID, [employer] EmbibeABBY  •  gabapentin (NEURONTIN) capsule 300 mg, 300 mg, Oral, HS, Madelyn Patel PA-C, 300 mg at 01/18/23 2125  •  losartan (COZAAR) tablet 100 mg, 100 mg, Oral, Daily **AND** hydrochlorothiazide (HYDRODIURIL) tablet 12 5 mg, 12 5 mg, Oral, Daily, Devyn Ware ABBY  •  hyoscyamine (LEVSIN/SL) SL tablet 0 125 mg, 0 125 mg, Oral, Q4H PRN, Jesus Beauchamp PA-C  •  lactated ringers bolus 1,000 mL, 1,000 mL, Intravenous, Once PRN **AND** lactated ringers bolus 1,000 mL, 1,000 mL, Intravenous, Once PRN, Jesus Beauchamp PA-C  •  lactated ringers infusion, 100 mL/hr, Intravenous, Continuous, Jesus Beauchamp PA-C, Stopped at 01/19/23 0694  •  morphine injection 2 mg, 2 mg, Intravenous, Q2H PRN, Jesus Beauchamp PA-C  •  ondansetron [facility]) injection 4 mg, 4 mg, Intravenous, Q6H PRN, Jesus Beauchamp PA-C  •  oxyCODONE (ROXICODONE) IR tablet 10 mg, 10 mg, Oral, Q4H PRN, Jesus Beauchamp PA-C  •  oxyCODONE (ROXICODONE) IR tablet 5 mg, 5 mg, Oral, Q4H PRN, Jesus Beauchamp PA-C, 5 mg at 01/19/23 6841  •  pantoprazole (PROTONIX) EC tablet 40 mg, 40 mg, Oral, BID AC, Moreen Alpers, PA-C, 40 mg at 01/19/23 6989  •  senna (SENOKOT) tablet 8 6 mg, 1 tablet, Oral, Daily, Jesus Beauchamp PA-C  •  sodium chloride 0 9 % bolus 1,000 mL, 1,000 mL, Intravenous, Once PRN **AND** sodium chloride 0 9 % bolus 1,000 mL, 1,000 mL, Intravenous, Once PRN, Jesus Beauchamp PA-C  •  sucralfate (CARAFATE) tablet 1 g, 1 g, Oral, BID AC, Jesus Beauchamp PA-C, 1 g at 01/19/23 6972    Blood Culture:   No results found for: BLOODCX    Wound Culture:   No results found for: WOUNDCULT    Ins and Outs:  I/O last 24 hours: In: 1990 [P O :240; I V :1000; IV Piggyback:750]  Out: 1002 [Urine:1150; Blood:400]          Physical:  Vitals:    01/19/23 0106   BP: 132/66   Pulse: 99   Resp: 16   Temp: 98 6 °F (37 °C)   SpO2:      Musculoskeletal: right Lower Extremity  · Skin warm and dry  No erythema or ecchymosis  · Dressing clean, dry, intact    No strikethrough seen on Mepilex dressing  · Mildly tender palpation periincisional area  · Sensation intact to saphenous, sural, tibial, superficial peroneal nerve, and deep peroneal  · Motor intact to +FHL/EHL, +ankle dorsi/plantar flexion  · 2+ DP pulse, symmetric bilaterally  · Compartments of thigh and calf soft and compressible  · Digits warm and well perfused  · Capillary refill < 2 seconds    Assessment:    59 y  o female postop day 1 status post right hip total arthroplasty  Patient doing well  Plan:  · Weightbearing as tolerated  · PT/OT  Discharge plans per their recommendations  · DVT prophylaxis: Aspirin 81 mg twice daily for 4 weeks  · Doxycycline postop for 5 days  · Hgb 10 7 this morning  No signs of bleeding from incision site  We will continue to monitor for postoperative acute blood loss anemia  · Multimodal pain control  · Posterior hip precautions  · Follow-up in 10 to 14 days with Dr Cuong Samson as an outpatient    · Dispo: Ortho will follow    Abiel Miller PA-C

## 2023-01-20 ENCOUNTER — NURSE TRIAGE (OUTPATIENT)
Dept: OTHER | Facility: OTHER | Age: 58
End: 2023-01-20

## 2023-01-20 ENCOUNTER — TELEPHONE (OUTPATIENT)
Dept: OBGYN CLINIC | Facility: HOSPITAL | Age: 58
End: 2023-01-20

## 2023-01-20 NOTE — TELEPHONE ENCOUNTER
Spoke to patient for full postoperative follow up call assessment, she reports doing "ok" and "a lot of pain " We discussed her pain, current 10/10 pain, and AVS medication list  She did not take any oxycodone today, and was trying to only take it at night, and taking the Celebrex 200mg BID, Tylenol 1000mg TID for pain this AM  I did advise patient to take oxycodone this AM for 10/10 pain  She is also taking the ASA 81mg BID, Doxycycline 100mg BID for 5 days and senna (denies yet having a BM, and we discussed increasing fluid and fiber intake)  She reports swelling is "I don't know, I don't see any" and we discussed icing areas of pain and swelling  She states her dressing is dry with no drainage  She denies chest pain, SOB, dizziness, fever or calf pain, She states "pain at knee "     pt encouraged to call me with questions, concerns or issues  Chart(s)/Patient/Spouse/Significant Other

## 2023-01-21 NOTE — TELEPHONE ENCOUNTER
Regarding: post op/fever of 100 2  ----- Message from Maryann Sharma sent at 1/20/2023  8:14 PM EST -----  Pt called "I had a total hip replacement on Wednesday and now I am running a fever of 100 2 "

## 2023-01-21 NOTE — TELEPHONE ENCOUNTER
s/p  ARTHROPLASTY HIP TOTAL (Right: Hip) on 1/18 withKurt Menendez DO  C/o temp of 100 2 (oral), feeling face hot and red  States she had intermittent hip pain rated 9/10 with movement, but has not taken pain medication for tonight  Pain controlled with medication but left feeling "drugged up"  Patient lowered heater  No additional symptoms reported  Care advice given  Informed to call back if worsening/developing symptoms  Verbalized understanding and agreeable with disposition  No further questions

## 2023-01-21 NOTE — TELEPHONE ENCOUNTER
Reason for Disposition  • Other post-op symptom or question    Answer Assessment - Initial Assessment Questions  1  SYMPTOM: "What's the main symptom you're concerned about?" (e g , pain, fever, vomiting)      fever  2  ONSET: "When did it  start?"     1/20  3  SURGERY: "What surgery was performed?"    ARTHROPLASTY HIP TOTAL (Right: Hip  4  DATE of SURGERY: "When was surgery performed?"      1/18  5  ANESTHESIA: " What type of anesthesia did you have?" (e g , general, spinal, epidural, local)    General   6  PAIN: "Is there any pain?" If Yes, ask: "How bad is it?"  (Scale 1-10; or mild, moderate, severe)    9/10 , last med given at 1930  7  FEVER: "Do you have a fever?" If Yes, ask: "What is your temperature, how was it measured, and when did it start?"      100 2 oral   8  VOMITING: "Is there any vomiting?" If yes, ask: "How many times?"     Denies   9  BLEEDING: "Is there any bleeding?" If Yes, ask: "How much?" and "Where?"     Denies   10   OTHER SYMPTOMS: "Do you have any other symptoms?" (e g , drainage from wound, painful urination, constipation)     Red face    Protocols used: POST-OP SYMPTOMS AND QUESTIONS-[facility]

## 2023-01-23 ENCOUNTER — TELEPHONE (OUTPATIENT)
Dept: OBGYN CLINIC | Facility: HOSPITAL | Age: 58
End: 2023-01-23

## 2023-01-23 DIAGNOSIS — M16.0 PRIMARY OSTEOARTHRITIS OF BOTH HIPS: Primary | ICD-10-CM

## 2023-01-23 RX ORDER — HYDROCODONE BITARTRATE AND ACETAMINOPHEN 5; 325 MG/1; MG/1
1 TABLET ORAL EVERY 4 HOURS PRN
Qty: 42 TABLET | Refills: 0 | Status: SHIPPED | OUTPATIENT
Start: 2023-01-23

## 2023-01-23 RX ORDER — MIDAZOLAM HYDROCHLORIDE 2 MG/2ML
INJECTION, SOLUTION INTRAMUSCULAR; INTRAVENOUS AS NEEDED
Status: DISCONTINUED | OUTPATIENT
Start: 2023-01-18 | End: 2023-01-23

## 2023-01-23 NOTE — ADDENDUM NOTE
Addendum  created 01/23/23 1159 by Cuauhtemoc Florentino CRNA    Intraprocedure Meds edited, Orders acknowledged in Narrator

## 2023-01-23 NOTE — TELEPHONE ENCOUNTER
Called back and spoke to Aram Holder per the surgeon "  Florencio Ortiz We are going to send her Norco so she just needs to be careful to stay less than 4,000 mg of tylenol per day  The low grade fevers less than 100 4 can be related to her pain and likely atelectasis  She should use her incentive spirometer "       Aware norco was sent to [address] [facility] in [facility]  Advised tylenol is MDD 3-4G  Made aware norco has tylenol in each tablet  She is using her incentive spirometer hourly, also ambulating using RW hourly- she plans to continue doing these things  NN and pt discussed her intermittent low grade fevers  Of note pt's Son does have low grade fever also with congestion  She states he Tested COVID negative  Pt denies any congestion or other concerning symptoms, is only c/o post-op pain and low grade temps  Advised low grade temps (<101 0) is not uncommon post-op  She was instructed to call back with temps 101 0 or greater, new or worsening symptoms  Again She is using her incentive spirometer hourly, also ambulating using RW hourly- she plans to continue doing these things  Patient also admits she is constipated  She notes she Has tried senokot, dulcolax, "a vegetable OTC laxative" all without sucess  She states she tried the vegetable lax this AM, and plans to try for BM today, she was encouraged to call back if constipation continues  She confirms she is passing gas, denies vomiting  Aware if she stops passing gas, starts to vomit or continues to struggle with constipation to call back ortho office  Pt Wants to know if she can lay on L side, even if it is briefly  Need surgeon to please advise if this positioning is OK    Pt denies other questions or needs at this time

## 2023-01-23 NOTE — TELEPHONE ENCOUNTER
Caller: Daughter- Татьяна [facility]    Doctor: Dr Jim Hess    Reason for call: Patients Daughter Arvin Roque is calling in stating that she had surgery on 1/18/23 for a total right hip  She is wanting to know if she is able to lay on her left side and be positioned with pillows? Also wanted to let the Dr be aware that in the evenings after 7pm she is running a low grade fever- the lowest is like 99 4 and the highest has gotten to 100 3 and wanted to know what she should do relating to that? She is extremely sensitive to pain medication- she left the hospital with Oxycodone 5 mgs tabs and she is currently breaking them in quarters since they are making her so sick and nauseous  She is taking Zofran to help with the pain medication but that is not helping her either  The patient is asking if something else can be given to her for pain that is a bit lighter and not so hard on her stomach since she still is not getting any relief in pain, please advise and reach back out to patient      Call back#: [phone] (cell)     Amol Stokes in [facility] on file

## 2023-01-23 NOTE — TELEPHONE ENCOUNTER
Advised pt can lay on that side for comfort, per surgeon  Pt notes she had a successful BM  NN and pt discussed again tylenol and dosing- educated 325mg tylenol in each norco tablet, reeducated 3000-4000mg MDD  She verbalizes understanding, denies having additional questions at this time  Pt encouraged to call with any questions, concerns or issues

## 2023-01-27 ENCOUNTER — OFFICE VISIT (OUTPATIENT)
Dept: OBGYN CLINIC | Facility: CLINIC | Age: 58
End: 2023-01-27

## 2023-01-27 ENCOUNTER — APPOINTMENT (OUTPATIENT)
Dept: RADIOLOGY | Facility: CLINIC | Age: 58
End: 2023-01-27

## 2023-01-27 VITALS
HEIGHT: 63 IN | SYSTOLIC BLOOD PRESSURE: 112 MMHG | DIASTOLIC BLOOD PRESSURE: 72 MMHG | HEART RATE: 99 BPM | BODY MASS INDEX: 38.27 KG/M2 | WEIGHT: 216 LBS

## 2023-01-27 DIAGNOSIS — Z96.641 AFTERCARE FOLLOWING RIGHT HIP JOINT REPLACEMENT SURGERY: ICD-10-CM

## 2023-01-27 DIAGNOSIS — Z96.641 AFTERCARE FOLLOWING RIGHT HIP JOINT REPLACEMENT SURGERY: Primary | ICD-10-CM

## 2023-01-27 DIAGNOSIS — Z47.1 AFTERCARE FOLLOWING RIGHT HIP JOINT REPLACEMENT SURGERY: Primary | ICD-10-CM

## 2023-01-27 DIAGNOSIS — Z47.1 AFTERCARE FOLLOWING RIGHT HIP JOINT REPLACEMENT SURGERY: ICD-10-CM

## 2023-01-27 NOTE — PROGRESS NOTES
Subjective:Patient seen and examined  Pain controlled with Norco and tylenol  Progressing well  Incision without drainage  Denies fevers or chills  Patient is ambulating with a walker  Physical Exam:  Incision: CDI bilaterally without erythema or drainage noted  ROM:post operative ROM  5/5 IP/Q/HS/TA/GS, 2+ DP/PT, SILT DP/SP/S/S/TN    XR R hip: s/p press-fit total right hip arthroplasty, components in good position   No fracture or dislocation    Assessment/Plan:  59year old s/p right total hip arthroplasty 1/18/2023    - continue multi-modal pain control   - Weight bearing status: WBAT  - DVT ppx: ASA 81mg 4 weeks post op  - Incision care: may shower- do not submerge/soak  - PT/OT  - F/U 3 weeks    Scribe Attestation    I,:  Joan Connors am acting as a scribe while in the presence of the attending physician :       I,:  Jennifer Fernandes DO personally performed the services described in this documentation    as scribed in my presence :

## 2023-02-03 NOTE — TELEPHONE ENCOUNTER
Caller: patient    Doctor: Loreta Law    Reason for call: patient has insomnia, she thinks its from the medication, asking if she can take some in the morning and some in the evening?     Call back#: [phone]

## 2023-02-06 ENCOUNTER — OFFICE VISIT (OUTPATIENT)
Dept: PHYSICAL THERAPY | Facility: CLINIC | Age: 58
End: 2023-02-06

## 2023-02-06 DIAGNOSIS — Z96.641 HISTORY OF RIGHT HIP REPLACEMENT: Primary | ICD-10-CM

## 2023-02-06 DIAGNOSIS — M16.11 LOCALIZED OSTEOARTHROSIS OF RIGHT HIP: ICD-10-CM

## 2023-02-06 NOTE — PROGRESS NOTES
PT Evaluation     Today's date: 2023  Patient name: Manuel Calabrese  : 1965  MRN: [id number]  Referring provider: Serenity Tatum, *  Dx:   Encounter Diagnosis     ICD-10-CM    1  History of right hip replacement  Z96 641       2  Localized osteoarthrosis of right hip  M16 11                      Assessment  Assessment details: Manuel Calabrese is a 59 y o  female presenting to outpatient physical therapy at [place] [address] with complaints of B hip pain  She presents with decreased range of motion, decreased strength, limited flexibility, altered gait pattern, poor balance, decreased tolerance to activity and decreased functional mobility following R MAME on 23 due to Primary osteoarthritis of right hip (primary encounter diagnosis)  She plans to have a L MAME soon  She would benefit from skilled PT services in order to address these deficits and reach maximum level of function  Thank you for the referral!  Impairments: abnormal gait, abnormal or restricted ROM, activity intolerance, impaired balance, impaired physical strength, lacks appropriate home exercise program, pain with function and safety issue  Barriers to therapy: None  Understanding of Dx/Px/POC: good  Goals  ST   Independent with HEP in 2 weeks  2   Increase R hip PROM to within 10 degrees of WNL all motions in 6 weeks   3  Able to ambulate with SPC x 10 min for ADLs in 2 weeks    LT  Achieve FOTO score of 58/100 in 8 weeks   2   Able to RTW, ambulate x 30 min without AD on all surfaces in 8 weeks  3  Strength R LE = 5/5 all motions in 8 weeks  4  No tightness in R LE in 8 weeks  5    Good R LE balance in 8 weeks      Plan  Patient would benefit from: skilled PT and PT eval  Planned modality interventions: cryotherapy  Planned therapy interventions: abdominal trunk stabilization, ADL retraining, balance/weight bearing training, body mechanics training, flexibility, functional ROM exercises, home exercise program, joint mobilization, manual therapy, neuromuscular re-education, strengthening, stretching, therapeutic activities, therapeutic exercise and gait training  Frequency: 2x week  Duration in weeks: 8  Treatment plan discussed with: patient        Subjective Evaluation    History of Present Illness  Mechanism of injury: Pt reports having B hip pain for at least 1 year  She tried injections, PT and weight loss without significant pain relief  She had R MAME on 23 with posterior approach  Plans to have a L MAME ASAP  Has been walking with a walker since surgery  Has gained 60# in the past year due to inactivity  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  Having some R knee pain since surgery  Able to walk up to 2 min now  Not performing any tasks at home for housework  Recurrent probem    Quality of life: fair    Pain  Current pain ratin  At best pain ratin  At worst pain rating: 10  Quality: dull ache, sharp and tight  Relieving factors: rest, medications and ice  Aggravating factors: walking, standing and stair climbing  Progression: worsening    Social Support  Steps to enter house: yes  Stairs in house: no   Lives in: [facility]  Lives with: adult children    Employment status: not working  Treatments  Previous treatment: medication, injection treatment and physical therapy  Current treatment: medication  Patient Goals  Patient goals for therapy: decreased pain, improved balance, increased motion, increased strength, independence with ADLs/IADLs, return to sport/leisure activities and return to work          Objective     Palpation     Additional Palpation Details  Severe tightness B hip adductors and flexors  Tenderness     Left Hip   Tenderness in the greater trochanter  Right Hip   Tenderness in the greater trochanter       Neurological Testing     Sensation     Hip   Left Hip   Intact: light touch    Right Hip   Intact: light touch    Active Range of Motion     Right Hip   Flexion: 55 degrees with pain  Abduction: 5 degrees with pain  Left Knee   Normal active range of motion    Right Knee   Normal active range of motion    Passive Range of Motion     Right Hip   Flexion: 70 degrees with pain  Extension: 0 degrees   Abduction: 24 degrees with pain    Strength/Myotome Testing     Left Hip   Planes of Motion   Flexion: 4+  Abduction: 4  Adduction: 4+    Right Hip   Planes of Motion   Flexion: 2-  Abduction: 2-  Adduction: 2+    Left Knee   Flexion: 3+  Extension: 3+    Right Knee   Flexion: 5  Extension: 4+    Ambulation   Weight-Bearing Status   Assistive device used: front-wheeled walker    Ambulation: Level Surfaces   Ambulation with assistive device: independent  Ambulation without assistive device: unable    Ambulation: Stairs   Ascend stairs: independent  Pattern: non-reciprocal  Railings: one rail  Descend stairs: independent  Pattern: non-reciprocal  Railings: one rail  Curbs: unable    Observational Gait   Decreased walking speed and stride length  Comments   Poor L LE balance    Unable to balance on R LE          POC EXPIRES On:  4/3/23  PRECAUTIONS:  None  CO-MORBIDITES:  B hip OA  PERSONAL FACTORS:  R MAME on 1/18/23  Access Code [email or web address]       Manuals HEP 2/6           PROM R hip supine  10'                                                  Neuro Re-Ed                                                                                                Ther Ex    LAQ R 2/6 20           B calf raises 2/6 20            Marching standing L/R 2/6 10 ea           Standing hip ext R 2/6 5           Standing hip abd R 2/6 5           Quad sets R 2/6 5" 10           R heel slides 2/6 10           Bridges 2/6 10                        Ther Activity                              Gait Training                              Modalities    CP R hip  NV

## 2023-02-08 ENCOUNTER — OFFICE VISIT (OUTPATIENT)
Dept: PHYSICAL THERAPY | Facility: CLINIC | Age: 58
End: 2023-02-08

## 2023-02-08 DIAGNOSIS — M16.11 LOCALIZED OSTEOARTHROSIS OF RIGHT HIP: ICD-10-CM

## 2023-02-08 DIAGNOSIS — Z96.641 HISTORY OF RIGHT HIP REPLACEMENT: Primary | ICD-10-CM

## 2023-02-08 NOTE — PROGRESS NOTES
Daily Note     Today's date: 2023  Patient name: Denita Garay  : 1965  MRN: [id number]  Referring provider: Bay Jackson, *  Dx:   Encounter Diagnosis     ICD-10-CM    1  History of right hip replacement  Z96 641       2  Localized osteoarthrosis of right hip  M16 11                      Subjective: Pt reports seeing stars when performing her HEP  Objective: See treatment diary below  Survey Monkey given (_____) and FOTO given (_____)      Assessment: Pt presented to outpatient physical therapy at [place] [address] with complaints of B hip pain  She presents with decreased range of motion, decreased strength, limited flexibility, altered gait pattern, poor balance, decreased tolerance to activity and decreased functional mobility following R MAME on 23 due to Primary osteoarthritis of right hip (primary encounter diagnosis)  She plans to have a L MAME soon  She would benefit from skilled PT services in order to address these deficits and reach maximum level of function  Pt required multiple breaks due to feeling nauseous when performing TE  Pt is very limited in all ROM during PROM, pain at end range       Plan: Continue plan of care     POC EXPIRES On:  4/3/23  PRECAUTIONS:  None  CO-MORBIDITES:  B hip OA  PERSONAL FACTORS:  R MAME on 23  Access Code [email or web address]       Manuals HEP           PROM R hip supine  10' 10'                                                 Neuro Re-Ed                                                                                                Ther Ex    LAQ R  20 20          B calf raises  20  20          Marching standing L/R  10 ea 20 ea          Seated Marches   20           Standing hip ext R  5 20          Standing hip abd R  5 20          Quad sets R  5" 10           R heel slides  10           Bridges  10 20                       Ther Activity                              Gait Training Modalities    CP R hip  NV Def home

## 2023-02-13 ENCOUNTER — OFFICE VISIT (OUTPATIENT)
Dept: PHYSICAL THERAPY | Facility: CLINIC | Age: 58
End: 2023-02-13

## 2023-02-13 DIAGNOSIS — M16.11 LOCALIZED OSTEOARTHROSIS OF RIGHT HIP: ICD-10-CM

## 2023-02-13 DIAGNOSIS — Z96.641 HISTORY OF RIGHT HIP REPLACEMENT: Primary | ICD-10-CM

## 2023-02-13 NOTE — PROGRESS NOTES
Daily Note     Today's date: 2023  Patient name: Kd Dougherty  : 1965  MRN: [id number]  Referring provider: Natasha Hirsch, *  Dx:   Encounter Diagnosis     ICD-10-CM    1  History of right hip replacement  Z96 641       2  Localized osteoarthrosis of right hip  M16 11                      Subjective: Pt reports she is starting to see improvements with his hip  Objective: See treatment diary below  Survey Monkey given (_____) and FOTO given (_____)      Assessment: Pt presented to outpatient physical therapy at [facility] with complaints of B hip pain  She presents with decreased range of motion, decreased strength, limited flexibility, altered gait pattern, poor balance, decreased tolerance to activity and decreased functional mobility following R MAME on 23 due to Primary osteoarthritis of right hip (primary encounter diagnosis)  She plans to have a L MAME soon  She would benefit from skilled PT services in order to address these deficits and reach maximum level of function  Introduced new TE, with great tolerance, pt is very motivated to get her hip stronger  Pt required breaks in between sets due to increased soreness/fatigue       Plan: Continue plan of care     POC EXPIRES On:  4/3/23  PRECAUTIONS:  None  CO-MORBIDITES:  B hip OA  PERSONAL FACTORS:  R MAME on 23  Access Code [email or web address]       Manuals HEP          PROM R hip supine  10' 10' 10                                                Neuro Re-Ed     Side stepping in // bars    3laps                                                                                       Ther Ex    LAQ R  20 20 20         B calf raises  20  20 20         Marching standing L/R  10 ea 20 ea 20 ea         Seated    20           Standing hip ext R  5 20 20         Standing hip abd R  5 20 20         Quad sets R 6 5" 10           R heel slides  10           Bridges  10 20 20                      Ther Activity    Step ups    4" 20x         minisquats    20x         Gait Training                              Modalities    CP R hip  NV Def home 10' supine

## 2023-02-15 ENCOUNTER — OFFICE VISIT (OUTPATIENT)
Dept: PHYSICAL THERAPY | Facility: CLINIC | Age: 58
End: 2023-02-15

## 2023-02-15 DIAGNOSIS — Z96.641 HISTORY OF RIGHT HIP REPLACEMENT: Primary | ICD-10-CM

## 2023-02-15 DIAGNOSIS — M16.11 LOCALIZED OSTEOARTHROSIS OF RIGHT HIP: ICD-10-CM

## 2023-02-15 NOTE — PROGRESS NOTES
Daily Note     Today's date: 2/15/2023  Patient name: Kevon Becerra  : 1965  MRN: [id number]  Referring provider: Yuliet Leslie, *  Dx:   Encounter Diagnosis     ICD-10-CM    1  History of right hip replacement  Z96 641       2  Localized osteoarthrosis of right hip  M16 11                      Subjective: Pt reports feeling very sore after lv  Objective: See treatment diary below  Survey Monkey given (_____) and FOTO given (_____)      Assessment: Pt presented to outpatient physical therapy at [facility] with complaints of B hip pain  She presents with decreased range of motion, decreased strength, limited flexibility, altered gait pattern, poor balance, decreased tolerance to activity and decreased functional mobility following R MAME on 23 due to Primary osteoarthritis of right hip (primary encounter diagnosis)  She plans to have a L MAME soon  She would benefit from skilled PT services in order to address these deficits and reach maximum level of function  Despite pt feeling very sore pt was very motivated to push through the TE however, encourage pt to take breaks and avoid pushing through the point of pain  Pt required breaks in between sets due to increased soreness/fatigue       Plan: Continue plan of care     POC EXPIRES On:  4/3/23  PRECAUTIONS:  None  CO-MORBIDITES:  B hip OA  PERSONAL FACTORS:  R MAME on 23  Access Code [email or web address]       Manuals HEP 2/6 2/8 2/13 2/15        PROM R hip supine  10' 10' 10 10'                                               Neuro Re-Ed     Side stepping in // bars    3laps 3laps                                                                                      Ther Ex    LAQ R 2/6 20 20 20 20        B calf raises  20  20 20 20        Marching standing L/R 2/6 10 ea 20 ea 20 ea 20 ea        Seated    20           Standing hip ext R 2/6 5 20 20 20        Standing hip abd R 2/6 5 20 20 20        Bridges 2/6 10 20 20 20 Ther Activity    Step ups    4" 20x nv        minisquats    20x 20x        Gait Training                              Modalities    CP R hip  NV Def home 10' supine 10' supine

## 2023-02-17 ENCOUNTER — APPOINTMENT (OUTPATIENT)
Dept: RADIOLOGY | Facility: CLINIC | Age: 58
End: 2023-02-17

## 2023-02-17 ENCOUNTER — OFFICE VISIT (OUTPATIENT)
Dept: OBGYN CLINIC | Facility: CLINIC | Age: 58
End: 2023-02-17

## 2023-02-17 VITALS
SYSTOLIC BLOOD PRESSURE: 130 MMHG | DIASTOLIC BLOOD PRESSURE: 86 MMHG | BODY MASS INDEX: 37.74 KG/M2 | WEIGHT: 213 LBS | HEIGHT: 63 IN | HEART RATE: 96 BPM

## 2023-02-17 DIAGNOSIS — Z96.641 AFTERCARE FOLLOWING RIGHT HIP JOINT REPLACEMENT SURGERY: ICD-10-CM

## 2023-02-17 DIAGNOSIS — Z47.1 AFTERCARE FOLLOWING RIGHT HIP JOINT REPLACEMENT SURGERY: Primary | ICD-10-CM

## 2023-02-17 DIAGNOSIS — Z96.641 AFTERCARE FOLLOWING RIGHT HIP JOINT REPLACEMENT SURGERY: Primary | ICD-10-CM

## 2023-02-17 DIAGNOSIS — Z47.1 AFTERCARE FOLLOWING RIGHT HIP JOINT REPLACEMENT SURGERY: ICD-10-CM

## 2023-02-17 NOTE — PROGRESS NOTES
Subjective: Patient seen and examined  Pain controlled with Tylenol only  Progressing well  Incision without drainage  Denies fevers or chills  Patient is ambulating with a walker  She does not trust putting weight on the right leg  She does not feel safe to drive at this time  Physical Exam:  Incision: CDI, well healed, without erythema or drainage noted  ROM: post operative ROM, -log roll  5/5 IP/Q/HS/TA/GS, 2+ DP/PT, SILT DP/SP/S/S/TN    XR R hip: s/p press-fit total right hip arthroplasty, components in good position  No fracture or dislocation    Assessment/Plan:  59year old 4 weeks s/p right total hip arthroplasty 1/18/2023    - continue multi-modal pain control   - Weight bearing status: WBAT  - May transition to cane from walker   - DVT ppx: may dc aspirin since she is 4 weeks post op     - Incision care: may shower- do not submerge/soak  - May now drive that she is 4 weeks post op, but only when she feels safe and off of narcotics   - PT/OT  - F/U 4 weeks to discuss RTW      Scribe Attestation    I,:  Tai Núñez PA-C am acting as a scribe while in the presence of the attending physician :       I,:  Mikal Gold,  personally performed the services described in this documentation    as scribed in my presence :

## 2023-02-18 DIAGNOSIS — R13.19 ESOPHAGEAL DYSPHAGIA: ICD-10-CM

## 2023-02-18 RX ORDER — SUCRALFATE ORAL 1 G/10ML
SUSPENSION ORAL
Qty: 600 ML | Refills: 0 | Status: SHIPPED | OUTPATIENT
Start: 2023-02-18

## 2023-02-21 ENCOUNTER — OFFICE VISIT (OUTPATIENT)
Dept: PHYSICAL THERAPY | Facility: CLINIC | Age: 58
End: 2023-02-21

## 2023-02-21 DIAGNOSIS — M16.11 LOCALIZED OSTEOARTHROSIS OF RIGHT HIP: ICD-10-CM

## 2023-02-21 DIAGNOSIS — Z96.641 HISTORY OF RIGHT HIP REPLACEMENT: Primary | ICD-10-CM

## 2023-02-21 NOTE — PROGRESS NOTES
Daily Note     Today's date: 2023  Patient name: Negrita Khan  : 1965  MRN: [id number]  Referring provider: Mike Moss, *  Dx:   Encounter Diagnosis     ICD-10-CM    1  History of right hip replacement  Z96 641       2  Localized osteoarthrosis of right hip  M16 11                      Subjective: Pt reports feeling good, except L hip pain  Surgeon is very pleased with her progression  Objective: See treatment diary below  Survey Monkey given (_____) and FOTO given (_____)      Assessment: Pt presented to outpatient physical therapy at [place] [address] with complaints of B hip pain  She presents with decreased range of motion, decreased strength, limited flexibility, altered gait pattern, poor balance, decreased tolerance to activity and decreased functional mobility following R MAME on 23 due to Primary osteoarthritis of right hip (primary encounter diagnosis)  She plans to have a L MAME soon  She would benefit from skilled PT services in order to address these deficits and reach maximum level of function  Introduced new TE with good tolerance and technique, however, pt needed assurance that she is not hurting herself       Plan: Continue plan of care     POC EXPIRES On:  4/3/23  PRECAUTIONS:  None  CO-MORBIDITES:  B hip OA  PERSONAL FACTORS:  R MAME on 23  Access Code [email or web address]       Manuals HEP 2 2/8 2/13 2/15 2/21       PROM R hip supine  10' 10' 10 10' 10                                              Neuro Re-Ed     Side stepping in // bars    3laps 3laps 3laps       SLS       5" 5                                                                        Ther Ex    LAQ R 2/6 20 20 20 20 20       B calf raises 2/6 20  20 20 20 20       Marching standing L/R 2/6 10 ea 20 ea 20 ea 20 ea 20       Seated Marches   20    20       Standing hip ext R 2/6 5 20 20 20 20       Standing hip abd R 2/6 5 20 20 20 20       Bridges 2/6 10 20 20 20 20                    Ther Activity    Leg Press B      65# 20                    Step ups    4" 20x nv 6" 20       minisquats    20x 20x 20       Gait Training    1 UE amb in //bars      5x                    Modalities    CP R hip  NV Def home 10' supine 10' supine 10' supine

## 2023-02-23 ENCOUNTER — OFFICE VISIT (OUTPATIENT)
Dept: PHYSICAL THERAPY | Facility: CLINIC | Age: 58
End: 2023-02-23

## 2023-02-23 DIAGNOSIS — Z96.641 HISTORY OF RIGHT HIP REPLACEMENT: Primary | ICD-10-CM

## 2023-02-23 DIAGNOSIS — M16.11 LOCALIZED OSTEOARTHROSIS OF RIGHT HIP: ICD-10-CM

## 2023-02-23 NOTE — PROGRESS NOTES
Daily Note     Today's date: 2023  Patient name: Elva Woodson  : 1965  MRN: [id number]  Referring provider: Stacy Alexandra, *  Dx:   Encounter Diagnosis     ICD-10-CM    1  History of right hip replacement  Z96 641       2  Localized osteoarthrosis of right hip  M16 11                      Subjective: Pt reports feeling good  Objective: See treatment diary below  Survey Monkey given (_____) and FOTO given (_____)      Assessment: Pt presented to outpatient physical therapy at [facility] with complaints of B hip pain  She presents with decreased range of motion, decreased strength, limited flexibility, altered gait pattern, poor balance, decreased tolerance to activity and decreased functional mobility following R MAME on 23 due to Primary osteoarthritis of right hip (primary encounter diagnosis)  She plans to have a L MAME soon  She would benefit from skilled PT services in order to address these deficits and reach maximum level of function  Introduced new TE with good tolerance and technique, pt ROM has greatly improved however noted significant hip flexor tightness  Introduced gentle kwame stretch with increased flexibility  Plan: Continue plan of care     POC EXPIRES On:  4/3/23  PRECAUTIONS:  None  CO-MORBIDITES:  B hip OA  PERSONAL FACTORS:  R MAME on 23  Access Code [email or web address]       Manuals HEP 2/6 2/8 2/13 2/15 2/21 2/23      PROM R hip supine  10' 10' 10 10' 10 8      GENTLE!  Kwame stretch       2                                Neuro Re-Ed     Side stepping in // bars    3laps 3laps 3laps 3laps      SLS       5" 5 5" 5                                                                       Ther Ex    Bike Full Rotation       8'      LAQ R 2/6 20 20 20 20 20 20      B calf raises 2/6 20  20 20 20 20 20      Marching standing L/R 2/6 10 ea 20 ea 20 ea 20 ea 20 20      Seated Marches   20    20 20      Standing hip ext R 2/6 5 20 20 20 20 20      Standing hip abd R 2/6 5 20 20 20 20 20      Bridges 2/6 10 20 20 20 20 20                   Ther Activity    Leg Press B      65# 20 75# 20                   Step ups    4" 20x nv 6" 20 8" 20      minisquats    20x 20x 20 20      Gait Training    1 UE amb in //bars      5x       SPC in clinic       8'                   Modalities    CP R hip  NV Def home 10' supine 10' supine 10' supine 10' supine

## 2023-02-27 ENCOUNTER — OFFICE VISIT (OUTPATIENT)
Dept: PHYSICAL THERAPY | Facility: CLINIC | Age: 58
End: 2023-02-27

## 2023-02-27 ENCOUNTER — TELEPHONE (OUTPATIENT)
Dept: OBGYN CLINIC | Facility: CLINIC | Age: 58
End: 2023-02-27

## 2023-02-27 DIAGNOSIS — M16.11 LOCALIZED OSTEOARTHROSIS OF RIGHT HIP: ICD-10-CM

## 2023-02-27 DIAGNOSIS — Z96.641 HISTORY OF RIGHT HIP REPLACEMENT: Primary | ICD-10-CM

## 2023-02-27 NOTE — LETTER
February 28, 2023     Patient: Jamar Nunez  YOB: 1965  Date of Visit: 2/27/2023      To Whom it May Concern:    Lucrecia Webb is under my professional care  Loretamary Thurman was seen in my office on 2/27/2023  Micah Thurman may return to work with limitations part time only  Please allow to use cane or walker as needed       If you have any questions or concerns, please don't hesitate to call           Sincerely,          Dr Dorinda Perdomo, MADDY O       CC: No Recipients

## 2023-02-27 NOTE — PROGRESS NOTES
Daily Note     Today's date: 2023  Patient name: Matilda Gipson  : 1965  MRN: [id number]  Referring provider: Fatoumata Smith, *  Dx:   Encounter Diagnosis     ICD-10-CM    1  History of right hip replacement  Z96 641       2  Localized osteoarthrosis of right hip  M16 11                      Subjective: Pt looks to return to work on         Objective: See treatment diary below  Survey Monkey given (_____) and FOTO given (_____)      Assessment: Pt presented to outpatient physical therapy at [place] [address] with complaints of B hip pain  She presents with decreased range of motion, decreased strength, limited flexibility, altered gait pattern, poor balance, decreased tolerance to activity and decreased functional mobility following R MAME on 23 due to Primary osteoarthritis of right hip (primary encounter diagnosis)  She plans to have a L MAME soon  She would benefit from skilled PT services in order to address these deficits and reach maximum level of function  Pt discussed return to work plan with [id number] [facility]  Also needs clearance from surgeon but it is our recommendation that she can return to work part time as long as employer is accommodating to her situation  Hip is moving well- pt noted hip is moving better than it even has  She also noted performance of the TE's was better today- noted difference was stretching manual therapy prior to TE's    Plan: Continue plan of care     POC EXPIRES On:  4/3/23  PRECAUTIONS:  None  CO-MORBIDITES:  B hip OA  PERSONAL FACTORS:  R MAME on 23  Access Code [email or web address]       Manuals HEP 2/6 2/8 2/13 2/15 2/21 2/23 2/27     PROM R hip supine  10' 10' 10 10' 10 8 8     GENTLE!  Kwame stretch       2 2                               Neuro Re-Ed     Side stepping in // bars    3laps 3laps 3laps 3laps 3 laps     SLS       5" 5 5" 5 5"x5                                                                      Ther Ex    Bike Full Rotation       8' 8' LAQ R 2/6 20 20 20 20 20 20 20     B calf raises 2/6 20  20 20 20 20 20 20     Marching standing L/R 2/6 10 ea 20 ea 20 ea 20 ea 20 20 20     Seated Marches   20    20 20 20     Standing hip ext R 2/6 5 20 20 20 20 20 20     Standing hip abd R 2/6 5 20 20 20 20 20 20     Bridges 2/6 10 20 20 20 20 20 20                  Ther Activity    Leg Press B      65# 20 75# 20 75# 20                  Step ups    4" 20x nv 6" 20 8" 20 8"x20     minisquats    20x 20x 20 20 20     Gait Training    1 UE amb in //bars      5x       SPC in clinic       8' Step through  200'                  Modalities    CP R hip  NV Def home 10' supine 10' supine 10' supine 10' supine

## 2023-02-27 NOTE — TELEPHONE ENCOUNTER
Patient requesting to return to work letter for her return  on Wed  3/8 part time only  Patients boss aware that she will be using  Cane full time and walker part time or as needed

## 2023-03-01 ENCOUNTER — OFFICE VISIT (OUTPATIENT)
Dept: PHYSICAL THERAPY | Facility: CLINIC | Age: 58
End: 2023-03-01

## 2023-03-01 DIAGNOSIS — M16.11 LOCALIZED OSTEOARTHROSIS OF RIGHT HIP: ICD-10-CM

## 2023-03-01 DIAGNOSIS — Z96.641 HISTORY OF RIGHT HIP REPLACEMENT: Primary | ICD-10-CM

## 2023-03-01 NOTE — PROGRESS NOTES
Daily Note     Today's date: 3/1/2023  Patient name: Denita Garay  : 1965  MRN: [id number]  Referring provider: Bay Jackson, *  Dx:   Encounter Diagnosis     ICD-10-CM    1  History of right hip replacement  Z96 641       2  Localized osteoarthrosis of right hip  M16 11                      Subjective: Pt reports having mm soreness post LV w/ additional walking  Objective: See treatment diary below      Assessment: Tolerated treatment fair  Patient demonstrated fatigue post treatment and would benefit from continued PT for cont'd work on gait, hi ROM and strength  Pt sensitive to gentle STM to thigh  Pt encouraged to touch her scar  Plan: Continue per plan of care  Progress treatment as tolerated  POC EXPIRES On:  4/3/23  PRECAUTIONS:  None  CO-MORBIDITES:  B hip OA  PERSONAL FACTORS:  R MAME on 23  Access Code [email or web address]       Manuals HEP 2/ 2/8 2/13 2/15 2/21 2/23 2/27 3/1    PROM R hip supine  10' 10' 10 10' 10 8 8 JK    GENTLE!  Kwame stretch       2 2 STM [facility]                           10'    Neuro Re-Ed     Side stepping in // bars    3laps 3laps 3laps 3laps 3 laps 3 laps    SLS       5" 5 5" 5 5"x5                                                                      Ther Ex    Bike Full Rotation       8' 8' 5'    LAQ R 2/6 20 20 20 20 20 20 20 20    B calf raises 2/6 20  20 20 20 20 20 20 20    Marching standing L/R 2/6 10 ea 20 ea 20 ea 20 ea 20 20 20 20    Seated Marches   20    20 20 20     Standing hip ext R 2/6 5 20 20 20 20 20 20 20    Standing hip abd R 2/6 5 20 20 20 20 20 20 20    Bridges 2/6 10 20 20 20 20 20 20 20    Glut sets         5"x10    Ther Activity    Leg Press B      65# 20 75# 20 75# 20 75# 20                 Step ups    4" 20x nv 6" 20 8" 20 8"x20 8" 20    minisquats    20x 20x 20 20 20     Gait Training    1 UE amb in //bars      5x       SPC in clinic       8' Step through  200'                  Modalities    CP R hip  NV Def home 10' supine 10' supine 10' supine 10' supine  10' supine

## 2023-03-03 ENCOUNTER — TELEPHONE (OUTPATIENT)
Dept: OBGYN CLINIC | Facility: HOSPITAL | Age: 58
End: 2023-03-03

## 2023-03-03 NOTE — TELEPHONE ENCOUNTER
Caller: Demario Castañeda    Doctor: Jim Hess    Reason for call: Patient will be stopping by the office to  her work note today      Call back#: [phone]

## 2023-03-07 ENCOUNTER — OFFICE VISIT (OUTPATIENT)
Dept: PHYSICAL THERAPY | Facility: CLINIC | Age: 58
End: 2023-03-07

## 2023-03-07 DIAGNOSIS — Z96.641 HISTORY OF RIGHT HIP REPLACEMENT: Primary | ICD-10-CM

## 2023-03-07 DIAGNOSIS — M16.11 LOCALIZED OSTEOARTHROSIS OF RIGHT HIP: ICD-10-CM

## 2023-03-07 NOTE — PROGRESS NOTES
Daily Note     Today's date: 3/7/2023  Patient name: Regis Aviles  : 1965  MRN: [id number]  Referring provider: Shira Hamilton, *  Dx:   Encounter Diagnosis     ICD-10-CM    1  History of right hip replacement  Z96 641       2  Localized osteoarthrosis of right hip  M16 11                      Subjective: Pt reports using her SPC more frequently  Objective: See treatment diary below      Assessment: Pt performed below TE with good tolerance, pt experienced increased soreness post TE  Plan: Continue per plan of care  Progress treatment as tolerated  POC EXPIRES On:  4/3/23  PRECAUTIONS:  None  CO-MORBIDITES:  B hip OA  PERSONAL FACTORS:  R MAME on 23  Access Code [email or web address]       Manuals HEP 2/23 2/27 3/1 3/7   PROM R hip supine  8 8 JK 8   GENTLE!  Kwame stretch  2 2 STM /JK                 10' 8'   Neuro Re-Ed     Side stepping in // bars  3laps 3 laps 3 laps 3laps   SLS   5" 5 5"x5  5"5                                           Ther Ex    Bike Full Rotation  8' 8' 5' 5'   LAQ R 2/6 20 20 20 20   B calf raises 2/6 20 20 20 20   Marching standing L/R 2/6 20 20 20 20   Seated Marches  20 20  20   Standing hip ext R 2/6 20 20 20 20   Standing hip abd R 2/6 20 20 20 20   Bridges 2/6 20 20 20 20   Glut sets    5"x10 5" 10   Ther Activity    Leg Press B  75# 20 75# 20 75# 20 85# 20           Step ups  8" 20 8"x20 8" 20 8" 20   minisquats  20 20  20   Gait Training    1 UE amb in //[facility] in clinic  8' Step through  200'             Modalities    CP R hip  10' supine  10' supine 10' supine

## 2023-03-09 ENCOUNTER — OFFICE VISIT (OUTPATIENT)
Dept: PHYSICAL THERAPY | Facility: CLINIC | Age: 58
End: 2023-03-09

## 2023-03-09 DIAGNOSIS — M54.16 LUMBAR RADICULOPATHY: ICD-10-CM

## 2023-03-09 DIAGNOSIS — Z96.641 HISTORY OF RIGHT HIP REPLACEMENT: Primary | ICD-10-CM

## 2023-03-09 DIAGNOSIS — M16.11 LOCALIZED OSTEOARTHROSIS OF RIGHT HIP: ICD-10-CM

## 2023-03-09 NOTE — PROGRESS NOTES
Daily Note     Today's date: 3/9/2023  Patient name: Paul Victoria  : 1965  MRN: [id number]  Referring provider: Felicity Saucedo, *  Dx: No diagnosis found  Subjective: Pt notes she was at work yesterday and after sitting for 2 hours was in a lot of pain  Objective: See treatment diary below      Assessment: Tolerated treatment fair  Pt was able to complete ambulation, marching, and side stepping in parallel bars but was unable to complete other exercises due to fatigue and time  Pt tolerated PNF to the RLE in D1 Extension pattern well with progressively improved movement throughout exercise  Pt appeared to have improved gait by end of the session  Pt educated to continue mobility the rest of the day to reduce soreness post PT  Patient would benefit from continued PT      Plan: Progress treatment as tolerated  POC EXPIRES On:  4/3/23  PRECAUTIONS:  None  CO-MORBIDITES:  B hip OA  PERSONAL FACTORS:  R MAME on 23  Access Code [email or web address]       Manuals HEP 2/23 2/27 3/1 3/7 3/9     PROM R hip supine  8 8 JK 8      GENTLE!  Kwame stretch  2 2 STM /JK                       10' 8'      Neuro Re-Ed        Side stepping in // bars  3laps 3 laps 3 laps 3laps 10 laps with RTB     SLS   5" 5 5"x5  5"5      PNF  D1 Extension      x10 to RLE                                                 Ther Ex       Bike Full Rotation  8' 8' 5' 5' 5' lvl 8     LAQ R 2/6 20 20 20 20      B calf raises 2/6 20 20 20 20      Marching standing L/R 2/6 20 20 20 20      Seated Marches  20 20  20 x20 with RTB     Standing hip ext R 2/6 20 20 20 20      Standing hip abd R 2/6 20 20 20 20      Bridges 2/6 20 20 20 20      Supine Hip ABd hooklying position      RTB x20     Glut sets    5"x10 5" 10      Ther Activity       Leg Press B  75# 20 75# 20 75# 20 85# 20                 Step ups  8" 20 8"x20 8" 20 8" 20      minisquats  20 20  20 x20 RTB     Gait Training       1 UE amb in //bars           SPC in clinic  8' Step through  200'                   Modalities       CP R hip  10' supine  10' supine 10' supine

## 2023-03-13 ENCOUNTER — OFFICE VISIT (OUTPATIENT)
Dept: PHYSICAL THERAPY | Facility: CLINIC | Age: 58
End: 2023-03-13

## 2023-03-13 DIAGNOSIS — M16.11 LOCALIZED OSTEOARTHROSIS OF RIGHT HIP: ICD-10-CM

## 2023-03-13 DIAGNOSIS — Z96.641 HISTORY OF RIGHT HIP REPLACEMENT: Primary | ICD-10-CM

## 2023-03-13 NOTE — PROGRESS NOTES
Daily Note     Today's date: 3/13/2023  Patient name: Kevon Becerra  : 1965  MRN: [id number]  Referring provider: Yuliet Leslie, *  Dx:   Encounter Diagnosis     ICD-10-CM    1  History of right hip replacement  Z96 641       2  Localized osteoarthrosis of right hip  M16 11                      Subjective: Patient stated significant pain in her left hip prior to treatment session  Objective: See treatment diary below      Assessment: Patient c/o pain in left hip throughout session, no significant pain in right hip with activities  Plan: Progress treatment as tolerated  POC EXPIRES On:  4/3/23  PRECAUTIONS:  None  CO-MORBIDITES:  B hip OA  PERSONAL FACTORS:  R MAME on 23  Access Code [email or web address]       Manuals HEP 2/23 2/27 3/1 3/7 3/9 3/13    PROM R hip supine  8 8 JK 8      GENTLE!  Kwame stretch  2 2 STM /JK                       10' 8'      Neuro Re-Ed        Side stepping in // [facility]  3laps 3 laps 3 laps 3laps 10 laps with RTB     SLS   5" 5 5"x5  5"5      PNF  D1 Extension      x10 to RLE                                                 Ther Ex       Bike Full Rotation  8' 8' 5' 5' 5' lvl 8 5'     LAQ R 2/6 20 20 20 20  20    B calf raises 2/6 20 20 20 20  20    Marching standing L/R 2/6 20 20 20 20      Seated Marches  20 20  20 x20 with RTB 20x    Standing hip ext R 2/6 20 20 20 20      Standing hip abd R 2/6 20 20 20 20      Bridges 2/6 20 20 20 20  30    Supine Hip ABd hooklying position      RTB x20 RTB x30    Glut sets    5"x10 5" 10  20x5"    Ther Activity       Leg Press B  75# 20 75# 20 75# 20 85# 20  85# 30               Step ups  8" 20 8"x20 8" 20 8" 20      minisquats  20 20  20 x20 RTB x20 RTB    Gait Training       1 UE amb in //[facility] in clinic  8' Step through  200'                   Modalities       CP R hip  10' supine  10' supine 10' supine

## 2023-03-15 ENCOUNTER — OFFICE VISIT (OUTPATIENT)
Dept: PHYSICAL THERAPY | Facility: CLINIC | Age: 58
End: 2023-03-15

## 2023-03-15 ENCOUNTER — TELEPHONE (OUTPATIENT)
Dept: OBGYN CLINIC | Facility: CLINIC | Age: 58
End: 2023-03-15

## 2023-03-15 DIAGNOSIS — M54.16 LUMBAR RADICULOPATHY: ICD-10-CM

## 2023-03-15 DIAGNOSIS — M16.11 LOCALIZED OSTEOARTHROSIS OF RIGHT HIP: ICD-10-CM

## 2023-03-15 DIAGNOSIS — Z96.641 HISTORY OF RIGHT HIP REPLACEMENT: Primary | ICD-10-CM

## 2023-03-15 NOTE — TELEPHONE ENCOUNTER
Patient came into office today stating Physical therapy that she could not do to patient in a lot of pain and high blood pressure  Please advise

## 2023-03-15 NOTE — PROGRESS NOTES
Pt presented w/ rash on her neck , chest and arms  Pt reports she is having so much arthritic pain in her L hip (non-operative) that she feels her BP must be elevated  Pt seated L UE BP: 164/92    Pt sent home advised to call her PCP and try and see if she can get her surgeon appt moved up

## 2023-03-20 ENCOUNTER — OFFICE VISIT (OUTPATIENT)
Dept: PHYSICAL THERAPY | Facility: CLINIC | Age: 58
End: 2023-03-20

## 2023-03-20 DIAGNOSIS — Z96.641 HISTORY OF RIGHT HIP REPLACEMENT: Primary | ICD-10-CM

## 2023-03-20 DIAGNOSIS — M16.11 LOCALIZED OSTEOARTHROSIS OF RIGHT HIP: ICD-10-CM

## 2023-03-20 DIAGNOSIS — R13.19 ESOPHAGEAL DYSPHAGIA: ICD-10-CM

## 2023-03-20 RX ORDER — SUCRALFATE ORAL 1 G/10ML
1 SUSPENSION ORAL 2 TIMES DAILY
Qty: 600 ML | Refills: 2 | Status: SHIPPED | OUTPATIENT
Start: 2023-03-20

## 2023-03-20 NOTE — PROGRESS NOTES
Daily Note     Today's date: 3/20/2023  Patient name: Peyman Santo  : 1965  MRN: [id number]  Referring provider: Yeison Aleman, *  Dx:   Encounter Diagnosis     ICD-10-CM    1  History of right hip replacement  Z96 641       2  Localized osteoarthrosis of right hip  M16 11                      Subjective:  Pt reports feeling a lot of L hip pain, but recent R MAME feels great, just weak  Wants L MAME ASAP  Using walker > SPC due to L hip pain  Objective:  See treatment diary below Survey Monkey given (_____) and FOTO given (_____)      Assessment:  Pt is progressing very well following R MAME, but still moderately weak  Pain in R hip is minimal, but L hip pain is limiting her functionally  Switching her SPC to her R hand was beneficial for lessening her L hip pain with gait  Plan: To MD on 3/24/23 for R MAME follow up and to talk about future L MAME  Continue to increase R hip strength  Add supine R SLR  POC EXPIRES On:  4/3/23  PRECAUTIONS:  None  CO-MORBIDITES:  B hip OA  PERSONAL FACTORS:  R MAME on 23, planned L MAME in late 2023   Access Code [email or web address]       Manuals HEP 2/23 2/27 3/1 3/7 3/9 3/13 3/20   PROM R hip supine  8 8 JK 8      GENTLE!  Kwame stretch  2 2 STM /JK                       10' 8'      Neuro Re-Ed        Side stepping in // bars  3laps 3 laps 3 laps 3laps 10 laps with RTB  12' 10 laps   SLS R  5" 5 5"x5  5"5   10" 5   PNF  D1 Extension      x10 to RLE                                                 Ther Ex       Bike Full Rotation  8' 8' 5' 5' 5' lvl 8 5'  L2 5'   LAQ L/R 2/6 20 20 20 20  20 20 ea   B calf raises 2/6 20 20 20 20  20 20   Supine R SLR        NV   Marching standing L/R 2/6 20 20 20 20      Seated Marches  20 20  20 x20 with RTB 20x 20 ea   Standing hip ext R 2/6 20 20 20 20      Standing hip abd R 2/6 20 20 20 20      Bridges 2/6 20 20 20 20  30 30   Supine hooklying ball adduction        5" 20   Supine Hip Abd hooklying position      RTB x20 RTB x30 Plum TB 3x10   Glut sets    5"x10 5" 10  20x5" 5" 20   Ther Activity       Leg Press B  75# 20 75# 20 75# 20 85# 20  85# 30 85# 30              Step ups  8" 20 8"x20 8" 20 8" 20   8" R only   minisquats  20 20  20 x20 RTB x20 RTB 20   Gait Training       1 UE amb in //bars           [facility] in clinic  8' Step through  200'     With [facility] in R hand 5'              Modalities       CP R hip  10' supine  10' supine 10' supine

## 2023-03-20 NOTE — TELEPHONE ENCOUNTER
Received rx refill notice from [facility] in [facility] for sucralfate

## 2023-03-22 ENCOUNTER — OFFICE VISIT (OUTPATIENT)
Dept: PHYSICAL THERAPY | Facility: CLINIC | Age: 58
End: 2023-03-22

## 2023-03-22 DIAGNOSIS — M54.16 LUMBAR RADICULOPATHY: ICD-10-CM

## 2023-03-22 DIAGNOSIS — Z96.641 HISTORY OF RIGHT HIP REPLACEMENT: Primary | ICD-10-CM

## 2023-03-22 DIAGNOSIS — M16.11 LOCALIZED OSTEOARTHROSIS OF RIGHT HIP: ICD-10-CM

## 2023-03-22 NOTE — PROGRESS NOTES
Daily Note     Today's date: 3/22/2023  Patient name: Fela Simms  : 1965  MRN: [id number]  Referring provider: Jeni Rodney, *  Dx:   Encounter Diagnosis     ICD-10-CM    1  History of right hip replacement  Z96 641       2  Localized osteoarthrosis of right hip  M16 11       3  Lumbar radiculopathy  M54 16                      Subjective:  Pt reports she wants to get her L hip done soon, will be seeing the surgeon Friday  Objective:  See treatment diary below   Survey Monkey given (3/20) and FOTO given (3/20)      Assessment: Pt presented to outpatient physical therapy at [place] [address] with complaints of B hip pain  She presents with decreased range of motion, decreased strength, limited flexibility, altered gait pattern, poor balance, decreased tolerance to activity and decreased functional mobility due to Primary osteoarthritis of right hip (primary encounter diagnosis)  She will have R MAME on 23 and L MAME soon after  She would benefit from skilled PT services in order to address these deficits and reach maximum level of function  She was given a full HEP for pre-op and 2 weeks post-op until she sees her MD        Pt performed below TE with good tolerance  Introduced sit->Stand however, solely performed 2x5 due to ms soreness and fatigue  Plan: To MD on 3/24/23 for R MAME follow up and to talk about future L MAME  Continue to increase R hip strength  Add supine R SLR  POC EXPIRES On:  4/3/23  PRECAUTIONS:  None  CO-MORBIDITES:  B hip OA  PERSONAL FACTORS:  R MAME on 23, planned L MAME in late 2023   Access Code [email or web address]       Manuals HEP 3/13 3/20 3/22   PROM R hip supine       GENTLE!  Kwame stretch                     Neuro Re-Ed       Side stepping in // bars   12' 10 laps 3laps   SLS R   10" 5 10" 5   PNF  D1 Extension                                   Ther Ex      Bike Full Rotation  5'  L2 5' L2 5'   LAQ L/R  20 20 ea 20 ea   B calf raises 2/6 20 20 20   Supine R SLR   NV 10x   Seated Marches  20x 20 ea 20 ea   Bridges 2/6 30 30 30   Supine hooklying ball adduction   5" 20 5" 20   Supine Hip Abd hooklying position  RTB x30 Plum TB 3x10 Plum TB 3x10   Glut sets  20x5" 5" 20 5" 20   Ther Activity      Leg Press B  85# 30 85# 30 85# 30   Sit->stand    10x   Step ups   8" R only 8" R only  20x   minisquats  x20 RTB 20 20   Gait Training      SPC in clinic   With [facility] in R hand 5' 3laps          Modalities      CP R hip

## 2023-03-24 ENCOUNTER — OFFICE VISIT (OUTPATIENT)
Dept: OBGYN CLINIC | Facility: CLINIC | Age: 58
End: 2023-03-24

## 2023-03-24 VITALS
HEIGHT: 63 IN | SYSTOLIC BLOOD PRESSURE: 121 MMHG | BODY MASS INDEX: 37.74 KG/M2 | DIASTOLIC BLOOD PRESSURE: 79 MMHG | WEIGHT: 213 LBS | HEART RATE: 93 BPM

## 2023-03-24 DIAGNOSIS — Z47.1 AFTERCARE FOLLOWING RIGHT HIP JOINT REPLACEMENT SURGERY: ICD-10-CM

## 2023-03-24 DIAGNOSIS — Z96.641 AFTERCARE FOLLOWING RIGHT HIP JOINT REPLACEMENT SURGERY: ICD-10-CM

## 2023-03-24 DIAGNOSIS — M16.12 PRIMARY OSTEOARTHRITIS OF ONE HIP, LEFT: Primary | ICD-10-CM

## 2023-03-24 RX ORDER — CHLORHEXIDINE GLUCONATE 0.12 MG/ML
15 RINSE ORAL ONCE
OUTPATIENT
Start: 2023-03-24 | End: 2023-03-24

## 2023-03-24 RX ORDER — MULTIVIT-MIN/IRON FUM/FOLIC AC 7.5 MG-4
1 TABLET ORAL DAILY
Qty: 30 TABLET | Refills: 1 | Status: SHIPPED | OUTPATIENT
Start: 2023-03-24

## 2023-03-24 RX ORDER — FOLIC ACID 1 MG/1
1 TABLET ORAL DAILY
Qty: 30 TABLET | Refills: 1 | Status: SHIPPED | OUTPATIENT
Start: 2023-03-24

## 2023-03-24 RX ORDER — MELATONIN
1000 DAILY
Qty: 60 TABLET | Refills: 1 | Status: SHIPPED | OUTPATIENT
Start: 2023-03-24 | End: 2023-03-29 | Stop reason: SDUPTHER

## 2023-03-24 RX ORDER — CLINDAMYCIN HYDROCHLORIDE 300 MG/1
CAPSULE ORAL
Qty: 2 CAPSULE | Refills: 5 | Status: SHIPPED | OUTPATIENT
Start: 2023-03-24 | End: 2023-04-24

## 2023-03-24 RX ORDER — CHLORHEXIDINE GLUCONATE 4 G/100ML
SOLUTION TOPICAL DAILY PRN
OUTPATIENT
Start: 2023-03-24

## 2023-03-24 RX ORDER — ASCORBIC ACID 500 MG
500 TABLET ORAL 2 TIMES DAILY
Qty: 60 TABLET | Refills: 1 | Status: SHIPPED | OUTPATIENT
Start: 2023-03-24 | End: 2023-03-29 | Stop reason: SDUPTHER

## 2023-03-24 RX ORDER — SODIUM CHLORIDE, SODIUM LACTATE, POTASSIUM CHLORIDE, CALCIUM CHLORIDE 600; 310; 30; 20 MG/100ML; MG/100ML; MG/100ML; MG/100ML
125 INJECTION, SOLUTION INTRAVENOUS CONTINUOUS
OUTPATIENT
Start: 2023-03-24

## 2023-03-24 RX ORDER — GABAPENTIN 100 MG/1
300 CAPSULE ORAL ONCE
OUTPATIENT
Start: 2023-03-24 | End: 2023-03-24

## 2023-03-24 RX ORDER — ACETAMINOPHEN 325 MG/1
975 TABLET ORAL ONCE
OUTPATIENT
Start: 2023-03-24 | End: 2023-03-24

## 2023-03-24 NOTE — PROGRESS NOTES
Hip New Office Note    Assessment:     1  Primary osteoarthritis of one hip, left    2  Aftercare following right hip joint replacement surgery        Plan:   Diagnoses and all orders for this visit:    Primary osteoarthritis of one hip, left  -     Case request operating room: ARTHROPLASTY HIP TOTAL; Standing  -     Comprehensive metabolic panel; Future  -     Hemoglobin A1C W/EAG Estimation; Future  -     CBC and differential; Future  -     Anemia Panel w/Reflex; Future  -     Protime-INR; Future  -     APTT; Future  -     Type and screen; Future  -     Ambulatory referral to [facility]; Future  -     Ambulatory referral to surgical optimization; Future  -     Ambulatory referral to Physical Therapy; Future  -     EKG 12 lead; Future  -     ascorbic acid (VITAMIN C) 500 MG tablet; Take 1 tablet (500 mg total) by mouth 2 (two) times a day  -     folic acid (FOLVITE) 1 mg tablet; Take 1 tablet (1 mg total) by mouth daily  -     Multiple Vitamins-Minerals (multivitamin with minerals) tablet; Take 1 tablet by mouth daily  -     cholecalciferol (VITAMIN D3) 1,000 units tablet; Take 1 tablet (1,000 Units total) by mouth daily  -     Comprehensive metabolic panel  -     CBC and differential  -     Protime-INR  -     APTT  -     Type and screen  -     Case request operating room: ARTHROPLASTY HIP TOTAL    Aftercare following right hip joint replacement surgery  -     clindamycin (CLEOCIN) 300 MG capsule; Take 2 capsules by mouth 1 hour prior to dental procedure or cleaning  Other orders  -     Diet NPO; Sips with meds; Standing  -     Fingerstick Glucose (POCT); Standing  -     Nursing Communication [address] [place] [address] Interventions Implemented; Standing  -     Nursing Communication [facility] bath, have staff wash entire body (neck down) per pre-op bathing protocol   Routine, evening prior to, and day of surgery ; Standing  -     Nursing Communication Swab both nares with Povidone-Iodine solution, EXCLUDE if patient has shellfish/Iodine allergy  Routine, day of surgery, on call to OR; Standing  -     chlorhexidine (PERIDEX) 0 12 % oral rinse 15 mL  -     Void on call to OR; Standing  -     Insert peripheral IV; Standing  -     lactated ringers infusion  -     acetaminophen (TYLENOL) tablet 975 mg  -     gabapentin (NEURONTIN) capsule 300 mg  -     Place sequential compression device; Standing  -     tranexamic Acid 1,000 mg in sodium chloride 0 9 % 100 mL IVPB Loading Dose  -     chlorhexidine (HIBICLENS) 4 % topical liquid  -     clindamycin (CLEOCIN) 900 mg in sodium chloride 0 9 % 50 mL IVPB          Roselyn examination and review of the physical therapy notes is doing very well in regards to her right hip now 9 weeks status post posterior approach total hip arthroplasty  She will continue physical therapy as per protocol in regards to the right hip  She may continue to weight-bear as tolerated with the use of a walker  I did explain that the discomfort at the anterior lateral aspect of the hip is likely due to muscle weakness and should continue to progress with physical therapy  She continue use of oral analgesics as needed  In regards to her left hip she does have severe osteoarthritis of the hip joint  Her exam is consistent with these findings with significant limitations in range of motion in all planes as well as severe pain into the groin with extension down the thigh  I did note that she could consider a total hip arthroplasty for the left hip as well  I did discuss the procedure with her as well as the risks and benefits of surgery to include but not limited to bleeding, infection, damage to surrounding structures, hardware failure, instability, fracture, dislocation, need for further surgery, continued pain, stiffness, blood clots, stroke, and heart attack was discussed with the patient  I did note that this would not be performed prior to 3 months post-op from the right hip    She was understanding and had no further questions  The patient has elected to proceed with left MAME through the posterior approach  Risks and benefits of surgery to include but not limited to bleeding, infection, damage to surrounding structures, hardware failure, instability, fracture, dislocation, leg length inequality, need for further surgery, continued pain, stiffness, blood clots, stroke, heart attack  was discussed with the patient  Informed consent was signed today in the office  The patient has met with our surgical schedulers and our preoperative joint replacement pathway has been initiated  All questions were answered  Patient will follow-up 2 weeks post operatively  Patient is a nonsmoker and appropriate BMI  Subjective:     Patient ID: Henry Cross is a 59 y o  female  Chief Complaint:  HPI:    Ernesto Bunn is a pleasant 24-year-old female presenting for follow-up of her right hip  She is 9 weeks status post press-fit total hip arthroplasty  She states that she is doing quite well in regards to her right hip and is progressing with range of motion and strengthening  However, she notes that she does feel that her progression in regards to her right hip is inhibited due to her pain in her left hip she does have a history of severe osteoarthritis in the left hip as well  She does utilize a walker still as she is feeling quite stable in the right hip secondary to delaying her recovery from her left hip  She states that even subtle movements such as positioning herself in her chair will exacerbate significant pain into her left hip  She does not appreciate the same pain into the right hip and notes that her incision is healing well without evidence of infection  She does wish to discuss surgical intervention in the form of a left total hip arthroplasty for the left hip  She does understand that this cannot be performed prior to April 18 2023      Allergy:  Allergies   Allergen Reactions   • Ativan [Lorazepam] Myalgia When taken  Along w/ cymbalta   • Cymbalta [Duloxetine Hcl] Myalgia     When taken w/ ativan x 1 occurrence   • Erythromycin    • Keflex [Cephalexin]    • Amoxicillin Rash   • Ampicillin Rash   • Latex Rash     Medications:  all current active meds have been reviewed  Past Medical History:  Past Medical History:   Diagnosis Date   • Arthritis    • GERD (gastroesophageal reflux disease)    • Hyperlipidemia    • Hypertension    • Irritable bowel syndrome    • Low back pain    • PONV (postoperative nausea and vomiting)      Past Surgical History:  Past Surgical History:   Procedure Laterality Date   • CATARACT EXTRACTION Bilateral    •  SECTION N/A    • CHOLECYSTECTOMY     • COLONOSCOPY     • HERNIA REPAIR     • HYSTERECTOMY     • NASAL SEPTUM SURGERY     • MI ARTHRP ACETBLR/PROX FEM PROSTC AGRFT/ALGRFT Right 2023    Procedure: ARTHROPLASTY HIP TOTAL;  Surgeon: Veronique Cruz DO;  Location:  MAIN OR;  Service: Orthopedics   • TONSILLECTOMY     • UPPER GASTROINTESTINAL ENDOSCOPY       Family History:  Family History   Problem Relation Age of Onset   • Hypertension Mother    • Skin cancer Mother    • Stroke Father    • Colon cancer Cousin    • Hypertension Son    • Skin cancer Daughter    • Cancer Daughter    • Colon polyps Neg Hx      Social History:  Social History     Substance and Sexual Activity   Alcohol Use Not Currently    Comment: extremely rare social occasion     Social History     Substance and Sexual Activity   Drug Use Never     Social History     Tobacco Use   Smoking Status Former   • Types: Cigarettes   • Quit date:    • Years since quitting: 10 2   Smokeless Tobacco Never           ROS:  General: Per HPI  Skin: Negative, except if noted below  HEENT: Negative  Respiratory: Negative  Cardiovascular: Negative  Gastrointestinal: Negative  Urinary: Negative  Vascular: Negative  Musculoskeletal: Positive per HPI   Neurologic: Positive per HPI  Endocrine: Negative    Objective:  BP Readings from Last 1 Encounters:   03/24/23 121/79      Wt Readings from Last 1 Encounters:   03/24/23 96 6 kg (213 lb)        Respiratory:   non-labored respirations    Lymphatics:  no palpable lymph nodes    Gait and Station:   abnormal    Neurologic:   Alert and oriented  Patient with normal sensation except as noted below  Deep tendon reflexes 2+ except as noted in MSK exam    Bilateral Lower Extremity:  Left Hip     Inspection: skin tact with no abrasion or erythema    Range of Motion: 0 degrees of internal rotation and 10 degrees of external rotation with       pain    Positive log roll    Positive Trendelenburg sign    Motor: 4/5 IP/Q/HS/TA/GS    Pulses: 2+ DP / 2+ PT    SILT DP/SP/S/S/TN    Right Hip     Inspection: incision is clean, dry and intact    Range of Motion: 90 degrees hip flexion no pain with IR/ER    Motor: 5/5 IP/Q/HS/TA/GS    Pulses: 2+ DP / 2+ PT    SILT DP/SP/S/S/TN    Imaging:  My interpretation XR AP Left hip: Severe joint space narrowing, subchondral sclerosis, subchondral cysts, osteophyte formation  No fracture or dislocation  S/p press-fit right MAME  Components in good position  No fracture or dislocation  BMI:   Estimated body mass index is 37 73 kg/m² as calculated from the following:    Height as of this encounter: 5' 3" (1 6 m)  Weight as of this encounter: 96 6 kg (213 lb)  BSA:   Estimated body surface area is 1 99 meters squared as calculated from the following:    Height as of this encounter: 5' 3" (1 6 m)  Weight as of this encounter: 96 6 kg (213 lb)             Scribe Attestation    I,:  Nia Johnson am acting as a scribe while in the presence of the attending physician :       I,:  Prabhu Chow, DO personally performed the services described in this documentation    as scribed in my presence :

## 2023-03-27 ENCOUNTER — EVALUATION (OUTPATIENT)
Dept: PHYSICAL THERAPY | Facility: CLINIC | Age: 58
End: 2023-03-27

## 2023-03-27 DIAGNOSIS — Z96.641 HISTORY OF RIGHT HIP REPLACEMENT: Primary | ICD-10-CM

## 2023-03-27 DIAGNOSIS — M16.11 LOCALIZED OSTEOARTHROSIS OF RIGHT HIP: ICD-10-CM

## 2023-03-27 NOTE — PROGRESS NOTES
PT Re-evaluation    Today's date: 3/27/2023  Patient name: Sandee Patrick  : 1965  MRN: [id number]  Referring provider: Alvin Sears, *  Dx:   Encounter Diagnosis     ICD-10-CM    1  History of right hip replacement  Z96 641       2  Localized osteoarthrosis of right hip  M16 11                      Assessment  Assessment details: Sandee Patrick is a 59 y o  female who presented to outpatient physical therapy at [place] [address] with complaints of B hip pain  She presented with decreased range of motion, decreased strength, limited flexibility, altered gait pattern, poor balance, decreased tolerance to activity and decreased functional mobility following R MAME on 23 due to Primary osteoarthritis of right hip (primary encounter diagnosis)  Her progression has been very good with PT with less and pain better function  She plans to have a L MAME on 2023  She will continue to benefit from skilled PT services in order to address these deficits and reach maximum level of function  Thank you for the referral!  Impairments: abnormal gait, abnormal or restricted ROM, activity intolerance, impaired balance, impaired physical strength, pain with function and safety issue  Barriers to therapy: None  Understanding of Dx/Px/POC: good  Goals  ST   Independent with HEP in 2 weeks - Met  2   Increase R hip PROM to within 10 degrees of WNL all motions in 6 weeks - Met   3  Able to ambulate with SPC x 10 min for ADLs in 2 weeks - Met    LT  Achieve FOTO score of 58/100 in 8 weeks - Mostly Met  2   Able to RTW, ambulate x 30 min without AD on all surfaces in 8 weeks - Partially Met  3  Strength R LE = 5/5 all motions in 8 weeks - Mostly Met  4  No tightness in R LE in 8 weeks - Mostly Met  5    Good R LE balance in 8 weeks - Met      Plan  Patient would benefit from: skilled PT  Planned modality interventions: cryotherapy  Planned therapy interventions: abdominal trunk stabilization, ADL retraining, balance/weight bearing training, body mechanics training, flexibility, functional ROM exercises, home exercise program, joint mobilization, manual therapy, neuromuscular re-education, strengthening, stretching, therapeutic activities, therapeutic exercise and gait training  Frequency: 2x week  Duration in weeks: 6  Treatment plan discussed with: patient        Subjective Evaluation    History of Present Illness  Mechanism of injury: Pt reports having B hip pain for at least 1 year  She tried injections, PT and weight loss without significant pain relief  She had R MAME on 23 with posterior approach and feels good about her progression  Plans to have a L MAME on 23  Has been walking with a walker or SPC since surgery but less than a few weeks ago only due to her L hip pain, not her R  Has gained 60# in the past year due to inactivity  Working part time as an  for a dental practice  Still having R knee pain  Was only able to walk up to 2 min when she started PT and now able to walk up to 5 min limited due to L hip pain  Starting to perform more tasks at home for housework             Recurrent probem    Quality of life: fair    Pain  Current pain ratin  At best pain ratin  At worst pain ratin  Quality: dull ache and tight  Relieving factors: rest, medications and ice  Aggravating factors: walking, standing and stair climbing  Progression: improved    Social Support  Steps to enter house: yes  Stairs in house: no   Lives in: [facility]  Lives with: adult children    Employment status: not working  Treatments  Previous treatment: medication, injection treatment and physical therapy  Current treatment: medication and physical therapy  Patient Goals  Patient goals for therapy: decreased pain, improved balance, increased motion, increased strength, independence with ADLs/IADLs, return to sport/leisure activities and return to work          Objective     Palpation Additional Palpation Details  Min tightness R, mod L hip adductors and flexors  Tenderness     Left Hip   Tenderness in the greater trochanter  Right Hip   No tenderness in the greater trochanter  Neurological Testing     Sensation     Hip   Left Hip   Intact: light touch    Right Hip   Intact: light touch    Active Range of Motion   Left Hip   Flexion: 75 degrees with pain  Abduction: 10 degrees with pain    Right Hip   Flexion: 90 degrees   Abduction: 20 degrees   Left Knee   Normal active range of motion    Right Knee   Normal active range of motion    Passive Range of Motion   Left Hip   Flexion: 80 degrees with pain  Abduction: 25 degrees with pain    Right Hip   Flexion: 90 degrees   Abduction: 30 degrees     Strength/Myotome Testing     Left Hip   Planes of Motion   Flexion: 4-  Abduction: 3+  Adduction: 4-    Right Hip   Planes of Motion   Flexion: 4+  Extension: 4+  Abduction: 4  Adduction: 5    Left Knee   Flexion: 3+  Extension: 3+    Right Knee   Flexion: 5  Extension: 4+    Ambulation   Weight-Bearing Status   Assistive device used: front-wheeled walker and single point cane    Ambulation: Level Surfaces   Ambulation with assistive device: independent  Ambulation without assistive device: moderate assist    Ambulation: Stairs   Ascend stairs: independent  Pattern: non-reciprocal  Railings: one rail  Descend stairs: independent  Pattern: non-reciprocal  Railings: one rail  Curbs: unable    Observational Gait   Decreased walking speed and stride length  Comments   Fair L LE balance  Good on R LE         POC EXPIRES On:  5/8/23  PRECAUTIONS:  None  CO-MORBIDITES:  B hip OA  PERSONAL FACTORS:  R MAME on 1/18/23, planned L MAME on 4/26/23   Access Code [email or web address]       Manuals HEP 3/13 3/20 3/22 3/27     PROM R hip supine          GENTLE!  Kwame stretch                              Neuro Re-Ed          Side stepping in // bars 12'   12' 10 laps 3laps 3 laps with R LE leading + OTB     SLS R " 10\" 5 10\" 5 10\" 10                                                       Ther Ex         Bike Full Rotation  5'  L2 5' L2 5' L2 5'     LAQ L/R 2/6 20 20 ea 20 ea 3# 20 ea     B calf raises 2/6 20 20 20 20     Supine R SLR   NV 10x Standing with OTB 2x10 due to LBP     Seated Marches  20x 20 ea 20 ea 20 ea     Bridges 2/6 30 30 30 30     Supine hooklying ball adduction   5\" 20 5\" 20 5\" 20     Supine Hip Abd hooklying position  RTB x30 Plum TB 3x10 Plum TB 3x10 Plum TB 3x10     Glut sets  20x5\" 5\" 20 5\" 20 5\" 20     Ther Activity         Leg Press B  85# 30 85# 30 85# 30 85# 30     Sit->stand    10x 10     Step ups   8\" R only 8\" R only  20x 8\" R only 20     minisquats  x20 RTB 20 20 20     Gait Training         SPC in clinic   With [facility] in R hand 5' 3laps 3 laps               Modalities         CP R hip                                        "

## 2023-03-29 ENCOUNTER — OFFICE VISIT (OUTPATIENT)
Dept: FAMILY MEDICINE CLINIC | Facility: HOSPITAL | Age: 58
End: 2023-03-29

## 2023-03-29 ENCOUNTER — TELEPHONE (OUTPATIENT)
Dept: FAMILY MEDICINE CLINIC | Facility: HOSPITAL | Age: 58
End: 2023-03-29

## 2023-03-29 ENCOUNTER — APPOINTMENT (OUTPATIENT)
Dept: PHYSICAL THERAPY | Facility: CLINIC | Age: 58
End: 2023-03-29

## 2023-03-29 VITALS
HEIGHT: 63 IN | SYSTOLIC BLOOD PRESSURE: 190 MMHG | WEIGHT: 210.8 LBS | DIASTOLIC BLOOD PRESSURE: 120 MMHG | OXYGEN SATURATION: 99 % | HEART RATE: 97 BPM | BODY MASS INDEX: 37.35 KG/M2

## 2023-03-29 DIAGNOSIS — M16.0 PRIMARY OSTEOARTHRITIS OF BOTH HIPS: ICD-10-CM

## 2023-03-29 DIAGNOSIS — I10 PRIMARY HYPERTENSION: ICD-10-CM

## 2023-03-29 DIAGNOSIS — R00.0 RAPID HEART RATE: ICD-10-CM

## 2023-03-29 DIAGNOSIS — H00.012 HORDEOLUM EXTERNUM OF RIGHT LOWER EYELID: Primary | ICD-10-CM

## 2023-03-29 DIAGNOSIS — J34.89 SINUS PRESSURE: ICD-10-CM

## 2023-03-29 DIAGNOSIS — R00.2 PALPITATIONS: ICD-10-CM

## 2023-03-29 DIAGNOSIS — M25.552 BILATERAL HIP PAIN: ICD-10-CM

## 2023-03-29 DIAGNOSIS — M25.551 BILATERAL HIP PAIN: ICD-10-CM

## 2023-03-29 RX ORDER — AZITHROMYCIN 250 MG/1
TABLET, FILM COATED ORAL
Qty: 6 TABLET | Refills: 0 | Status: SHIPPED | OUTPATIENT
Start: 2023-03-29 | End: 2023-04-03

## 2023-03-29 NOTE — PROGRESS NOTES
[facility],     Assessment/Plan:      Diagnosis ICD-10-CM Associated Orders   1  Hordeolum externum of right lower eyelid  H00 012 azithromycin (Zithromax) 250 mg tablet      2  Primary osteoarthritis of both hips  M16 0       3  Bilateral hip pain  M25 551     M25 552       4  Primary hypertension  I10       5  Rapid heart rate  R00 0 Ambulatory Referral to Cardiology     Holter monitor      6  Palpitations  R00 2 Ambulatory Referral to Cardiology     Holter monitor      7  Sinus pressure  J34 89 azithromycin (Zithromax) 250 mg tablet        • Abx as above  Allergies & meds reviewed  • Start with holter & Cardio ref  EKG prior to next hip surgery  Return in about 4 months (around 7/24/2023) for Annual Physical   • Patient may call or return to office with any questions or concerns  ______________________________________________________________________  Subjective:     Patient ID: Skyler Michel is a 59 y o  female  HPI  Skyler Michel  Chief Complaint   Patient presents with   • Stye     Right eye     January 18th R hip surgery  Repeatedly dealing with styes on R eye  Now having R eye pain & headaches & pressure in right face  No recent sinus infection, but has had them often in the past      Potentially have L hip surgery next month  Didn't yet take meds today & BP high  Occasionally noticing rapid heart rate, possible palpitations  Sleepy tea causing nightmares  Chamomile possibly causing it  The following portions of the patient's history were reviewed and updated as appropriate: allergies, current medications, past medical history, and problem list     Review of Systems   Constitutional: Negative for chills and fever  HENT: Positive for postnasal drip, rhinorrhea, sinus pressure and sneezing  Negative for ear pain, sore throat and voice change      Respiratory: Positive for shortness of breath (at times even with rest & tries to take deep breaths)  Negative for cough  Cardiovascular: Positive for palpitations (& fast HR)  Negative for chest pain  Musculoskeletal: Positive for arthralgias and gait problem  Neurological: Positive for headaches  Negative for dizziness and light-headedness  Objective:      Vitals:    03/29/23 0727   BP: (!) 190/120   Pulse: 97   SpO2: 99%      Physical Exam  Vitals reviewed  Constitutional:       General: She is not in acute distress  Appearance: Normal appearance  She is well-developed  She is obese  She is not ill-appearing  HENT:      Head: Normocephalic and atraumatic  Comments: Sinus tender b/l & red resolved stye on r lower lid     Right Ear: Tympanic membrane, ear canal and external ear normal       Left Ear: Tympanic membrane, ear canal and external ear normal       Nose: Congestion present  Mouth/Throat:      Mouth: Mucous membranes are moist       Pharynx: Oropharynx is clear  No oropharyngeal exudate  Eyes:      General: No scleral icterus  Right eye: No discharge  Left eye: No discharge  Cardiovascular:      Rate and Rhythm: Normal rate and regular rhythm  Pulses: Normal pulses  Heart sounds: Normal heart sounds  No murmur heard  Pulmonary:      Effort: Pulmonary effort is normal  No respiratory distress  Breath sounds: Normal breath sounds  No stridor  No wheezing  Musculoskeletal:      Cervical back: Normal range of motion  No rigidity or tenderness  Lymphadenopathy:      Cervical: No cervical adenopathy  Skin:     General: Skin is warm  Neurological:      Mental Status: She is alert and oriented to person, place, and time  Gait: Gait abnormal (walker for hip post op)  Psychiatric:         Mood and Affect: Mood normal          Behavior: Behavior normal          Thought Content: Thought content normal          Judgment: Judgment normal            Portions of the record may have been created with voice recognition software  "Occasional wrong word or \"sound alike\" substitutions may have occurred due to the inherent limitations of voice recognition software  Please review the chart carefully and recognize, using context, where substitutions/typographical errors may have occurred       "

## 2023-03-29 NOTE — TELEPHONE ENCOUNTER
[facility] questioning Zithromax prescribed to pt, they have noted that pt has an allergy to Macrolide antibiotics  Asking if ok to fill

## 2023-03-31 ENCOUNTER — TELEPHONE (OUTPATIENT)
Dept: OBGYN CLINIC | Facility: HOSPITAL | Age: 58
End: 2023-03-31

## 2023-03-31 ENCOUNTER — PREP FOR PROCEDURE (OUTPATIENT)
Dept: OBGYN CLINIC | Facility: CLINIC | Age: 58
End: 2023-03-31

## 2023-03-31 NOTE — TELEPHONE ENCOUNTER
Caller: Lalita Dietz    Doctor: Reza Rivera    Reason for call: patient is calling to schedule her Surgery Date, April 26th please advise    Call back#: [phone]

## 2023-04-03 ENCOUNTER — OFFICE VISIT (OUTPATIENT)
Dept: PHYSICAL THERAPY | Facility: CLINIC | Age: 58
End: 2023-04-03

## 2023-04-03 DIAGNOSIS — M16.11 LOCALIZED OSTEOARTHROSIS OF RIGHT HIP: ICD-10-CM

## 2023-04-03 DIAGNOSIS — M54.16 LUMBAR RADICULOPATHY: ICD-10-CM

## 2023-04-03 DIAGNOSIS — Z96.641 HISTORY OF RIGHT HIP REPLACEMENT: Primary | ICD-10-CM

## 2023-04-03 NOTE — TELEPHONE ENCOUNTER
Caller: Patient    Doctor: Dunia Bazan    Reason for call: Patient returning call to sx coordinator  Patient left direct number at home and is at work  Provided direct number to sx coordinator      Call back#: [phone]

## 2023-04-03 NOTE — PROGRESS NOTES
"Daily Note     Today's date: 4/3/2023  Patient name: Ruslan Kamara  : 1965  MRN: [id number]  Referring provider: Ani Johnson, *  Dx:   Encounter Diagnosis     ICD-10-CM    1  History of right hip replacement  Z96 641       2  Localized osteoarthrosis of right hip  M16 11       3  Lumbar radiculopathy  M54 16                      Subjective:  Pt reports having a lot of L hip pain today  R hip feels great  Objective:  See treatment diary below      Assessment:  Pt presented to outpatient physical therapy at [place] [address] with complaints of B hip pain  She presented with decreased range of motion, decreased strength, limited flexibility, altered gait pattern, poor balance, decreased tolerance to activity and decreased functional mobility following R MAME on 23 due to Primary osteoarthritis of right hip (primary encounter diagnosis)  Her progression has been very good with PT with less and pain better function  She plans to have a L MAME on May 17, 2023  She will continue to benefit from skilled PT services in order to address these deficits and reach maximum level of function for 2 more weeks  R hip is close to D/C status  Plan:  Plan D/C for R hip on 23 then return to PT after L MAME on 23  POC EXPIRES On:  23  PRECAUTIONS:  None  CO-MORBIDITES:  B hip OA  PERSONAL FACTORS:  R MAME on 23, planned L MAME on 23   Access Code [email or web address]       Manuals HEP 3/13 3/20 3/22 3/27 4/3    PROM R hip supine          GENTLE!  Kwame wang                              Neuro Re-Ed          Side stepping in // bars 12'   12' 10 laps 3laps 3 laps with R LE leading + OTB 3 laps with R LE leading + OTB    SLS R   10\" 5 10\" 5 10\" 10 10\" 10                                                      Ther Ex         Bike Full Rotation  5'  L2 5' L2 5' L2 5' L2 5'    LAQ L/R / 20 20 ea 20 ea 3# 20 ea 3# 30 ea    B calf raises  20 20 20 20 30    Supine R SLR   NV 10x Standing with " "OTB 2x10 due to LBP Standing with OTB 2x10    Seated Marches  20x 20 ea 20 ea 20 ea 20 ea    Bridges 2/6 30 30 30 30 30    Supine hooklying ball adduction   5\" 20 5\" 20 5\" 20 5\" 20    Supine Hip Abd hooklying position  RTB x30 Plum TB 3x10 Plum TB 3x10 Plum TB 3x10 Plum TB 30    Glut sets  20x5\" 5\" 20 5\" 20 5\" 20 5\" 20    Ther Activity         Leg Press B  85# 30 85# 30 85# 30 85# 30 85# 30    Sit->stand    10x 10 10    Step ups   8\" R only 8\" R only  20x 8\" R only 20 8\" R only 20    minisquats  x20 RTB 20 20 20 20    Gait Training         SPC in clinic   With [facility] in R hand 5' 3laps 3 laps 3 laps              Modalities         CP R hip                                          "

## 2023-04-05 ENCOUNTER — OFFICE VISIT (OUTPATIENT)
Dept: PHYSICAL THERAPY | Facility: CLINIC | Age: 58
End: 2023-04-05

## 2023-04-05 DIAGNOSIS — Z96.641 HISTORY OF RIGHT HIP REPLACEMENT: Primary | ICD-10-CM

## 2023-04-05 DIAGNOSIS — M54.16 LUMBAR RADICULOPATHY: ICD-10-CM

## 2023-04-05 DIAGNOSIS — M16.11 LOCALIZED OSTEOARTHROSIS OF RIGHT HIP: ICD-10-CM

## 2023-04-05 NOTE — PROGRESS NOTES
"Discharge Note     Today's date: 2023  Patient name: Kinjal Avalos  : 1965  MRN: [id number]  Referring provider: Ana Paredes, *  Dx:   Encounter Diagnosis     ICD-10-CM    1  History of right hip replacement  Z96 641       2  Localized osteoarthrosis of right hip  M16 11       3  Lumbar radiculopathy  M54 16                      Subjective:  Pt reports her L hip replacement is scheduled already  Objective:  See treatment diary below  Survey Monkey given (3/20) and FOTO given ()    Assessment:  Pt presented to outpatient physical therapy at [place] [address] with complaints of B hip pain  She presented with decreased range of motion, decreased strength, limited flexibility, altered gait pattern, poor balance, decreased tolerance to activity and decreased functional mobility following R MAME on 23 due to Primary osteoarthritis of right hip (primary encounter diagnosis)  Her progression has been very good with PT with less and pain better function  She plans to have a L MAME on May 17, 2023     R hip is at D/C status  Plan:  Plan D/C for R hip today  pt will return to PT after L MAME on 23  POC EXPIRES On:  23  PRECAUTIONS:  None  CO-MORBIDITES:  B hip OA  PERSONAL FACTORS:  R MAME on 23, planned L MAME on 23   Access Code [email or web address]       Manuals HEP 3/27 4/3 4/5   PROM R hip supine       GENTLE!  Kwame wang                     Neuro Re-Ed       Side stepping in // bars 12'  3 laps with R LE leading + OTB 3 laps with R LE leading + OTB 3laps    SLS R  10\" 10 10\" 10 10\" 10                                      Ther Ex      Bike Full Rotation  L2 5' L2 5' L2 5'   LAQ L/R 2/6 3# 20 ea 3# 30 ea 3# 30 ea   B calf raises / 20 30    Supine R SLR  Standing with OTB 2x10 due to LBP Standing with OTB 2x10 20x   Seated Marches  20 ea 20 ea 20 ea   Bridges 2/6 30 30 30   Supine hooklying ball adduction  5\" 20 5\" 20  5\" 20   Supine Hip Abd hooklying position  [place] TB " "3x10 Plum TB 30 Plum TB 30   Glut sets  5\" 20 5\" 20 5\" 20   Ther Activity      Leg Press B  85# 30 85# 30 95# 30   Sit->stand  10 10 10   Step ups  8\" R only 20 8\" R only 20 8\" R only 20   minisquats  20 20 20   Gait Training      SPC in clinic  3 laps 3 laps 3laps          Modalities      CP R hip                                    "

## 2023-04-06 ENCOUNTER — HOSPITAL ENCOUNTER (OUTPATIENT)
Dept: NON INVASIVE DIAGNOSTICS | Age: 58
Discharge: HOME | End: 2023-04-06

## 2023-04-06 DIAGNOSIS — R00.0 RAPID HEART RATE: ICD-10-CM

## 2023-04-06 DIAGNOSIS — R00.2 PALPITATIONS: ICD-10-CM

## 2023-04-10 ENCOUNTER — APPOINTMENT (OUTPATIENT)
Dept: PHYSICAL THERAPY | Facility: CLINIC | Age: 58
End: 2023-04-10

## 2023-04-12 ENCOUNTER — APPOINTMENT (OUTPATIENT)
Dept: PHYSICAL THERAPY | Facility: CLINIC | Age: 58
End: 2023-04-12

## 2023-04-17 ENCOUNTER — APPOINTMENT (OUTPATIENT)
Dept: PHYSICAL THERAPY | Facility: CLINIC | Age: 58
End: 2023-04-17

## 2023-04-19 ENCOUNTER — APPOINTMENT (OUTPATIENT)
Dept: PHYSICAL THERAPY | Facility: CLINIC | Age: 58
End: 2023-04-19

## 2023-04-27 ENCOUNTER — TELEPHONE (OUTPATIENT)
Dept: OBGYN CLINIC | Facility: HOSPITAL | Age: 58
End: 2023-04-27

## 2023-04-27 NOTE — PRE-PROCEDURE INSTRUCTIONS
Pre-Surgery Instructions:   Medication Instructions   • acetaminophen (TYLENOL) 500 mg tablet Uses PRN- OK to take day of surgery   • ascorbic acid (VITAMIN C) 500 MG tablet Hold day of surgery  • cholecalciferol (VITAMIN D3) 1,000 units tablet Hold day of surgery  • folic acid (FOLVITE) 1 mg tablet Hold day of surgery  • hyoscyamine (ANASPAZ,LEVSIN) 0 125 MG tablet Uses PRN- OK to take day of surgery   • losartan-hydrochlorothiazide (HYZAAR) 100-12 5 MG per tablet Hold day of surgery  • Multiple Vitamins-Minerals (multivitamin with minerals) tablet Hold day of surgery  • omeprazole (PriLOSEC) 40 MG capsule Take day of surgery  • ondansetron (ZOFRAN-ODT) 4 mg disintegrating tablet Take day of surgery  • sucralfate (CARAFATE) 1 g/10 mL suspension Hold day of surgery  Instructed to avoid all ASA and OTC Vit/Supp 1 week prior to surgery and to avoid NSAIDs 3 days prior to surgery per anesthesia instructions  Tylenol ok to take prn  Pre procedure instructions reviewed verbalizes understanding  NPO after MN  Bathing reviewed  Morning meds with water  Ortho Class all questions answered  Using walker daily

## 2023-04-28 LAB
ABO GROUP BLD: NORMAL
ALBUMIN SERPL-MCNC: 4.6 G/DL (ref 3.8–4.9)
ALBUMIN/GLOB SERPL: 1.8 {RATIO} (ref 1.2–2.2)
ALP SERPL-CCNC: 103 IU/L (ref 44–121)
ALT SERPL-CCNC: 27 IU/L (ref 0–32)
APTT PPP: 27 SEC (ref 24–33)
AST SERPL-CCNC: 32 IU/L (ref 0–40)
BASOPHILS # BLD AUTO: 0 X10E3/UL (ref 0–0.2)
BASOPHILS NFR BLD AUTO: 1 %
BILIRUB SERPL-MCNC: 0.3 MG/DL (ref 0–1.2)
BLD GP AB SCN SERPL QL: NEGATIVE
BUN SERPL-MCNC: 12 MG/DL (ref 6–24)
BUN/CREAT SERPL: 15 (ref 9–23)
CALCIUM SERPL-MCNC: 10 MG/DL (ref 8.7–10.2)
CHLORIDE SERPL-SCNC: 102 MMOL/L (ref 96–106)
CO2 SERPL-SCNC: 23 MMOL/L (ref 20–29)
CREAT SERPL-MCNC: 0.8 MG/DL (ref 0.57–1)
EGFR: 85 ML/MIN/1.73
EOSINOPHIL # BLD AUTO: 0.2 X10E3/UL (ref 0–0.4)
EOSINOPHIL NFR BLD AUTO: 3 %
ERYTHROCYTE [DISTWIDTH] IN BLOOD BY AUTOMATED COUNT: 13.2 % (ref 11.7–15.4)
EST. AVERAGE GLUCOSE BLD GHB EST-MCNC: 108 MG/DL
GLOBULIN SER-MCNC: 2.5 G/DL (ref 1.5–4.5)
GLUCOSE SERPL-MCNC: 112 MG/DL (ref 70–99)
HBA1C MFR BLD: 5.4 % (ref 4.8–5.6)
HCT VFR BLD AUTO: 42.6 % (ref 34–46.6)
HGB BLD-MCNC: 14.1 G/DL (ref 11.1–15.9)
IMM GRANULOCYTES # BLD: 0 X10E3/UL (ref 0–0.1)
IMM GRANULOCYTES NFR BLD: 0 %
INR PPP: 1 (ref 0.9–1.2)
IRON SATN MFR SERPL: 18 % (ref 15–55)
IRON SERPL-MCNC: 65 UG/DL (ref 27–159)
LYMPHOCYTES # BLD AUTO: 1.6 X10E3/UL (ref 0.7–3.1)
LYMPHOCYTES NFR BLD AUTO: 28 %
MCH RBC QN AUTO: 28.6 PG (ref 26.6–33)
MCHC RBC AUTO-ENTMCNC: 33.1 G/DL (ref 31.5–35.7)
MCV RBC AUTO: 86 FL (ref 79–97)
MONOCYTES # BLD AUTO: 0.5 X10E3/UL (ref 0.1–0.9)
MONOCYTES NFR BLD AUTO: 9 %
NEUTROPHILS # BLD AUTO: 3.4 X10E3/UL (ref 1.4–7)
NEUTROPHILS NFR BLD AUTO: 59 %
PLATELET # BLD AUTO: 321 X10E3/UL (ref 150–450)
POTASSIUM SERPL-SCNC: 4.4 MMOL/L (ref 3.5–5.2)
PROT SERPL-MCNC: 7.1 G/DL (ref 6–8.5)
PROTHROMBIN TIME: 10.3 SEC (ref 9.1–12)
RBC # BLD AUTO: 4.93 X10E6/UL (ref 3.77–5.28)
RETICS/RBC NFR AUTO: 1.5 % (ref 0.6–2.6)
RH BLD: POSITIVE
SODIUM SERPL-SCNC: 143 MMOL/L (ref 134–144)
TIBC SERPL-MCNC: 359 UG/DL (ref 250–450)
UIBC SERPL-MCNC: 294 UG/DL (ref 131–425)
WBC # BLD AUTO: 5.8 X10E3/UL (ref 3.4–10.8)

## 2023-05-01 ENCOUNTER — OFFICE VISIT (OUTPATIENT)
Dept: FAMILY MEDICINE CLINIC | Facility: HOSPITAL | Age: 58
End: 2023-05-01

## 2023-05-01 VITALS
HEIGHT: 63 IN | DIASTOLIC BLOOD PRESSURE: 84 MMHG | OXYGEN SATURATION: 97 % | SYSTOLIC BLOOD PRESSURE: 118 MMHG | BODY MASS INDEX: 37.03 KG/M2 | HEART RATE: 104 BPM | WEIGHT: 209 LBS

## 2023-05-01 DIAGNOSIS — M25.551 BILATERAL HIP PAIN: Primary | ICD-10-CM

## 2023-05-01 DIAGNOSIS — M25.552 BILATERAL HIP PAIN: Primary | ICD-10-CM

## 2023-05-01 RX ORDER — ALPRAZOLAM 0.5 MG/1
TABLET ORAL
Qty: 2 TABLET | Refills: 0 | Status: SHIPPED | OUTPATIENT
Start: 2023-05-01

## 2023-05-01 NOTE — H&P (VIEW-ONLY)
[address] [facility] [address]ARE [address]       NAME: Rita Owen  AGE: 59 y o  SEX: female  : 1965   DATE: 2023    History of Present Illness:     Rita Owen is a 59 y o  female who presents to the office today for a preoperative consultation at the request of surgeon, Dr Jaclyn Anderson who plans on performing Left THR on 23  Planned anesthesia is regional and general  Patient has a bleeding risk of: no recent abnormal bleeding  Patient does not have objections to receiving blood products if needed  Current anti-platelet/anti-coagulation medications that the patient is prescribed includes: None  Assessment of Chronic Conditions:   - Hypertension: controlled on meds 118/84      Assessment of Cardiac Risk:  · Denies unstable or severe angina or MI in the last 6 weeks or history of stent placement in the last year   · Denies decompensated heart failure (e g  New onset heart failure, NYHA functional class IV heart failure, or worsening existing heart failure)  · Denies significant arrhythmias such as high grade AV block, symptomatic ventricular arrhythmia, newly recognized ventricular tachycardia, supraventricular tachycardia with resting heart rate >100, or symptomatic bradycardia  · Denies severe heart valve disease including aortic stenosis or symptomatic mitral stenosis     Exercise Capacity:  · Able to walk 4 blocks without symptoms?: Yes, limited by pain & SOB    Prior Anesthesia Reactions: Yes, can take a while to wake up & vomiting  Personal history of venous thromboembolic disease? No  History of steroid use for >2 weeks within last year? No    Review of Systems:     Review of Systems   Constitutional: Negative for chills and fever  Respiratory: Negative for cough and shortness of breath  BELLE + at times   Cardiovascular: Positive for leg swelling (post op )  Negative for chest pain       Current Problem List:     Patient Active Problem List   Diagnosis   • Lumbar spondylosis   • Bilateral hip pain   • Greater trochanteric bursitis of both hips   • Primary osteoarthritis of both hips   • GERD (gastroesophageal reflux disease)   • Chronic pain syndrome   • Chronic bilateral low back pain without sciatica   • Diffuse pain   • Primary hypertension   • Mixed hyperlipidemia   • PONV (postoperative nausea and vomiting)   • Nausea   • Dysphagia       Allergies:      Allergies   Allergen Reactions   • Ativan [Lorazepam] Myalgia     When taken  Along w/ cymbalta   • Cymbalta [Duloxetine Hcl] Myalgia     When taken w/ ativan x 1 occurrence   • Latex Rash   • Amoxicillin Rash     Current Medications:       Current Outpatient Medications:   •  acetaminophen (TYLENOL) 500 mg tablet, Take 2 tablets (1,000 mg total) by mouth every 8 (eight) hours, Disp: 60 tablet, Rfl: 0  •  albuterol (Proventil HFA) 90 mcg/act inhaler, Inhale 2 puffs every 6 (six) hours as needed for wheezing or shortness of breath, Disp: 18 g, Rfl: 0  •  ascorbic acid (VITAMIN C) 500 MG tablet, Take 1 tablet (500 mg total) by mouth 2 (two) times a day, Disp: 30 tablet, Rfl: 3  •  cholecalciferol (VITAMIN D3) 1,000 units tablet, Take 2 tablets (2,000 Units total) by mouth daily, Disp: 60 tablet, Rfl: 0  •  cholestyramine (QUESTRAN) 4 g packet, Take 1 packet (4 g total) by mouth daily at bedtime (Patient taking differently: Take 1 packet by mouth daily as needed), Disp: 30 packet, Rfl: 2  •  folic acid (FOLVITE) 1 mg tablet, Take 1 tablet (1 mg total) by mouth daily, Disp: 30 tablet, Rfl: 1  •  hyoscyamine (ANASPAZ,LEVSIN) 0 125 MG tablet, Take 1 tablet (0 125 mg total) by mouth every 4 (four) hours as needed for cramping, Disp: 30 tablet, Rfl: 0  •  losartan-hydrochlorothiazide (HYZAAR) 100-12 5 MG per tablet, Take 1 tablet by mouth daily, Disp: 30 tablet, Rfl: 5  •  Multiple Vitamins-Minerals (multivitamin with minerals) tablet, Take 1 tablet by mouth daily, Disp: 30 tablet, Rfl: 1  •  omeprazole (PriLOSEC) 40 MG capsule, Take 1 capsule (40 mg total) by mouth 2 (two) times a day, Disp: 180 capsule, Rfl: 1  •  ondansetron (ZOFRAN-ODT) 4 mg disintegrating tablet, Take 1 tablet (4 mg total) by mouth every 6 (six) hours as needed for nausea or vomiting, Disp: 60 tablet, Rfl: 1  •  senna-docusate sodium (SENOKOT S) 8 6-50 mg per tablet, Take 1 tablet by mouth 3 (three) times a day with meals, Disp: 90 tablet, Rfl: 0  •  sucralfate (CARAFATE) 1 g/10 mL suspension, Take 10 mL (1 g total) by mouth 2 (two) times a day, Disp: 600 mL, Rfl: 2  •  aspirin (ECOTRIN LOW STRENGTH) 81 mg EC tablet, Take 1 tablet (81 mg total) by mouth 2 (two) times a day (Patient not taking: Reported on 2023), Disp: 60 tablet, Rfl: 0    Past Medical History:       Past Medical History:   Diagnosis Date   • Arthritis    • GERD (gastroesophageal reflux disease)    • Hyperlipidemia    • Hypertension    • Irritable bowel syndrome    • Low back pain    • PONV (postoperative nausea and vomiting)         Past Surgical History:   Procedure Laterality Date   • CATARACT EXTRACTION Bilateral    •  SECTION N/A    • CHOLECYSTECTOMY     • COLONOSCOPY     • HERNIA REPAIR     • HYSTERECTOMY     • NASAL SEPTUM SURGERY     • [place] ARTHRP ACETBLR/PROX FEM PROSTC AGRFT/ALGRFT Right 2023    Procedure: ARTHROPLASTY HIP TOTAL;  Surgeon: Maryana Loza DO;  Location: [facility];  Service: Orthopedics   • TONSILLECTOMY     • UPPER GASTROINTESTINAL ENDOSCOPY          Family History   Problem Relation Age of Onset   • Hypertension Mother    • Skin cancer Mother    • Stroke Father    • Skin cancer Daughter    • Cancer Daughter    • Hypertension Son    • Colon cancer Cousin    • Colon polyps Neg Hx         Social History     Socioeconomic History   • Marital status:       Spouse name: Not on file   • Number of children: Not on file   • Years of education: Not on file   • Highest education level: "Not on file   Occupational History   • Not on file   Tobacco Use   • Smoking status: Former     Types: Cigarettes     Quit date: 2013     Years since quitting: 10 3   • Smokeless tobacco: Never   Vaping Use   • Vaping Use: Never used   Substance and Sexual Activity   • Alcohol use: Yes     Comment: extremely rare social occasion   • Drug use: Never   • Sexual activity: Never   Other Topics Concern   • Not on file   Social History Narrative   • Not on file     Social Determinants of Health     Financial Resource Strain: Not on file   Food Insecurity: No Food Insecurity   • Worried About Running Out of Food in the Last Year: Never true   • Ran Out of Food in the Last Year: Never true   Transportation Needs: No Transportation Needs   • Lack of Transportation (Medical): No   • Lack of Transportation (Non-Medical): No   Physical Activity: Not on file   Stress: Not on file   Social Connections: Not on file   Intimate Partner Violence: Not on file   Housing Stability: Low Risk    • Unable to Pay for Housing in the Last Year: No   • Number of Places Lived in the Last Year: 1   • Unstable Housing in the Last Year: No        Physical Exam:     /84   Pulse 104   Ht 5' 3\" (1 6 m)   Wt 94 8 kg (209 lb)   SpO2 97%   BMI 37 02 kg/m²     Physical Exam  Vitals reviewed  Constitutional:       General: She is not in acute distress  Appearance: Normal appearance  She is well-developed  She is obese  She is not ill-appearing  HENT:      Head: Normocephalic and atraumatic  Eyes:      General: No scleral icterus  Right eye: No discharge  Left eye: No discharge  Cardiovascular:      Rate and Rhythm: Normal rate and regular rhythm  Pulses: Normal pulses  Heart sounds: Normal heart sounds  No murmur heard  Pulmonary:      Effort: Pulmonary effort is normal  No respiratory distress  Breath sounds: Normal breath sounds  No stridor  No wheezing     Musculoskeletal:      Cervical back: Normal " range of motion  No rigidity  Right lower leg: Edema (1+) present  Left lower leg: Edema (1+) present  Skin:     General: Skin is warm  Neurological:      Mental Status: She is alert and oriented to person, place, and time  Gait: Gait abnormal (using walker)  Psychiatric:         Mood and Affect: Mood normal          Behavior: Behavior normal          Thought Content: Thought content normal          Judgment: Judgment normal        Data:     Pre-operative work-up  Laboratory Results: I have personally reviewed the pertinent laboratory results/reports   CBC normal, 14 1 Hb  Glu 112, A1c 5 4, no prediabetes  CMP - eGFR 85, BUN 12, 0 80 Cr, lytes normal     EKG: I have personally reviewed pertinent reports  5/1/2023 - NSR 80 bpm    Chest x-ray: I have personally reviewed pertinent reports  1/14/23 - No acute cardiopulmonary disease    Holter: 4/6/23  1  The patient was in sinus rhythm throughout the duration of the study  2  The average heart rate was 90 bpm    3  Rare ectopy without any arrhythmias  Symptoms did not correspond to any clinically significant findings  Assessment & Recommendations:     No diagnosis found  Pre-Op Evaluation Assessment  59 y o  female with planned Surgery: Left THR 5/17/23  Known risk factors for perioperative complications: None  Current medications which may produce withdrawal symptoms if withheld perioperatively: None  Pre-Op Evaluation Plan  1  Further preoperative workup as follows:   - None; no further preoperative work-up is required    2  Medication Management/Recommendations:   - None, continue medication regimen including morning of surgery, with sip of water   - Xanax discussed, ordered for nervousness both procedure days  3  Prophylaxis for cardiac events with perioperative beta-blockers: not indicated      4  Patient requires further consultation with: None    Clearance  Patient is CLEARED for surgery without any additional cardiac testing       Randi Parnell DO  [facility] PRIMARY CARE [address] [place] [id number]  Phone#  [phone]  Fax#  [phone]

## 2023-05-01 NOTE — PROGRESS NOTES
[address] [facility] [address]       NAME: Solomon Caldwell  AGE: 59 y o  SEX: female  : 1965   DATE: 2023    History of Present Illness:     Solomon Caldwell is a 59 y o  female who presents to the office today for a preoperative consultation at the request of surgeon, Dr Madina Santana who plans on performing Left THR on 23  Planned anesthesia is regional and general  Patient has a bleeding risk of: no recent abnormal bleeding  Patient does not have objections to receiving blood products if needed  Current anti-platelet/anti-coagulation medications that the patient is prescribed includes: None  Assessment of Chronic Conditions:   - Hypertension: controlled on meds 118/84      Assessment of Cardiac Risk:  · Denies unstable or severe angina or MI in the last 6 weeks or history of stent placement in the last year   · Denies decompensated heart failure (e g  New onset heart failure, NYHA functional class IV heart failure, or worsening existing heart failure)  · Denies significant arrhythmias such as high grade AV block, symptomatic ventricular arrhythmia, newly recognized ventricular tachycardia, supraventricular tachycardia with resting heart rate >100, or symptomatic bradycardia  · Denies severe heart valve disease including aortic stenosis or symptomatic mitral stenosis     Exercise Capacity:  · Able to walk 4 blocks without symptoms?: Yes, limited by pain & SOB    Prior Anesthesia Reactions: Yes, can take a while to wake up & vomiting  Personal history of venous thromboembolic disease? No  History of steroid use for >2 weeks within last year? No    Review of Systems:     Review of Systems   Constitutional: Negative for chills and fever  Respiratory: Negative for cough and shortness of breath  BELLE + at times   Cardiovascular: Positive for leg swelling (post op )  Negative for chest pain       Current Problem List:     Patient Active Problem List   Diagnosis    Lumbar spondylosis    Bilateral hip pain    Greater trochanteric bursitis of both hips    Primary osteoarthritis of both hips    GERD (gastroesophageal reflux disease)    Chronic pain syndrome    Chronic bilateral low back pain without sciatica    Diffuse pain    Primary hypertension    Mixed hyperlipidemia    PONV (postoperative nausea and vomiting)    Nausea    Dysphagia       Allergies:      Allergies   Allergen Reactions    Ativan [Lorazepam] Myalgia     When taken  Along w/ cymbalta    Cymbalta [Duloxetine Hcl] Myalgia     When taken w/ ativan x 1 occurrence    Latex Rash    Amoxicillin Rash     Current Medications:       Current Outpatient Medications:     acetaminophen (TYLENOL) 500 mg tablet, Take 2 tablets (1,000 mg total) by mouth every 8 (eight) hours, Disp: 60 tablet, Rfl: 0    albuterol (Proventil HFA) 90 mcg/act inhaler, Inhale 2 puffs every 6 (six) hours as needed for wheezing or shortness of breath, Disp: 18 g, Rfl: 0    ascorbic acid (VITAMIN C) 500 MG tablet, Take 1 tablet (500 mg total) by mouth 2 (two) times a day, Disp: 30 tablet, Rfl: 3    cholecalciferol (VITAMIN D3) 1,000 units tablet, Take 2 tablets (2,000 Units total) by mouth daily, Disp: 60 tablet, Rfl: 0    cholestyramine (QUESTRAN) 4 g packet, Take 1 packet (4 g total) by mouth daily at bedtime (Patient taking differently: Take 1 packet by mouth daily as needed), Disp: 30 packet, Rfl: 2    folic acid (FOLVITE) 1 mg tablet, Take 1 tablet (1 mg total) by mouth daily, Disp: 30 tablet, Rfl: 1    hyoscyamine (ANASPAZ,LEVSIN) 0 125 MG tablet, Take 1 tablet (0 125 mg total) by mouth every 4 (four) hours as needed for cramping, Disp: 30 tablet, Rfl: 0    losartan-hydrochlorothiazide (HYZAAR) 100-12 5 MG per tablet, Take 1 tablet by mouth daily, Disp: 30 tablet, Rfl: 5    Multiple Vitamins-Minerals (multivitamin with minerals) tablet, Take 1 tablet by mouth daily, Disp: 30 tablet, Rfl: 1    omeprazole (PriLOSEC) 40 MG capsule, Take 1 capsule (40 mg total) by mouth 2 (two) times a day, Disp: 180 capsule, Rfl: 1    ondansetron (ZOFRAN-ODT) 4 mg disintegrating tablet, Take 1 tablet (4 mg total) by mouth every 6 (six) hours as needed for nausea or vomiting, Disp: 60 tablet, Rfl: 1    senna-docusate sodium (SENOKOT S) 8 6-50 mg per tablet, Take 1 tablet by mouth 3 (three) times a day with meals, Disp: 90 tablet, Rfl: 0    sucralfate (CARAFATE) 1 g/10 mL suspension, Take 10 mL (1 g total) by mouth 2 (two) times a day, Disp: 600 mL, Rfl: 2    aspirin (ECOTRIN LOW STRENGTH) 81 mg EC tablet, Take 1 tablet (81 mg total) by mouth 2 (two) times a day (Patient not taking: Reported on 2023), Disp: 60 tablet, Rfl: 0    Past Medical History:       Past Medical History:   Diagnosis Date    Arthritis     GERD (gastroesophageal reflux disease)     Hyperlipidemia     Hypertension     Irritable bowel syndrome     Low back pain     PONV (postoperative nausea and vomiting)         Past Surgical History:   Procedure Laterality Date    CATARACT EXTRACTION Bilateral      SECTION N/A     CHOLECYSTECTOMY      COLONOSCOPY      HERNIA REPAIR      HYSTERECTOMY      NASAL SEPTUM SURGERY      [place] ARTHRP ACETBLR/PROX FEM PROSTC AGRFT/ALGRFT Right 2023    Procedure: ARTHROPLASTY HIP TOTAL;  Surgeon: Florentin Stock DO;  Location:  MAIN OR;  Service: Orthopedics    TONSILLECTOMY      UPPER GASTROINTESTINAL ENDOSCOPY          Family History   Problem Relation Age of Onset    Hypertension Mother     Skin cancer Mother     Stroke Father     Skin cancer Daughter     Cancer Daughter     Hypertension Son     Colon cancer Cousin     Colon polyps Neg Hx         Social History     Socioeconomic History    Marital status:       Spouse name: Not on file    Number of children: Not on file    Years of education: Not on file    Highest education level: "Not on file   Occupational History    Not on file   Tobacco Use    Smoking status: Former     Types: Cigarettes     Quit date: 2013     Years since quitting: 10 3    Smokeless tobacco: Never   Vaping Use    Vaping Use: Never used   Substance and Sexual Activity    Alcohol use: Yes     Comment: extremely rare social occasion    Drug use: Never    Sexual activity: Never   Other Topics Concern    Not on file   Social History Narrative    Not on file     Social Determinants of Health     Financial Resource Strain: Not on file   Food Insecurity: No Food Insecurity    Worried About Running Out of Food in the Last Year: Never true    [address] in the Last Year: Never true   Transportation Needs: No Transportation Needs    Lack of Transportation (Medical): No    Lack of Transportation (Non-Medical): No   Physical Activity: Not on file   Stress: Not on file   Social Connections: Not on file   Intimate Partner Violence: Not on file   Housing Stability: Low Risk     Unable to Pay for Housing in the Last Year: No    Number of Places Lived in the Last Year: 1    Unstable Housing in the Last Year: No        Physical Exam:     /84   Pulse 104   Ht 5' 3\" (1 6 m)   Wt 94 8 kg (209 lb)   SpO2 97%   BMI 37 02 kg/m²     Physical Exam  Vitals reviewed  Constitutional:       General: She is not in acute distress  Appearance: Normal appearance  She is well-developed  She is obese  She is not ill-appearing  HENT:      Head: Normocephalic and atraumatic  Eyes:      General: No scleral icterus  Right eye: No discharge  Left eye: No discharge  Cardiovascular:      Rate and Rhythm: Normal rate and regular rhythm  Pulses: Normal pulses  Heart sounds: Normal heart sounds  No murmur heard  Pulmonary:      Effort: Pulmonary effort is normal  No respiratory distress  Breath sounds: Normal breath sounds  No stridor  No wheezing     Musculoskeletal:      Cervical back: Normal " range of motion  No rigidity  Right lower leg: Edema (1+) present  Left lower leg: Edema (1+) present  Skin:     General: Skin is warm  Neurological:      Mental Status: She is alert and oriented to person, place, and time  Gait: Gait abnormal (using walker)  Psychiatric:         Mood and Affect: Mood normal          Behavior: Behavior normal          Thought Content: Thought content normal          Judgment: Judgment normal        Data:     Pre-operative work-up  Laboratory Results: I have personally reviewed the pertinent laboratory results/reports   CBC normal, 14 1 Hb  Glu 112, A1c 5 4, no prediabetes  CMP - eGFR 85, BUN 12, 0 80 Cr, lytes normal     EKG: I have personally reviewed pertinent reports  5/1/2023 - NSR 80 bpm    Chest x-ray: I have personally reviewed pertinent reports  1/14/23 - No acute cardiopulmonary disease    Holter: 4/6/23  1  The patient was in sinus rhythm throughout the duration of the study  2  The average heart rate was 90 bpm    3  Rare ectopy without any arrhythmias  Symptoms did not correspond to any clinically significant findings  Assessment & Recommendations:     No diagnosis found  Pre-Op Evaluation Assessment  59 y o  female with planned Surgery: Left THR 5/17/23  Known risk factors for perioperative complications: None  Current medications which may produce withdrawal symptoms if withheld perioperatively: None  Pre-Op Evaluation Plan  1  Further preoperative workup as follows:   - None; no further preoperative work-up is required    2  Medication Management/Recommendations:   - None, continue medication regimen including morning of surgery, with sip of water   - Xanax discussed, ordered for nervousness both procedure days  3  Prophylaxis for cardiac events with perioperative beta-blockers: not indicated      4  Patient requires further consultation with: None    Clearance  Patient is CLEARED for surgery without any additional cardiac testing       [facility] [address] [place] [id number]  Phone#  [phone]  Fax#  [phone]

## 2023-05-03 NOTE — TELEPHONE ENCOUNTER
Preoperative Elective Admission Assessment- spoke to patient to reconfirm prev DC plans  Assigned to care team  S/w pt x10m  *pt is planning to stay overnight       Living Situation:    Who does pt live with: Christ Wang, son- Gabby Odell, daughter coming from [place] to stay with patient  What kind of home: ranch  How do they enter the home: front  How many levels in home: 1 level home    # of steps to enter home: 4 to enter   # of steps to second floor: N/A   Are there handrails: Yes  Sleeping arrangement: first/entry floor  Where is Bathroom: Walk in shower on first floor  With grab bar and shower seat  First Floor Setup:   Is there a bathroom: Yes  Where would pt sleep: bed     DME: pt has a RW, frame walker, hip kit and cane  Patient's Current Level of Function: Ambulates with walker, Ambulates with cane also  independent with ADLs     Post-op Caregiver: child  Caregiver Name: Son and Daughter  Post-op Transport: daughter  Outpatient Physical Therapy Site:  Site: [facility]  pre and post-op appts scheduled? No, will be scheduled when cleared for PT by surgeon  Medication Management: self  Preferred Pharmacy for Post-op Medications: [id number] - [place], [place] - [address]  Sticky note placed  Blood Management Vitamin Regimen: Pt confirms taking as prescribed  Post-op anticoagulant: to be determined by surgical team postoperatively     DC Plan: Pt plans to be discharged home      Barriers to DC identified preoperatively: none identified     BMI: 36 85      Patient Education:  Pt educated on post-op pain, early mobilization (POD0), Average inpt LOS, OP PT goal   Patient educated that our goal is to appropriately discharge patient based off their post-op function while striving to maintain maximal independence  The goal is to discharge patient to home and for them to attend outpatient physical therapy      Pt encouraged to call with any questions, concerns or issues

## 2023-05-05 ENCOUNTER — HOSPITAL ENCOUNTER (OUTPATIENT)
Dept: RADIOLOGY | Facility: HOSPITAL | Age: 58
End: 2023-05-05
Attending: INTERNAL MEDICINE

## 2023-05-05 DIAGNOSIS — R13.19 ESOPHAGEAL DYSPHAGIA: ICD-10-CM

## 2023-05-16 ENCOUNTER — ANESTHESIA EVENT (OUTPATIENT)
Dept: PERIOP | Facility: HOSPITAL | Age: 58
End: 2023-05-16

## 2023-05-17 ENCOUNTER — HOSPITAL ENCOUNTER (OUTPATIENT)
Facility: HOSPITAL | Age: 58
Setting detail: OUTPATIENT SURGERY
Discharge: HOME | End: 2023-05-18
Attending: STUDENT IN AN ORGANIZED HEALTH CARE EDUCATION/TRAINING PROGRAM | Admitting: STUDENT IN AN ORGANIZED HEALTH CARE EDUCATION/TRAINING PROGRAM

## 2023-05-17 ENCOUNTER — ANESTHESIA (OUTPATIENT)
Dept: PERIOP | Facility: HOSPITAL | Age: 58
End: 2023-05-17

## 2023-05-17 ENCOUNTER — APPOINTMENT (OUTPATIENT)
Dept: RADIOLOGY | Facility: HOSPITAL | Age: 58
End: 2023-05-17

## 2023-05-17 DIAGNOSIS — M16.0 PRIMARY OSTEOARTHRITIS OF BOTH HIPS: Primary | ICD-10-CM

## 2023-05-17 LAB
ABO GROUP BLD: NORMAL
RH BLD: POSITIVE

## 2023-05-17 DEVICE — TRIDENT II TRITANIUM CLUSTER 52E
Type: IMPLANTABLE DEVICE | Site: HIP | Status: FUNCTIONAL
Brand: TRIDENT II

## 2023-05-17 DEVICE — 132 DEGREE NECK ANGLE HIP STEM
Type: IMPLANTABLE DEVICE | Site: HIP | Status: FUNCTIONAL
Brand: ACCOLADE

## 2023-05-17 DEVICE — CERAMIC V40 FEMORAL HEAD
Type: IMPLANTABLE DEVICE | Site: HIP | Status: FUNCTIONAL
Brand: BIOLOX

## 2023-05-17 DEVICE — 6.5MM LOW PROFILE HEX SCREW 25MM
Type: IMPLANTABLE DEVICE | Site: HIP | Status: FUNCTIONAL
Brand: TRIDENT II

## 2023-05-17 DEVICE — TRIDENT X3 0 DEGREE POLYETHYLENE INSERT
Type: IMPLANTABLE DEVICE | Site: HIP | Status: FUNCTIONAL
Brand: TRIDENT X3 INSERT

## 2023-05-17 RX ORDER — FENTANYL CITRATE/PF 50 MCG/ML
25 SYRINGE (ML) INJECTION
Status: DISCONTINUED | OUTPATIENT
Start: 2023-05-17 | End: 2023-05-17 | Stop reason: HOSPADM

## 2023-05-17 RX ORDER — ACETAMINOPHEN 325 MG/1
975 TABLET ORAL ONCE
Status: COMPLETED | OUTPATIENT
Start: 2023-05-17 | End: 2023-05-17

## 2023-05-17 RX ORDER — GABAPENTIN 300 MG/1
300 CAPSULE ORAL
Status: DISCONTINUED | OUTPATIENT
Start: 2023-05-17 | End: 2023-05-18 | Stop reason: HOSPADM

## 2023-05-17 RX ORDER — PHENYLEPHRINE HCL IN 0.9% NACL 1 MG/10 ML
SYRINGE (ML) INTRAVENOUS AS NEEDED
Status: DISCONTINUED | OUTPATIENT
Start: 2023-05-17 | End: 2023-05-17

## 2023-05-17 RX ORDER — ONDANSETRON 4 MG/1
4 TABLET, ORALLY DISINTEGRATING ORAL EVERY 6 HOURS PRN
Qty: 20 TABLET | Refills: 0 | Status: SHIPPED | OUTPATIENT
Start: 2023-05-17

## 2023-05-17 RX ORDER — CEFAZOLIN SODIUM 2 G/50ML
2000 SOLUTION INTRAVENOUS EVERY 8 HOURS
Status: CANCELLED | OUTPATIENT
Start: 2023-05-17 | End: 2023-05-18

## 2023-05-17 RX ORDER — HYDROMORPHONE HCL IN WATER/PF 6 MG/30 ML
0.2 PATIENT CONTROLLED ANALGESIA SYRINGE INTRAVENOUS
Status: DISCONTINUED | OUTPATIENT
Start: 2023-05-17 | End: 2023-05-17 | Stop reason: HOSPADM

## 2023-05-17 RX ORDER — SUCRALFATE 1 G/1
1 TABLET ORAL
Status: DISCONTINUED | OUTPATIENT
Start: 2023-05-17 | End: 2023-05-18 | Stop reason: HOSPADM

## 2023-05-17 RX ORDER — DOXYCYCLINE 100 MG/1
100 CAPSULE ORAL 2 TIMES DAILY
Qty: 10 CAPSULE | Refills: 0 | Status: SHIPPED | OUTPATIENT
Start: 2023-05-17 | End: 2023-05-22

## 2023-05-17 RX ORDER — ASPIRIN 81 MG/1
81 TABLET ORAL 2 TIMES DAILY
Qty: 60 TABLET | Refills: 0 | Status: SHIPPED | OUTPATIENT
Start: 2023-05-17

## 2023-05-17 RX ORDER — PROPOFOL 10 MG/ML
INJECTION, EMULSION INTRAVENOUS AS NEEDED
Status: DISCONTINUED | OUTPATIENT
Start: 2023-05-17 | End: 2023-05-17

## 2023-05-17 RX ORDER — NEOSTIGMINE METHYLSULFATE 1 MG/ML
INJECTION INTRAVENOUS AS NEEDED
Status: DISCONTINUED | OUTPATIENT
Start: 2023-05-17 | End: 2023-05-17

## 2023-05-17 RX ORDER — ONDANSETRON 2 MG/ML
INJECTION INTRAMUSCULAR; INTRAVENOUS AS NEEDED
Status: DISCONTINUED | OUTPATIENT
Start: 2023-05-17 | End: 2023-05-17

## 2023-05-17 RX ORDER — ALBUTEROL SULFATE 90 UG/1
2 AEROSOL, METERED RESPIRATORY (INHALATION) EVERY 6 HOURS PRN
Status: DISCONTINUED | OUTPATIENT
Start: 2023-05-17 | End: 2023-05-18 | Stop reason: HOSPADM

## 2023-05-17 RX ORDER — GLYCOPYRROLATE 0.2 MG/ML
INJECTION INTRAMUSCULAR; INTRAVENOUS AS NEEDED
Status: DISCONTINUED | OUTPATIENT
Start: 2023-05-17 | End: 2023-05-17

## 2023-05-17 RX ORDER — SENNOSIDES 8.6 MG
1 TABLET ORAL DAILY
Status: DISCONTINUED | OUTPATIENT
Start: 2023-05-17 | End: 2023-05-18 | Stop reason: HOSPADM

## 2023-05-17 RX ORDER — ALBUMIN, HUMAN INJ 5% 5 %
SOLUTION INTRAVENOUS CONTINUOUS PRN
Status: DISCONTINUED | OUTPATIENT
Start: 2023-05-17 | End: 2023-05-17

## 2023-05-17 RX ORDER — CHLORHEXIDINE GLUCONATE 0.12 MG/ML
15 RINSE ORAL ONCE
Status: COMPLETED | OUTPATIENT
Start: 2023-05-17 | End: 2023-05-17

## 2023-05-17 RX ORDER — CELECOXIB 200 MG/1
200 CAPSULE ORAL 2 TIMES DAILY
Qty: 60 CAPSULE | Refills: 0 | Status: SHIPPED | OUTPATIENT
Start: 2023-05-17

## 2023-05-17 RX ORDER — KETAMINE HCL IN NACL, ISO-OSM 100MG/10ML
SYRINGE (ML) INJECTION AS NEEDED
Status: DISCONTINUED | OUTPATIENT
Start: 2023-05-17 | End: 2023-05-17

## 2023-05-17 RX ORDER — ROCURONIUM BROMIDE 10 MG/ML
INJECTION, SOLUTION INTRAVENOUS AS NEEDED
Status: DISCONTINUED | OUTPATIENT
Start: 2023-05-17 | End: 2023-05-17

## 2023-05-17 RX ORDER — LIDOCAINE HYDROCHLORIDE 10 MG/ML
INJECTION, SOLUTION EPIDURAL; INFILTRATION; INTRACAUDAL; PERINEURAL AS NEEDED
Status: DISCONTINUED | OUTPATIENT
Start: 2023-05-17 | End: 2023-05-17

## 2023-05-17 RX ORDER — ONDANSETRON 2 MG/ML
4 INJECTION INTRAMUSCULAR; INTRAVENOUS EVERY 6 HOURS PRN
Status: DISCONTINUED | OUTPATIENT
Start: 2023-05-17 | End: 2023-05-17

## 2023-05-17 RX ORDER — SODIUM CHLORIDE, SODIUM LACTATE, POTASSIUM CHLORIDE, CALCIUM CHLORIDE 600; 310; 30; 20 MG/100ML; MG/100ML; MG/100ML; MG/100ML
100 INJECTION, SOLUTION INTRAVENOUS CONTINUOUS
Status: DISCONTINUED | OUTPATIENT
Start: 2023-05-17 | End: 2023-05-18 | Stop reason: HOSPADM

## 2023-05-17 RX ORDER — ONDANSETRON 2 MG/ML
4 INJECTION INTRAMUSCULAR; INTRAVENOUS ONCE
Status: COMPLETED | OUTPATIENT
Start: 2023-05-17 | End: 2023-05-17

## 2023-05-17 RX ORDER — BUPIVACAINE HYDROCHLORIDE AND EPINEPHRINE 2.5; 5 MG/ML; UG/ML
INJECTION, SOLUTION EPIDURAL; INFILTRATION; INTRACAUDAL; PERINEURAL AS NEEDED
Status: DISCONTINUED | OUTPATIENT
Start: 2023-05-17 | End: 2023-05-17 | Stop reason: HOSPADM

## 2023-05-17 RX ORDER — HYOSCYAMINE SULFATE 0.125 MG
0.12 TABLET ORAL EVERY 4 HOURS PRN
Status: DISCONTINUED | OUTPATIENT
Start: 2023-05-17 | End: 2023-05-18 | Stop reason: HOSPADM

## 2023-05-17 RX ORDER — CHLORHEXIDINE GLUCONATE 4 G/100ML
SOLUTION TOPICAL DAILY PRN
Status: DISCONTINUED | OUTPATIENT
Start: 2023-05-17 | End: 2023-05-17

## 2023-05-17 RX ORDER — MIDAZOLAM HYDROCHLORIDE 2 MG/2ML
INJECTION, SOLUTION INTRAMUSCULAR; INTRAVENOUS AS NEEDED
Status: DISCONTINUED | OUTPATIENT
Start: 2023-05-17 | End: 2023-05-17

## 2023-05-17 RX ORDER — AMOXICILLIN 250 MG
1 CAPSULE ORAL DAILY
Qty: 30 TABLET | Refills: 0 | Status: SHIPPED | OUTPATIENT
Start: 2023-05-17

## 2023-05-17 RX ORDER — SODIUM CHLORIDE, SODIUM LACTATE, POTASSIUM CHLORIDE, CALCIUM CHLORIDE 600; 310; 30; 20 MG/100ML; MG/100ML; MG/100ML; MG/100ML
125 INJECTION, SOLUTION INTRAVENOUS CONTINUOUS
Status: DISCONTINUED | OUTPATIENT
Start: 2023-05-17 | End: 2023-05-17

## 2023-05-17 RX ORDER — HYDROCODONE BITARTRATE AND ACETAMINOPHEN 5; 325 MG/1; MG/1
2 TABLET ORAL EVERY 6 HOURS PRN
Status: DISCONTINUED | OUTPATIENT
Start: 2023-05-17 | End: 2023-05-18 | Stop reason: HOSPADM

## 2023-05-17 RX ORDER — CLINDAMYCIN PHOSPHATE 600 MG/50ML
600 INJECTION INTRAVENOUS EVERY 8 HOURS
Status: COMPLETED | OUTPATIENT
Start: 2023-05-17 | End: 2023-05-18

## 2023-05-17 RX ORDER — CLINDAMYCIN PHOSPHATE 900 MG/50ML
900 INJECTION INTRAVENOUS ONCE
Status: COMPLETED | OUTPATIENT
Start: 2023-05-17 | End: 2023-05-17

## 2023-05-17 RX ORDER — EPHEDRINE SULFATE 50 MG/ML
INJECTION INTRAVENOUS AS NEEDED
Status: DISCONTINUED | OUTPATIENT
Start: 2023-05-17 | End: 2023-05-17

## 2023-05-17 RX ORDER — ONDANSETRON 4 MG/1
4 TABLET, ORALLY DISINTEGRATING ORAL EVERY 6 HOURS PRN
Status: DISCONTINUED | OUTPATIENT
Start: 2023-05-17 | End: 2023-05-18 | Stop reason: HOSPADM

## 2023-05-17 RX ORDER — CEFADROXIL 500 MG/1
500 CAPSULE ORAL EVERY 12 HOURS SCHEDULED
Qty: 10 CAPSULE | Refills: 0 | Status: SHIPPED | OUTPATIENT
Start: 2023-05-17 | End: 2023-05-17

## 2023-05-17 RX ORDER — GABAPENTIN 300 MG/1
300 CAPSULE ORAL ONCE
Status: COMPLETED | OUTPATIENT
Start: 2023-05-17 | End: 2023-05-17

## 2023-05-17 RX ORDER — ONDANSETRON 2 MG/ML
4 INJECTION INTRAMUSCULAR; INTRAVENOUS EVERY 6 HOURS PRN
Status: DISCONTINUED | OUTPATIENT
Start: 2023-05-17 | End: 2023-05-18 | Stop reason: HOSPADM

## 2023-05-17 RX ORDER — HYDROCHLOROTHIAZIDE 12.5 MG/1
12.5 TABLET ORAL DAILY
Status: CANCELLED | OUTPATIENT
Start: 2023-05-18

## 2023-05-17 RX ORDER — TRANEXAMIC ACID 10 MG/ML
1000 INJECTION, SOLUTION INTRAVENOUS ONCE
Status: COMPLETED | OUTPATIENT
Start: 2023-05-17 | End: 2023-05-17

## 2023-05-17 RX ORDER — MAGNESIUM HYDROXIDE/ALUMINUM HYDROXICE/SIMETHICONE 120; 1200; 1200 MG/30ML; MG/30ML; MG/30ML
30 SUSPENSION ORAL EVERY 6 HOURS PRN
Status: DISCONTINUED | OUTPATIENT
Start: 2023-05-17 | End: 2023-05-18 | Stop reason: HOSPADM

## 2023-05-17 RX ORDER — DOCUSATE SODIUM 100 MG/1
100 CAPSULE, LIQUID FILLED ORAL 2 TIMES DAILY
Status: DISCONTINUED | OUTPATIENT
Start: 2023-05-17 | End: 2023-05-18 | Stop reason: HOSPADM

## 2023-05-17 RX ORDER — KETOROLAC TROMETHAMINE 30 MG/ML
INJECTION, SOLUTION INTRAMUSCULAR; INTRAVENOUS AS NEEDED
Status: DISCONTINUED | OUTPATIENT
Start: 2023-05-17 | End: 2023-05-17 | Stop reason: HOSPADM

## 2023-05-17 RX ORDER — ASPIRIN 81 MG/1
81 TABLET ORAL 2 TIMES DAILY
Status: DISCONTINUED | OUTPATIENT
Start: 2023-05-17 | End: 2023-05-18 | Stop reason: HOSPADM

## 2023-05-17 RX ORDER — DEXAMETHASONE SODIUM PHOSPHATE 10 MG/ML
INJECTION, SOLUTION INTRAMUSCULAR; INTRAVENOUS AS NEEDED
Status: DISCONTINUED | OUTPATIENT
Start: 2023-05-17 | End: 2023-05-17 | Stop reason: HOSPADM

## 2023-05-17 RX ORDER — CHOLESTYRAMINE LIGHT 4 G/5.7G
4 POWDER, FOR SUSPENSION ORAL DAILY PRN
Status: DISCONTINUED | OUTPATIENT
Start: 2023-05-17 | End: 2023-05-18 | Stop reason: HOSPADM

## 2023-05-17 RX ORDER — DEXAMETHASONE SODIUM PHOSPHATE 10 MG/ML
INJECTION, SOLUTION INTRAMUSCULAR; INTRAVENOUS AS NEEDED
Status: DISCONTINUED | OUTPATIENT
Start: 2023-05-17 | End: 2023-05-17

## 2023-05-17 RX ORDER — HYDROCODONE BITARTRATE AND ACETAMINOPHEN 5; 325 MG/1; MG/1
1 TABLET ORAL EVERY 6 HOURS PRN
Status: DISCONTINUED | OUTPATIENT
Start: 2023-05-17 | End: 2023-05-18 | Stop reason: HOSPADM

## 2023-05-17 RX ORDER — FENTANYL CITRATE 50 UG/ML
INJECTION, SOLUTION INTRAMUSCULAR; INTRAVENOUS AS NEEDED
Status: DISCONTINUED | OUTPATIENT
Start: 2023-05-17 | End: 2023-05-17

## 2023-05-17 RX ORDER — LOSARTAN POTASSIUM 50 MG/1
100 TABLET ORAL DAILY
Status: CANCELLED | OUTPATIENT
Start: 2023-05-17

## 2023-05-17 RX ORDER — HYDROCODONE BITARTRATE AND ACETAMINOPHEN 5; 325 MG/1; MG/1
1 TABLET ORAL EVERY 4 HOURS PRN
Qty: 42 TABLET | Refills: 0 | Status: SHIPPED | OUTPATIENT
Start: 2023-05-17 | End: 2023-05-22 | Stop reason: SDUPTHER

## 2023-05-17 RX ORDER — CALCIUM CARBONATE 200(500)MG
1000 TABLET,CHEWABLE ORAL DAILY PRN
Status: DISCONTINUED | OUTPATIENT
Start: 2023-05-17 | End: 2023-05-18 | Stop reason: HOSPADM

## 2023-05-17 RX ORDER — ONDANSETRON 2 MG/ML
4 INJECTION INTRAMUSCULAR; INTRAVENOUS ONCE AS NEEDED
Status: DISCONTINUED | OUTPATIENT
Start: 2023-05-17 | End: 2023-05-17 | Stop reason: HOSPADM

## 2023-05-17 RX ADMIN — Medication 20 MG: at 10:28

## 2023-05-17 RX ADMIN — ASPIRIN 81 MG: 81 TABLET, COATED ORAL at 21:11

## 2023-05-17 RX ADMIN — Medication 20 MG: at 11:03

## 2023-05-17 RX ADMIN — SODIUM CHLORIDE, SODIUM LACTATE, POTASSIUM CHLORIDE, AND CALCIUM CHLORIDE 100 ML/HR: .6; .31; .03; .02 INJECTION, SOLUTION INTRAVENOUS at 16:32

## 2023-05-17 RX ADMIN — LIDOCAINE HYDROCHLORIDE 50 MG: 10 INJECTION, SOLUTION EPIDURAL; INFILTRATION; INTRACAUDAL; PERINEURAL at 10:08

## 2023-05-17 RX ADMIN — Medication 10 MG: at 11:21

## 2023-05-17 RX ADMIN — PROPOFOL 200 MG: 10 INJECTION, EMULSION INTRAVENOUS at 10:08

## 2023-05-17 RX ADMIN — ACETAMINOPHEN 975 MG: 325 TABLET, FILM COATED ORAL at 09:12

## 2023-05-17 RX ADMIN — NEOSTIGMINE METHYLSULFATE 3 MG: 1 INJECTION INTRAVENOUS at 11:28

## 2023-05-17 RX ADMIN — GABAPENTIN 300 MG: 300 CAPSULE ORAL at 09:12

## 2023-05-17 RX ADMIN — FENTANYL CITRATE 25 MCG: 50 INJECTION INTRAMUSCULAR; INTRAVENOUS at 12:33

## 2023-05-17 RX ADMIN — FENTANYL CITRATE 50 MCG: 50 INJECTION, SOLUTION INTRAMUSCULAR; INTRAVENOUS at 10:28

## 2023-05-17 RX ADMIN — EPHEDRINE SULFATE 5 MG: 50 INJECTION INTRAVENOUS at 11:04

## 2023-05-17 RX ADMIN — ONDANSETRON 4 MG: 2 INJECTION INTRAMUSCULAR; INTRAVENOUS at 15:15

## 2023-05-17 RX ADMIN — CHLORHEXIDINE GLUCONATE 0.12% ORAL RINSE 15 ML: 1.2 LIQUID ORAL at 09:12

## 2023-05-17 RX ADMIN — Medication 100 MCG: at 10:23

## 2023-05-17 RX ADMIN — SODIUM CHLORIDE, SODIUM LACTATE, POTASSIUM CHLORIDE, AND CALCIUM CHLORIDE 125 ML/HR: .6; .31; .03; .02 INJECTION, SOLUTION INTRAVENOUS at 09:18

## 2023-05-17 RX ADMIN — MIDAZOLAM 2 MG: 1 INJECTION INTRAMUSCULAR; INTRAVENOUS at 10:00

## 2023-05-17 RX ADMIN — ALUMINUM HYDROXIDE, MAGNESIUM HYDROXIDE, AND SIMETHICONE 30 ML: 200; 200; 20 SUSPENSION ORAL at 21:21

## 2023-05-17 RX ADMIN — Medication 100 MCG: at 10:45

## 2023-05-17 RX ADMIN — DEXAMETHASONE SODIUM PHOSPHATE 10 MG: 10 INJECTION, SOLUTION INTRAMUSCULAR; INTRAVENOUS at 10:28

## 2023-05-17 RX ADMIN — CLINDAMYCIN IN 5 PERCENT DEXTROSE 900 MG: 18 INJECTION, SOLUTION INTRAVENOUS at 10:09

## 2023-05-17 RX ADMIN — ONDANSETRON 4 MG: 2 INJECTION INTRAMUSCULAR; INTRAVENOUS at 11:31

## 2023-05-17 RX ADMIN — GLYCOPYRROLATE 0.6 MG: 0.2 INJECTION, SOLUTION INTRAMUSCULAR; INTRAVENOUS at 11:28

## 2023-05-17 RX ADMIN — ROCURONIUM BROMIDE 50 MG: 10 INJECTION INTRAVENOUS at 10:08

## 2023-05-17 RX ADMIN — GABAPENTIN 300 MG: 300 CAPSULE ORAL at 21:11

## 2023-05-17 RX ADMIN — SUCRALFATE 1 G: 1 TABLET ORAL at 16:32

## 2023-05-17 RX ADMIN — FENTANYL CITRATE 25 MCG: 50 INJECTION INTRAMUSCULAR; INTRAVENOUS at 12:39

## 2023-05-17 RX ADMIN — TRANEXAMIC ACID 1000 MG: 10 INJECTION, SOLUTION INTRAVENOUS at 09:20

## 2023-05-17 RX ADMIN — ONDANSETRON 4 MG: 2 INJECTION INTRAMUSCULAR; INTRAVENOUS at 09:50

## 2023-05-17 RX ADMIN — HYDROCODONE BITARTRATE AND ACETAMINOPHEN 2 TABLET: 5; 325 TABLET ORAL at 15:14

## 2023-05-17 RX ADMIN — FENTANYL CITRATE 50 MCG: 50 INJECTION, SOLUTION INTRAMUSCULAR; INTRAVENOUS at 10:08

## 2023-05-17 RX ADMIN — CALCIUM CARBONATE (ANTACID) CHEW TAB 500 MG 1000 MG: 500 CHEW TAB at 21:21

## 2023-05-17 RX ADMIN — CLINDAMYCIN PHOSPHATE 600 MG: 600 INJECTION, SOLUTION INTRAVENOUS at 16:33

## 2023-05-17 RX ADMIN — ALBUMIN HUMAN: 0.05 INJECTION, SOLUTION INTRAVENOUS at 10:54

## 2023-05-17 NOTE — PHYSICAL THERAPY NOTE
PHYSICAL THERAPY EVAL/TX  Physical Therapy Evaluation    Performed at least 2 patient identifiers during session:  Patient Active Problem List   Diagnosis    Lumbar spondylosis    Bilateral hip pain    Greater trochanteric bursitis of both hips    Primary osteoarthritis of both hips    GERD (gastroesophageal reflux disease)    Chronic pain syndrome    Chronic bilateral low back pain without sciatica    Diffuse pain    Primary hypertension    Mixed hyperlipidemia    PONV (postoperative nausea and vomiting)    Nausea    Dysphagia       Past Medical History:   Diagnosis Date    Arthritis     GERD (gastroesophageal reflux disease)     Hyperlipidemia     Hypertension     Irritable bowel syndrome     Low back pain     PONV (postoperative nausea and vomiting)        Past Surgical History:   Procedure Laterality Date    CATARACT EXTRACTION Bilateral      SECTION N/A     CHOLECYSTECTOMY      COLONOSCOPY      HERNIA REPAIR      HYSTERECTOMY      NASAL SEPTUM SURGERY      [place] ARTHRP ACETBLR/PROX FEM PROSTC AGRFT/ALGRFT Right 2023    Procedure: ARTHROPLASTY HIP TOTAL;  Surgeon: Elicia Clements DO;  Location:  MAIN OR;  Service: Orthopedics    TONSILLECTOMY      UPPER GASTROINTESTINAL ENDOSCOPY              23 1601   PT Last Visit   PT Visit Date 23   Note Type   Note type Evaluation   Pain Assessment   Pain Assessment Tool 0-10   Pain Score 5   Pain Location/Orientation Orientation: Left; Location: Hip   Hospital Pain Intervention(s) Medication (See MAR); Cold applied   Restrictions/Precautions   Weight Bearing Precautions Per Order Yes   LLE Weight Bearing Per Order WBAT   Other Precautions Pain; Fall Risk; Chair Alarm; Bed Alarm;WBS;THR   Home Living   Type of [address] [place] [address] Two level  (2+1STE with 2 rails)   Bathroom Shower/Tub Walk-in shower   [address] [place] [address] chair   Home Equipment Walker;Cane "  Additional Comments RW   Prior Function   Level of [place] Independent with ADLs; Independent with functional mobility; Needs assistance with IADLS   Lives With Abdoulaye Hdez Help From Family   IADLs Family/Friend/Other provides transportation; Family/Friend/Other provides meals; Family/Friend/Other provides medication management   Falls in the last 6 months 0   Vocational Full time employment  ()   General   Additional Pertinent History Recent R MAME   Family/Caregiver Present No   Cognition   Overall Cognitive Status WFL   Arousal/Participation Alert   Orientation Level Oriented X4   Memory Within functional limits   Following Commands Follows all commands and directions without difficulty   Subjective   Subjective \"I just got pain medication  \"   RLE Assessment   RLE Assessment WFL   LLE Assessment   LLE Assessment   (Unable to assess due to pain)   Bed Mobility   Supine to Sit 4  Minimal assistance   Additional items Assist x 1;HOB elevated; Bedrails; Increased time required;Verbal cues   Additional Comments BP taken in supine, sitting, and standing  Elevated  Refer to chart  Transfers   Sit to Stand 4  Minimal assistance   Additional items Assist x 2;Armrests; Increased time required;Verbal cues   Stand to Sit 4  Minimal assistance   Additional items Assist x 2;Armrests; Increased time required;Verbal cues   Ambulation/Elevation   Gait pattern Antalgic;Decreased L stance  (Step to pattern)   Gait Assistance 4  Minimal assist   Additional items Assist x 2;Verbal cues; Tactile cues   Assistive Device Rolling walker   Distance 3ft   Ambulation/Elevation Additional Comments bed to commode   Refer to treatment session for additional mobility   Balance   Static Sitting Fair +   Dynamic Sitting Fair +   Static Standing Fair   Dynamic Standing Fair -   Ambulatory Fair -   Endurance Deficit   Endurance Deficit Yes   Endurance Deficit Description Limited by pain   Activity Tolerance   Activity Tolerance " Patient limited by pain; Other (Comment)  (HTN)   Medical Staff Made Aware Co-treat with OTNadiya   Nurse Made Aware RN, Aasmitha   Assessment   Prognosis Good   Problem List Decreased strength;Decreased range of motion;Decreased endurance; Impaired balance;Decreased mobility;Obesity;Orthopedic restrictions;Pain   Assessment Patient is a 57y/o F who is POD 0 L MAME  Patient resides with son in a home with a first floor setup and steps to enter  She was using a RW prior to admission  Current medical status includes HTN, fall risk, bed/chair alarm, hip precautions, dizziness, nausea, decreased strength, balance, endurance and mobility  Patient was received supine in bed and agreeable to session  She reports moderate pain levels  Patient recalled hip precautions  She required assistance of 1-2 for all mobility  Elevated BP  Nausea, dizziness with all mobility  Patient will continue to benefit from ongoing inpatient P T  Anticipate progression once medically stable  Recommending level 3  The patient's AM-PAC Basic Mobility Inpatient Short Form Raw Score is 18  A Raw score of greater than 17 suggests the patient may benefit from discharge to home  Please also refer to the recommendation of the Physical Therapist for safe discharge planning  Barriers to Discharge Comments Will need to further evaluate mobility and will need a stair trial prior to discharge  Goals   Patient Goals To have less pain   STG Expiration Date 05/19/23   Short Term Goal #1 1  Perform supine<>sit with HOB flat without the use of bedrails ind 2  Perform sit<>stand transfers mod I 3  Ambulate 150ft with a RW mod I level 4  Ascend/descend 2 steps with 2 rails nonreciprocal pattern mod I   PT Treatment Day 1   Plan   Treatment/Interventions Functional transfer training;LE strengthening/ROM; Elevations; Therapeutic exercise; Endurance training;Patient/family training;Equipment eval/education; Bed mobility;Gait training;Spoke to nursing;OT   PT Frequency Twice a day   Recommendation   UB Rehab Discharge Recommendation (PT/OT) Level 3   AM-PAC Basic Mobility Inpatient   Turning in Flat Bed Without Bedrails 3   Lying on Back to Sitting on Edge of Flat Bed Without Bedrails 3   Moving Bed to Chair 3   Standing Up From Chair Using Arms 3   Walk in Room 3   Climb 3-5 Stairs With Railing 3   Basic Mobility Inpatient Raw Score 18   Basic Mobility Standardized Score 41 05   Highest Level Of Mobility   JH-HLM Goal 6: Walk 10 steps or more   JH-HLM Achieved 5: Stand (1 or more minutes)   Additional Treatment Session   Start Time 1616   End Time 1626   Treatment Assessment Performed additional sit<>stand transfer with min A x 2  Verbal cues for hand placement  Ambualted 5ft from commode to recliner chair with a RW with min A x 2  Verbal cues for sequencing  Step to pattern, decreased stance on the L, antalgic gait   Equipment Use RW   End of Consult   Patient Position at End of Consult Bedside chair;Bed/Chair alarm activated; All needs within reach     Carolina Garcia, PT             Patient Name: Reza Morales  [id number]'[id number] Date: 5/17/2023

## 2023-05-17 NOTE — ANESTHESIA POSTPROCEDURE EVALUATION
Post-Op Assessment Note    CV Status:  Stable  Pain Score: 0    Pain management: adequate     Mental Status:  Arousable and sleepy   Hydration Status:  Euvolemic   PONV Controlled:  Controlled   Airway Patency:  Patent      Post Op Vitals Reviewed: Yes      Staff: CRNA         No notable events documented      BP   156/73   Temp   98 4   Pulse  96   Resp   15   SpO2   98%

## 2023-05-17 NOTE — OP NOTE
OPERATIVE REPORT  PATIENT NAME: Randa Rollins  : 1965  MRN: [id number]  Pt Location:  [facility] OR ROOM 02    Surgery Date: 2023    Surgeon(s) and Role:     * Andrey Elizondo DO - Primary     * Lina Childers PA-C - Assisting      * DIALLO Cook - Assisting    Preop Diagnosis:  Primary osteoarthritis of one hip, left [M16 12]    Post-Op Diagnosis Codes:     * Primary osteoarthritis of one hip, left [M16 12]    Procedure(s):  Left - ARTHROPLASTY HIP TOTAL    Specimens:  * No specimens in log *    Estimated Blood Loss:   100 cc    Drains:  * No LDAs found *    Anesthesia Type:   General     Operative Indications:  Primary osteoarthritis of one hip, left [M16 12]  57F with severe osteoarthritis of the left hip  She has significant limitations in range of motion in all planes as well as severe pain into the groin with extension down the thigh  She has failed all conservative measures and has recovered well after her right MAME  The patient has elected to proceed with left MAME through the posterior approach  Risks and benefits of surgery to include but not limited to bleeding, infection, damage to surrounding structures, hardware failure, instability, fracture, dislocation, leg length inequality, need for further surgery, continued pain, stiffness, blood clots, stroke, heart attack  was discussed with the patient  Informed consent was signed today in the office  Operative Findings:  Severe osteoarthritis   Large anterior acetabular osteophyte  Native femoral head 50 mm    Implant Name Type Inv   Item Serial No   Lot No  LRB No  Used Action   SHELL ACETABULAR CLUSTERHOLE 52MM TRIDENT II - [id number]  SHELL ACETABULAR CLUSTERHOLE 52MM [facility] [id number] Left 1 Implanted   INSERT ACTB 36MM 0DEG SZ E TRIDENT - [id number]  INSERT ACTB 36MM 0DEG SZ E TRIDENT  [employer] [id number] Left 1 Implanted   SCREW HEX 6 5 X 25MM LOPRFL - [id number]  SCREW HEX 6 5 X 25MM LOPRFL [employer] UAG Left 1 Implanted   SCREW HEX 6 5 X 25MM LOPRFL - [id number]  SCREW HEX 6 5 X 25MM LOPRFL  [employer] UBW Left 1 Implanted   FEM STEM SZ 3 CMNTLS 132DEG ACCOLADE II - [id number]  FEM STEM SZ 3 CMNTLS 132DEG [employer] [id number] Left 1 Implanted   HEAD FEMORAL 36MM -2 5MM OFFSET BIOLOX DELTA - [id number]  HEAD FEMORAL 36MM -2 5MM OFFSET BIOLOX DELTA  [employer] [id number] Left 1 Implanted     Complications:   None    Hip Approach: Posterior    Procedure and Technique:  The patient seen in the preoperative area  The informed consent was confirmed  The operative site was confirmed and marked  Patient was taken to the operating room and spinal anesthesia was performed by the anesthesiologist   Once the spinal was complete the patient was transferred to the operating room table  Patient was placed in the lateral decubitus position with the operative hip up held in place with stulberg hip positioners  All bony prominences were well-padded  The left lower extremity was prepped and draped in typical sterile fashion  Perioperative antibiotics were given per scip protocol prior to incision  A formal timeout was performed identifying the patient and surgical site  A standard posterolateral approach to the hip was drawn  The skin was sharply dissected with a 10 blade  Electrocautery was used down to the level of iliotibial band fascia  This was incised in line with the femur and along the fibers of the gluteus keira  A cobra retractor was placed underneath the medius exposing the posterior capsule and short external rotators  The piriformis tendon was found and taken down off of its insertion at the greater trochanter  Capsule was incised in L-shaped fashion along the back of the trochanter and along where piriformis tendon would have been  The sciatic nerve was palpated and protected  The hip was internally rotated and dislocated    There was significant deformity of the femoral head  Retractors were placed around the neck and the neck was resected in line with preoperative template  Femoral head was removed and sized to be approximately 50 mm  At this time the leg was placed in a position of sleep and acetabular retractors were carefully placed  The labrum was removed with sharp dissection  There was significant dwayne-acetabular osteophytes which were carefully taken down with an osteotome  Sequential reaming was initiated with a 45 Reamer to the medial wall  Sequential reaming was then performed up to a size 52 reamer with excellent chatter  The Josh trident II 52 mm cluster hole cup was impacted into place aiming for an anteversion of 25° and abduction angle of 40 just anteverted to his native transverse acetabular ligament  There was excellent fixation of the acetabular component  A screw was drilled and placed to augment fixation of the posterior column  At this time the neutral 36 mm liner was placed and assured to be flush at all points along the liner  Acetabular retractors were carefully removed and a femoral neck elevator and Cobra was placed to expose the proximal femur  A box osteotome was used to clear the lateral neck followed by canal finer  Hip was sequentially broached to a size 3 Accolate 2  The hip was trialed with a 132 neck angle trial per preoperative template  The hip had excellent stability in position of sleep, no impingement with external rotation, flexion to 90 with 70° of internal rotation before the hip started to dislocate  This time hip was copiously irrigated and the real size 3 132 degree neck angle Accolate 2 stem was impacted into the femur  Hip was again trialed and found have excellent stability with the -2 5 femoral head  The trial was removed and the trunnion was well dried  The real -2 5 head was impacted on the dry trunnion  The hip was reduced and again had excellent stability and restoration of leg lengths   Xu Arauz solution was used  The hip and the remainder of the 3 L of normal saline solution  The posterior capsule was repaired in anatomic fashion with figure-of-eight #1 Vicryl sutures  The piriformis tendon was re-approximated to the abductor tendon  The ITB band was closed with #1 Stratafix  The subcutaneous tissues closed with interrupted 2-0 Vicryl  The skin was closed with 3-0 Stratafix suture  All instrument counts and sponge counts were correct  Patient was awoken from anesthesia in stable condition when taken to the recovery room        I was present for the entire procedure, A qualified resident physician was not available and A physician assistant and  were required during the procedure for retraction tissue handling,dissection and suturing     Patient Disposition:  PACU      57F s/p L MAME 5/17  - multi-modal pain control  - clinda pre-op, doxy post op x 5 days  - DVT ppx: aspirin 81mg BID x 4 weeks  - PT/OT  - WBAT  - posterior hip precautions  - f/u 10-14 days       SIGNATURE: Tiff Olmstead DO  DATE: May 17, 2023  TIME: 11:39 AM

## 2023-05-17 NOTE — PLAN OF CARE
Problem: PHYSICAL THERAPY ADULT  Goal: Performs mobility at highest level of function for planned discharge setting  See evaluation for individualized goals  Description: Treatment/Interventions: Functional transfer training, LE strengthening/ROM, Elevations, Therapeutic exercise, Endurance training, Patient/family training, Equipment eval/education, Bed mobility, Gait training, Spoke to nursing, OT          See flowsheet documentation for full assessment, interventions and recommendations  Note: Prognosis: Good  Problem List: Decreased strength, Decreased range of motion, Decreased endurance, Impaired balance, Decreased mobility, Obesity, Orthopedic restrictions, Pain  Assessment: Patient is a 57y/o F who is POD 0 L MAME  Patient resides with son in a home with a first floor setup and steps to enter  She was using a RW prior to admission  Current medical status includes HTN, fall risk, bed/chair alarm, hip precautions, dizziness, nausea, decreased strength, balance, endurance and mobility  Patient was received supine in bed and agreeable to session  She reports moderate pain levels  Patient recalled hip precautions  She required assistance of 1-2 for all mobility  Elevated BP  Nausea, dizziness with all mobility  Patient will continue to benefit from ongoing inpatient P T  Anticipate progression once medically stable  Recommending level 3  The patient's AM-PAC Basic Mobility Inpatient Short Form Raw Score is 18  A Raw score of greater than 17 suggests the patient may benefit from discharge to home  Please also refer to the recommendation of the Physical Therapist for safe discharge planning  Barriers to Discharge Comments: Will need to further evaluate mobility and will need a stair trial prior to discharge  See flowsheet documentation for full assessment

## 2023-05-17 NOTE — PLAN OF CARE
Problem: OCCUPATIONAL THERAPY ADULT  Goal: Performs self-care activities at highest level of function for planned discharge setting  See evaluation for individualized goals  Description: Treatment Interventions: ADL retraining, Functional transfer training, Endurance training, Patient/family training, Equipment evaluation/education, Compensatory technique education          See flowsheet documentation for full assessment, interventions and recommendations  Note: Limitation: Decreased ADL status, Decreased Safe judgement during ADL, Decreased endurance, Decreased high-level ADLs, Decreased self-care trans  Prognosis: Good  Assessment: Pt is a 59 y o  female seen for OT evaluation at [facility], admitted 5/17/2023 w/ Primary osteoarthritis of both hips  Pt is POD#0 L MAME with posterior hip precautions  OT completed extensive review of pt's medical and social history  Comorbidities affecting pt's functional performance at time of assessment include: recent R MAME, lumbar spondylosis, GERD, chronic pain syndrome, HTN, HLD, PONV, nausea, dysphagia, chronic BL LBP  Personal factors affecting pt at time of IE include: steps to enter environment, limited home support, difficulty performing ADLS, difficulty performing IADLS  and environment  Prior to admission, pt was living with her son in a [facility] with 2+2 JOE  Pt was Mod I w/  ADLS and A w/ IADLS, (+) drove, & required use of RW PTA  Upon evaluation: Pt requires Min Ax1 for bed mobility, Min Ax2 for functional mobility/transfers, S for UB ADLs and Min A for LB ADLS 2* the following deficits impacting occupational performance: weakness, decreased strength, decreased balance, decreased tolerance, increased pain and orthopedic restrictions  Full objective findings from OT assessment regarding body systems outlined above   Pt to benefit from continued skilled OT tx while in the hospital to address deficits as defined above and maximize level of functional independence w/ ADL's and functional mobility  Occupational Performance areas to address include: bathing/shower, toilet hygiene, dressing, functional mobility and clothing management  Based on findings, pt is of high complexity  The patient's raw score on the AM-PAC Daily Activity inpatient short form is 21, standardized score is 44 27, greater than 39 4  Patients at this level are likely to benefit from DC to home  However, please refer to therapist recommendation for discharge planning given other factors that may influence destination  At this time, OT recommendations at time of discharge are DC with level 3 resources

## 2023-05-17 NOTE — OCCUPATIONAL THERAPY NOTE
Occupational Therapy Evaluation & Treatment     Patient Name: Lara Najera  MNAGY'J Date: 2023  Problem List  Principal Problem:    Primary osteoarthritis of both hips    Past Medical History  Past Medical History:   Diagnosis Date    Arthritis     GERD (gastroesophageal reflux disease)     Hyperlipidemia     Hypertension     Irritable bowel syndrome     Low back pain     PONV (postoperative nausea and vomiting)      Past Surgical History  Past Surgical History:   Procedure Laterality Date    CATARACT EXTRACTION Bilateral      SECTION N/A     CHOLECYSTECTOMY      COLONOSCOPY      HERNIA REPAIR      HYSTERECTOMY      NASAL SEPTUM SURGERY      GA ARTHRP ACETBLR/PROX FEM PROSTC AGRFT/ALGRFT Right 2023    Procedure: ARTHROPLASTY HIP TOTAL;  Surgeon: Emma Miguel DO;  Location:  MAIN OR;  Service: Orthopedics    TONSILLECTOMY      UPPER GASTROINTESTINAL ENDOSCOPY               23 1625   OT Last Visit   OT Visit Date 23   Note Type   Note type Evaluation   Pain Assessment   Pain Assessment Tool 0-10   Pain Score 5   Pain Location/Orientation Orientation: Left; Location: Knee   Hospital Pain Intervention(s) Repositioned; Ambulation/increased activity;Cold applied   Restrictions/Precautions   Weight Bearing Precautions Per Order Yes   LLE Weight Bearing Per Order WBAT   Other Precautions Pain; Fall Risk; Chair Alarm; Bed Alarm;WBS;THR  (Posterior hip precautions)   Home Living   Type of [address] [place] [address] Two level  (2+1 JOE with rails)   Bathroom Shower/Tub Walk-in shower   Bathroom Toilet Raised   Bathroom Equipment Shower chair   Bathroom Accessibility Accessible via walker   Home Equipment Walker;Cane  (RW; hip kit)   Prior Function   Level of [place] Independent with ADLs; Independent with functional mobility; Needs assistance with IADLS   Lives With Son  (Dtr coming to help)   Receives Help From Family   IADLs Independent with driving; Independent with meal "prep; Independent with medication management   Falls in the last 6 months 0   Vocational Full time employment   General   Additional Pertinent History R MAME 1/2023   Family/Caregiver Present No   Subjective   Subjective \"I need to pee\"   ADL   Eating Assistance 7  Independent   Grooming Assistance 7  Independent   UB Bathing Assistance 5  Supervision/Setup   LB Bathing Assistance 4  Minimal Assistance   UB Dressing Assistance 5  Supervision/Setup   LB Dressing Assistance 4  [address]  4  Minimal Assistance   Toileting Deficit Verbal cueing;Supervison/safety; Bedside commode  (Min A for balance)   Bed Mobility   Supine to Sit 4  Minimal assistance   Additional items Assist x 1;HOB elevated; Bedrails; Increased time required;Verbal cues   Additional Comments BP elevated during supine, sitting, standing (160/101)  Pt nauseous at times & dizzy  LPN aware   Transfers   Sit to Stand 4  Minimal assistance   Additional items Assist x 2; Increased time required;Verbal cues   Stand to Sit 4  Minimal assistance   Additional items Assist x 2; Increased time required;Verbal cues   Stand pivot 4  Minimal assistance   Additional items Assist x 2; Increased time required;Verbal cues;Armrests  (Steppage txfer to commode)   Toilet transfer 4  Minimal assistance   Additional items Assist x 2; Increased time required;Commode;Verbal cues   Balance   Static Sitting Fair +   Dynamic Sitting Fair +   Static Standing Fair   Dynamic Standing Fair -   Ambulatory Fair -   Activity Tolerance   Activity Tolerance Patient limited by pain  (HTN)   Medical Staff Made Aware PT Fide   Nurse Made Aware LPN Angel Ghosh Assessment   RUE Assessment WFL   LUE Assessment   LUE Assessment WFL   Cognition   Overall Cognitive Status WFL   Arousal/Participation Alert   Attention Within functional limits   Orientation Level Oriented X4   Memory Within functional limits   Following Commands Follows multistep commands without difficulty " Assessment   Limitation Decreased ADL status; Decreased Safe judgement during ADL;Decreased endurance;Decreased high-level ADLs; Decreased self-care trans   Prognosis Good   Assessment Pt is a 59 y o  female seen for OT evaluation at [facility], admitted 5/17/2023 w/ Primary osteoarthritis of both hips  Pt is POD#0 L MAME with posterior hip precautions  OT completed extensive review of pt's medical and social history  Comorbidities affecting pt's functional performance at time of assessment include: recent R MAME, lumbar spondylosis, GERD, chronic pain syndrome, HTN, HLD, PONV, nausea, dysphagia, chronic BL LBP  Personal factors affecting pt at time of IE include: steps to enter environment, limited home support, difficulty performing ADLS, difficulty performing IADLS  and environment  Prior to admission, pt was living with her son in a [facility] with 2+2 JOE  Pt was Mod I w/  ADLS and A w/ IADLS, (+) drove, & required use of RW PTA  Upon evaluation: Pt requires Min Ax1 for bed mobility, Min Ax2 for functional mobility/transfers, S for UB ADLs and Min A for LB ADLS 2* the following deficits impacting occupational performance: weakness, decreased strength, decreased balance, decreased tolerance, increased pain and orthopedic restrictions  Full objective findings from OT assessment regarding body systems outlined above  Pt to benefit from continued skilled OT tx while in the hospital to address deficits as defined above and maximize level of functional independence w/ ADL's and functional mobility  Occupational Performance areas to address include: bathing/shower, toilet hygiene, dressing, functional mobility and clothing management  Based on findings, pt is of high complexity  The patient's raw score on the AM-PAC Daily Activity inpatient short form is 21, standardized score is 44 27, greater than 39 4  Patients at this level are likely to benefit from DC to home   However, please refer to therapist recommendation for discharge planning given other factors that may influence destination  At this time, OT recommendations at time of discharge are DC with level 3 resources  Goals   Patient Goals Pt wants to have less pain   Plan   Treatment Interventions ADL retraining;Functional transfer training; Endurance training;Patient/family training;Equipment evaluation/education; Compensatory technique education   Goal Expiration Date 05/27/23   OT Treatment Day 0   OT Frequency 2-3x/wk   Recommendation   UB Rehab Discharge Recommendation (PT/OT) Level 3   AM-PAC Daily Activity Inpatient   Lower Body Dressing 3   Bathing 3   Toileting 3   Upper Body Dressing 4   Grooming 4   Eating 4   Daily Activity Raw Score 21   Daily Activity Standardized Score (Calc for Raw Score >=11) 44 27   AM-PAC Applied Cognition Inpatient   Following a Speech/Presentation 4   Understanding Ordinary Conversation 4   Taking Medications 4   Remembering Where Things Are Placed or Put Away 4   Remembering List of 4-5 Errands 4   Taking Care of Complicated Tasks 4   Applied Cognition Raw Score 24   Applied Cognition Standardized Score 62 21   Additional Treatment Session   Start Time 1615   End Time 1625   Treatment Assessment Pt performed sit > stand from commode with Min Ax2 & VCs  Pt complete dwayne-care in stand with Min A for balance  Pt performed functional mobility x5 ft to recliner with Min Ax2 & RW  Pt able to boost self back in chair  Pt left seated in recliner, chair alarm on, all needs in reach, LPN aware  End of Consult   Education Provided Yes   Patient Position at End of Consult Bedside chair;Bed/Chair alarm activated; All needs within reach   Nurse Communication Nurse aware of consult     Pt will achieve the following goals within 10 days  *Pt will complete LB bathing and dressing with Mod I & DME PRN      *Pt will complete toileting w/ Mod I w/ G hygiene/thoroughness using DME PRN    *Pt will complete bed mobility independently, with bed flat and no side rail to prep for purposeful tasks    *Pt will perform functional transfers with on/off all surfaces with Mod I using DME as needed w/ G balance/safety  *Pt will improve functional mobility during ADL/IADL/leisure tasks to Mod I using DME as needed w/ G balance/safety       *Assess DME needs     Josh Greenberg, OTR/L

## 2023-05-17 NOTE — INTERVAL H&P NOTE
H&P reviewed  After examining the patient I find no changes in the patients condition since the H&P had been written  Plan for left MAME

## 2023-05-17 NOTE — PLAN OF CARE
Problem: MOBILITY - ADULT  Goal: Maintain or return to baseline ADL function  Description: INTERVENTIONS:  -  Assess patient's ability to carry out ADLs; assess patient's baseline for ADL function and identify physical deficits which impact ability to perform ADLs (bathing, care of mouth/teeth, toileting, grooming, dressing, etc )  - Assess/evaluate cause of self-care deficits   - Assess range of motion  - Assess patient's mobility; develop plan if impaired  - Assess patient's need for assistive devices and provide as appropriate  - Encourage maximum independence but intervene and supervise when necessary  - Involve family in performance of ADLs  - Assess for home care needs following discharge   - Consider OT consult to assist with ADL evaluation and planning for discharge  - Provide patient education as appropriate  Outcome: Progressing  Goal: Maintains/Returns to pre admission functional level  Description: INTERVENTIONS:  - Perform BMAT or MOVE assessment daily    - Set and communicate daily mobility goal to care team and patient/family/caregiver  - Collaborate with rehabilitation services on mobility goals if consulted  - Perform Range of Motion 2 times a day  - Reposition patient every 2 hours    - Dangle patient 2 times a day  - Stand patient 2 times a day  - Ambulate patient 2 times a day  - Out of bed to chair 2 times a day   - Out of bed for meals 2 times a day  - Out of bed for toileting  - Record patient progress and toleration of activity level   Outcome: Progressing     Problem: PAIN - ADULT  Goal: Verbalizes/displays adequate comfort level or baseline comfort level  Description: Interventions:  - Encourage patient to monitor pain and request assistance  - Assess pain using appropriate pain scale  - Administer analgesics based on type and severity of pain and evaluate response  - Implement non-pharmacological measures as appropriate and evaluate response  - Consider cultural and social influences on pain and pain management  - Notify physician/advanced practitioner if interventions unsuccessful or patient reports new pain  Outcome: Progressing     Problem: INFECTION - ADULT  Goal: Absence or prevention of progression during hospitalization  Description: INTERVENTIONS:  - Assess and monitor for signs and symptoms of infection  - Monitor lab/diagnostic results  - Monitor all insertion sites, i e  indwelling lines, tubes, and drains  - Monitor endotracheal if appropriate and nasal secretions for changes in amount and color  - [place] appropriate cooling/warming therapies per order  - Administer medications as ordered  - Instruct and encourage patient and family to use good hand hygiene technique  - Identify and instruct in appropriate isolation precautions for identified infection/condition  Outcome: Progressing  Goal: Absence of fever/infection during neutropenic period  Description: INTERVENTIONS:  - Monitor WBC    Outcome: Progressing     Problem: SAFETY ADULT  Goal: Maintain or return to baseline ADL function  Description: INTERVENTIONS:  -  Assess patient's ability to carry out ADLs; assess patient's baseline for ADL function and identify physical deficits which impact ability to perform ADLs (bathing, care of mouth/teeth, toileting, grooming, dressing, etc )  - Assess/evaluate cause of self-care deficits   - Assess range of motion  - Assess patient's mobility; develop plan if impaired  - Assess patient's need for assistive devices and provide as appropriate  - Encourage maximum independence but intervene and supervise when necessary  - Involve family in performance of ADLs  - Assess for home care needs following discharge   - Consider OT consult to assist with ADL evaluation and planning for discharge  - Provide patient education as appropriate  Outcome: Progressing  Goal: Maintains/Returns to pre admission functional level  Description: INTERVENTIONS:  - Perform BMAT or MOVE assessment daily    - Set and communicate daily mobility goal to care team and patient/family/caregiver  - Collaborate with rehabilitation services on mobility goals if consulted  - Perform Range of Motion 2 times a day  - Reposition patient every 2 hours    - Dangle patient 2 times a day  - Stand patient 2 times a day  - Ambulate patient 2 times a day  - Out of bed to chair 2 times a day   - Out of bed for meals 2 times a day  - Out of bed for toileting  - Record patient progress and toleration of activity level   Outcome: Progressing  Goal: Patient will remain free of falls  Description: INTERVENTIONS:  - Educate patient/family on patient safety including physical limitations  - Instruct patient to call for assistance with activity   - Consult OT/PT to assist with strengthening/mobility   - Keep Call bell within reach  - Keep bed low and locked with side rails adjusted as appropriate  - Keep care items and personal belongings within reach  - Initiate and maintain comfort rounds  - Make Fall Risk Sign visible to staff  - Offer Toileting every 2 Hours, in advance of need  - Initiate/Maintain bed alarm  - Obtain necessary fall risk management equipment: call bell usage and bed alarm  - Apply yellow socks and bracelet for high fall risk patients  - Consider moving patient to room near nurses station  Outcome: Progressing     Problem: DISCHARGE PLANNING  Goal: Discharge to home or other facility with appropriate resources  Description: INTERVENTIONS:  - Identify barriers to discharge w/patient and caregiver  - Arrange for needed discharge resources and transportation as appropriate  - Identify discharge learning needs (meds, wound care, etc )  - Arrange for interpretive services to assist at discharge as needed  - Refer to Case Management Department for coordinating discharge planning if the patient needs post-hospital services based on physician/advanced practitioner order or complex needs related to functional status, cognitive ability, or social support system  Outcome: Progressing

## 2023-05-17 NOTE — DISCHARGE INSTR - AVS FIRST PAGE
Dr Florida Reina Hip Replacement    What to Expect/Activity  You are weight bearing as tolerated to your operative leg unless otherwise instructed  Use your walker or crutches  It is important to get up and walk for 10 minutes every hour, if possible  Initial recovery therapy is focused on walking  If in your follow-up a referral to formal physical therapy is required, this will be provided based on your recovery  You may sleep however you would like  Many patients feel more comfortable with a pillow between their legs and find it uncomfortably to lay on the operative side  If you do roll on that side at night you will not damage the replacement  You may use a regular or elevated toilet seat, whichever is more comfortable  We do not routinely require any specific precautions in terms of positioning of your leg and if so this will be taught to you by the physical therapists at the hospital  This means that you can dress as you are comfortable, including shoes and socks  You can bend down carefully to get items from the floor  Swelling and discomfort causes pain  Elevate your surgical leg on 1-2 pillows placed behind your ankle/calf throughout the day, especially when you are laying down  Please use ice packs for 20-30 minutes every 1-2 hours for 48-72 hours on the operative hip  Please make sure there is a barrier directly between your skin and your ice/ice pack  Please use incentive spirometer 10 times per hour while awake (see diagram below)  Dressing/Wound Care/Bathing  There is a surgical dressing over your incision that stays in place for 7 days after surgery  You may start showering 24 hours after surgery, the surgical dressing will remain in place  Please pat the dressing dry  If you notice the dressing appears saturated or is starting to come off, please replace with a dry dressing  Keep the dressing on until your follow-up in the office  There may be dried blood around the incision   It is ok to continue showering after removing the dressing but do not scrub the incision  Pat incision dry  Do not place any creams, ointments or gels on or around the incision  No baths, swimming or submerging until cleared by Dr Gonzalez See  Pain Management/Medications  You may resume your usual medications  Please take the following medications:  Anti-coagulation (blood clot prevention) - aspirin 81mg twice daily for 4 weeks  Pain medication:  Narcotic Medication: Take as directed  NSAID (Anti-inflammatory): Take as directed  Tylenol 1000mg every 8 hours  Zofran (ondasetron) - 4mg every 8 hours as needed for nausea  Stool Softeners (senna/colace)- take daily to help prevent constipation as narcotic pain medication causes constipation  Antibiotic - take as directed if prescribed  If you have questions or pain concerns, please contact the office  Pain medication cannot remove all post-operative pain  Follow up/Call if:  The findings of your surgery will be explained to you and your family immediately after surgery  However, in the post-operative period, during recovery from anesthesia you may not fully remember or fully understand what was said  We will go over this again when you return for your post-op appointment    Please contact Dr Manuel Avilez office if you experience the following:  Excessive bleeding (bleeding through your dressing)  Fever greater than 101 degrees F after the first 2 days (a slight fever is normal the first few days after surgery)  Persistent nausea or vomiting  Decreased sensation or discoloration of the operative limb  Pain or swelling that is getting worse and not better with medication    Dr Carlos Enrique Hopper: [phone]

## 2023-05-18 ENCOUNTER — HOME HEALTH ADMISSION (OUTPATIENT)
Dept: HOME HEALTH SERVICES | Facility: HOME HEALTHCARE | Age: 58
End: 2023-05-18
Payer: COMMERCIAL

## 2023-05-18 VITALS
BODY MASS INDEX: 36.72 KG/M2 | OXYGEN SATURATION: 95 % | WEIGHT: 207.23 LBS | TEMPERATURE: 97.3 F | HEART RATE: 91 BPM | DIASTOLIC BLOOD PRESSURE: 55 MMHG | RESPIRATION RATE: 12 BRPM | HEIGHT: 63 IN | SYSTOLIC BLOOD PRESSURE: 112 MMHG

## 2023-05-18 LAB
ANION GAP SERPL CALCULATED.3IONS-SCNC: 8 MMOL/L (ref 4–13)
BUN SERPL-MCNC: 10 MG/DL (ref 5–25)
CALCIUM SERPL-MCNC: 9.3 MG/DL (ref 8.4–10.2)
CHLORIDE SERPL-SCNC: 100 MMOL/L (ref 96–108)
CO2 SERPL-SCNC: 27 MMOL/L (ref 21–32)
CREAT SERPL-MCNC: 0.71 MG/DL (ref 0.6–1.3)
ERYTHROCYTE [DISTWIDTH] IN BLOOD BY AUTOMATED COUNT: 13.4 % (ref 11.6–15.1)
GFR SERPL CREATININE-BSD FRML MDRD: 93 ML/MIN/1.73SQ M
GLUCOSE P FAST SERPL-MCNC: 126 MG/DL (ref 65–99)
GLUCOSE SERPL-MCNC: 126 MG/DL (ref 65–140)
HCT VFR BLD AUTO: 33 % (ref 34.8–46.1)
HGB BLD-MCNC: 10.9 G/DL (ref 11.5–15.4)
MCH RBC QN AUTO: 28.5 PG (ref 26.8–34.3)
MCHC RBC AUTO-ENTMCNC: 33 G/DL (ref 31.4–37.4)
MCV RBC AUTO: 86 FL (ref 82–98)
PLATELET # BLD AUTO: 286 THOUSANDS/UL (ref 149–390)
PMV BLD AUTO: 10.5 FL (ref 8.9–12.7)
POTASSIUM SERPL-SCNC: 3.9 MMOL/L (ref 3.5–5.3)
RBC # BLD AUTO: 3.83 MILLION/UL (ref 3.81–5.12)
SODIUM SERPL-SCNC: 135 MMOL/L (ref 135–147)
WBC # BLD AUTO: 11.28 THOUSAND/UL (ref 4.31–10.16)

## 2023-05-18 RX ADMIN — HYDROCODONE BITARTRATE AND ACETAMINOPHEN 2 TABLET: 5; 325 TABLET ORAL at 15:15

## 2023-05-18 RX ADMIN — DOCUSATE SODIUM 100 MG: 100 CAPSULE, LIQUID FILLED ORAL at 08:36

## 2023-05-18 RX ADMIN — HYDROCODONE BITARTRATE AND ACETAMINOPHEN 1 TABLET: 5; 325 TABLET ORAL at 00:56

## 2023-05-18 RX ADMIN — CLINDAMYCIN PHOSPHATE 600 MG: 600 INJECTION, SOLUTION INTRAVENOUS at 00:48

## 2023-05-18 RX ADMIN — SENNOSIDES 8.6 MG: 8.6 TABLET, FILM COATED ORAL at 08:36

## 2023-05-18 RX ADMIN — ASPIRIN 81 MG: 81 TABLET, COATED ORAL at 08:36

## 2023-05-18 RX ADMIN — MORPHINE SULFATE 2 MG: 2 INJECTION, SOLUTION INTRAMUSCULAR; INTRAVENOUS at 10:50

## 2023-05-18 RX ADMIN — HYDROCODONE BITARTRATE AND ACETAMINOPHEN 1 TABLET: 5; 325 TABLET ORAL at 09:12

## 2023-05-18 RX ADMIN — SUCRALFATE 1 G: 1 TABLET ORAL at 06:11

## 2023-05-18 RX ADMIN — SODIUM CHLORIDE, SODIUM LACTATE, POTASSIUM CHLORIDE, AND CALCIUM CHLORIDE 100 ML/HR: .6; .31; .03; .02 INJECTION, SOLUTION INTRAVENOUS at 00:48

## 2023-05-18 NOTE — PROGRESS NOTES
-- Patient:  -- MRN: [id number]  -- Aidin Request ID: [id number]  -- Level of care reserved: [facility]  -- Partner Reserved: [facility] [place], [address] [place] [address] [phone]  -- Clinical needs requested:  -- Geography searched: [id number]  -- Start of Service:  -- Request sent: 11:57am EDT on 5/18/2023 by Usman Chatterjee  -- Partner reserved: 12:07pm EDT on 5/18/2023 by Usman Chatterjee  -- Choice list shared: 12:07pm EDT on 5/18/2023 by Usman Chatterjee

## 2023-05-18 NOTE — PLAN OF CARE
Problem: MOBILITY - ADULT  Goal: Maintain or return to baseline ADL function  Description: INTERVENTIONS:  -  Assess patient's ability to carry out ADLs; assess patient's baseline for ADL function and identify physical deficits which impact ability to perform ADLs (bathing, care of mouth/teeth, toileting, grooming, dressing, etc )  - Assess/evaluate cause of self-care deficits   - Assess range of motion  - Assess patient's mobility; develop plan if impaired  - Assess patient's need for assistive devices and provide as appropriate  - Encourage maximum independence but intervene and supervise when necessary  - Involve family in performance of ADLs  - Assess for home care needs following discharge   - Consider OT consult to assist with ADL evaluation and planning for discharge  - Provide patient education as appropriate  Outcome: Progressing  Goal: Maintains/Returns to pre admission functional level  Description: INTERVENTIONS:  - Perform BMAT or MOVE assessment daily    - Set and communicate daily mobility goal to care team and patient/family/caregiver  - Collaborate with rehabilitation services on mobility goals if consulted  - Perform Range of Motion 2 times a day  - Reposition patient every 2 hours    - Dangle patient 2 times a day  - Stand patient 2 times a day  - Ambulate patient 2 times a day  - Out of bed to chair 2 times a day   - Out of bed for meals 2 times a day  - Out of bed for toileting  - Record patient progress and toleration of activity level   Outcome: Progressing     Problem: PAIN - ADULT  Goal: Verbalizes/displays adequate comfort level or baseline comfort level  Description: Interventions:  - Encourage patient to monitor pain and request assistance  - Assess pain using appropriate pain scale  - Administer analgesics based on type and severity of pain and evaluate response  - Implement non-pharmacological measures as appropriate and evaluate response  - Consider cultural and social influences on pain and pain management  - Notify physician/advanced practitioner if interventions unsuccessful or patient reports new pain  Outcome: Progressing     Problem: INFECTION - ADULT  Goal: Absence or prevention of progression during hospitalization  Description: INTERVENTIONS:  - Assess and monitor for signs and symptoms of infection  - Monitor lab/diagnostic results  - Monitor all insertion sites, i e  indwelling lines, tubes, and drains  - Monitor endotracheal if appropriate and nasal secretions for changes in amount and color  - [place] appropriate cooling/warming therapies per order  - Administer medications as ordered  - Instruct and encourage patient and family to use good hand hygiene technique  - Identify and instruct in appropriate isolation precautions for identified infection/condition  Outcome: Progressing  Goal: Absence of fever/infection during neutropenic period  Description: INTERVENTIONS:  - Monitor WBC    Outcome: Progressing     Problem: SAFETY ADULT  Goal: Maintain or return to baseline ADL function  Description: INTERVENTIONS:  -  Assess patient's ability to carry out ADLs; assess patient's baseline for ADL function and identify physical deficits which impact ability to perform ADLs (bathing, care of mouth/teeth, toileting, grooming, dressing, etc )  - Assess/evaluate cause of self-care deficits   - Assess range of motion  - Assess patient's mobility; develop plan if impaired  - Assess patient's need for assistive devices and provide as appropriate  - Encourage maximum independence but intervene and supervise when necessary  - Involve family in performance of ADLs  - Assess for home care needs following discharge   - Consider OT consult to assist with ADL evaluation and planning for discharge  - Provide patient education as appropriate  Outcome: Progressing  Goal: Maintains/Returns to pre admission functional level  Description: INTERVENTIONS:  - Perform BMAT or MOVE assessment daily    - Set and communicate daily mobility goal to care team and patient/family/caregiver  - Collaborate with rehabilitation services on mobility goals if consulted  - Perform Range of Motion 2 times a day  - Reposition patient every 2 hours    - Dangle patient 2 times a day  - Stand patient 2 times a day  - Ambulate patient 2 times a day  - Out of bed to chair 2 times a day   - Out of bed for meals 2 times a day  - Out of bed for toileting  - Record patient progress and toleration of activity level   Outcome: Progressing  Goal: Patient will remain free of falls  Description: INTERVENTIONS:  - Educate patient/family on patient safety including physical limitations  - Instruct patient to call for assistance with activity   - Consult OT/PT to assist with strengthening/mobility   - Keep Call bell within reach  - Keep bed low and locked with side rails adjusted as appropriate  - Keep care items and personal belongings within reach  - Initiate and maintain comfort rounds  - Make Fall Risk Sign visible to staff  - Offer Toileting every 2 Hours, in advance of need  - Initiate/Maintain bed alarm  - Obtain necessary fall risk management equipment: call bell usage  - Apply yellow socks and bracelet for high fall risk patients  - Consider moving patient to room near nurses station  Outcome: Progressing

## 2023-05-18 NOTE — PLAN OF CARE
Problem: PHYSICAL THERAPY ADULT  Goal: Performs mobility at highest level of function for planned discharge setting  See evaluation for individualized goals  Description: Treatment/Interventions: Functional transfer training, LE strengthening/ROM, Elevations, Therapeutic exercise, Endurance training, Patient/family training, Equipment eval/education, Bed mobility, Gait training, Spoke to nursing, OT          See flowsheet documentation for full assessment, interventions and recommendations  Outcome: Progressing  Note: Prognosis: Good  Problem List: Decreased strength, Decreased range of motion, Decreased endurance, Impaired balance, Decreased mobility, Obesity, Orthopedic restrictions, Pain  Assessment: Patient was received supine in bed  Reports of high pain levels  Patient progressed since initial evaluation  She is not at a mod I/ supervision level for all mobility  She continues to have dizziness and nausea but BP was stable  Patient takes rest breaks as needed  She ambulated a household distance with a step to pattern  She also performed steps with verbal cues for technique and sequencing  She recalled all hip precautions  Patient will continue to benefit from ongoing inpatient P T  Recommending level 3  The patient's AM-PAC Basic Mobility Inpatient Short Form Raw Score is 21  A Raw score of greater than 17 suggests the patient may benefit from discharge to home  Please also refer to the recommendation of the Physical Therapist for safe discharge planning  Barriers to Discharge: None  Barriers to Discharge Comments: Will need to further evaluate mobility and will need a stair trial prior to discharge  See flowsheet documentation for full assessment

## 2023-05-18 NOTE — OCCUPATIONAL THERAPY NOTE
Occupational Therapy Treatment Note     Patient Name: Marietta Speaker  [id number] Date: 5/18/2023  Problem List  Principal Problem:    Primary osteoarthritis of both hips              05/18/23 1032   OT Last Visit   OT Visit Date 05/18/23   Note Type   Note Type Treatment   Pain Assessment   Pain Assessment Tool 0-10   Pain Score 8   Pain Location/Orientation Orientation: Left; Location: Hip   Patient's Stated Pain Goal No pain   Hospital Pain Intervention(s) Repositioned; Ambulation/increased activity   Restrictions/Precautions   Weight Bearing Precautions Per Order Yes   LLE Weight Bearing Per Order WBAT  (Posterior hip precautions)   Other Precautions Pain; Fall Risk;Bed Alarm; Chair Alarm   ADL   LB Dressing Assistance 2  Maximal Assistance   LB Dressing Deficit Don/doff R sock; Don/doff L sock   LB Dressing Comments Pt has hip kit at home that she received from previous TH[facility] (January 2023)  Pt reports still using devices for LB dressing  Pt denies need for physical practice/demo as she still utilizes LHAE at baseline  Reviewed compensatory strategies with pt  Pt able to independently recall said strategies  Toileting Assistance  5  Supervision/Setup   Toileting Deficit Grab bar use; Increased time to complete;Perineal hygiene;Supervison/safety   Bed Mobility   Rolling R 5  Supervision   Additional items Bedrails; Increased time required;Verbal cues  (Pt has bedrail at home)   Supine to Sit 5  Supervision   Additional items Bedrails;HOB elevated   Additional Comments Pt reports difficulty lifting LLE into bed  Educated pt on different methods of lifting LLE into bed Mod I (cane, towel, assisting with RLE)  Pt & dtr verbalize understanding   Transfers   Sit to Stand 5  Supervision   Additional items Bedrails   Stand to Sit 5  Supervision   Additional items Armrests   Toilet transfer 4  Minimal assistance   Additional items Assist x 1;Standard toilet; Increased time required;Armrests  (Bariatric toilet as pt in "bariatric room, CGA)   Additional Comments Multiple sit <> stands completed  VCs for hand placement   Functional Mobility   Functional Mobility 5  Supervision   Additional Comments Functional household distance   Additional items Rolling walker   Subjective   Subjective \"I don't do well with pain meds\"  (Dtr present during session)   Cognition   Overall Cognitive Status WFL   Arousal/Participation Alert   Attention Within functional limits   Orientation Level Oriented X4   Memory Within functional limits   Following Commands Follows all commands and directions without difficulty   Additional Activities   Additional Activities Comments Pt able to ascend/descent 2 standard height steps with rail & S to demo safe entry/exit into home  States son will help if needed   Activity Tolerance   Activity Tolerance Patient tolerated treatment well   Medical Staff Made Aware PT JEANNETTE Elizalde Saul   Assessment   Assessment Pt seen for OT tx session with focus on functional balance, functional mobility, ADL status, and transfer safety  Patient agreeable to OT treatment session  Pt received supine in bed  Pt completed bed mobility with S  All functional txfers performed with S & VCs for hand placement  Pt completed toilet txfer with Min Ax1 for CGA  Pt completed functional mobility for a functional household distance with S & RW  Pt requiring Max A for LB dressing this session, however, pt utilizes hip kit at baseline & reports feeling comfortable with LHAE use & denies need for practice  Patient continues to be functioning below baseline level, occupational performance remains limited secondary to factors listed above, and pt at increased risk for falls and injury  The patient's raw score on the AM-PAC Daily Activity inpatient short form is 21, standardized score is 44 27, greater than 39 4  Patients at this level are likely to benefit from DC to home   Please refer to the recommendation of the Occupational Therapist for safe DC " planning  From OT standpoint, recommendation at time of D/C would be DC with level 4 resources  Patient to benefit from continued Occupational Therapy treatment while in the hospital to address deficits as defined above and maximize level of functional independence with ADLs and functional mobility  Pt left seated OOB to Recliner with call bell in reach, tray table in reach, needs met, chair alarm activated, SCDs on, RN informed  Plan   Treatment Interventions ADL retraining;Functional transfer training; Endurance training;Equipment evaluation/education;Patient/family training; Compensatory technique education;Continued evaluation   Goal Expiration Date 05/27/23   OT Treatment Day 1   OT Frequency 2-3x/wk   Recommendation   UB Rehab Discharge Recommendation (PT/OT) Level 4   AM-PAC Daily Activity Inpatient   Lower Body Dressing 3   Bathing 3   Toileting 3   Upper Body Dressing 4   Grooming 4   Eating 4   Daily Activity Raw Score 21   Daily Activity Standardized Score (Calc for Raw Score >=11) 44 27   AM-PAC Applied Cognition Inpatient   Following a Speech/Presentation 4   Understanding Ordinary Conversation 4   Taking Medications 4   Remembering Where Things Are Placed or Put Away 4   Remembering List of 4-5 Errands 4   Taking Care of Complicated Tasks 4   Applied Cognition Raw Score 24   Applied Cognition Standardized Score 62 21   End of Consult   Education Provided Yes;Family or social support of family present for education by provider   Patient Position at End of Consult Bedside chair;Bed/Chair alarm activated; All needs within reach   Nurse Communication Nurse aware of consult     Gabrielle Tafoya OTR/L

## 2023-05-18 NOTE — SPEECH THERAPY NOTE
Speech Language/Pathology    Speech-Language Pathology Bedside Swallow Evaluation      Patient Name: Tri Banda    FQFRK'L Date: 2023     Problem List  Principal Problem:    Primary osteoarthritis of both hips      Past Medical History  Past Medical History:   Diagnosis Date   • Arthritis    • GERD (gastroesophageal reflux disease)    • Hyperlipidemia    • Hypertension    • Irritable bowel syndrome    • Low back pain    • PONV (postoperative nausea and vomiting)        Past Surgical History  Past Surgical History:   Procedure Laterality Date   • CATARACT EXTRACTION Bilateral    •  SECTION N/A    • CHOLECYSTECTOMY     • COLONOSCOPY     • HERNIA REPAIR     • HYSTERECTOMY     • NASAL SEPTUM SURGERY     • MS ARTHRP ACETBLR/PROX FEM PROSTC AGRFT/ALGRFT Right 2023    Procedure: ARTHROPLASTY HIP TOTAL;  Surgeon: Lee Vicente DO;  Location: [facility];  Service: Orthopedics   • MS ARTHRP ACETBLR/PROX FEM PROSTC AGRFT/ALGRFT Left 2023    Procedure: ARTHROPLASTY HIP TOTAL;  Surgeon: Lee Vicente DO;  Location: [facility];  Service: Orthopedics   • TONSILLECTOMY     • UPPER GASTROINTESTINAL ENDOSCOPY         Summary   Pt presented with functional appearing oral and pharyngeal stage swallowing skills with materials administered today  Bite strength is adequate for all  Mastication and oral organization is timely and effective  No oral residue  Swallows suspected timely  No overt s/s aspiration across trials  Pt does endorse esophageal globus sensation, primarily w/ pills  Pt seen w/ pills w/ thin liquid (administered by RN), no overt difficulties  Education provided on strategies to optimize swallow safety and s/s oropharyngeal dysphagia/aspiration to notify medical team of shuold they arise       Risk/s for Aspiration: Minimal      Recommended Diet: regular diet and thin liquids   Recommended Form of Meds: Whole w/ liquid as tolerate, pt reoprts she splits large pills Aspiration precautions and swallowing strategies: upright posture, only feed when fully alert and slow rate of feeding  Other Recommendations: Continue frequent oral care        Current Medical Status  Pt is a 59 y o  female who presented to [facility] with  admitted 5/17/2023 w/ Primary osteoarthritis of both hips  Pt is POD#1 L MAME with posterior hip precautions  Comorbidities at time of assessment include: recent R MAME, lumbar spondylosis, GERD, chronic pain syndrome, HTN, HLD, PONV, nausea, dysphagia, chronic BL LBP  RN reports coughing w/ PO meds yesterday  Current Precautions:  Posterior hip     Allergies:  No known food allergies    Past medical history:  Please see H&P for details    Special Studies:  FL Barium Swallow Routine esophagus 5/5/23: Small to moderate sliding-type hiatal hernia  Gastroesophageal reflux      Very early or small pulsion (false) diverticulum along the right side of the mid to distal esophageal wall      Although numerous tertiary esophageal contractions are present, esophageal caliber is normal and there is no delayed emptying of enteric contrast from the esophagus into the stomach  Social/Education/Vocational Hx:  Pt lives with family    Swallow Information   Current Risks for Dysphagia & Aspiration: known history of dysphagia  Current Symptoms/Concerns: coughing w/ pills  Current Diet: regular diet and thin liquids   Baseline Diet: regular diet and thin liquids      Baseline Assessment   Behavior/Cognition: alert  Speech/Language Status: able to participate in conversation and able to follow commands  Patient Positioning: upright in bed  Pain Status/Interventions/Response to Interventions:  No report of or nonverbal indications of pain         Swallow Mechanism Exam  Facial: symmetrical  Labial: WFL  Lingual: WFL  Velum: symmetrical  Mandible:  decreased ROM and pt reports mandible pain and following ENT  Dentition: adequate  Vocal quality:clear/adequate Volitional Cough: strong/productive   Respiratory Status: on RA    Consistencies Assessed and Performance   Consistencies Administered: thin liquids, puree, soft solids, hard solids and pills whole w/ thin     Oral Stage: WFL  Mastication was adequate with the materials administered today  Bolus formation and transfer were functional with no significant oral residue noted  No overt s/s reduced oral control  Pharyngeal Stage: WFL  Swallow Mechanics:  Swallowing initiation appeared prompt  Laryngeal rise was palpated and judged to be within functional limits  No coughing, throat clearing, change in vocal quality or respiratory status noted today  Esophageal Concerns: globus sensation    Strategies and Efficacy: -     Summary and Recommendations (see above)    Results Reviewed with: patient and RN     Treatment Recommended: Not at this time, evaluation only  Please re-consult if medically necessary

## 2023-05-18 NOTE — CASE MANAGEMENT
Case Management Assessment & Discharge Planning Note    Patient name Leslye Duncan  Location /-26 MRN [id number]  : 1965 Date 2023       Current Admission Date: 2023  Current Admission Diagnosis:Primary osteoarthritis of both hips   Patient Active Problem List    Diagnosis Date Noted   • Nausea 2023   • Dysphagia 2023   • PONV (postoperative nausea and vomiting) 2023   • Primary hypertension 2022   • Mixed hyperlipidemia 2022   • Chronic pain syndrome 2022   • Chronic bilateral low back pain without sciatica 2022   • Diffuse pain 2022   • GERD (gastroesophageal reflux disease)    • Primary osteoarthritis of both hips 2022   • Bilateral hip pain    • Greater trochanteric bursitis of both hips    • Lumbar spondylosis       LOS (days): 0  Geometric Mean LOS (GMLOS) (days):   Days to GMLOS:     OBJECTIVE:              Current admission status: Outpatient Surgery       Preferred Pharmacy:   [facility] [address] [facility] [address] - [address]  [address]    [place] [address]  Phone: [phone] Fax: [phone]    Primary Care Provider: Tiana Win DO    Primary Insurance: BLUE CROSS  Secondary Insurance:     ASSESSMENT:  Josh Mcallister 116 Representative - Son   Primary Phone: [phone] (Mobile)               Advance Directives  Does patient have a [address] [place] [address] Avenue?: No  Was patient offered paperwork?: Yes (pt declined)  Does patient currently have a Health Care decision maker?: Yes, please see Health Care Proxy section  Does patient have Advance Directives?: No  Was patient offered paperwork?: Yes (pt declined)  Primary Contact: Son, Edgar Valencia and Dtr, Tena Lehman         Readmission Root Cause  30 Day Readmission: No    Patient Information  Admitted from[de-identified] Home  Mental Status: Alert  During Assessment patient was accompanied by: Not accompanied during assessment  Assessment information provided by[de-identified] Patient  Primary Caregiver: Self  Support Systems: Son, Daughter, [address] [place] of Residence: [address] [place] [address] do you live in?: [address] [facility] entry access options   Select all that apply : Stairs  Number of steps to enter home : 4  Do the steps have railings?: Yes  Type of Current Residence: Ranch  In the last 12 months, was there a time when you were not able to pay the mortgage or rent on time?: No  In the last 12 months, how many places have you lived?: 1  In the last 12 months, was there a time when you did not have a steady place to sleep or slept in a shelter (including now)?: No  Homeless/housing insecurity resource given?: N/A  Living Arrangements: Lives w/ Son    Activities of Daily Living Prior to Admission  Functional Status: Independent  Completes ADLs independently?: Yes  Ambulates independently?: Yes  Does patient use assisted devices?: Yes  Assisted Devices (DME) used: Racquel Broderick  Does patient currently own DME?: Yes  What DME does the patient currently own?: Racquel Broderick  Does patient have a history of Outpatient Therapy (PT/OT)?: Yes (NATALIYA Salter)  Does the patient have a history of Short-Term Rehab?: No  Does patient have a history of HHC?: No  Does patient currently have [place] [id number]?: No         Patient Information Continued  Income Source: Employed  Does patient have prescription coverage?: Yes  Within the past 12 months, you worried that your food would run out before you got the money to buy more : Never true  Within the past 12 months, the food you bought just didn't last and you didn't have money to get more : Never true  Food insecurity resource given?: N/A  Does patient receive dialysis treatments?: No  Does patient have a history of substance abuse?: No  Does patient have a history of Mental Health Diagnosis?: No         Means of Transportation  Means of Transport to Appts[de-identified] Family transport  In the past 12 months, has lack of transportation kept you from medical appointments or from getting medications?: No  In the past 12 months, has lack of transportation kept you from meetings, work, or from getting things needed for daily living?: No  Was application for public transport provided?: N/A        DISCHARGE DETAILS:    Discharge planning discussed with[de-identified] Patient  Freedom of Choice: Yes  Comments - Freedom of Choice: Pt prefers [facility]  CM contacted family/caregiver?: No- see comments (Pt reports being in contact with son and dtr)  Were Treatment Team discharge recommendations reviewed with patient/caregiver?: Yes  Did patient/caregiver verbalize understanding of patient care needs?: Yes  Were patient/caregiver advised of the risks associated with not following Treatment Team discharge recommendations?: Yes         [address]         Is the patient interested in Mackenzie Monahan at discharge?: Yes  Via Ghada Man requested[de-identified] Occupational Therapy, Physical [address] Name[de-identified] [facility] Provider[de-identified] PCP  Home Health Services Needed[de-identified] Strengthening/Theraputic Exercises to Improve Function, Gait/ADL Training, Evaluate Functional Status and Safety  Homebound Criteria Met[de-identified] Uses an Assist Device (i e  cane, walker, etc)  Supporting Clincal Findings[de-identified] Limited Endurance    DME Referral Provided  Referral made for DME?: No    Other Referral/Resources/Interventions Provided:  Interventions: Elenadariou 78         Treatment Team Recommendation: Home with [facility]  Discharge Destination Plan[de-identified] Home with Luis at Discharge : Family                                      Additional Comments: Met with pt to discuss role of CM and any needs prior to discharge  PT resides w/ son in ranch style home w/ 4 JOE  Dtr is coming to stay with her as well  PT owns/uses cane, walker  [facility] PT at [facility]  No hx STR/HH/DA/MH  Pt uses [facility] and is vaccinated for Covid   Therapy is recommending [place] and pt prefers [facility]  Referral made in [place]  Dtr will transport at discharge

## 2023-05-18 NOTE — PROGRESS NOTES
"Progress Note - Orthopedics   Reza Morales 59 y o  female MRN: [id number]  Unit/Bed#: -01 Encounter: [id number]    Assessment:  58 y/o female POD1 s/p Left MAME, posterior approach    Plan:  · Pain control prn  · PT/OT  · WBAT LLE  · Posterior hip precautions   · Hemoglobin 10 9 this morning  No signs of bleeding in the operative extremity at this time  Will continue to monitor at this time  · Aspirin 81mg twice daily/SCDs for DVT prophylaxis  Patient should be discharged with aspirin 81mg twice daily x1 month  · IV Abx x 24h, then doxycycline x 5 days  · Patient is stable from an orthopedic standpoint  He require follow-up with Dr Lala Shearer in the office  Subjective: Patient seen and examined at the bedside this morning  She is laying comfortably in bed  She states that she is having some pain in the left hip, but overall she is doing well  She feels that her pain is well controlled with the hydrocodone and she is better tolerating this than the oxycodone she was on for her last surgery  She is anticipating working with PT this morning and hoping to be able to go home later today  She denies distal numbness and tingling, chest pain, fevers, or chills  She notices sometimes she needs to take deeper breaths, she does normally take albuterol for allergy induced asthma  No acute events overnight  Vitals: Blood pressure 150/78, pulse 98, temperature 97 7 °F (36 5 °C), temperature source Temporal, resp  rate 16, height 5' 3\" (1 6 m), weight 94 kg (207 lb 3 7 oz), SpO2 95 %, not currently breastfeeding  ,Body mass index is 36 71 kg/m²  Intake/Output Summary (Last 24 hours) at 5/18/2023 1113  Last data filed at 5/18/2023 0820  Gross per 24 hour   Intake 1550 ml   Output 125 ml   Net 1425 ml       Invasive Devices     Peripheral Intravenous Line  Duration           Peripheral IV 05/17/23 Dorsal (posterior); Left Hand 1 day                Ortho Exam:   Left hip:  mepilex C/D/I, thigh soft " and compressible    Minimal pain with log roll  sensation grossly intact L4, L5, S1, palpable pedal pulse, EHL/AT/GS intact      Lab, Imaging and other studies:   CBC:   Lab Results   Component Value Date    WBC 11 28 (H) 05/18/2023    HGB 10 9 (L) 05/18/2023    HCT 33 0 (L) 05/18/2023    MCV 86 05/18/2023     05/18/2023    MCH 28 5 05/18/2023    MCHC 33 0 05/18/2023    RDW 13 4 05/18/2023    MPV 10 5 05/18/2023     CMP:   Lab Results   Component Value Date    SODIUM 135 05/18/2023     05/18/2023    CO2 27 05/18/2023    BUN 10 05/18/2023    CREATININE 0 71 05/18/2023    CALCIUM 9 3 05/18/2023    EGFR 93 05/18/2023          Semaj James PA-C

## 2023-05-18 NOTE — PHYSICAL THERAPY NOTE
"                                                                                  PHYSICAL THERAPY NOTE       05/18/23 1033   PT Last Visit   PT Visit Date 05/18/23   Note Type   Note Type Treatment   Pain Assessment   Pain Assessment Tool 0-10   Pain Score 8   Pain Location/Orientation Orientation: Left; Location: Hip   Hospital Pain Intervention(s) Repositioned;Medication (See MAR)   Precautions   Total Hip Replacement ADduction; Internal rotation; Flexion   Restrictions/Precautions   Weight Bearing Precautions Per Order Yes   LLE Weight Bearing Per Order WBAT   Other Precautions Pain; Fall Risk; Chair Alarm; Bed Alarm   General   Chart Reviewed Yes   Response to Previous Treatment Patient with no complaints from previous session  Family/Caregiver Present Yes   Cognition   Overall Cognitive Status WFL   Arousal/Participation Alert   Attention Within functional limits   Orientation Level Oriented X4   Memory Within functional limits   Following Commands Follows all commands and directions without difficulty   Subjective   Subjective \"The pain got bad around 9:00\"   Bed Mobility   Supine to Sit 5  Supervision   Additional items HOB elevated; Bedrails; Increased time required   Transfers   Sit to Stand 5  Supervision   Additional items Armrests   Stand to Sit 5  Supervision   Additional items Armrests   Additional Comments Multiple transfers performed during session  Verbal cues for hand placement  Ambulation/Elevation   Gait pattern Decreased hip extension  (Step to pattern, decreased stance on the L, Antalgic gait  Heavy reliance on UE's on RW )   Gait Assistance 5  Supervision   Additional items Verbal cues   Assistive Device Rolling walker   Distance 125ft   Stair Management Assistance 5  Supervision   Additional items Verbal cues  (Verbal cues for technique and sequencing)   Stair Management Technique Two rails   Number of Stairs 2   Ambulation/Elevation Additional Comments Son to assist in/out of home     Balance " Static Sitting Normal   Dynamic Sitting Good   Static Standing Good   Dynamic Standing Fair +   Ambulatory Fair +   Endurance Deficit   Endurance Deficit Yes   Endurance Deficit Description Limited by pain   Activity Tolerance   Activity Tolerance Patient limited by pain   Medical Staff Made Aware Co-treat with Josh GARDNER 38   Nurse Made Aware Saul AYALA   Assessment   Prognosis Good   Problem List Decreased strength;Decreased range of motion;Decreased endurance; Impaired balance;Decreased mobility;Obesity;Orthopedic restrictions;Pain   Assessment Patient was received supine in bed  Reports of high pain levels  Patient progressed since initial evaluation  She is not at a mod I/ supervision level for all mobility  She continues to have dizziness and nausea but BP was stable  Patient takes rest breaks as needed  She ambulated a household distance with a step to pattern  She also performed steps with verbal cues for technique and sequencing  She recalled all hip precautions  Patient will continue to benefit from ongoing inpatient P T  Recommending level 3  The patient's AM-[facility] Basic Mobility Inpatient Short Form Raw Score is 21  A Raw score of greater than 17 suggests the patient may benefit from discharge to home  Please also refer to the recommendation of the Physical Therapist for safe discharge planning  Barriers to Discharge None   Goals   Patient Goals To go home today   STG Expiration Date 05/19/23   PT Treatment Day 2   Plan   Treatment/Interventions Functional transfer training;LE strengthening/ROM; Elevations; Therapeutic exercise; Endurance training;Patient/family training;Equipment eval/education; Bed mobility;Gait training;Spoke to nursing;Spoke to MD;Spoke to advanced practitioner;OT   Progress Progressing toward goals   PT Frequency Twice a day   Recommendation   UB Rehab Discharge Recommendation (PT/OT) Level 3   AM-PAC Basic Mobility Inpatient   Turning in Flat Bed Without Bedrails 4   Lying on Back to Sitting on Edge of Flat Bed Without Bedrails 4   Moving Bed to Chair 4   Standing Up From Chair Using Arms 3   Walk in Room 3   Climb 3-5 Stairs With Railing 3   Basic Mobility Inpatient Raw Score 21   Basic Mobility Standardized Score 45 55   Highest Level Of Mobility   -HLM Goal 6: Walk 10 steps or more   JH-HLM Achieved 7: Walk 25 feet or more   Education   Education Provided Mobility training;Assistive device;Precautions for total hip arthroplasty (MAME)   Patient Demonstrates acceptance/verbal understanding   End of Consult   Patient Position at End of Consult Bedside chair;Bed/Chair alarm activated; All needs within reach  (LE's elevated )   Carolina Garcia            Patient Name: Marisa Adam  EEJJX'O Date: 5/18/2023

## 2023-05-18 NOTE — PLAN OF CARE
Problem: MOBILITY - ADULT  Goal: Maintain or return to baseline ADL function  Description: INTERVENTIONS:  -  Assess patient's ability to carry out ADLs; assess patient's baseline for ADL function and identify physical deficits which impact ability to perform ADLs (bathing, care of mouth/teeth, toileting, grooming, dressing, etc )  - Assess/evaluate cause of self-care deficits   - Assess range of motion  - Assess patient's mobility; develop plan if impaired  - Assess patient's need for assistive devices and provide as appropriate  - Encourage maximum independence but intervene and supervise when necessary  - Involve family in performance of ADLs  - Assess for home care needs following discharge   - Consider OT consult to assist with ADL evaluation and planning for discharge  - Provide patient education as appropriate  Outcome: Progressing     Problem: PAIN - ADULT  Goal: Verbalizes/displays adequate comfort level or baseline comfort level  Description: Interventions:  - Encourage patient to monitor pain and request assistance  - Assess pain using appropriate pain scale  - Administer analgesics based on type and severity of pain and evaluate response  - Implement non-pharmacological measures as appropriate and evaluate response  - Consider cultural and social influences on pain and pain management  - Notify physician/advanced practitioner if interventions unsuccessful or patient reports new pain  Outcome: Progressing     Problem: INFECTION - ADULT  Goal: Absence or prevention of progression during hospitalization  Description: INTERVENTIONS:  - Assess and monitor for signs and symptoms of infection  - Monitor lab/diagnostic results  - Monitor all insertion sites, i e  indwelling lines, tubes, and drains  - Monitor endotracheal if appropriate and nasal secretions for changes in amount and color  - Kimball appropriate cooling/warming therapies per order  - Administer medications as ordered  - Instruct and encourage patient and family to use good hand hygiene technique  - Identify and instruct in appropriate isolation precautions for identified infection/condition  Outcome: Progressing

## 2023-05-18 NOTE — PLAN OF CARE
Problem: OCCUPATIONAL THERAPY ADULT  Goal: Performs self-care activities at highest level of function for planned discharge setting  See evaluation for individualized goals  Description: Treatment Interventions: ADL retraining, Functional transfer training, Endurance training, Equipment evaluation/education, Patient/family training, Compensatory technique education, Continued evaluation          See flowsheet documentation for full assessment, interventions and recommendations  Outcome: Progressing  Note: Limitation: Decreased ADL status, Decreased Safe judgement during ADL, Decreased endurance, Decreased high-level ADLs, Decreased self-care trans  Prognosis: Good  Assessment: Pt seen for OT tx session with focus on functional balance, functional mobility, ADL status, and transfer safety  Patient agreeable to OT treatment session  Pt received supine in bed  Pt completed bed mobility with S  All functional txfers performed with S & VCs for hand placement  Pt completed toilet txfer with Min Ax1 for CGA  Pt completed functional mobility for a functional household distance with S & RW  Pt requiring Max A for LB dressing this session, however, pt utilizes hip kit at baseline & reports feeling comfortable with LHAE use & denies need for practice  Patient continues to be functioning below baseline level, occupational performance remains limited secondary to factors listed above, and pt at increased risk for falls and injury  The patient's raw score on the AM-PAC Daily Activity inpatient short form is 21, standardized score is 44 27, greater than 39 4  Patients at this level are likely to benefit from DC to home  Please refer to the recommendation of the Occupational Therapist for safe DC planning  From OT standpoint, recommendation at time of D/C would be DC with level 4 resources   Patient to benefit from continued Occupational Therapy treatment while in the hospital to address deficits as defined above and maximize level of functional independence with ADLs and functional mobility  Pt left seated OOB to Recliner with call bell in reach, tray table in reach, needs met, chair alarm activated, SCDs on, RN informed

## 2023-05-18 NOTE — NURSING NOTE
Patient stated they feel feverish during DC paperwork  Spoke with provider  Stated they don't have fever based off of baseline temperatures and current temperatures  Continuing with DC per orders

## 2023-05-19 ENCOUNTER — TELEPHONE (OUTPATIENT)
Dept: OBGYN CLINIC | Facility: HOSPITAL | Age: 58
End: 2023-05-19

## 2023-05-19 ENCOUNTER — TRANSCRIBE ORDERS (OUTPATIENT)
Dept: HOME HEALTH SERVICES | Facility: HOME HEALTHCARE | Age: 58
End: 2023-05-19

## 2023-05-19 DIAGNOSIS — M16.0 PRIMARY OSTEOARTHRITIS OF BOTH HIPS: Primary | ICD-10-CM

## 2023-05-19 NOTE — TELEPHONE ENCOUNTER
Patient contacted for a postoperative follow up assessment  Patient reports 10/10 pain when sitting and 10/10 when walking with RW  Patient states she is in excruciating pain and it is much worse than her last hip replacement in January  Patient is taking Tylenol 1000mg every 8 hours, Percocet 5-325mg PRN, Celebrex 200mg BID, ASA 81mg BID, Sennakot daily  Patient has not yet had a BM but is passing gas  Patient reports increase in swelling but dressing is clean, dry and intact  Patient is icing the site regularly  Patient reports nausea  Denies vomiting, abdominal pain, chest pain, shortness of breath, fever, dizziness and calf pain  Patient confirmed post-op appointment with surgeon on 6/2 at 9:15AM  Patient does not have any other questions or concerns at this time

## 2023-05-20 ENCOUNTER — HOME CARE VISIT (OUTPATIENT)
Dept: HOME HEALTH SERVICES | Facility: HOME HEALTHCARE | Age: 58
End: 2023-05-20

## 2023-05-20 VITALS — OXYGEN SATURATION: 95 % | HEART RATE: 85 BPM | SYSTOLIC BLOOD PRESSURE: 118 MMHG | DIASTOLIC BLOOD PRESSURE: 72 MMHG

## 2023-05-22 ENCOUNTER — TELEPHONE (OUTPATIENT)
Dept: OBGYN CLINIC | Facility: HOSPITAL | Age: 58
End: 2023-05-22

## 2023-05-22 DIAGNOSIS — M16.0 PRIMARY OSTEOARTHRITIS OF BOTH HIPS: ICD-10-CM

## 2023-05-22 RX ORDER — HYDROCODONE BITARTRATE AND ACETAMINOPHEN 5; 325 MG/1; MG/1
1 TABLET ORAL EVERY 4 HOURS PRN
Qty: 42 TABLET | Refills: 0 | Status: SHIPPED | OUTPATIENT
Start: 2023-05-22 | End: 2023-06-01

## 2023-05-22 NOTE — CASE COMMUNICATION
Pt  has severe pain 9/10 and is very hesitant to want to move her LLE  Even with AA motion she is resistive, She states she had some same issues with her right THR back in January but the pain was not as bad with that surgery  She states she will improve over the next few days  I am able to teach her quad and add sets and also some modified heel slides and she has been doing ankle pumps  Her daughter is staying with her until 5/23/23 an d then her son will be with her  She needs assist to achieve stand but once up she can walke with the ww with supervision  She does need assist to get on and off the toilet and she has the commode over the toilet to make it easier for her  will need to be taught a HEP and get her hip moving, will also need work on bed mobility and transfers  I expect she meagan improve as her pain decreases

## 2023-05-22 NOTE — TELEPHONE ENCOUNTER
Pt contacted [facility] requested refill of their medication  They have 5 pills remaining  Her daughter will be leaving tomorrow and she was hoping to get refill before she leaves to go back home  So she would need to pick them up today  Patient also stating that she experiencing some tingling in the right hip which was replaced in January and she is wondering if this is normal   Patient asking to speak to clinical team     Patient CB:       HYDROcodone-acetaminophen (Norco) 5-325 mg per tablet [[id number]]     Order Details  Dose: 1 tablet Route: Oral Frequency: Every 4 hours PRN for pain   Dispense Quantity: 42 tablet Refills: 0    Note to Pharmacy: Continuing rx         Sig: Take 1 tablet by mouth every 4 (four) hours as needed for pain for up to 10 days Max Daily Amount: 6 tablets         Start Date: 05/17/23 End Date: 05/27/23   Written Date: 05/17/23 Expiration Date: 07/16/23   Earliest Fill Date: 05/17/23         Diagnosis Association: Primary osteoarthritis of both hips (M16 0)               [address], [place] [address]  END [facility]   [phone]      Thank you  PATIENT ADVISED:    REFILL REQUESTS WILL BE PROCESSED WITHIN 24-48 HOURS

## 2023-05-22 NOTE — CASE COMMUNICATION
Evaluated this patient on 5/20/23 for home P T  She is having pain in the 9/10 range and is moving her left hip less than 40 degrees hip flex  Needs assist for transfers and bed mobility but once standing able to ambulate with WBAT with ww with supervision  Plan to follow patient 1 time/ week for week one and starting week of 5/21/23 plan to visit 2 x per week for 4 weeks with DC to op therapy

## 2023-05-24 ENCOUNTER — HOME CARE VISIT (OUTPATIENT)
Dept: HOME HEALTH SERVICES | Facility: HOME HEALTHCARE | Age: 58
End: 2023-05-24

## 2023-05-24 VITALS — DIASTOLIC BLOOD PRESSURE: 80 MMHG | HEART RATE: 89 BPM | OXYGEN SATURATION: 96 % | SYSTOLIC BLOOD PRESSURE: 142 MMHG

## 2023-05-26 ENCOUNTER — HOME CARE VISIT (OUTPATIENT)
Dept: HOME HEALTH SERVICES | Facility: HOME HEALTHCARE | Age: 58
End: 2023-05-26

## 2023-05-30 ENCOUNTER — HOME CARE VISIT (OUTPATIENT)
Dept: HOME HEALTH SERVICES | Facility: HOME HEALTHCARE | Age: 58
End: 2023-05-30

## 2023-05-30 DIAGNOSIS — I10 PRIMARY HYPERTENSION: ICD-10-CM

## 2023-05-30 RX ORDER — LOSARTAN POTASSIUM AND HYDROCHLOROTHIAZIDE 12.5; 1 MG/1; MG/1
1 TABLET ORAL DAILY
Qty: 30 TABLET | Refills: 5 | Status: SHIPPED | OUTPATIENT
Start: 2023-05-30

## 2023-06-01 ENCOUNTER — HOME CARE VISIT (OUTPATIENT)
Dept: HOME HEALTH SERVICES | Facility: HOME HEALTHCARE | Age: 58
End: 2023-06-01
Payer: COMMERCIAL

## 2023-06-02 ENCOUNTER — APPOINTMENT (OUTPATIENT)
Dept: RADIOLOGY | Facility: CLINIC | Age: 58
End: 2023-06-02
Payer: COMMERCIAL

## 2023-06-02 ENCOUNTER — OFFICE VISIT (OUTPATIENT)
Dept: OBGYN CLINIC | Facility: CLINIC | Age: 58
End: 2023-06-02

## 2023-06-02 VITALS
SYSTOLIC BLOOD PRESSURE: 140 MMHG | WEIGHT: 210 LBS | DIASTOLIC BLOOD PRESSURE: 80 MMHG | BODY MASS INDEX: 37.21 KG/M2 | HEIGHT: 63 IN

## 2023-06-02 DIAGNOSIS — Z96.642 AFTERCARE FOLLOWING LEFT HIP JOINT REPLACEMENT SURGERY: Primary | ICD-10-CM

## 2023-06-02 DIAGNOSIS — Z96.642 AFTERCARE FOLLOWING LEFT HIP JOINT REPLACEMENT SURGERY: ICD-10-CM

## 2023-06-02 DIAGNOSIS — Z47.1 AFTERCARE FOLLOWING LEFT HIP JOINT REPLACEMENT SURGERY: ICD-10-CM

## 2023-06-02 DIAGNOSIS — Z47.1 AFTERCARE FOLLOWING LEFT HIP JOINT REPLACEMENT SURGERY: Primary | ICD-10-CM

## 2023-06-02 PROCEDURE — 73502 X-RAY EXAM HIP UNI 2-3 VIEWS: CPT

## 2023-06-02 NOTE — PROGRESS NOTES
Subjective:Patient seen and examined  Pain controlled  Patient states that the pain in her left hip is well controled  She is experiencing sharp nerve pain through her right upper thigh  She notes it feeling hyper sensitive  Explained that this is sciatica  Progressing well otherwise  Incision without drainage  Denies fevers or chills    Physical Exam:  Incision: CDI, no erythema or drainage  ROM: normal post-op motion   5/5 IP/Q/HS/TA/GS, 2+ DP/PT, SILT DP/SP/S/S/TN    XR L hip: s/p press-fit total hip arthroplasty, components in good position  No fracture or dislocation    Assessment/Plan:  59year old female 2 weeks s/p left total hip arthroplasty DOS 5/17/2023    - continue multi-modal pain control   - Weight bearing status: WBAT  - Patient is experiencing lumbar radiculopathy through her right leg  Offered patient nerve related medications- deferred   - DVT ppx: Aspirin 81mg BID- 4 weeks post op  - Patient can start taking ibuprofen- discontinued Celebrex due to insomnia  - Incision care:  May shower, do not submerge or soak  - PT/OT  - F/U 4 weeks    Scribe Attestation    I,:  Ismael Chong am acting as a scribe while in the presence of the attending physician :       I,:  Miley Desai, DO personally performed the services described in this documentation    as scribed in my presence :

## 2023-06-07 ENCOUNTER — HOME CARE VISIT (OUTPATIENT)
Dept: HOME HEALTH SERVICES | Facility: HOME HEALTHCARE | Age: 58
End: 2023-06-07
Payer: COMMERCIAL

## 2023-06-15 ENCOUNTER — OFFICE VISIT (OUTPATIENT)
Dept: GASTROENTEROLOGY | Facility: CLINIC | Age: 58
End: 2023-06-15
Payer: COMMERCIAL

## 2023-06-15 VITALS — BODY MASS INDEX: 38.41 KG/M2 | HEIGHT: 62 IN | DIASTOLIC BLOOD PRESSURE: 78 MMHG | SYSTOLIC BLOOD PRESSURE: 126 MMHG

## 2023-06-15 DIAGNOSIS — R11.2 NAUSEA AND VOMITING, UNSPECIFIED VOMITING TYPE: ICD-10-CM

## 2023-06-15 DIAGNOSIS — K58.0 IRRITABLE BOWEL SYNDROME WITH DIARRHEA: Primary | ICD-10-CM

## 2023-06-15 DIAGNOSIS — R19.7 DIARRHEA, UNSPECIFIED TYPE: ICD-10-CM

## 2023-06-15 PROCEDURE — 99214 OFFICE O/P EST MOD 30 MIN: CPT | Performed by: INTERNAL MEDICINE

## 2023-06-15 RX ORDER — ONDANSETRON 4 MG/1
4 TABLET, ORALLY DISINTEGRATING ORAL EVERY 6 HOURS PRN
Qty: 60 TABLET | Refills: 1 | Status: SHIPPED | OUTPATIENT
Start: 2023-06-15

## 2023-06-15 RX ORDER — HYOSCYAMINE SULFATE 0.125 MG
0.12 TABLET ORAL EVERY 4 HOURS PRN
Qty: 60 TABLET | Refills: 5 | Status: SHIPPED | OUTPATIENT
Start: 2023-06-15

## 2023-06-15 NOTE — PROGRESS NOTES
[facility] - Outpatient Follow-up Note  Idania Arzola 59 y o  female MRN: [id number]  Encounter: [id number]    ASSESSMENT AND PLAN:      1  IBS-D   Diagnosed with IBS at age 11 with worsening symptoms over the past few years  Colonoscopy January 2023 negative for IBD and microscopic colitis  Questran induced constipation even at partial doses    Progressive symptoms over the past few months with multiple stressors including hip surgery, multiple deaths in the family, and her mom's diagnosis with lung cancer  Good relief with Levsin as needed  She will take a standing dose each morning with additional doses as needed  2  Gastroesophageal reflux disease without esophagitis  Upper endoscopy in January 2023 showed a small hiatal hernia and normal esophagus  Reflux symptoms persist despite acid suppression, symptoms have been out of proportion to endoscopic findings  Reviewed antireflux diet  May benefit from Bravo pH probe or pH impedance testing if symptoms persist    3  Screening for colon cancer  Negative colonoscopy 2023, 10-year recall      Followup Appointment: 3 months  ______________________________________________________________________    Chief Complaint   Patient presents with   • Follow-up     Follow up to swallow study and also needs refill of hyoscamine     HPI: The patient presents for follow-up on IBS-D and GERD  She was last seen in the office in April  Since then she had a barium swallow that showed no additional diagnoses  Reflux symptoms are stable on acid suppression  Since her last visit here she had hip replacements  She is recuperating slowly  She has been dealing with a lot of life stressors including deaths in the family and her mom's recent diagnosis with lung cancer  Her IBS symptoms have been worse but abdominal cramping improves with Levsin  She denies any rectal bleeding or other alarm symptoms      Historical Information   Past Medical History:   Diagnosis Date   • Arthritis    • GERD (gastroesophageal reflux disease)    • Hyperlipidemia    • Hypertension    • Irritable bowel syndrome    • Low back pain    • PONV (postoperative nausea and vomiting)      Past Surgical History:   Procedure Laterality Date   • CATARACT EXTRACTION Bilateral    •  SECTION N/A    • CHOLECYSTECTOMY     • COLONOSCOPY     • HERNIA REPAIR     • HYSTERECTOMY     • NASAL SEPTUM SURGERY     • IN ARTHRP ACETBLR/PROX FEM PROSTC AGRFT/ALGRFT Right 2023    Procedure: ARTHROPLASTY HIP TOTAL;  Surgeon: Tammy Mojica DO;  Location: [facility];  Service: Orthopedics   • IN ARTHRP ACETBLR/PROX FEM PROSTC AGRFT/ALGRFT Left 2023    Procedure: ARTHROPLASTY HIP TOTAL;  Surgeon: Tammy Mojica DO;  Location: [facility];  Service: Orthopedics   • TONSILLECTOMY     • UPPER GASTROINTESTINAL ENDOSCOPY       Social History     Substance and Sexual Activity   Alcohol Use Yes    Comment: extremely rare social occasion     Social History     Substance and Sexual Activity   Drug Use Never     Social History     Tobacco Use   Smoking Status Former   • Types: Cigarettes   • Quit date:    • Years since quitting: 10 4   Smokeless Tobacco Never     Family History   Problem Relation Age of Onset   • Hypertension Mother    • Skin cancer Mother    • Stroke Father    • Skin cancer Daughter    • Cancer Daughter    • Hypertension Son    • Colon cancer Cousin    • Colon polyps Neg Hx          Current Outpatient Medications:   •  acetaminophen (TYLENOL) 325 mg tablet  •  acetaminophen (TYLENOL) 500 mg tablet  •  albuterol (Proventil HFA) 90 mcg/act inhaler  •  ALPRAZolam (XANAX) 0 5 mg tablet  •  ascorbic acid (VITAMIN C) 500 MG tablet  •  aspirin (ECOTRIN LOW STRENGTH) 81 mg EC tablet  •  celecoxib (CeleBREX) 200 mg capsule  •  cholecalciferol (VITAMIN D3) 1,000 units tablet  •  cholestyramine (QUESTRAN) 4 g packet  •  folic acid (FOLVITE) 1 mg tablet  •  hyoscyamine "(ANASPAZ,LEVSIN) 0 125 MG tablet  •  losartan-hydrochlorothiazide (HYZAAR) 100-12 5 MG per tablet  •  Multiple Vitamins-Minerals (multivitamin with minerals) tablet  •  omeprazole (PriLOSEC) 40 MG capsule  •  ondansetron (ZOFRAN-ODT) 4 mg disintegrating tablet  •  senna-docusate sodium (SENOKOT S) 8 6-50 mg per tablet  •  sucralfate (CARAFATE) 1 g/10 mL suspension  •  senna-docusate sodium (SENOKOT S) 8 6-50 mg per tablet  Allergies   Allergen Reactions   • Ativan [Lorazepam] Myalgia     When taken  Along w/ cymbalta   • Cymbalta [Duloxetine Hcl] Myalgia     When taken w/ ativan x 1 occurrence   • Latex Rash   • Amoxicillin Rash     Reviewed medications and allergies and updated as indicated    PHYSICAL EXAM:    Blood pressure 126/78, height 5' 2\" (1 575 m), not currently breastfeeding  Body mass index is 38 41 kg/m²  General Appearance: NAD, cooperative, alert  Eyes: Anicteric, PERRLA, EOMI  ENT:  Normocephalic, atraumatic, normal mucosa  Neck:  Supple, symmetrical, trachea midline  Resp:  Clear to auscultation bilaterally; no rales, rhonchi or wheezing; respirations unlabored   CV:  S1 S2, Regular rate and rhythm; no murmur, rub, or gallop  GI:  Soft, non-tender, non-distended; normal bowel sounds; no masses, no organomegaly   Rectal: Deferred  Musculoskeletal: No cyanosis, clubbing or edema  Normal ROM    Skin:  No jaundice, rashes, or lesions   Heme/Lymph: No palpable cervical lymphadenopathy  Psych: Normal affect, good eye contact  Neuro: No gross deficits, AAOx3    Lab Results:   Lab Results   Component Value Date    HCT 33 0 (L) 05/18/2023    HGB 10 9 (L) 05/18/2023    MCV 86 05/18/2023     05/18/2023    WBC 11 28 (H) 05/18/2023     Lab Results   Component Value Date    ALKPHOS 86 01/14/2023    ALT 27 04/27/2023    AST 32 04/27/2023    BUN 10 05/18/2023    CALCIUM 9 3 05/18/2023     05/18/2023    CO2 27 05/18/2023    CREATININE 0 71 05/18/2023    EGFR 93 05/18/2023    GLUF 126 (H) 05/18/2023 " K 3 9 05/18/2023     Lab Results   Component Value Date    IRON 65 04/27/2023    TIBC 359 04/27/2023     Lab Results   Component Value Date    LIPASE 63 (L) 01/14/2023       Radiology Results:   XR hip/pelv 2-3 vws left if performed    Result Date: 6/7/2023  Narrative: LEFT HIP INDICATION:   Z47 1: Aftercare following joint replacement surgery Z96 642: Presence of left artificial hip joint  COMPARISON: Pelvis x-ray 5/17/2023 VIEWS:  XR HIP/PELV 2-3 VWS LEFT  W PELVIS IF PERFORMED Images: 3 FINDINGS: There is no acute fracture or dislocation  Left total hip arthroplasty is identified in satisfactory position without evidence of hardware complication  Stable right hip arthroplasty  No lytic or blastic osseous lesion  Soft tissues are unremarkable  Degenerative changes pubic symphysis and visualized lower lumbar spine  Impression: Unremarkable appearance of left total hip arthroplasty  Workstation performed: [id number]     XR pelvis ap only 1 or 2 vw    Result Date: 5/22/2023  Narrative: PELVIS INDICATION:   s/p Left MAME  COMPARISON: Right hip x-ray dated February 17, 2023  VIEWS:  XR PELVIS AP ONLY 1 OR 2 VW FINDINGS: No acute fracture or hip dislocation  Postoperative changes of recent left total hip arthroplasty are noted  Postoperative changes of prior right total hip arthroplasty are also present  No lytic or blastic osseous lesion  Few foci of postoperative soft tissue air is noted at the left hip  The visualized lumbar spine is unremarkable  Impression: No acute osseous abnormality  Postoperative changes of bilateral total hip arthroplasty   Workstation performed: [id number]

## 2023-06-15 NOTE — PATIENT INSTRUCTIONS
Use the Levsin/hyoscyamine daily in the morning, with additional doses as needed for breakthrough symptoms    As we discussed Elavil/amitriptyline is often used for nerve pain  If another physician prescribes it, it may help your GI symptoms as well  We can also discuss prescribing this as a primary GI medication if your symptoms persist at your next visit    Continue the same Prilosec dose while on anti-inflammatories  If you are able to wean off ibuprofen we can discuss reducing the Prilosec dose at your next follow-up

## 2023-06-16 ENCOUNTER — EVALUATION (OUTPATIENT)
Dept: PHYSICAL THERAPY | Facility: CLINIC | Age: 58
End: 2023-06-16
Payer: COMMERCIAL

## 2023-06-16 DIAGNOSIS — M16.12 LOCALIZED OSTEOARTHROSIS OF LEFT HIP: Primary | ICD-10-CM

## 2023-06-16 DIAGNOSIS — Z47.1 AFTERCARE FOLLOWING LEFT HIP JOINT REPLACEMENT SURGERY: ICD-10-CM

## 2023-06-16 DIAGNOSIS — Z96.642 AFTERCARE FOLLOWING LEFT HIP JOINT REPLACEMENT SURGERY: ICD-10-CM

## 2023-06-16 PROCEDURE — 97161 PT EVAL LOW COMPLEX 20 MIN: CPT | Performed by: PHYSICAL THERAPIST

## 2023-06-16 PROCEDURE — 97140 MANUAL THERAPY 1/> REGIONS: CPT | Performed by: PHYSICAL THERAPIST

## 2023-06-16 PROCEDURE — 97110 THERAPEUTIC EXERCISES: CPT | Performed by: PHYSICAL THERAPIST

## 2023-06-16 NOTE — PROGRESS NOTES
PT Evaluation     Today's date: 2023  Patient name: Aster Gottlieb  : 1965  MRN: [id number]  Referring provider: Gomez Colón, *  Dx:   Encounter Diagnosis     ICD-10-CM    1  Localized osteoarthrosis of left hip  M16 12       2  Aftercare following left hip joint replacement surgery  Z47 1 Ambulatory Referral to Physical Therapy    Z96 642                      Assessment  Assessment details: Aster Gottlieb is a 59 y o  female presenting to outpatient physical therapy at [place] [address] with complaints of L hip pain and weakness  She presents with decreased range of motion, decreased strength, limited flexibility, altered gait pattern, poor balance, decreased tolerance to activity and decreased functional mobility following L MAME on 23 due to osteoarthritis  She has hx of R MAME on 23 with successful rehab, but has symptoms of CRPS at her mid-distal R quadriceps  She is able to ambulate with [facility] after initial visit to PT today  Pt advised in techniques to her R thigh pain  She would benefit from skilled PT services in order to address these deficits and reach maximum level of function  Thank you for the referral!  Impairments: abnormal gait, abnormal or restricted ROM, activity intolerance, impaired balance, impaired physical strength, lacks appropriate home exercise program, pain with function and safety issue  Barriers to therapy: None  Understanding of Dx/Px/POC: good  Goals  ST   Independent with HEP in 2 weeks  2   Increase L hip PROM to within 10 degrees of WNL all motions in 6 weeks   3  Able to ambulate with SPC x 10 min for ADLs in 2 weeks    LT  Achieve FOTO score of 53/100 in 8 weeks   2   Able to RTW, ambulate x 30 min without AD on all surfaces in 8 weeks  3  Strength L LE = 5/5 all motions in 8 weeks  4  No tightness in L LE in 8 weeks  5    Good L LE balance in 8 weeks      Plan  Patient would benefit from: skilled PT and PT eval  Planned modality interventions: cryotherapy  Planned therapy interventions: ADL retraining, balance/weight bearing training, body mechanics training, flexibility, functional ROM exercises, home exercise program, manual therapy, neuromuscular re-education, strengthening, stretching, therapeutic activities, therapeutic exercise and gait training  Frequency: 2x week  Duration in weeks: 8  Treatment plan discussed with: patient        Subjective Evaluation    History of Present Illness  Mechanism of injury: surgery  Mechanism of injury: Pt reports having B hip pain for more than 1 year  She tried injections, PT and weight loss without significant pain relief  She had R MAME on 23 with posterior approach with success but has severe hypersensitivity to touch at distal R quadriceps nerve pain  L MAME on 23  Has been walking with a walker since surgery and had home PT for 1 session  Has gained 60# in the past year due to inactivity  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  Able to walk up to 2 min now  Not performing any tasks at home for housework  Has pain with attempting to sit  Feels very weak in her L LE  Unable to carry a laundry basket or bend to low levels  Sleep has been poor due to R thigh pain              Recurrent probem    Quality of life: fair    Pain  Current pain ratin  At best pain ratin  At worst pain ratin  Quality: dull ache, sharp and tight  Relieving factors: rest, medications and ice  Aggravating factors: walking, standing and stair climbing  Progression: worsening    Social Support  Steps to enter house: yes  Stairs in house: no   Lives in: [facility]  Lives with: adult children    Employment status: not working  Treatments  Previous treatment: medication, injection treatment and physical therapy  Current treatment: medication  Discharged from (in last 30 days): home health care  Patient Goals  Patient goals for therapy: decreased pain, improved balance, increased motion, increased strength, independence with ADLs/IADLs, return to sport/leisure activities and return to work          Objective     Palpation     Right   Hypertonic in the rectus femoris  Tenderness of the rectus femoris  Additional Palpation Details  Mod tightness L>R hip adductors and flexors  Tenderness     Left Hip   Tenderness in the greater trochanter  Right Hip   No tenderness in the greater trochanter  Neurological Testing     Sensation     Hip   Left Hip   Intact: light touch    Right Hip   Intact: light touch    Active Range of Motion   Left Hip   Flexion: WFL  Abduction: 20 degrees     Right Hip   Normal active range of motion  Left Knee   Normal active range of motion    Right Knee   Normal active range of motion    Passive Range of Motion   Left Hip   Abduction: 25 degrees with pain    Strength/Myotome Testing     Left Hip   Planes of Motion   Flexion: 4-  Abduction: 3+  Adduction: 4-    Right Hip   Planes of Motion   Flexion: 5  Abduction: 4+  Adduction: 5    Left Knee   Flexion: 4  Extension: 4-    Right Knee   Flexion: 5  Extension: 4+    Ambulation   Weight-Bearing Status   Weight-Bearing Status (Left): full weight bearing   Weight-Bearing Status (Right): full weight-bearing    Assistive device used: front-wheeled walker    Ambulation: Level Surfaces   Ambulation with assistive device: independent  Ambulation without assistive device: minimum assist    Ambulation: Stairs   Ascend stairs: independent  Pattern: non-reciprocal  Railings: one rail  Descend stairs: independent  Pattern: non-reciprocal  Railings: one rail  Curbs: unable    Observational Gait   Decreased walking speed, stride length and left stance time  Comments   Poor L LE balance    Good on R LE          POC EXPIRES On:  8/11/23  PRECAUTIONS:  None  CO-MORBIDITES:  B hip OA  PERSONAL FACTORS:  L MAME on 5/17/23, R MAME on 1/18/23      Manuals HEP 6/16           PROM L hip supine  8' Neuro Re-Ed                                                                                                Ther Ex    LAQ L 6/16 10           B calf raises 6/16 10           Marching standing L/R 6/16 10           Seated B hip adduction with ball 6/16 10           Glut sets 6/16 10           Supine SLR L 6/16 10           Supine L hip abd 6/16 10           L heel slides 6/16 10           Bridges 6/16 10                        Ther Activity    Step ups             Mini squats 6/16 10           Gait Training    With [facility]  5'                        Modalities

## 2023-06-19 DIAGNOSIS — J30.2 SEASONAL ALLERGIES: ICD-10-CM

## 2023-06-19 DIAGNOSIS — R06.02 SOB (SHORTNESS OF BREATH): ICD-10-CM

## 2023-06-19 RX ORDER — ALBUTEROL SULFATE 90 UG/1
2 AEROSOL, METERED RESPIRATORY (INHALATION) EVERY 6 HOURS PRN
Qty: 18 G | Refills: 2 | Status: SHIPPED | OUTPATIENT
Start: 2023-06-19

## 2023-06-19 NOTE — TELEPHONE ENCOUNTER
STEPHAN IS HAVING TROUBLE BREATHING AND SOB - SHE SAID SHE LOST HER INHALER - ASKING FOR A REFILL ON HER ALBUTEROL INHALER     [facility]

## 2023-06-20 ENCOUNTER — OFFICE VISIT (OUTPATIENT)
Dept: PHYSICAL THERAPY | Facility: CLINIC | Age: 58
End: 2023-06-20
Payer: COMMERCIAL

## 2023-06-20 DIAGNOSIS — Z47.1 AFTERCARE FOLLOWING LEFT HIP JOINT REPLACEMENT SURGERY: Primary | ICD-10-CM

## 2023-06-20 DIAGNOSIS — M16.12 LOCALIZED OSTEOARTHROSIS OF LEFT HIP: ICD-10-CM

## 2023-06-20 DIAGNOSIS — Z96.642 AFTERCARE FOLLOWING LEFT HIP JOINT REPLACEMENT SURGERY: Primary | ICD-10-CM

## 2023-06-20 PROCEDURE — 97110 THERAPEUTIC EXERCISES: CPT

## 2023-06-20 PROCEDURE — 97140 MANUAL THERAPY 1/> REGIONS: CPT

## 2023-06-20 NOTE — PROGRESS NOTES
Daily Note     Today's date: 2023  Patient name: Maralyn Angelucci  : 1965  MRN: [id number]  Referring provider: Michaela Vo, *  Dx:   Encounter Diagnosis     ICD-10-CM    1  Aftercare following left hip joint replacement surgery  Z47 1     Z96 642       2  Localized osteoarthrosis of left hip  M16 12                      Subjective: Pt states her R leg is very sensitive since the surgery unsure why  Objective: See treatment diary  Survey Monkey given (_____) and FOTO given (_____)      Assessment: Pt presented to outpatient physical therapy at [place] [address] with complaints of L hip pain and weakness  She presents with decreased range of motion, decreased strength, limited flexibility, altered gait pattern, poor balance, decreased tolerance to activity and decreased functional mobility following L MAME on 23 due to osteoarthritis  She has hx of R MAME on 23 with successful rehab, but has symptoms of CRPS at her mid-distal R quadriceps  She is able to ambulate with [facility] after initial visit to PT today  Pt advised in techniques to her R thigh pain  She would benefit from skilled PT services in order to address these deficits and reach maximum level of function  Pt performed below TE with great tolerance and technique, no complain of pain       Plan: Continue plan of care     POC EXPIRES On:  23  PRECAUTIONS:  None  CO-MORBIDITES:  B hip OA  PERSONAL FACTORS:  L MAME on 23, R MAME on 23      Manuals HEP  6          PROM L hip supine  8' 10'                                                 Neuro Re-Ed     L desensitize    10'                                                                                        Ther Ex    Bike   L1 8'          LAQ L  10           B calf raises  10 20          Marching standing L/R  10 20          HS curls standing L/R   20          Standing ext/abd L/R   20 ea          Seated B hip adduction with ball  10 Glut sets 6/16 10           Supine SLR L 6/16 10           Supine L hip abd 6/16 10           L heel slides 6/16 10           Bridges 6/16 10                        Ther Activity    Step ups             Mini squats 6/16 10           Gait Training    With [facility]  5'                        Modalities

## 2023-06-22 ENCOUNTER — OFFICE VISIT (OUTPATIENT)
Dept: PHYSICAL THERAPY | Facility: CLINIC | Age: 58
End: 2023-06-22
Payer: COMMERCIAL

## 2023-06-22 ENCOUNTER — APPOINTMENT (OUTPATIENT)
Dept: OCCUPATIONAL THERAPY | Facility: CLINIC | Age: 58
End: 2023-06-22
Payer: COMMERCIAL

## 2023-06-22 DIAGNOSIS — M16.12 LOCALIZED OSTEOARTHROSIS OF LEFT HIP: ICD-10-CM

## 2023-06-22 DIAGNOSIS — Z47.1 AFTERCARE FOLLOWING LEFT HIP JOINT REPLACEMENT SURGERY: Primary | ICD-10-CM

## 2023-06-22 DIAGNOSIS — Z96.642 AFTERCARE FOLLOWING LEFT HIP JOINT REPLACEMENT SURGERY: Primary | ICD-10-CM

## 2023-06-22 PROCEDURE — 97110 THERAPEUTIC EXERCISES: CPT

## 2023-06-22 PROCEDURE — 97140 MANUAL THERAPY 1/> REGIONS: CPT

## 2023-06-22 NOTE — PROGRESS NOTES
Daily Note     Today's date: 2023  Patient name: Roxann Alston  : 1965  MRN: [id number]  Referring provider: Earnest Stroud, *  Dx:   Encounter Diagnosis     ICD-10-CM    1  Aftercare following left hip joint replacement surgery  Z47 1     Z96 642       2  Localized osteoarthrosis of left hip  M16 12                      Subjective: Pt states her R leg is very sensitive since the surgery unsure why  Objective: See treatment diary  Survey Monkey given (_____) and FOTO given (_____)      Assessment: Pt presented to outpatient physical therapy at [place] [address] with complaints of L hip pain and weakness  She presents with decreased range of motion, decreased strength, limited flexibility, altered gait pattern, poor balance, decreased tolerance to activity and decreased functional mobility following L MAME on 23 due to osteoarthritis  She has hx of R MAME on 23 with successful rehab, but has symptoms of CRPS at her mid-distal R quadriceps  She is able to ambulate with [facility] after initial visit to PT today  Pt advised in techniques to her R thigh pain  She would benefit from skilled PT services in order to address these deficits and reach maximum level of function  Pt performed below TE with great tolerance and technique, no complain of pain       Plan: Continue plan of care     POC EXPIRES On:  23  PRECAUTIONS:  None  CO-MORBIDITES:  B hip OA  PERSONAL FACTORS:  L MAME on 23, R MAME on 23      Manuals HEP          PROM L hip supine  8' 10' 10'                                                Neuro Re-Ed     L desensitize    10'          Tandem 24ft grn line    3laps +spc         Side Stepping 24ft Grn line    3laps +Spc                                                             Ther Ex    Bike   L1 8' L1 8         LAQ L  10           B calf raises  10 20 20         Marching standing L/R  10 20 20         HS curls standing L/R   20 20 Standing ext/abd L/R   20 ea          Seated B hip adduction with ball 6/16 10           Glut sets 6/16 10           Supine SLR L 6/16 10           Supine L hip abd 6/16 10  10         L heel slides 6/16 10  2X10 active         Bridges 6/16 10  20x                      Ther Activity    Leg Press B    105# 20         Leg Press SL    55# 20         Mini squats 6/16 10                        Gait Training    With [facility]  5'                        Modalities

## 2023-06-23 ENCOUNTER — OFFICE VISIT (OUTPATIENT)
Dept: OBGYN CLINIC | Facility: CLINIC | Age: 58
End: 2023-06-23

## 2023-06-23 VITALS
HEIGHT: 62 IN | SYSTOLIC BLOOD PRESSURE: 125 MMHG | DIASTOLIC BLOOD PRESSURE: 84 MMHG | BODY MASS INDEX: 38.64 KG/M2 | WEIGHT: 210 LBS | HEART RATE: 94 BPM

## 2023-06-23 DIAGNOSIS — Z96.642 AFTERCARE FOLLOWING LEFT HIP JOINT REPLACEMENT SURGERY: ICD-10-CM

## 2023-06-23 DIAGNOSIS — Z47.1 AFTERCARE FOLLOWING LEFT HIP JOINT REPLACEMENT SURGERY: ICD-10-CM

## 2023-06-23 DIAGNOSIS — M54.16 LUMBAR RADICULOPATHY: Primary | ICD-10-CM

## 2023-06-23 PROCEDURE — 99024 POSTOP FOLLOW-UP VISIT: CPT | Performed by: STUDENT IN AN ORGANIZED HEALTH CARE EDUCATION/TRAINING PROGRAM

## 2023-06-23 RX ORDER — METHYLPREDNISOLONE 4 MG/1
TABLET ORAL
Qty: 21 TABLET | Refills: 0 | Status: SHIPPED | OUTPATIENT
Start: 2023-06-23 | End: 2023-06-26

## 2023-06-23 NOTE — PROGRESS NOTES
Subjective:Patient seen and examined  Pain controlled with ibuprofen and tylenol  She is continuing to experience sharp nerve pain through her right upper thigh and radiating down her leg- we discussed last visit this is coming from her lumbar spine (radiculopathy)  Patient deferred nerve related medication at last visit, due to other affects of medication in past  Progressing well  Incisions without drainage   Denies fevers or chills    Physical Exam:  Incision: CDI no erythema or drainage  ROM:normal post-op motion  5/5 IP/Q/HS/TA/GS, 2+ DP/PT, SILT DP/SP/S/S/TN    XR no new imaging was obtained today     Assessment/Plan:  59year old female 5 weeks s/p left total hip arthroplasty DOS 5/17/2023    - continue multi-modal pain control   - Weight bearing status: WBAT  - DVT ppx: Aspirin 81mg BID- completed  - Incision care: No restrictions: can start transverse friction massage to desensitize   - Script for medrol dose pack was placed today for lumbar radiculopathy   - PT/OT  - F/U 6 weeks    Scribe Attestation    I,:  Jazmyn Brock am acting as a scribe while in the presence of the attending physician :       I,:  Tressa Romero DO personally performed the services described in this documentation    as scribed in my presence :

## 2023-06-26 ENCOUNTER — OFFICE VISIT (OUTPATIENT)
Dept: FAMILY MEDICINE CLINIC | Facility: HOSPITAL | Age: 58
End: 2023-06-26
Payer: COMMERCIAL

## 2023-06-26 VITALS
HEART RATE: 96 BPM | WEIGHT: 209 LBS | HEIGHT: 62 IN | DIASTOLIC BLOOD PRESSURE: 80 MMHG | BODY MASS INDEX: 38.46 KG/M2 | OXYGEN SATURATION: 98 % | SYSTOLIC BLOOD PRESSURE: 130 MMHG

## 2023-06-26 DIAGNOSIS — Z80.8 FAMILY HISTORY OF MELANOMA: ICD-10-CM

## 2023-06-26 DIAGNOSIS — Z80.1 FAMILY HISTORY OF LUNG CANCER: ICD-10-CM

## 2023-06-26 DIAGNOSIS — Z13.1 SCREENING FOR DIABETES MELLITUS: ICD-10-CM

## 2023-06-26 DIAGNOSIS — Z80.9 FAMILY HISTORY OF SQUAMOUS CELL CARCINOMA: ICD-10-CM

## 2023-06-26 DIAGNOSIS — R06.02 SOB (SHORTNESS OF BREATH): Primary | ICD-10-CM

## 2023-06-26 DIAGNOSIS — Z13.0 SCREENING FOR IRON DEFICIENCY ANEMIA: ICD-10-CM

## 2023-06-26 DIAGNOSIS — Z80.8 FAMILY HISTORY OF BASAL CELL CARCINOMA: ICD-10-CM

## 2023-06-26 DIAGNOSIS — J30.2 SEASONAL ALLERGIES: ICD-10-CM

## 2023-06-26 DIAGNOSIS — R06.09 DOE (DYSPNEA ON EXERTION): ICD-10-CM

## 2023-06-26 DIAGNOSIS — E78.2 MIXED HYPERLIPIDEMIA: ICD-10-CM

## 2023-06-26 PROCEDURE — 99214 OFFICE O/P EST MOD 30 MIN: CPT | Performed by: STUDENT IN AN ORGANIZED HEALTH CARE EDUCATION/TRAINING PROGRAM

## 2023-06-26 RX ORDER — ATORVASTATIN CALCIUM 20 MG/1
TABLET, FILM COATED ORAL
Qty: 90 TABLET | Refills: 3 | Status: SHIPPED | OUTPATIENT
Start: 2023-06-26

## 2023-06-26 RX ORDER — LORATADINE 10 MG/1
CAPSULE, LIQUID FILLED ORAL
COMMUNITY

## 2023-06-26 RX ORDER — FLUTICASONE PROPIONATE AND SALMETEROL 100; 50 UG/1; UG/1
1 POWDER RESPIRATORY (INHALATION) 2 TIMES DAILY
Qty: 60 BLISTER | Refills: 1 | Status: SHIPPED | OUTPATIENT
Start: 2023-06-26

## 2023-06-26 RX ORDER — CHLORAL HYDRATE 500 MG
1000 CAPSULE ORAL DAILY
COMMUNITY

## 2023-06-26 RX ORDER — FLUTICASONE PROPIONATE 50 MCG
1 SPRAY, SUSPENSION (ML) NASAL DAILY
Qty: 16 G | Refills: 2 | Status: SHIPPED | OUTPATIENT
Start: 2023-06-26

## 2023-06-26 NOTE — PROGRESS NOTES
[facility],     Assessment/Plan:      Diagnosis ICD-10-CM Associated Orders   1  SOB (shortness of breath)  R06 02 CT chest wo contrast     Complete PFT with post bronchodilator     Fluticasone-Salmeterol (Advair Diskus) 100-50 mcg/dose inhaler      2  BELLE (dyspnea on exertion)  R06 09 CT chest wo contrast     Complete PFT with post bronchodilator     Fluticasone-Salmeterol (Advair Diskus) 100-50 mcg/dose inhaler      3  Seasonal allergies  J30 2 CT chest wo contrast     Complete PFT with post bronchodilator     fluticasone (FLONASE) 50 mcg/act nasal spray      4  Family history of melanoma  Z80 8 Ambulatory Referral to Dermatology      5  Family history of squamous cell carcinoma  Z80 9 Ambulatory Referral to Dermatology      6  Family history of lung cancer  Z80 1 Ambulatory Referral to Dermatology      7  Family history of basal cell carcinoma  Z80 8 Ambulatory Referral to Dermatology      8  Screening for iron deficiency anemia  Z13 0 CBC and differential     CBC and differential      9  Screening for diabetes mellitus  D83 2 Basic metabolic panel     Basic metabolic panel      10  Mixed hyperlipidemia  E78 2 Lipid Panel with Direct LDL reflex     atorvastatin (LIPITOR) 20 mg tablet     Lipid Panel with Direct LDL reflex        • Derm ref for skin cancer screening given strong family hx & age  • CT Chest for worsening BELLE & SOB  C/W inhaler  • PFT ordered, may need to see pulm or consider daily inhaler if not improving  • Patient's medications were reviewed and reconciled  Ordered/Re-ordered as above  • Screening & diagnostic bloodwork ordered, our office will reach out with results once obtained  • Trial advair - also, report back  Return if symptoms worsen or fail to improve - July Annual   • Patient may call or return to office with any questions or concerns     ______________________________________________________________________  Subjective:     Patient ID: Irene Ashley is a 59 y o  female  [facility]  Irene Ashley  Chief Complaint   Patient presents with   • Shortness of Breath     Breathing seems worse since two surgeries  Using inhaler once a day, tries to calm her breathing  Getting out of breath daily  Most recent CXR normal in [place]    Smoking hx - 6-7 yrs total, with breaks in btwn - 5 ciggs per day for stress    Aunt just passed CA/Melanoma, & mom just had lung cancer diagnosis  Mom had biopsy with : squamous cells on report from her lung  Wants to see derm, melanoma family history  Wt Readings from Last 3 Encounters:   06/26/23 94 8 kg (209 lb)   06/23/23 95 3 kg (210 lb)   06/02/23 95 3 kg (210 lb)        The following portions of the patient's history were reviewed and updated as appropriate: allergies, current medications, past medical history, and problem list     Review of Systems   Constitutional: Negative for chills and fever  HENT: Positive for postnasal drip, rhinorrhea, sneezing and voice change (hoarse)  Negative for sore throat  Respiratory: Positive for cough (occas) and shortness of breath  Negative for chest tightness  Chest heavy with deep breathing   Cardiovascular: Negative for chest pain and palpitations  Neurological: Positive for light-headedness (at times)  Negative for dizziness and headaches  Objective:      Vitals:    06/26/23 0948   BP: 130/80   Pulse: 96   SpO2: 98%      Physical Exam  Vitals reviewed  Constitutional:       General: She is not in acute distress  Appearance: Normal appearance  She is well-developed  She is obese  She is not ill-appearing  HENT:      Head: Normocephalic and atraumatic  Comments: No sinus ttp     Right Ear: Tympanic membrane, ear canal and external ear normal  There is no impacted cerumen  Left Ear: Tympanic membrane, ear canal and external ear normal  There is no impacted cerumen  Nose: Nose normal  No congestion        Mouth/Throat: "Mouth: Mucous membranes are moist       Comments: Mucus, clear, bubbly  Eyes:      General: No scleral icterus  Right eye: No discharge  Left eye: No discharge  Cardiovascular:      Rate and Rhythm: Normal rate and regular rhythm  Pulses: Normal pulses  Heart sounds: Normal heart sounds  No murmur heard  Pulmonary:      Effort: Pulmonary effort is normal  No respiratory distress  Breath sounds: Normal breath sounds  No stridor  No wheezing  Comments: LH with deep breathing on exam  Hoarse voice  Musculoskeletal:      Cervical back: Normal range of motion  No rigidity  Right lower leg: No edema  Left lower leg: No edema  Skin:     General: Skin is warm  Neurological:      Mental Status: She is alert and oriented to person, place, and time  Gait: Gait abnormal (walker post op)  Psychiatric:         Mood and Affect: Mood normal          Behavior: Behavior normal          Thought Content: Thought content normal          Judgment: Judgment normal            Portions of the record may have been created with voice recognition software  Occasional wrong word or \"sound alike\" substitutions may have occurred due to the inherent limitations of voice recognition software  Please review the chart carefully and recognize, using context, where substitutions/typographical errors may have occurred       "

## 2023-06-27 ENCOUNTER — OFFICE VISIT (OUTPATIENT)
Dept: PHYSICAL THERAPY | Facility: CLINIC | Age: 58
End: 2023-06-27
Payer: COMMERCIAL

## 2023-06-27 DIAGNOSIS — M16.12 LOCALIZED OSTEOARTHROSIS OF LEFT HIP: ICD-10-CM

## 2023-06-27 DIAGNOSIS — Z96.642 AFTERCARE FOLLOWING LEFT HIP JOINT REPLACEMENT SURGERY: Primary | ICD-10-CM

## 2023-06-27 DIAGNOSIS — Z47.1 AFTERCARE FOLLOWING LEFT HIP JOINT REPLACEMENT SURGERY: Primary | ICD-10-CM

## 2023-06-27 LAB
BASOPHILS # BLD AUTO: 0 X10E3/UL (ref 0–0.2)
BASOPHILS NFR BLD AUTO: 1 %
BUN SERPL-MCNC: 14 MG/DL (ref 6–24)
BUN/CREAT SERPL: 17 (ref 9–23)
CALCIUM SERPL-MCNC: 9.9 MG/DL (ref 8.7–10.2)
CHLORIDE SERPL-SCNC: 106 MMOL/L (ref 96–106)
CHOLEST SERPL-MCNC: 281 MG/DL (ref 100–199)
CO2 SERPL-SCNC: 24 MMOL/L (ref 20–29)
CREAT SERPL-MCNC: 0.82 MG/DL (ref 0.57–1)
CYTOLOGY CMNT CVX/VAG CYTO-IMP: ABNORMAL
EGFR: 82 ML/MIN/1.73
EOSINOPHIL # BLD AUTO: 0.2 X10E3/UL (ref 0–0.4)
EOSINOPHIL NFR BLD AUTO: 4 %
ERYTHROCYTE [DISTWIDTH] IN BLOOD BY AUTOMATED COUNT: 13.6 % (ref 11.7–15.4)
GLUCOSE SERPL-MCNC: 88 MG/DL (ref 70–99)
HCT VFR BLD AUTO: 35.7 % (ref 34–46.6)
HDLC SERPL-MCNC: 61 MG/DL
HGB BLD-MCNC: 12 G/DL (ref 11.1–15.9)
IMM GRANULOCYTES # BLD: 0 X10E3/UL (ref 0–0.1)
IMM GRANULOCYTES NFR BLD: 0 %
LDLC SERPL CALC-MCNC: 195 MG/DL (ref 0–99)
LDLC/HDLC SERPL: 3.2 RATIO (ref 0–3.2)
LYMPHOCYTES # BLD AUTO: 1.1 X10E3/UL (ref 0.7–3.1)
LYMPHOCYTES NFR BLD AUTO: 25 %
MCH RBC QN AUTO: 29.1 PG (ref 26.6–33)
MCHC RBC AUTO-ENTMCNC: 33.6 G/DL (ref 31.5–35.7)
MCV RBC AUTO: 86 FL (ref 79–97)
MONOCYTES # BLD AUTO: 0.4 X10E3/UL (ref 0.1–0.9)
MONOCYTES NFR BLD AUTO: 10 %
NEUTROPHILS # BLD AUTO: 2.8 X10E3/UL (ref 1.4–7)
NEUTROPHILS NFR BLD AUTO: 60 %
PLATELET # BLD AUTO: 271 X10E3/UL (ref 150–450)
POTASSIUM SERPL-SCNC: 4.2 MMOL/L (ref 3.5–5.2)
RBC # BLD AUTO: 4.13 X10E6/UL (ref 3.77–5.28)
SL AMB VLDL CHOLESTEROL CALC: 25 MG/DL (ref 5–40)
SODIUM SERPL-SCNC: 143 MMOL/L (ref 134–144)
TRIGL SERPL-MCNC: 137 MG/DL (ref 0–149)
WBC # BLD AUTO: 4.6 X10E3/UL (ref 3.4–10.8)

## 2023-06-27 PROCEDURE — 97110 THERAPEUTIC EXERCISES: CPT

## 2023-06-27 PROCEDURE — 97140 MANUAL THERAPY 1/> REGIONS: CPT

## 2023-06-27 PROCEDURE — 97530 THERAPEUTIC ACTIVITIES: CPT

## 2023-06-27 NOTE — PROGRESS NOTES
Daily Note     Today's date: 2023  Patient name: Hanh Mistry  : 1965  MRN: [id number]  Referring provider: Lolis Andrade, *  Dx:   Encounter Diagnosis     ICD-10-CM    1  Aftercare following left hip joint replacement surgery  Z47 1     Z96 642       2  Localized osteoarthrosis of left hip  M16 12                      Subjective: Pt states her R leg is feeling much beter  Objective: See treatment diary  Survey Monkey given (_____) and FOTO given (_____)      Assessment: Pt presented to outpatient physical therapy at [place] [address] with complaints of L hip pain and weakness  She presents with decreased range of motion, decreased strength, limited flexibility, altered gait pattern, poor balance, decreased tolerance to activity and decreased functional mobility following L MAME on 23 due to osteoarthritis  She has hx of R MAME on 23 with successful rehab, but has symptoms of CRPS at her mid-distal R quadriceps  She is able to ambulate with [facility] after initial visit to PT today  Pt advised in techniques to her R thigh pain  She would benefit from skilled PT services in order to address these deficits and reach maximum level of function  Pt performed below TE with great tolerance and technique, no complain of pain  Plan: Continue plan of care     POC EXPIRES On:  23  PRECAUTIONS:  None10'  CO-MORBIDITES:  B hip OA  PERSONAL FACTORS:  L MAME on 23, R MAME on 23      Manuals HEP         PROM L hip supine  8' 10' 10' 10'                                               Neuro Re-Ed     L desensitize    10'          Tandem 24ft grn line    3laps +spc 3laps +Spc        Side Stepping 24ft Grn line    3laps +Spc 3laps +Spc                                                            Ther Ex    Bike   L1 8' L1 8 L1 8'        TM incline     10% 5'    5mph        LAQ L  10           B calf raises  10 20 20 20        Marching standing L/R  10 20 20 20        HS curls standing L/R   20 20 20        Standing ext/abd L/R   20 ea          Seated B hip adduction with ball 6/16 10           Glut sets 6/16 10           Supine SLR L 6/16 10           Supine L hip abd 6/16 10  10 10        L heel slides 6/16 10  210 active 2x10 active        Bridges 6/16 10  20x 20x                     Ther Activity    Leg Press B    105# 20 125# 30        Leg Press SL    55# 20 R: 65#  L:55# 30        Mini squats 6/16 10                        Gait Training    With [facility]  5'                        Modalities

## 2023-06-29 ENCOUNTER — TELEPHONE (OUTPATIENT)
Dept: DERMATOLOGY | Facility: CLINIC | Age: 58
End: 2023-06-29

## 2023-06-29 NOTE — TELEPHONE ENCOUNTER
Rec'd call from patient requesting new patient appt  Dx: FULL BODY CHECK (strong family history of skin cancer)    Explained wait list process  Patient verbalized understanding  Created wait list for /[facility]

## 2023-06-30 ENCOUNTER — OFFICE VISIT (OUTPATIENT)
Dept: PHYSICAL THERAPY | Facility: CLINIC | Age: 58
End: 2023-06-30
Payer: COMMERCIAL

## 2023-06-30 DIAGNOSIS — Z96.642 AFTERCARE FOLLOWING LEFT HIP JOINT REPLACEMENT SURGERY: Primary | ICD-10-CM

## 2023-06-30 DIAGNOSIS — Z47.1 AFTERCARE FOLLOWING LEFT HIP JOINT REPLACEMENT SURGERY: Primary | ICD-10-CM

## 2023-06-30 DIAGNOSIS — M16.12 LOCALIZED OSTEOARTHROSIS OF LEFT HIP: ICD-10-CM

## 2023-06-30 PROCEDURE — 97110 THERAPEUTIC EXERCISES: CPT | Performed by: PHYSICAL THERAPIST

## 2023-06-30 PROCEDURE — 97140 MANUAL THERAPY 1/> REGIONS: CPT | Performed by: PHYSICAL THERAPIST

## 2023-06-30 NOTE — PROGRESS NOTES
"Daily Note     Today's date: 2023  Patient name: Nae Chavez  : 1965  MRN: [id number]  Referring provider: Rodney Rosa, *  Dx:   Encounter Diagnosis     ICD-10-CM    1  Aftercare following left hip joint replacement surgery  Z47 1     Z96 642       2  Localized osteoarthrosis of left hip  M16 12           Subjective: pt reports significant soreness in thighs and hips which she attributes to program additions in previous visits       Objective: See treatment diary below      Assessment: Tolerated treatment well with progression of treatment program as noted below requiring verbal and tactile cues from PT for safe execution of therapeutic exercise  Patient demonstrated fatigue post treatment, exhibited good technique with therapeutic exercises and would benefit from continued PT      Plan: Progress treatment as tolerated  POC EXPIRES On:  23  PRECAUTIONS:  None10'  CO-MORBIDITES:  B hip OA  PERSONAL FACTORS:  L MAME on 23, R MAME on 23      Manuals HEP        PROM L hip supine  8' 10' 10' 10' 8'                                              Neuro Re-Ed     L desensitize    10'          Tandem 24ft grn line    3laps +spc 3laps +Spc 3 laps + spc       Side Stepping 24ft Grn line    3laps +Spc 3laps +Spc 3 laps + spc                                                           Ther Ex    Elliptical      L1 5'       Seated HS stretch      10\"x 3 ea       Bike   L1 8' L1 8 L1 8' L4 8'       TM incline     10% 5'    5mph        LAQ L  10           B calf raises  10 20 20 20 20       Marching standing L/R  10 20 20 20 20       HS curls standing L/R   20 20 20 20       Standing ext/abd L/R   20 ea          Seated B hip adduction with ball  10           Glut sets  10           Supine SLR L  10           Supine L hip abd  10  10 10        L heel slides  10  210 active 2x10 active        Bridges  10  20x 20x                   " Ther Activity    Leg Press B    105# 20 125# 30 125# 30       Leg Press SL    55# 20 R: 65#  L:55# 30 4: 65# L:55# 30 ea       Mini squats 6/16 10                        Gait Training    With [facility]  5'                        Modalities

## 2023-07-03 ENCOUNTER — APPOINTMENT (OUTPATIENT)
Dept: PHYSICAL THERAPY | Facility: CLINIC | Age: 58
End: 2023-07-03
Payer: COMMERCIAL

## 2023-07-03 ENCOUNTER — OFFICE VISIT (OUTPATIENT)
Dept: PHYSICAL THERAPY | Facility: CLINIC | Age: 58
End: 2023-07-03
Payer: COMMERCIAL

## 2023-07-03 DIAGNOSIS — M16.12 LOCALIZED OSTEOARTHROSIS OF LEFT HIP: ICD-10-CM

## 2023-07-03 DIAGNOSIS — Z96.642 AFTERCARE FOLLOWING LEFT HIP JOINT REPLACEMENT SURGERY: Primary | ICD-10-CM

## 2023-07-03 DIAGNOSIS — Z47.1 AFTERCARE FOLLOWING LEFT HIP JOINT REPLACEMENT SURGERY: Primary | ICD-10-CM

## 2023-07-03 PROCEDURE — 97530 THERAPEUTIC ACTIVITIES: CPT | Performed by: PHYSICAL THERAPIST

## 2023-07-03 PROCEDURE — 97110 THERAPEUTIC EXERCISES: CPT | Performed by: PHYSICAL THERAPIST

## 2023-07-03 NOTE — PROGRESS NOTES
Daily Note     Today's date: 7/3/2023  Patient name: Pita Rey  : 1965  MRN: [id number]  Referring provider: Blair Morfin, *  Dx:   Encounter Diagnosis     ICD-10-CM    1. Aftercare following left hip joint replacement surgery  Z47.1     Z96.642       2. Localized osteoarthrosis of left hip  M16.12           Subjective:  Pt reports being able to ambulate without her SPC inside comfortably now. Would like to limp less. Able to squat down to lower levels, but getting up is difficult. Objective:  See treatment diary below      Assessment:  Pt presented to outpatient physical therapy at [facility] with complaints of L hip pain and weakness. She presents with decreased range of motion, decreased strength, limited flexibility, altered gait pattern, poor balance, decreased tolerance to activity and decreased functional mobility following L MAME on 23 due to osteoarthritis. She has hx of R MAME on 23 with successful rehab, but has symptoms of CRPS at her mid-distal R quadriceps. She is able to ambulate without her [address] in most areas now safely. Unable to ascend 8" steps with L LE today, but able at 6". Now able to squat down to  objects off of floor. She will continue to benefit from skilled PT services in order to address these deficits and reach maximum level of function. Plan:  Progress to 8" step ups. Normalize gait pattern to eliminate SPC and refine gait pattern.        POC EXPIRES On:  23  PRECAUTIONS:  None  CO-MORBIDITES:  B hip OA  PERSONAL FACTORS:  L MAME on 23, R MAME on 23      Manuals HEP  7/3      PROM L hip supine  8' 10' 10' 10' 8' 8'                                             Neuro Re-Ed     L desensitize    10'          Tandem 24ft grn line    3laps +spc 3laps +Spc 3 laps + spc 3 laps + SPC      Side Stepping 24ft Grn line    3laps +Spc 3laps +Spc 3 laps + spc 3 laps GTB Ther Ex    Elliptical      L1 5' L1 5'      Seated L H/S stretch      10"x 3 ea 10" 3 ea      Bike   L1 8' L1 8 L1 8' L4 8' L4 8'      TM incline     10% 5'  . 5mph        LAQ L 6/16 10           B calf raises 6/16 10 20 20 20 20 20      Marching standing L/R 6/16 10 20 20 20 20 20      HS curls standing L/R   20 20 20 20 20      Standing ext/abd L/R   20 ea          Seated B hip adduction with ball 6/16 10           Glut sets 6/16 10           Supine SLR L 6/16 10           Supine L hip abd 6/16 10  10 10        L heel slides 6/16 10  210 active 2x10 active        Bridges 6/16 10  20x 20x                     Ther Activity    Leg Press B    105# 20 125# 30 125# 30 125# 30      Leg Press SL    55# 20 R: 65#  L:55# 30 4: 65# L:55# 30 ea R:65# L: 55# 30 ea      Step ups L       6" 20      Mini squats 6/16 10     NV      Sit to stand 7/3      2x10      Gait Training    With [facility]  5'                        Modalities

## 2023-07-05 ENCOUNTER — APPOINTMENT (OUTPATIENT)
Dept: PHYSICAL THERAPY | Facility: CLINIC | Age: 58
End: 2023-07-05
Payer: COMMERCIAL

## 2023-07-06 ENCOUNTER — HOSPITAL ENCOUNTER (OUTPATIENT)
Dept: PULMONOLOGY | Facility: HOSPITAL | Age: 58
End: 2023-07-06
Attending: STUDENT IN AN ORGANIZED HEALTH CARE EDUCATION/TRAINING PROGRAM
Payer: COMMERCIAL

## 2023-07-06 DIAGNOSIS — R06.09 DOE (DYSPNEA ON EXERTION): ICD-10-CM

## 2023-07-06 DIAGNOSIS — R06.02 SOB (SHORTNESS OF BREATH): ICD-10-CM

## 2023-07-06 DIAGNOSIS — J30.2 SEASONAL ALLERGIES: ICD-10-CM

## 2023-07-06 PROCEDURE — 94726 PLETHYSMOGRAPHY LUNG VOLUMES: CPT | Performed by: INTERNAL MEDICINE

## 2023-07-06 PROCEDURE — 94726 PLETHYSMOGRAPHY LUNG VOLUMES: CPT

## 2023-07-06 PROCEDURE — 94729 DIFFUSING CAPACITY: CPT

## 2023-07-06 PROCEDURE — 94010 BREATHING CAPACITY TEST: CPT

## 2023-07-06 PROCEDURE — 94729 DIFFUSING CAPACITY: CPT | Performed by: INTERNAL MEDICINE

## 2023-07-06 PROCEDURE — 94760 N-INVAS EAR/PLS OXIMETRY 1: CPT

## 2023-07-06 PROCEDURE — 94060 EVALUATION OF WHEEZING: CPT | Performed by: INTERNAL MEDICINE

## 2023-07-06 RX ORDER — ALBUTEROL SULFATE 2.5 MG/3ML
2.5 SOLUTION RESPIRATORY (INHALATION) ONCE
Status: DISCONTINUED | OUTPATIENT
Start: 2023-07-06 | End: 2023-07-10 | Stop reason: HOSPADM

## 2023-07-10 ENCOUNTER — APPOINTMENT (OUTPATIENT)
Dept: PHYSICAL THERAPY | Facility: CLINIC | Age: 58
End: 2023-07-10
Payer: COMMERCIAL

## 2023-07-11 ENCOUNTER — APPOINTMENT (OUTPATIENT)
Dept: PHYSICAL THERAPY | Facility: CLINIC | Age: 58
End: 2023-07-11
Payer: COMMERCIAL

## 2023-07-12 ENCOUNTER — OFFICE VISIT (OUTPATIENT)
Dept: FAMILY MEDICINE CLINIC | Facility: HOSPITAL | Age: 58
End: 2023-07-12
Payer: COMMERCIAL

## 2023-07-12 VITALS
HEIGHT: 62 IN | OXYGEN SATURATION: 96 % | WEIGHT: 207 LBS | DIASTOLIC BLOOD PRESSURE: 80 MMHG | SYSTOLIC BLOOD PRESSURE: 112 MMHG | HEART RATE: 108 BPM | BODY MASS INDEX: 38.09 KG/M2

## 2023-07-12 DIAGNOSIS — R21 FACIAL RASH: ICD-10-CM

## 2023-07-12 DIAGNOSIS — R21 GROIN RASH: Primary | ICD-10-CM

## 2023-07-12 DIAGNOSIS — L50.9 HIVES: ICD-10-CM

## 2023-07-12 PROCEDURE — 99214 OFFICE O/P EST MOD 30 MIN: CPT | Performed by: STUDENT IN AN ORGANIZED HEALTH CARE EDUCATION/TRAINING PROGRAM

## 2023-07-12 RX ORDER — PREDNISONE 20 MG/1
40 TABLET ORAL DAILY
Qty: 10 TABLET | Refills: 0 | Status: SHIPPED | OUTPATIENT
Start: 2023-07-12 | End: 2023-07-17

## 2023-07-12 NOTE — PROGRESS NOTES
[address], DO    Assessment/Plan:      Diagnosis ICD-10-CM Associated Orders   1. Groin rash  R21 predniSONE 20 mg tablet     diphenhydrAMINE (BENADRYL) 50 MG tablet      2. Facial rash  R21 predniSONE 20 mg tablet     diphenhydrAMINE (BENADRYL) 50 MG tablet      3. Hives  L50.9 predniSONE 20 mg tablet     diphenhydrAMINE (BENADRYL) 50 MG tablet        • Unknown etiology of rash, appears allergic & hive like. Possibly food induced, spices, etc.   • Trial steroids and benadryl. • Get in with derm. F/U as needed, throat tightness, go to ED. • Patient may call or return to office with any questions or concerns. ______________________________________________________________________  Subjective:     Patient ID: Riccardo Hernandez is a 59 y.o. female. HPI  Riccardo Hernandez  Chief Complaint   Patient presents with   • Rash     Face arms,legs, abd     Ate dinner at daughter's friday with steak ( not usual for her), some seafood, and some different seasonings. Started with rash on chin that night & then down her face on left. These are larger hive like lesions. Rash bad in left groin also. Everywhere is itchy. Using calamine. Tylenol too, no benadryl. Small marks on her arms & legs, 2 mm papules. BF, daughter & grandsonn all without symptoms. One cat in the basement had fleas, but was treated. No new soaps, makeups, clothing, sheets, detergents. Uses antiallergy already. Started lipitor 3 weeks ago, no issue until now. Not outside or in poison ivy. The following portions of the patient's history were reviewed and updated as appropriate: allergies, current medications, past medical history, and problem list.    Review of Systems   Constitutional: Negative for chills and fever. HENT: Negative for sneezing, sore throat, trouble swallowing and voice change. Respiratory: Positive for shortness of breath (conversational dyspnea now for months).  Negative for cough, choking, chest tightness and wheezing. Cardiovascular: Negative for chest pain and palpitations. Skin: Positive for color change and rash. Neurological: Positive for dizziness (with deep breathing) and light-headedness (from deep breathing & talking). Objective:      Vitals:    07/12/23 1505   BP: 112/80   Pulse: (!) 108   SpO2: 96%      Physical Exam  Vitals reviewed. Constitutional:       General: She is not in acute distress. Appearance: Normal appearance. She is well-developed. She is obese. She is not ill-appearing. HENT:      Head: Normocephalic and atraumatic. Eyes:      General: No scleral icterus. Right eye: No discharge. Left eye: No discharge. Cardiovascular:      Rate and Rhythm: Normal rate and regular rhythm. Pulses: Normal pulses. Heart sounds: Normal heart sounds. No murmur heard. Pulmonary:      Effort: Pulmonary effort is normal. No respiratory distress. Breath sounds: Normal breath sounds. No stridor. No wheezing. Musculoskeletal:      Cervical back: Normal range of motion. No rigidity. Right lower leg: No edema. Left lower leg: No edema. Skin:     General: Skin is warm and dry. Findings: Erythema and rash (hive like spots on face & neck, see picture below, also dry & flaky) present. Comments: Additional rash in groin line,    Neurological:      Mental Status: She is alert and oriented to person, place, and time. Gait: Gait normal.   Psychiatric:         Mood and Affect: Mood normal.         Behavior: Behavior normal.         Thought Content: Thought content normal.         Judgment: Judgment normal.                 Portions of the record may have been created with voice recognition software. Occasional wrong word or "sound alike" substitutions may have occurred due to the inherent limitations of voice recognition software.  Please review the chart carefully and recognize, using context, where substitutions/typographical errors may have occurred.

## 2023-07-13 ENCOUNTER — TELEPHONE (OUTPATIENT)
Dept: OBGYN CLINIC | Facility: CLINIC | Age: 58
End: 2023-07-13

## 2023-07-13 ENCOUNTER — OFFICE VISIT (OUTPATIENT)
Dept: PHYSICAL THERAPY | Facility: CLINIC | Age: 58
End: 2023-07-13
Payer: COMMERCIAL

## 2023-07-13 DIAGNOSIS — M16.12 LOCALIZED OSTEOARTHROSIS OF LEFT HIP: ICD-10-CM

## 2023-07-13 DIAGNOSIS — Z96.642 AFTERCARE FOLLOWING LEFT HIP JOINT REPLACEMENT SURGERY: Primary | ICD-10-CM

## 2023-07-13 DIAGNOSIS — Z47.1 AFTERCARE FOLLOWING LEFT HIP JOINT REPLACEMENT SURGERY: Primary | ICD-10-CM

## 2023-07-13 PROCEDURE — 97110 THERAPEUTIC EXERCISES: CPT

## 2023-07-13 PROCEDURE — 97112 NEUROMUSCULAR REEDUCATION: CPT

## 2023-07-13 NOTE — LETTER
July 14, 2023     Patient: Leidy Vance  YOB: 1965  Date of Visit: 7/13/2023      To Whom it May Concern:    Julieta Astorga is under my professional care. Peggy De La Garza was seen in my office on 7/13/2023. Peggy De La Garza may return to work on 7/19/23 part time . If you have any questions or concerns, please don't hesitate to call.          Sincerely,          Tommy Guevara, [facility]        CC: No Recipients

## 2023-07-13 NOTE — TELEPHONE ENCOUNTER
Patient may return to work part time on 7/19. Does she need a note for work?   If so I would be happy to place a note into her MyChart

## 2023-07-13 NOTE — TELEPHONE ENCOUNTER
Patient is inquiring if she can return to work part time on July 19, 2023. Patient stopped into office after therapy, please see notes.

## 2023-07-13 NOTE — PROGRESS NOTES
Daily Note     Today's date: 2023  Patient name: Edmundo Eastman  : 1965  MRN: [id number]  Referring provider: Inga Lezama, *  Dx:   Encounter Diagnosis     ICD-10-CM    1. Aftercare following left hip joint replacement surgery  Z47.1     Z96.642       2. Localized osteoarthrosis of left hip  M16.12           Subjective:  Pt reports feeling okay. Objective:  See treatment diary below  FOTO given (_____)      Assessment:  Pt presented to outpatient physical therapy at [facility] with complaints of L hip pain and weakness. She presents with decreased range of motion, decreased strength, limited flexibility, altered gait pattern, poor balance, decreased tolerance to activity and decreased functional mobility following L MAME on 23 due to osteoarthritis. She has hx of R MAME on 23 with successful rehab, but has symptoms of CRPS at her mid-distal R quadriceps. She is able to ambulate without her [facility] in most areas now safely. Unable to ascend 8" steps with L LE today, but able at 6". Now able to squat down to  objects off of floor. She will continue to benefit from skilled PT services in order to address these deficits and reach maximum level of function. Pt performed below TE with good tolerance and technique. Plan:  Progress to 8" step ups. Normalize gait pattern to eliminate SPC and refine gait pattern.        POC EXPIRES On:  23  PRECAUTIONS:  None  CO-MORBIDITES:  B hip OA  PERSONAL FACTORS:  L MAME on 23, R MAME on 23      Manuals HEP  7/3      PROM L hip supine  8' 8' 8'                                Neuro Re-Ed     L desensitize          Tandem 24ft grn line  3 laps + spc 3 laps + SPC 3 laps + SPC     Side Stepping 24ft Grn line  3 laps + spc 3 laps GTB 3 laps GTB                                         Ther Ex    Elliptical  L1 5' L1 5' L1 5'     Bike  L4 8' L4 8' L4 10'     TM incline         Seated L H/S stretch  10"x 3 ea 10" 3 ea 10" 3 ea     B calf raises 6/16 20 20 20     Marching standing L/R 6/16 20 20 20     HS curls standing L/R  20 20 20              Ther Activity    Leg Press B  125# 30 125# 30 125# 30     Leg Press SL  4: 65# L:55# 30 ea R:65# L: 55# 30 ea 65#  30 ea     Step ups L   6" 20 8" 20     Mini squats 6/16  NV      Sit to stand 7/3  2x10 2x10     Gait Training    With [facility]

## 2023-07-17 ENCOUNTER — OFFICE VISIT (OUTPATIENT)
Dept: PHYSICAL THERAPY | Facility: CLINIC | Age: 58
End: 2023-07-17
Payer: COMMERCIAL

## 2023-07-17 DIAGNOSIS — M16.12 LOCALIZED OSTEOARTHROSIS OF LEFT HIP: ICD-10-CM

## 2023-07-17 DIAGNOSIS — Z96.642 AFTERCARE FOLLOWING LEFT HIP JOINT REPLACEMENT SURGERY: Primary | ICD-10-CM

## 2023-07-17 DIAGNOSIS — Z47.1 AFTERCARE FOLLOWING LEFT HIP JOINT REPLACEMENT SURGERY: Primary | ICD-10-CM

## 2023-07-17 PROCEDURE — 97110 THERAPEUTIC EXERCISES: CPT

## 2023-07-17 PROCEDURE — 97112 NEUROMUSCULAR REEDUCATION: CPT

## 2023-07-17 NOTE — PROGRESS NOTES
Daily Note     Today's date: 2023  Patient name: Les Ruffin  : 1965  MRN: [id number]  Referring provider: Kunal Laird, *  Dx:   Encounter Diagnosis     ICD-10-CM    1. Aftercare following left hip joint replacement surgery  Z47.1     Z96.642       2. Localized osteoarthrosis of left hip  M16.12           Subjective:  Pt reports feeling okay. Objective:  See treatment diary below  FOTO given (_____)      Assessment:  Pt presented to outpatient physical therapy at [facility] with complaints of L hip pain and weakness. She presents with decreased range of motion, decreased strength, limited flexibility, altered gait pattern, poor balance, decreased tolerance to activity and decreased functional mobility following L MAME on 23 due to osteoarthritis. She has hx of R MAME on 23 with successful rehab, but has symptoms of CRPS at her mid-distal R quadriceps. She is able to ambulate without her [facility] in most areas now safely. Unable to ascend 8" steps with L LE today, but able at 6". Now able to squat down to  objects off of floor. She will continue to benefit from skilled PT services in order to address these deficits and reach maximum level of function. Pt performed below TE with good tolerance and technique. Plan:  Progress to 8" step ups. Normalize gait pattern to eliminate SPC and refine gait pattern.        POC EXPIRES On:  23  PRECAUTIONS:  None  CO-MORBIDITES:  B hip OA  PERSONAL FACTORS:  L MAME on 23, R MAME on 23      Manuals HEP  7/3     PROM L hip supine  8' 8' 8' 8'                               Neuro Re-Ed     L desensitize          Tandem 24ft grn line  3 laps + spc 3 laps + SPC 3 laps + SPC 3laps +SPC    Side Stepping 24ft Grn line  3 laps + spc 3 laps GTB 3 laps GTB 3laps GTB                                        Ther Ex    Elliptical  L1 5' L1 5' L1 5' L1 5'    Bike  L4 8' L4 8' L4 10' L4 10'    TM incline Seated L H/S stretch  10"x 3 ea 10" 3 ea 10" 3 ea 10" 3    B calf raises 6/16 20 20 20 20    Marching standing L/R 6/16 20 20 20 20    HS curls standing L/R  20 20 20 20             Ther Activity    Leg Press B  125# 30 125# 30 125# 30 125# 30    Leg Press SL  4: 65# L:55# 30 ea R:65# L: 55# 30 ea 65#  30 ea 65# 30    Step ups L   6" 20 8" 20     Mini squats 6/16  NV      Sit to stand 7/3  2x10 2x10 2x10    Gait Training    With [facility]                  Modalities

## 2023-07-27 ENCOUNTER — TELEPHONE (OUTPATIENT)
Dept: FAMILY MEDICINE CLINIC | Facility: HOSPITAL | Age: 58
End: 2023-07-27

## 2023-07-27 NOTE — TELEPHONE ENCOUNTER
Patient was out of town and apologizes for missing her appointment on 7/26. Her annual has been rescheduled. Would you please call her to go over the breathing test results.

## 2023-07-28 ENCOUNTER — TELEPHONE (OUTPATIENT)
Dept: OBGYN CLINIC | Facility: CLINIC | Age: 58
End: 2023-07-28

## 2023-07-28 NOTE — LETTER
July 28, 2023     Patient: Gloria Penn  YOB: 1965  Date of Visit: 7/28/2023      To Whom it May Concern:    Ronakkevin Funes is under my professional care. Monserrat Hdz was seen in my office on 6/23/23. Monserrat Hdz may return to work on 8/2/23 with part time hours. If you have any questions or concerns, please don't hesitate to call.          Sincerely,        Dr. Willow Mcardle, D.O.        CC: No Recipients

## 2023-07-28 NOTE — TELEPHONE ENCOUNTER
Patient stopped into office today to  new letter for work for her to return to work on 8/2/23. Patient stating that she is to return on that day but only part time. Can work letter be updated. Thank you.

## 2023-07-31 NOTE — TELEPHONE ENCOUNTER
Caller: Patient    Doctor: Cristino Whitten    Reason for call:     Patient will be stopping by to  her work note.     Call back#: n/a

## 2023-08-01 ENCOUNTER — OFFICE VISIT (OUTPATIENT)
Dept: PHYSICAL THERAPY | Facility: CLINIC | Age: 58
End: 2023-08-01
Payer: COMMERCIAL

## 2023-08-01 DIAGNOSIS — Z47.1 AFTERCARE FOLLOWING LEFT HIP JOINT REPLACEMENT SURGERY: Primary | ICD-10-CM

## 2023-08-01 DIAGNOSIS — Z96.642 AFTERCARE FOLLOWING LEFT HIP JOINT REPLACEMENT SURGERY: Primary | ICD-10-CM

## 2023-08-01 DIAGNOSIS — M16.12 LOCALIZED OSTEOARTHROSIS OF LEFT HIP: ICD-10-CM

## 2023-08-01 DIAGNOSIS — K21.9 GASTROESOPHAGEAL REFLUX DISEASE WITHOUT ESOPHAGITIS: ICD-10-CM

## 2023-08-01 PROCEDURE — 97110 THERAPEUTIC EXERCISES: CPT | Performed by: PHYSICAL THERAPIST

## 2023-08-01 PROCEDURE — 97140 MANUAL THERAPY 1/> REGIONS: CPT | Performed by: PHYSICAL THERAPIST

## 2023-08-01 RX ORDER — OMEPRAZOLE 40 MG/1
40 CAPSULE, DELAYED RELEASE ORAL 2 TIMES DAILY
Qty: 180 CAPSULE | Refills: 0 | Status: SHIPPED | OUTPATIENT
Start: 2023-08-01

## 2023-08-01 NOTE — PROGRESS NOTES
PT Re-evaluation    Today's date: 2023  Patient name: Lisa He  : 1965  MRN: [id number]  Referring provider: Tricia Mac, *  Dx:   Encounter Diagnosis     ICD-10-CM    1. Aftercare following left hip joint replacement surgery  Z47.1     Z96.642       2. Localized osteoarthrosis of left hip  M16.12                      Assessment  Assessment details: Lisa He is a 59 y.o. female who presented to outpatient physical therapy at [facility] with complaints of L hip pain and weakness. She presented with decreased range of motion, decreased strength, limited flexibility, altered gait pattern, poor balance, decreased tolerance to activity and decreased functional mobility following L MAME on 23 due to osteoarthritis. She has hx of R MAME on 23 with successful rehab, but has symptoms of CRPS at her mid-distal R quadriceps. She is able to ambulate without her SPC at most times now. Her progression has been excellent until she had to fly and sit a lot during the past 2 weeks which caused more L LBP. Her L hip progression is still good aside from hip flexor tightness. She will continue to benefit from skilled PT services for a few more visits in order to address these deficits and reach maximum level of function. Thank you for the referral!  Impairments: abnormal gait, abnormal or restricted ROM, activity intolerance, impaired balance, impaired physical strength and pain with function  Barriers to therapy: None  Understanding of Dx/Px/POC: good  Goals  ST.  Independent with HEP in 2 weeks - Met  2.  Increase L hip PROM to within 10 degrees of WNL all motions in 6 weeks - Met   3. Able to ambulate with SPC x 10 min for ADLs in 2 weeks - Met    LT. Achieve FOTO score of 53/100 in 8 weeks - Met  2.  Able to RTW, ambulate x 30 min without AD on all surfaces in 8 weeks - Mostly Met  3. Strength L LE = 5/5 all motions in 8 weeks - Met  4.   No tightness in L LE in 8 weeks - Mostly Met  5. Good L LE balance in 8 weeks - Met      Plan  Patient would benefit from: skilled PT  Planned modality interventions: cryotherapy  Planned therapy interventions: ADL retraining, balance/weight bearing training, flexibility, functional ROM exercises, home exercise program, manual therapy, neuromuscular re-education, strengthening, stretching, therapeutic activities, therapeutic exercise and gait training  Frequency: 2x week  Duration in weeks: 4  Treatment plan discussed with: patient        Subjective Evaluation    History of Present Illness  Mechanism of injury: surgery  Mechanism of injury: Pt reports having B hip pain for more than 1 year. She tried injections, PT and weight loss without significant pain relief. She had R MAME on 23 with posterior approach with success but has severe hypersensitivity to touch at distal R quadriceps nerve pain that has lessened recently. She had L MAME on 23. Had been walking with a walker after surgery and now usually with a SPC at times. Sometimes without AD. Has gained 60# in the past year due to inactivity, but lost some weight recently. 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, but returning part time on 23. Able to walk up to 20 min now. Recently having more L upper buttock pain.            Recurrent probem    Quality of life: fair    Patient Goals  Patient goals for therapy: decreased pain, improved balance, increased motion, increased strength, return to sport/leisure activities and return to work    Pain  Current pain ratin  At best pain ratin  At worst pain ratin  Quality: dull ache and tight  Relieving factors: rest, medications and ice  Aggravating factors: walking, standing and stair climbing  Progression: improved    Social Support  Steps to enter house: yes  Stairs in house: no   Lives in: [facility] house  Lives with: adult children    Employment status: not working  Treatments  Previous treatment: medication, injection treatment and physical therapy  Current treatment: medication  Discharged from (in last 30 days): home health care        Objective     Palpation   Left   No palpable tenderness to the rectus femoris. Hypertonic in the rectus femoris. Right   Hypertonic in the rectus femoris. Tenderness of the rectus femoris. Additional Palpation Details  Mod tightness L, min R hip flexors. Tenderness     Left Hip   No tenderness in the greater trochanter. Right Hip   No tenderness in the greater trochanter. Additional Tenderness Details  Mod tightness upper L glut. Neurological Testing     Sensation     Hip   Left Hip   Intact: light touch    Right Hip   Intact: light touch    Active Range of Motion   Left Hip   Normal active range of motion    Right Hip   Normal active range of motion  Left Knee   Normal active range of motion    Right Knee   Normal active range of motion    Strength/Myotome Testing     Left Hip   Planes of Motion   Flexion: 4+  Abduction: 5  Adduction: 5    Right Hip   Planes of Motion   Flexion: 5  Abduction: 5  Adduction: 5    Left Knee   Flexion: 5  Extension: 5    Right Knee   Flexion: 5  Extension: 4+    Ambulation   Weight-Bearing Status   Weight-Bearing Status (Left): full weight bearing   Weight-Bearing Status (Right): full weight-bearing    Assistive device used: single point cane    Ambulation: Level Surfaces   Ambulation without assistive device: independent    Ambulation: Stairs   Ascend stairs: independent  Pattern: reciprocal  Railings: one rail  Descend stairs: independent  Pattern: reciprocal  Railings: one rail  Curbs: independent    Observational Gait   Walking speed within functional limits. Decreased stride length and left stance time. Additional Observational Gait Details  Limited B hip ext ROM with gait. Comments   Good B L LE balance.            POC EXPIRES On:  8/29/23  PRECAUTIONS:  None  CO-MORBIDITES:  B hip OA  PERSONAL FACTORS: L MAME on 5/17/23, R MAME on 1/18/23      Manuals HEP 6/30 7/3 7/13 7/17 8/1   PROM L hip supine  8' 8' 8' 8' 8'   STM L upper glut      7'                     Neuro Re-Ed     L desensitize          Tandem 24ft grn line  3 laps + spc 3 laps + SPC 3 laps + SPC 3laps +SPC    Side Stepping 24ft Grn line  3 laps + spc 3 laps GTB 3 laps GTB 3laps GTB                                        Ther Ex    Elliptical  L1 5' L1 5' L1 5' L1 5'    Bike  L4 8' L4 8' L4 10' L4 10' L4 8'   TM incline         Seated L H/S stretch  10"x 3 ea 10" 3 ea 10" 3 ea 10" 3 10" 5   B calf raises 6/16 20 20 20 20 20   Marching standing L/R 6/16 20 20 20 20 20   HS curls standing L/R  20 20 20 20    Supine LTR to R 8/1     10" 10   Standing hip flexor stretch L/R 8/1     15" 3 ea   Ther Activity    Leg Press B  125# 30 125# 30 125# 30 125# 30    Leg Press SL  4: 65# L:55# 30 ea R:65# L: 55# 30 ea 65#  30 ea 65# 30    Step ups L   6" 20 8" 20     Mini squats 6/16  NV      Sit to stand 7/3  2x10 2x10 2x10 2x10   Gait Training    With [facility]                  Modalities

## 2023-08-03 ENCOUNTER — OFFICE VISIT (OUTPATIENT)
Dept: PHYSICAL THERAPY | Facility: CLINIC | Age: 58
End: 2023-08-03
Payer: COMMERCIAL

## 2023-08-03 DIAGNOSIS — Z96.642 AFTERCARE FOLLOWING LEFT HIP JOINT REPLACEMENT SURGERY: Primary | ICD-10-CM

## 2023-08-03 DIAGNOSIS — Z47.1 AFTERCARE FOLLOWING LEFT HIP JOINT REPLACEMENT SURGERY: Primary | ICD-10-CM

## 2023-08-03 DIAGNOSIS — M16.12 LOCALIZED OSTEOARTHROSIS OF LEFT HIP: ICD-10-CM

## 2023-08-03 PROCEDURE — 97140 MANUAL THERAPY 1/> REGIONS: CPT | Performed by: PHYSICAL THERAPIST

## 2023-08-03 PROCEDURE — 97110 THERAPEUTIC EXERCISES: CPT | Performed by: PHYSICAL THERAPIST

## 2023-08-03 NOTE — PROGRESS NOTES
Daily Note     Today's date: 8/3/2023  Patient name: Aleta Calles  : 1965  MRN: [id number]  Referring provider: Jh Rodriguez, *  Dx:   Encounter Diagnosis     ICD-10-CM    1. Aftercare following left hip joint replacement surgery  Z47.1     Z96.642       2. Localized osteoarthrosis of left hip  M16.12                      Subjective:  Pt reports feeling much better after last PT session until this morning. L LBP was bad again, but not quite as intense. Objective:  See treatment diary below      Assessment:  Pt presented to outpatient physical therapy at [facility] with complaints of L hip pain and weakness. She presented with decreased range of motion, decreased strength, limited flexibility, altered gait pattern, poor balance, decreased tolerance to activity and decreased functional mobility following L MAME on 23 due to osteoarthritis. She has hx of R MAME on 23 with successful rehab, but has symptoms of CRPS at her mid-distal R quadriceps. She is able to ambulate without her SPC at most times now and with cueing and gentle hip flexor stretching has been able to normalize her gait pattern. Her progression has been excellent until she had to fly and sit a lot during the past 2 weeks which caused more L LBP. Her L hip progression is still good aside from hip flexor tightness. She will continue to benefit from skilled PT services for a couple more sessions in order to address these deficits and reach maximum level of function. Plan: To MD on 23. Likely D/C in 2 more visits to [facility] due to insurance expiration. Continue to refine gait pattern. Manual tx prn.        POC EXPIRES On:  23  PRECAUTIONS:  None  CO-MORBIDITES:  B hip OA  PERSONAL FACTORS:  L MAME on 23, R MAME on 23      Manuals HEP  7/3 7/13 7/17 8/1 8/3   PROM L hip supine  8' 8' 8' 8' 8' 5'   STM L upper glut      7' 7'                       Neuro Re-Ed      L desensitize Tandem 24ft grn line  3 laps + spc 3 laps + SPC 3 laps + SPC 3laps +SPC  3 laps   Side Stepping 24ft Grn line  3 laps + spc 3 laps GTB 3 laps GTB 3laps GTB                                             Ther Ex     Elliptical  L1 5' L1 5' L1 5' L1 5'     Bike  L4 8' L4 8' L4 10' L4 10' L4 8' L4 10'   TM incline          Seated L H/S stretch  10"x 3 ea 10" 3 ea 10" 3 ea 10" 3 10" 5 10" 5   B calf raises 6/16 20 20 20 20 20 20   Marching standing L/R 6/16 20 20 20 20 20 20   HS curls standing L/R  20 20 20 20     Supine LTR to R 8/1     10" 10 10" 10   Standing hip flexor stretch L/R 8/1     15" 3 ea 15" 3 ea   Ther Activity     Leg Press B  125# 30 125# 30 125# 30 125# 30     Leg Press SL  4: 65# L:55# 30 ea R:65# L: 55# 30 ea 65#  30 ea 65# 30     Step ups L   6" 20 8" 20      Mini squats 6/16  NV       Sit to stand 7/3  2x10 2x10 2x10 2x10    Gait Training     Proper form       5'             Modalities

## 2023-08-04 ENCOUNTER — OFFICE VISIT (OUTPATIENT)
Dept: OBGYN CLINIC | Facility: CLINIC | Age: 58
End: 2023-08-04

## 2023-08-04 VITALS
DIASTOLIC BLOOD PRESSURE: 91 MMHG | HEIGHT: 62 IN | SYSTOLIC BLOOD PRESSURE: 134 MMHG | HEART RATE: 101 BPM | WEIGHT: 207 LBS | BODY MASS INDEX: 38.09 KG/M2

## 2023-08-04 DIAGNOSIS — Z96.643 AFTERCARE FOLLOWING BILATERAL HIP JOINT REPLACEMENT SURGERY: Primary | ICD-10-CM

## 2023-08-04 DIAGNOSIS — Z47.1 AFTERCARE FOLLOWING BILATERAL HIP JOINT REPLACEMENT SURGERY: Primary | ICD-10-CM

## 2023-08-04 PROCEDURE — 99024 POSTOP FOLLOW-UP VISIT: CPT | Performed by: STUDENT IN AN ORGANIZED HEALTH CARE EDUCATION/TRAINING PROGRAM

## 2023-08-04 NOTE — PROGRESS NOTES
Subjective: Patient seen and examined. Pain controlled, she is not having pain in the hips. She is still getting right thigh pain due to lumbar radiculopathy, but it has significantly improved. She is attending physical therapy for lower leg and quad strengthening as well as her lumbar spine. Progressing well. Incisions well-healed. She is ambulating today with a cane but only for security. Denies fevers or chills. She has been able to walk and get up from sitting without pain for the first time in many years.      Physical Exam:  Incision: well healed surgical incision  ROM: normal post-op motion  5/5 IP/Q/HS/TA/GS, 2+ DP/PT, SILT DP/SP/S/S/TN    XR no new imaging was obtained today     Assessment/Plan:  59year old female 11 weeks s/p left total hip arthroplasty DOS 5/17/2023 doing very-well    - continue multi-modal pain control   - Weight bearing status: WBAT  - DVT ppx: Aspirin 81mg BID- completed  - Incision care: No restrictions: can start transverse friction massage to desensitize   - PT/OT  - F/U 6 months for repeat evaluation of the bilateral hips w/ XRs at that time       Scribe Attestation    I,:  Thomas Coombs PA-C am acting as a scribe while in the presence of the attending physician.:       I,:  Angelica Vidal DO personally performed the services described in this documentation    as scribed in my presence.:

## 2023-08-09 ENCOUNTER — OFFICE VISIT (OUTPATIENT)
Dept: PHYSICAL THERAPY | Facility: CLINIC | Age: 58
End: 2023-08-09
Payer: COMMERCIAL

## 2023-08-09 DIAGNOSIS — Z96.642 AFTERCARE FOLLOWING LEFT HIP JOINT REPLACEMENT SURGERY: Primary | ICD-10-CM

## 2023-08-09 DIAGNOSIS — M16.12 LOCALIZED OSTEOARTHROSIS OF LEFT HIP: ICD-10-CM

## 2023-08-09 DIAGNOSIS — Z47.1 AFTERCARE FOLLOWING LEFT HIP JOINT REPLACEMENT SURGERY: Primary | ICD-10-CM

## 2023-08-09 PROCEDURE — 97140 MANUAL THERAPY 1/> REGIONS: CPT

## 2023-08-09 PROCEDURE — 97110 THERAPEUTIC EXERCISES: CPT

## 2023-08-09 PROCEDURE — 97112 NEUROMUSCULAR REEDUCATION: CPT

## 2023-08-09 NOTE — PROGRESS NOTES
Daily Note     Today's date: 2023  Patient name: Shilpi Ferrer  : 1965  MRN: [id number]  Referring provider: Jocelyn No, *  Dx:   Encounter Diagnosis     ICD-10-CM    1. Aftercare following left hip joint replacement surgery  Z47.1     Z96.642       2. Localized osteoarthrosis of left hip  M16.12                      Subjective:  Pt reports seeing   and he very pleased with her progression. Objective:  See treatment diary below      Assessment:  Pt presented to outpatient physical therapy at [facility] with complaints of L hip pain and weakness. She presented with decreased range of motion, decreased strength, limited flexibility, altered gait pattern, poor balance, decreased tolerance to activity and decreased functional mobility following L MAME on 23 due to osteoarthritis. She has hx of R MAME on 23 with successful rehab, but has symptoms of CRPS at her mid-distal R quadriceps. She is able to ambulate without her SPC at most times now and with cueing and gentle hip flexor stretching has been able to normalize her gait pattern. Her progression has been excellent until she had to fly and sit a lot during the past 2 weeks which caused more L LBP. She will continue to benefit from skilled PT services for a couple more sessions in order to address these deficits and reach maximum level of function. Reviewed with patient HEP stretches, answered pt questions and concerns to pt satisfaction. Plan: To MD on 23. Likely D/C in 2 more visits to HEP due to insurance expiration. Continue to refine gait pattern. Manual tx prn.        POC EXPIRES On:  23  PRECAUTIONS:  None  CO-MORBIDITES:  B hip OA  PERSONAL FACTORS:  L MAME on 23, R MAME on 23      Manuals HEP 7/17 8/1 8/3 8/9   PROM L hip supine  8' 8' 5' 8'   STM L upper glut   7' 7'                    Neuro Re-Ed      L desensitize         Tandem 24ft grn line  3laps +SPC  3 laps 3laps   Side Stepping 24ft Grn line  3laps GTB                                      Ther Ex     Elliptical  L1 5'      Bike  L4 10' L4 8' L4 10' L4 10'   TM incline        Seated L H/S stretch  10" 3 10" 5 10" 5 10" 5   B calf raises 6/16 20 20 20 20   Marching standing L/R 6/16 20 20 20 20   HS curls standing L/R  20      Supine LTR to R 8/1  10" 10 10" 10 10" 10   Standing hip flexor stretch L/R 8/1  15" 3 ea 15" 3 ea 15" 3   Ther Activity     Leg Press B  125# 30      Leg Press SL  65# 30      Step ups L        Mini squats 6/16       Sit to stand 7/3 2x10 2x10     Gait Training     Proper form    5'            Modalities

## 2023-08-16 ENCOUNTER — APPOINTMENT (OUTPATIENT)
Dept: PHYSICAL THERAPY | Facility: CLINIC | Age: 58
End: 2023-08-16
Payer: COMMERCIAL

## 2023-08-17 ENCOUNTER — TELEPHONE (OUTPATIENT)
Dept: FAMILY MEDICINE CLINIC | Facility: HOSPITAL | Age: 58
End: 2023-08-17

## 2023-08-17 NOTE — TELEPHONE ENCOUNTER
Patient complains of not being able to breath - inhalers not helping and rescue inhaler has been recalled - pft was normal - ct scan needed to be peer to peer    pcb

## 2023-08-20 ENCOUNTER — OFFICE VISIT (OUTPATIENT)
Dept: URGENT CARE | Facility: CLINIC | Age: 58
End: 2023-08-20
Payer: COMMERCIAL

## 2023-08-20 ENCOUNTER — APPOINTMENT (OUTPATIENT)
Dept: RADIOLOGY | Facility: CLINIC | Age: 58
End: 2023-08-20
Payer: COMMERCIAL

## 2023-08-20 VITALS
OXYGEN SATURATION: 97 % | RESPIRATION RATE: 18 BRPM | TEMPERATURE: 98.2 F | DIASTOLIC BLOOD PRESSURE: 70 MMHG | HEART RATE: 69 BPM | SYSTOLIC BLOOD PRESSURE: 110 MMHG

## 2023-08-20 DIAGNOSIS — H00.012 HORDEOLUM EXTERNUM OF RIGHT LOWER EYELID: Primary | ICD-10-CM

## 2023-08-20 DIAGNOSIS — R06.02 SHORTNESS OF BREATH: ICD-10-CM

## 2023-08-20 DIAGNOSIS — R06.02 SOB (SHORTNESS OF BREATH): ICD-10-CM

## 2023-08-20 DIAGNOSIS — J01.00 ACUTE NON-RECURRENT MAXILLARY SINUSITIS: ICD-10-CM

## 2023-08-20 DIAGNOSIS — J30.2 SEASONAL ALLERGIES: ICD-10-CM

## 2023-08-20 PROCEDURE — 71046 X-RAY EXAM CHEST 2 VIEWS: CPT

## 2023-08-20 PROCEDURE — 99213 OFFICE O/P EST LOW 20 MIN: CPT | Performed by: PHYSICIAN ASSISTANT

## 2023-08-20 RX ORDER — FLUTICASONE PROPIONATE 50 MCG
1 SPRAY, SUSPENSION (ML) NASAL DAILY
Qty: 16 G | Refills: 2 | Status: SHIPPED | OUTPATIENT
Start: 2023-08-20

## 2023-08-20 RX ORDER — ERYTHROMYCIN 5 MG/G
0.5 OINTMENT OPHTHALMIC EVERY 12 HOURS SCHEDULED
Qty: 10 G | Refills: 0 | Status: SHIPPED | OUTPATIENT
Start: 2023-08-20 | End: 2023-08-25

## 2023-08-20 RX ORDER — ALBUTEROL SULFATE 90 UG/1
2 AEROSOL, METERED RESPIRATORY (INHALATION) EVERY 6 HOURS PRN
Qty: 18 G | Refills: 2 | Status: SHIPPED | OUTPATIENT
Start: 2023-08-20

## 2023-08-20 NOTE — PATIENT INSTRUCTIONS
Apply ointment to right eye lower lid twice a day for 5 days, continue to apply warm compresses  For shortness of breath increase Albuterol to 2 puffs every 6 hours. Follow up with Dr Sanjuanita Newberry regarding progressive SOB  Sinusitis: Flonase 1 spray each nostril daily   Follow up with PCP in 3-5 days. Proceed to  ER if symptoms worsen.

## 2023-08-20 NOTE — PROGRESS NOTES
[facility]        NAME: Victor Manuel Bernal is a 59 y.o. female  : 1965    MRN: [id number]  DATE: 2023  TIME: 10:32 AM    Assessment and Plan   Hordeolum externum of right lower eyelid [H00.012]  1. Hordeolum externum of right lower eyelid  erythromycin (ILOTYCIN) ophthalmic ointment      2. Shortness of breath  XR chest pa & lateral      3. Seasonal allergies  fluticasone (FLONASE) 50 mcg/act nasal spray    albuterol (Proventil HFA) 90 mcg/act inhaler      4. SOB (shortness of breath)  albuterol (Proventil HFA) 90 mcg/act inhaler      5. Acute non-recurrent maxillary sinusitis              Patient Instructions   In terms of the eye will prescribe antibiotic ointment for hordeolum which is not resolving. Right eye/cheek swelling is related to the hordeolum and should resolve over the next few days. For sinus symptoms start Flonase. No indication for antibiotics at this time. For shortness of breath: objectively pulse ox is good and no ronchi/wheeze/rales on exam.  Complaints seem to be more subjective. I encouraged her to increase Albuterol to 2 puffs every 6 hours and follow up with PCP regarding these complaints. Apply ointment to right eye lower lid twice a day for 5 days, continue to apply warm compresses  For shortness of breath increase Albuterol to 2 puffs every 6 hours. Follow up with Dr Felicitas Marquis regarding progressive SOB  Sinusitis: Flonase 1 spray each nostril daily   Follow up with PCP in 3-5 days. Proceed to  ER if symptoms worsen. Chief Complaint     Chief Complaint   Patient presents with   • Stye in Right Eye     Pt reports she developed stye in right eye on Thursday. • Headache     Pt reports headache in addition to nasal congestion and stye in right eye. • Possible Sinus Infection     Pt also reports pressure on right side of cheek. History of Present Illness       HPI  58 y/o female presents for evaluation of multiple complaints.   She states on Thursday she developed a stye on her right lower lid. She has been applying warm compresses but continues with tenderness and swelling of the lower lid and now has swelling in the cheek as well. She is also c/o right sided sinus congestion and headache. She notes minimal drainage. She is also c/o shortness of breath. She states that she has been worked up for this within the last year and studies were 'normal'. She has an Albuterol inhaler which she is using twice a day - 1 puff each time. She states this seems to help for a brief period of time. She has been trying to get in with her PCP regarding her progressive SOB but has been unable to reach them. She denies cough. Review of Systems   Review of Systems   Constitutional: Negative for chills and fever. HENT: Positive for congestion, facial swelling and sinus pressure. Negative for ear pain and sore throat. Eyes: Positive for pain and redness. Negative for visual disturbance. Respiratory: Positive for cough and shortness of breath. Negative for chest tightness and wheezing. Cardiovascular: Negative for chest pain and palpitations. Gastrointestinal: Negative for abdominal pain and vomiting. Genitourinary: Negative for dysuria and hematuria. Musculoskeletal: Negative for arthralgias and back pain. Skin: Negative for color change and rash. Neurological: Negative for seizures and syncope. All other systems reviewed and are negative. Current Medications       Current Outpatient Medications:   •  albuterol (Proventil HFA) 90 mcg/act inhaler, Inhale 2 puffs every 6 (six) hours as needed for wheezing or shortness of breath, Disp: 18 g, Rfl: 2  •  atorvastatin (LIPITOR) 20 mg tablet, Take half tab every other day x2 weeks then half tab daily x2 weeks & then full tab daily afterwards. , Disp: 90 tablet, Rfl: 3  •  erythromycin (ILOTYCIN) ophthalmic ointment, Administer 0.5 inches to the right eye every 12 (twelve) hours for 5 days, Disp: 10 g, Rfl: 0  •  fluticasone (FLONASE) 50 mcg/act nasal spray, 1 spray into each nostril daily, Disp: 16 g, Rfl: 2  •  Fluticasone-Salmeterol (Advair Diskus) 100-50 mcg/dose inhaler, Inhale 1 puff 2 (two) times a day Rinse mouth after use., Disp: 60 blister, Rfl: 1  •  hyoscyamine (ANASPAZ,LEVSIN) 0.125 MG tablet, Take 1 tablet (0.125 mg total) by mouth every 4 (four) hours as needed for cramping, Disp: 60 tablet, Rfl: 5  •  losartan-hydrochlorothiazide (HYZAAR) 100-12.5 MG per tablet, Take 1 tablet by mouth daily, Disp: 30 tablet, Rfl: 5  •  omeprazole (PriLOSEC) 40 MG capsule, Take 1 capsule by mouth twice daily, Disp: 180 capsule, Rfl: 0  •  ascorbic acid (VITAMIN C) 500 MG tablet, Take 1 tablet (500 mg total) by mouth 2 (two) times a day, Disp: 30 tablet, Rfl: 3  •  cholecalciferol (VITAMIN D3) 1,000 units tablet, Take 2 tablets (2,000 Units total) by mouth daily, Disp: 60 tablet, Rfl: 0  •  cholestyramine (QUESTRAN) 4 g packet, Take 1 packet (4 g total) by mouth daily at bedtime, Disp: 30 packet, Rfl: 2  •  diphenhydrAMINE (BENADRYL) 50 MG tablet, Take 1 tablet (50 mg total) by mouth every 8 (eight) hours as needed for itching, Disp: 30 tablet, Rfl: 0  •  Loratadine 10 MG CAPS, Take by mouth, Disp: , Rfl:   •  Multiple Vitamins-Minerals (multivitamin with minerals) tablet, Take 1 tablet by mouth daily, Disp: 30 tablet, Rfl: 1  •  Omega-3 Fatty Acids (fish oil) 1,000 mg, Take 1,000 mg by mouth daily, Disp: , Rfl:   •  ondansetron (ZOFRAN-ODT) 4 mg disintegrating tablet, Take 1 tablet (4 mg total) by mouth every 6 (six) hours as needed for nausea or vomiting (Patient not taking: Reported on 8/4/2023), Disp: 60 tablet, Rfl: 1    Current Allergies     Allergies as of 08/20/2023 - Reviewed 08/20/2023   Allergen Reaction Noted   • Ativan [lorazepam] Myalgia 05/10/2020   • Cymbalta [duloxetine hcl] Myalgia 05/10/2020   • Latex Rash 06/24/2022   • Amoxicillin Rash 04/06/2022            The following portions of the patient's history were reviewed and updated as appropriate: allergies, current medications, past family history, past medical history, past social history, past surgical history and problem list.     Past Medical History:   Diagnosis Date   • Arthritis    • GERD (gastroesophageal reflux disease)    • Hyperlipidemia    • Hypertension    • Irritable bowel syndrome    • Low back pain    • PONV (postoperative nausea and vomiting)        Past Surgical History:   Procedure Laterality Date   • CATARACT EXTRACTION Bilateral    •  SECTION N/A    • CHOLECYSTECTOMY     • COLONOSCOPY     • HERNIA REPAIR     • HYSTERECTOMY     • NASAL SEPTUM SURGERY     • DE ARTHRP ACETBLR/PROX FEM PROSTC AGRFT/ALGRFT Right 2023    Procedure: ARTHROPLASTY HIP TOTAL;  Surgeon: Romero Collins DO;  Location: [facility] MAIN OR;  Service: Orthopedics   • DE ARTHRP ACETBLR/PROX FEM PROSTC AGRFT/ALGRFT Left 2023    Procedure: ARTHROPLASTY HIP TOTAL;  Surgeon: Romero Collins DO;  Location: [facility] OR;  Service: Orthopedics   • TONSILLECTOMY     • UPPER GASTROINTESTINAL ENDOSCOPY         Family History   Problem Relation Age of Onset   • Hypertension Mother    • Skin cancer Mother    • Stroke Father    • Skin cancer Daughter    • Cancer Daughter    • Hypertension Son    • Colon cancer Cousin    • Colon polyps Neg Hx          Medications have been verified. Objective   /70   Pulse 69   Temp 98.2 °F (36.8 °C)   Resp 18   SpO2 97%   No LMP recorded. Patient has had a hysterectomy. Physical Exam     Physical Exam  Vitals and nursing note reviewed. Constitutional:       General: She is not in acute distress. Appearance: Normal appearance. HENT:      Head: Normocephalic and atraumatic. Right Ear: Tympanic membrane and ear canal normal.      Left Ear: Tympanic membrane and ear canal normal.      Nose: Congestion present.       Mouth/Throat:      Mouth: Mucous membranes are moist.      Pharynx: No posterior oropharyngeal erythema. Eyes:      Comments: Hordeolum right lower lid with associated swelling extending into the upper cheek. Cardiovascular:      Rate and Rhythm: Normal rate and regular rhythm. Pulses: Normal pulses. Heart sounds: Normal heart sounds. Pulmonary:      Effort: Pulmonary effort is normal.      Breath sounds: Normal breath sounds. No wheezing, rhonchi or rales. Skin:     General: Skin is warm and dry. Neurological:      Mental Status: She is alert and oriented to person, place, and time. Psychiatric:         Mood and Affect: Mood normal.         Behavior: Behavior normal.        Chest xray:  Preliminary reading: no acute cardiopulmonary findings. Await final reading.

## 2023-09-03 DIAGNOSIS — R06.02 SOB (SHORTNESS OF BREATH): ICD-10-CM

## 2023-09-03 DIAGNOSIS — R06.09 DOE (DYSPNEA ON EXERTION): ICD-10-CM

## 2023-09-03 RX ORDER — FLUTICASONE PROPIONATE AND SALMETEROL 100; 50 UG/1; UG/1
POWDER RESPIRATORY (INHALATION) 2 TIMES DAILY
Qty: 60 BLISTER | Refills: 0 | Status: SHIPPED | OUTPATIENT
Start: 2023-09-03

## 2023-09-12 ENCOUNTER — OFFICE VISIT (OUTPATIENT)
Dept: FAMILY MEDICINE CLINIC | Facility: HOSPITAL | Age: 58
End: 2023-09-12
Payer: COMMERCIAL

## 2023-09-12 VITALS
HEART RATE: 98 BPM | WEIGHT: 205 LBS | BODY MASS INDEX: 37.73 KG/M2 | SYSTOLIC BLOOD PRESSURE: 132 MMHG | DIASTOLIC BLOOD PRESSURE: 78 MMHG | HEIGHT: 62 IN | OXYGEN SATURATION: 95 %

## 2023-09-12 DIAGNOSIS — Z96.641 STATUS POST RIGHT HIP REPLACEMENT: ICD-10-CM

## 2023-09-12 DIAGNOSIS — G89.29 CHRONIC PAIN OF RIGHT KNEE: Primary | ICD-10-CM

## 2023-09-12 DIAGNOSIS — M25.461 SWELLING OF RIGHT KNEE JOINT: ICD-10-CM

## 2023-09-12 DIAGNOSIS — H00.012 HORDEOLUM EXTERNUM OF RIGHT LOWER EYELID: ICD-10-CM

## 2023-09-12 DIAGNOSIS — M25.561 ACUTE PAIN OF RIGHT KNEE: ICD-10-CM

## 2023-09-12 DIAGNOSIS — M16.0 PRIMARY OSTEOARTHRITIS OF BOTH HIPS: ICD-10-CM

## 2023-09-12 DIAGNOSIS — M25.561 CHRONIC PAIN OF RIGHT KNEE: Primary | ICD-10-CM

## 2023-09-12 DIAGNOSIS — Z99.89 DEPENDENCE ON CANE: ICD-10-CM

## 2023-09-12 DIAGNOSIS — Z12.31 ENCOUNTER FOR SCREENING MAMMOGRAM FOR BREAST CANCER: ICD-10-CM

## 2023-09-12 DIAGNOSIS — M25.361 INSTABILITY OF KNEE JOINT, RIGHT: ICD-10-CM

## 2023-09-12 PROCEDURE — 99214 OFFICE O/P EST MOD 30 MIN: CPT | Performed by: STUDENT IN AN ORGANIZED HEALTH CARE EDUCATION/TRAINING PROGRAM

## 2023-09-12 NOTE — PROGRESS NOTES
[address], DO    Assessment/Plan:      Diagnosis ICD-10-CM Associated Orders   1. Chronic pain of right knee  M25.561 MRI knee right  wo contrast    G89.29       2. Acute pain of right knee  M25.561 MRI knee right  wo contrast      3. Swelling of right knee joint  M25.461 MRI knee right  wo contrast      4. Dependence on cane  Z99.89 MRI knee right  wo contrast      5. Primary osteoarthritis of both hips  M16.0       6. Status post right hip replacement  Z96.641 MRI knee right  wo contrast      7. Hordeolum externum of right lower eyelid  H00.012       8. Instability of knee joint, right  M25.361 MRI knee right  wo contrast      9. Encounter for screening mammogram for breast cancer  Z12.31         MRI R knee - Chronic R knee pain, now R knee swelling severe pain, can't stand or twist or walk. High suspicion R medial meniscal tear and swelling with baker's cyst.   Derm ref from June given & not giving any call back. No scheduled appt, and it has been 2.5 months. Mom & daughter had melanoma. Will reach out to their office to schedule. Due for mammo - ordered. Would recommend optometry or ophthalmology at this time if stye recurs. Provider list locally given. Return in about 4 weeks (around 10/10/2023) for F/U after MRI is completed for review. Patient may call or return to office with any questions or concerns. ______________________________________________________________________  Subjective:     Patient ID: Mario Turner is a 59 y.o. female. HPI  Mario Turner  Chief Complaint   Patient presents with    Knee Pain     Post hip surgery     Blepharitis     R knee pain. Did have hx of on & off R knee pain in the past around age 34. No prior knee surgeries. Hip surgery and was doing walking 2 miles per day, can't tolerate it. Barely can stand now. Crying all the time in pain. Past 2 weeks much worse. Not one new injury. Pivoting is awful.   Getting posterior knee swelling also. Using motrin, ice, elevation. The following portions of the patient's history were reviewed and updated as appropriate: allergies, current medications, past medical history, and problem list.    Review of Systems   Constitutional:  Negative for chills and fever. Musculoskeletal:  Positive for arthralgias, gait problem and joint swelling. Skin:  Negative for color change. Objective:      Vitals:    09/12/23 0730   BP: 132/78   Pulse: 98   SpO2: 95%      Physical Exam  Vitals reviewed. Constitutional:       General: She is not in acute distress. Appearance: Normal appearance. She is well-developed. She is obese. She is not ill-appearing. HENT:      Head: Normocephalic and atraumatic. Eyes:      General: No scleral icterus. Right eye: No discharge. Left eye: No discharge. Comments: R repeat lower eyelid stye   Cardiovascular:      Rate and Rhythm: Normal rate and regular rhythm. Pulses: Normal pulses. Heart sounds: Normal heart sounds. No murmur heard. Pulmonary:      Effort: Pulmonary effort is normal. No respiratory distress. Breath sounds: Normal breath sounds. No stridor. No wheezing. Musculoskeletal:         General: Swelling and tenderness present. Cervical back: Normal range of motion. No rigidity. Right lower leg: No edema. Left lower leg: No edema. Comments: Dec R knee ROM  R knee posterior swelling   R knee stiffness  + thessaly's R knee    Skin:     General: Skin is warm and dry. Findings: Lesion (several raised, dry patches on skins in arm., and facial discolorations) present. No bruising, erythema or rash. Neurological:      Mental Status: She is alert and oriented to person, place, and time. Gait: Gait abnormal (cane). Psychiatric:         Mood and Affect: Mood normal.         Behavior: Behavior normal.         Thought Content:  Thought content normal.         Judgment: Judgment normal.         Portions of the record may have been created with voice recognition software. Occasional wrong word or "sound alike" substitutions may have occurred due to the inherent limitations of voice recognition software. Please review the chart carefully and recognize, using context, where substitutions/typographical errors may have occurred.

## 2023-09-12 NOTE — PATIENT INSTRUCTIONS
Locally, many patients use:     [facility]: [phone]  Dr. Chapincito Lezama: [phone]  Dr. Raegan Julian: [phone] [facility]: [phone]

## 2023-09-15 ENCOUNTER — HOSPITAL ENCOUNTER (OUTPATIENT)
Dept: MAMMOGRAPHY | Facility: CLINIC | Age: 58
Discharge: HOME | End: 2023-09-15
Payer: COMMERCIAL

## 2023-09-15 VITALS — HEIGHT: 62 IN | BODY MASS INDEX: 37.73 KG/M2 | WEIGHT: 205.03 LBS

## 2023-09-15 DIAGNOSIS — Z12.31 ENCOUNTER FOR SCREENING MAMMOGRAM FOR BREAST CANCER: ICD-10-CM

## 2023-09-15 DIAGNOSIS — Z12.31 VISIT FOR SCREENING MAMMOGRAM: ICD-10-CM

## 2023-09-15 PROCEDURE — 77067 SCR MAMMO BI INCL CAD: CPT

## 2023-09-15 PROCEDURE — 77063 BREAST TOMOSYNTHESIS BI: CPT

## 2023-09-18 ENCOUNTER — HOSPITAL ENCOUNTER (OUTPATIENT)
Dept: MRI IMAGING | Facility: HOSPITAL | Age: 58
Discharge: HOME | End: 2023-09-18
Attending: STUDENT IN AN ORGANIZED HEALTH CARE EDUCATION/TRAINING PROGRAM
Payer: COMMERCIAL

## 2023-09-18 DIAGNOSIS — M25.461 SWELLING OF RIGHT KNEE JOINT: ICD-10-CM

## 2023-09-18 DIAGNOSIS — M25.561 ACUTE PAIN OF RIGHT KNEE: ICD-10-CM

## 2023-09-18 DIAGNOSIS — M25.561 CHRONIC PAIN OF RIGHT KNEE: ICD-10-CM

## 2023-09-18 DIAGNOSIS — Z99.89 DEPENDENCE ON CANE: ICD-10-CM

## 2023-09-18 DIAGNOSIS — Z96.641 STATUS POST RIGHT HIP REPLACEMENT: ICD-10-CM

## 2023-09-18 DIAGNOSIS — M25.361 INSTABILITY OF KNEE JOINT, RIGHT: ICD-10-CM

## 2023-09-18 DIAGNOSIS — G89.29 CHRONIC PAIN OF RIGHT KNEE: ICD-10-CM

## 2023-09-18 PROCEDURE — G1004 CDSM NDSC: HCPCS

## 2023-09-18 PROCEDURE — 73721 MRI JNT OF LWR EXTRE W/O DYE: CPT

## 2023-10-04 ENCOUNTER — OFFICE VISIT (OUTPATIENT)
Dept: FAMILY MEDICINE CLINIC | Facility: HOSPITAL | Age: 58
End: 2023-10-04
Payer: COMMERCIAL

## 2023-10-04 VITALS
SYSTOLIC BLOOD PRESSURE: 106 MMHG | OXYGEN SATURATION: 95 % | HEIGHT: 62 IN | HEART RATE: 77 BPM | WEIGHT: 206 LBS | DIASTOLIC BLOOD PRESSURE: 78 MMHG | BODY MASS INDEX: 37.91 KG/M2

## 2023-10-04 DIAGNOSIS — S83.241A ACUTE MEDIAL MENISCAL TEAR, RIGHT, INITIAL ENCOUNTER: ICD-10-CM

## 2023-10-04 DIAGNOSIS — Z23 ENCOUNTER FOR IMMUNIZATION: Primary | ICD-10-CM

## 2023-10-04 DIAGNOSIS — E78.2 MIXED HYPERLIPIDEMIA: ICD-10-CM

## 2023-10-04 DIAGNOSIS — R06.02 SOB (SHORTNESS OF BREATH): ICD-10-CM

## 2023-10-04 DIAGNOSIS — R06.09 DOE (DYSPNEA ON EXERTION): ICD-10-CM

## 2023-10-04 DIAGNOSIS — I10 PRIMARY HYPERTENSION: ICD-10-CM

## 2023-10-04 PROCEDURE — 99396 PREV VISIT EST AGE 40-64: CPT | Performed by: STUDENT IN AN ORGANIZED HEALTH CARE EDUCATION/TRAINING PROGRAM

## 2023-10-04 PROCEDURE — 99214 OFFICE O/P EST MOD 30 MIN: CPT | Performed by: STUDENT IN AN ORGANIZED HEALTH CARE EDUCATION/TRAINING PROGRAM

## 2023-10-04 PROCEDURE — 90471 IMMUNIZATION ADMIN: CPT

## 2023-10-04 PROCEDURE — 90686 IIV4 VACC NO PRSV 0.5 ML IM: CPT

## 2023-10-04 RX ORDER — ATORVASTATIN CALCIUM 20 MG/1
20 TABLET, FILM COATED ORAL DAILY
Qty: 90 TABLET | Refills: 3 | Status: SHIPPED | OUTPATIENT
Start: 2023-10-04

## 2023-10-04 RX ORDER — FLUTICASONE PROPIONATE AND SALMETEROL 100; 50 UG/1; UG/1
1 POWDER RESPIRATORY (INHALATION) 2 TIMES DAILY
Qty: 180 BLISTER | Refills: 2 | Status: SHIPPED | OUTPATIENT
Start: 2023-10-04

## 2023-10-04 RX ORDER — LOSARTAN POTASSIUM AND HYDROCHLOROTHIAZIDE 12.5; 1 MG/1; MG/1
1 TABLET ORAL DAILY
Qty: 90 TABLET | Refills: 2 | Status: SHIPPED | OUTPATIENT
Start: 2023-10-04

## 2023-10-04 NOTE — PATIENT INSTRUCTIONS
[address] [facility]: [phone]  Dr. Stuart العلي: [phone]  Dr. Ronaldo Cage: [phone]  [address] [place], [place] [address]: [phone]

## 2023-10-04 NOTE — PROGRESS NOTES
[facility] [address]    NAME: Pearl Lieberman  AGE: 59 y.o. SEX: female  : 1965   DATE: 10/9/2023     Assessment and Plan:      Diagnosis ICD-10-CM Associated Orders   1. Encounter for immunization  Z23 influenza vaccine, quadrivalent, 0.5 mL, preservative-free, for adult and pediatric patients 6 mos+ (AFLURIA, FLUARIX, FLULAVAL, FLUZONE)      2. Acute medial meniscal tear, right, initial encounter  S83.241A Ambulatory Referral to Orthopedic Surgery      3. SOB (shortness of breath)  R06.02 Fluticasone-Salmeterol (Advair) 100-50 mcg/dose inhaler      4. BELLE (dyspnea on exertion)  R06.09 Fluticasone-Salmeterol (Advair) 100-50 mcg/dose inhaler      5. Mixed hyperlipidemia  E78.2 atorvastatin (LIPITOR) 20 mg tablet      6. Primary hypertension  I10 losartan-hydrochlorothiazide (HYZAAR) 100-12.5 MG per tablet        Ref to ortho surgery for Acute Medial Meniscal tear. Pt cannot be out of work at this time however. Maybe could work with brace. Has been using a sleeve. Inhalers reviewed & re-ordered. Can dose increase if not helping, but pt wants to try more regular use. Patient's medications were reviewed and reconciled. Ordered/Re-ordered as above. Due for eye appt, names given. Pls call to schedule. Will reach out to get derm to call and schedule. Will get GI on her radar as well. Flu shot given. Immunizations and preventive care screenings were discussed with patient today. Appropriate education was printed on patient's after visit summary. Counseling:  Alcohol/drug use: discussed moderation in alcohol intake, the recommendations for healthy alcohol use, and avoidance of illicit drug use. Injury prevention: discussed safety/seat belts, safety helmets, smoke detectors, carbon dioxide detectors, and smoking near bedding or upholstery.   Exercise: the importance of regular exercise/physical activity was discussed. Recommend exercise 3-5 times per week for at least 30 minutes. Return in about 4 months (around 2/4/2024) for F/U Chronic Conditions. Chief Complaint:     Chief Complaint   Patient presents with    Physical Exam     Review MRI       History of Present Illness:     Adult Annual Physical   Patient here for a comprehensive physical exam. The patient reports: Knee pain is bad, new job, and some LH w/ standing. More HA's. Diet and Physical Activity  Diet/Nutrition:  regular . Exercise: no formal exercise, can't do it right now due to knee pain. Using cane. No Drug use. Tobacco use:  reports that she quit smoking about 10 years ago. Her smoking use included cigarettes. She has never used smokeless tobacco.  Alcohol use - no     Wt Readings from Last 3 Encounters:   10/04/23 93.4 kg (206 lb)   09/15/23 93 kg (205 lb 0.4 oz)   09/12/23 93 kg (205 lb)     General Health  Sleep:  uses half a benadryl to fall or stay asleep, tired , chronically  Hearing: normal - bilateral.  Vision:  readers during the day, night glasses not working - [employer] best  .   Dental: regular dental visits and brushes teeth twice daily. /GYN Health  Patient is: postmenopausal, s/p hysterectomy  Mammo UTD & nml     Review of Systems:     Review of Systems   Constitutional:  Negative for chills and fever. Respiratory:  Positive for cough (laying down, or tickle in throat) and shortness of breath (Getting more LH with talking, needs inhaler. ). BELLE +   Cardiovascular:  Negative for chest pain and palpitations. Gastrointestinal:  Positive for diarrhea (very sensitive to everything now). Negative for constipation. Neurological:  Positive for light-headedness and headaches (on and off more recently).       Past Medical History:     Past Medical History:   Diagnosis Date    Arthritis     GERD (gastroesophageal reflux disease)     Hyperlipidemia     Hypertension     Irritable bowel syndrome     Low back pain PONV (postoperative nausea and vomiting)       Past Surgical History:     Past Surgical History:   Procedure Laterality Date    CATARACT EXTRACTION Bilateral      SECTION N/A     CHOLECYSTECTOMY      COLONOSCOPY      HERNIA REPAIR      HYSTERECTOMY      NASAL SEPTUM SURGERY      [place] ARTHRP ACETBLR/PROX FEM PROSTC AGRFT/ALGRFT Right 2023    Procedure: ARTHROPLASTY HIP TOTAL;  Surgeon: Denae Geller DO;  Location:  MAIN OR;  Service: Orthopedics    [place] ARTHRP ACETBLR/PROX FEM PROSTC AGRFT/ALGRFT Left 2023    Procedure: ARTHROPLASTY HIP TOTAL;  Surgeon: Denae Geller DO;  Location:  MAIN OR;  Service: Orthopedics    TONSILLECTOMY      UPPER GASTROINTESTINAL ENDOSCOPY        Social History:     Social History     Socioeconomic History    Marital status:      Spouse name: None    Number of children: None    Years of education: None    Highest education level: None   Occupational History    None   Tobacco Use    Smoking status: Former     Types: Cigarettes     Quit date:      Years since quitting: 10.7    Smokeless tobacco: Never   Vaping Use    Vaping Use: Never used   Substance and Sexual Activity    Alcohol use: Yes     Comment: extremely rare social occasion    Drug use: Never    Sexual activity: Never   Other Topics Concern    None   Social History Narrative    None     Social Determinants of Health     Financial Resource Strain: Not on file   Food Insecurity: No Food Insecurity (2023)    Hunger Vital Sign     Worried About Running Out of Food in the Last Year: Never true     Ran Out of Food in the Last Year: Never true   Transportation Needs: No Transportation Needs (2023)    PRAPARE - Transportation     Lack of Transportation (Medical): No     Lack of Transportation (Non-Medical):  No   Physical Activity: Not on file   Stress: Not on file   Social Connections: Not on file   Intimate Partner Violence: Not on file   Housing Stability: Low Risk  (2023) Housing Stability Vital Sign     Unable to Pay for Housing in the Last Year: No     Number of Places Lived in the Last Year: 1     Unstable Housing in the Last Year: No      Family History:     Family History   Problem Relation Age of Onset    Hypertension Mother     Skin cancer Mother     Stroke Father     No Known Problems Sister     Skin cancer Daughter     Cancer Daughter     No Known Problems Daughter     Ovarian cancer Daughter     Ovarian cancer Maternal Grandmother     No Known Problems Maternal Grandfather     No Known Problems Paternal Grandmother     No Known Problems Paternal Grandfather     Hypertension Son     Colon cancer Cousin     No Known Problems Maternal Aunt     No Known Problems Maternal Aunt     No Known Problems Maternal Aunt     No Known Problems Maternal Aunt     Colon polyps Neg Hx       Current Medications:     Current Outpatient Medications   Medication Sig Dispense Refill    albuterol (Proventil HFA) 90 mcg/act inhaler Inhale 2 puffs every 6 (six) hours as needed for wheezing or shortness of breath 18 g 2    ascorbic acid (VITAMIN C) 500 MG tablet Take 1 tablet (500 mg total) by mouth 2 (two) times a day 30 tablet 3    atorvastatin (LIPITOR) 20 mg tablet Take 1 tablet (20 mg total) by mouth daily Take half tab every other day x2 weeks then half tab daily x2 weeks & then full tab daily afterwards.  90 tablet 3    cholecalciferol (VITAMIN D3) 1,000 units tablet Take 2 tablets (2,000 Units total) by mouth daily 60 tablet 0    cholestyramine (QUESTRAN) 4 g packet Take 1 packet (4 g total) by mouth daily at bedtime 30 packet 2    diphenhydrAMINE (BENADRYL) 50 MG tablet Take 1 tablet (50 mg total) by mouth every 8 (eight) hours as needed for itching 30 tablet 0    fluticasone (FLONASE) 50 mcg/act nasal spray 1 spray into each nostril daily 16 g 2    Fluticasone-Salmeterol (Advair) 100-50 mcg/dose inhaler Inhale 1 puff 2 (two) times a day Rinse mouth after use 180 blister 2    hyoscyamine (ANASPAZ,LEVSIN) 0.125 MG tablet Take 1 tablet (0.125 mg total) by mouth every 4 (four) hours as needed for cramping 60 tablet 5    Loratadine 10 MG CAPS Take by mouth      losartan-hydrochlorothiazide (HYZAAR) 100-12.5 MG per tablet Take 1 tablet by mouth daily 90 tablet 2    Multiple Vitamins-Minerals (multivitamin with minerals) tablet Take 1 tablet by mouth daily 30 tablet 1    Omega-3 Fatty Acids (fish oil) 1,000 mg Take 1,000 mg by mouth daily      omeprazole (PriLOSEC) 40 MG capsule Take 1 capsule by mouth twice daily 180 capsule 0    ondansetron (ZOFRAN-ODT) 4 mg disintegrating tablet Take 1 tablet (4 mg total) by mouth every 6 (six) hours as needed for nausea or vomiting 60 tablet 1     No current facility-administered medications for this visit. Allergies: Allergies   Allergen Reactions    Ativan [Lorazepam] Myalgia     When taken  Along w/ cymbalta    Cymbalta [Duloxetine Hcl] Myalgia     When taken w/ ativan x 1 occurrence    Latex Rash    Amoxicillin Rash      Physical Exam:     /78   Pulse 77   Ht 5' 2" (1.575 m)   Wt 93.4 kg (206 lb)   SpO2 95%   BMI 37.68 kg/m²     Physical Exam  Vitals and nursing note reviewed. Constitutional:       General: She is not in acute distress. Appearance: Normal appearance. She is well-developed. She is obese. She is not ill-appearing. HENT:      Head: Normocephalic and atraumatic. Eyes:      General: No scleral icterus. Right eye: No discharge. Left eye: No discharge. Conjunctiva/sclera: Conjunctivae normal.   Cardiovascular:      Rate and Rhythm: Normal rate and regular rhythm. Pulses: Normal pulses. Heart sounds: Normal heart sounds. No murmur heard. Pulmonary:      Effort: Pulmonary effort is normal. No respiratory distress. Breath sounds: Normal breath sounds. Musculoskeletal:         General: Swelling (R knee) and tenderness (R knee MJL) present.       Cervical back: Normal range of motion and neck supple. No rigidity. Right lower leg: Edema (1 +) present. Left lower leg: Edema (1+) present. Skin:     General: Skin is warm and dry. Capillary Refill: Capillary refill takes less than 2 seconds. Neurological:      Mental Status: She is alert and oriented to person, place, and time. Gait: Gait normal.   Psychiatric:         Mood and Affect: Mood normal.         Behavior: Behavior normal.         Thought Content:  Thought content normal.         Judgment: Judgment normal.        Rocío Ho, DO  [address]

## 2023-10-09 ENCOUNTER — TELEPHONE (OUTPATIENT)
Dept: FAMILY MEDICINE CLINIC | Facility: HOSPITAL | Age: 58
End: 2023-10-09

## 2023-10-09 ENCOUNTER — TELEPHONE (OUTPATIENT)
Age: 58
End: 2023-10-09

## 2023-10-09 NOTE — TELEPHONE ENCOUNTER
Patient called back and was offered an appt in [address] w/ dr alegre in November but declined due to time. I also offered her appt w/ dr Paulette Nunn in [facility] which she also declined. She requested a Friday appt or appt after 4:30, which is not available. She said she was going to call her pcp and see what they recommend.

## 2023-10-09 NOTE — TELEPHONE ENCOUNTER
Patients called and stated [facility] cannot get her an appt in a time frame that works for her. She called and asked for the number of a different office. I gave her the phone number for [facility] in [facility].      She will call back to update Dr Benton Course

## 2023-10-09 NOTE — TELEPHONE ENCOUNTER
Received a call from [address] at [facility] in [place] requesting appt for pt for a skin check per drs request due to strong family history of skin cancer. Patient has been on wait list sine 6/2023. I called pt and lm to call office back to offer an appt in 1/2024 w/ dr Freddy Brown or dr alegre.

## 2023-10-09 NOTE — TELEPHONE ENCOUNTER
Patient called again and stated she was able to get an appt w/ [facility] on 10/12/23. I will put in the insurance referral for her today.

## 2023-10-11 ENCOUNTER — TELEPHONE (OUTPATIENT)
Dept: FAMILY MEDICINE CLINIC | Facility: HOSPITAL | Age: 58
End: 2023-10-11

## 2023-10-11 DIAGNOSIS — Z02.1 PRE-EMPLOYMENT EXAMINATION: Primary | ICD-10-CM

## 2023-10-11 NOTE — TELEPHONE ENCOUNTER
Starting new job. Needs titer for hep b. Will need to give to her work by 10/23. Will bring the form with her. She is aware that we need to order this and it could take a couple of days. Please call to have her come in.

## 2023-10-13 LAB — HBV SURFACE AB SER-ACNC: 10.3 MIU/ML

## 2023-10-17 ENCOUNTER — OFFICE VISIT (OUTPATIENT)
Dept: OBGYN CLINIC | Facility: CLINIC | Age: 58
End: 2023-10-17
Payer: COMMERCIAL

## 2023-10-17 VITALS
HEIGHT: 62 IN | WEIGHT: 206 LBS | DIASTOLIC BLOOD PRESSURE: 84 MMHG | BODY MASS INDEX: 37.91 KG/M2 | SYSTOLIC BLOOD PRESSURE: 135 MMHG | HEART RATE: 71 BPM | RESPIRATION RATE: 16 BRPM

## 2023-10-17 DIAGNOSIS — S83.241A ACUTE MEDIAL MENISCAL TEAR, RIGHT, INITIAL ENCOUNTER: Primary | ICD-10-CM

## 2023-10-17 PROCEDURE — 20610 DRAIN/INJ JOINT/BURSA W/O US: CPT | Performed by: STUDENT IN AN ORGANIZED HEALTH CARE EDUCATION/TRAINING PROGRAM

## 2023-10-17 PROCEDURE — 99214 OFFICE O/P EST MOD 30 MIN: CPT | Performed by: STUDENT IN AN ORGANIZED HEALTH CARE EDUCATION/TRAINING PROGRAM

## 2023-10-17 RX ORDER — BUPIVACAINE HYDROCHLORIDE 2.5 MG/ML
4 INJECTION, SOLUTION INFILTRATION; PERINEURAL
Status: COMPLETED | OUTPATIENT
Start: 2023-10-17 | End: 2023-10-17

## 2023-10-17 RX ORDER — TRIAMCINOLONE ACETONIDE 40 MG/ML
40 INJECTION, SUSPENSION INTRA-ARTICULAR; INTRAMUSCULAR
Status: COMPLETED | OUTPATIENT
Start: 2023-10-17 | End: 2023-10-17

## 2023-10-17 RX ADMIN — TRIAMCINOLONE ACETONIDE 40 MG: 40 INJECTION, SUSPENSION INTRA-ARTICULAR; INTRAMUSCULAR at 10:30

## 2023-10-17 RX ADMIN — BUPIVACAINE HYDROCHLORIDE 4 ML: 2.5 INJECTION, SOLUTION INFILTRATION; PERINEURAL at 10:30

## 2023-10-17 NOTE — PROGRESS NOTES
Ortho Sports Medicine Knee New Patient Visit     Assesment:   59 y.o. female right knee horizontal tear of medial meniscus    Plan:    Catie Jenkins presents today for right knee pain. Her exam and MRI are consistent with a medial meniscus tear. We discussed non-surgical option such as a cortisone injection and physical therapy. I explained a cortisone injection, along with physical therapy can help to improve her symptoms as she is currently not experiencing mechanical symptoms. I explained the cortisone injection will help with swelling and physical therapy will help with strengthening the musculature around the knee to take pressure off the knee. I provided her with a physical therapy script at today's visit. She will follow up in 1 month. Conservative treatment:    PT for ROM/strengthening to knee, hip and core. OTC NSAIDS prn for pain. Tylenol for pain. Let pain guide gradual return activities. Imaging: All imaging from today was reviewed by myself and explained to the patient. Injection:    The risks and benefits of the injection (which include but are not limited to: infection, bleeding,damage to nerve/artery, need for further intervention), as well as the risks and benefits of all alternative treatments were explained and understood. The patient elected to proceed with injection. The procedure was done with aseptic technique, and the patient tolerated the procedure well with no complications. A corticosteroid injection was performed. Surgery:     No surgery is recommended at this point, continue with conservative treatment plan as noted. Follow up:    Return in about 1 month (around 11/17/2023) for appointment with Dr. Kashmir Conte. Chief Complaint   Patient presents with    Right Knee - Pain       History of Present Illness: The patient is a 59 y.o. female who presents for right knee pain. She has been having knee pain since January 2023.  She started having the pain while doing physical therapy for her hip. Her pain is over the medial aspect of her knee. She states her pain is sharp in nature and rates her pain 6/10. She has pain with twisting, prolonged walking and deep knee flexion. She reports she had a previous cortisone injection in 2016. She notes she had minimal relief from the injection. She saw her primary care physician, who referred her to me. She denies any numbness and tingling.        Knee Surgical History:  None    Past Medical, Social and Family History:  Past Medical History:   Diagnosis Date    Arthritis     GERD (gastroesophageal reflux disease)     Hyperlipidemia     Hypertension     Irritable bowel syndrome     Low back pain     PONV (postoperative nausea and vomiting)      Past Surgical History:   Procedure Laterality Date    CATARACT EXTRACTION Bilateral      SECTION N/A     CHOLECYSTECTOMY      COLONOSCOPY      HERNIA REPAIR      HYSTERECTOMY      NASAL SEPTUM SURGERY      IN ARTHRP ACETBLR/PROX FEM PROSTC AGRFT/ALGRFT Right 2023    Procedure: ARTHROPLASTY HIP TOTAL;  Surgeon: Macarena Carrasquillo DO;  Location:  MAIN OR;  Service: Orthopedics    IN ARTHRP ACETBLR/PROX FEM PROSTC AGRFT/ALGRFT Left 2023    Procedure: ARTHROPLASTY HIP TOTAL;  Surgeon: Macarena Carrasquillo DO;  Location: [facility] OR;  Service: Orthopedics    TONSILLECTOMY      UPPER GASTROINTESTINAL ENDOSCOPY       Allergies   Allergen Reactions    Ativan [Lorazepam] Myalgia     When taken  Along w/ cymbalta    Cymbalta [Duloxetine Hcl] Myalgia     When taken w/ ativan x 1 occurrence    Latex Rash    Amoxicillin Rash     Current Outpatient Medications on File Prior to Visit   Medication Sig Dispense Refill    albuterol (Proventil HFA) 90 mcg/act inhaler Inhale 2 puffs every 6 (six) hours as needed for wheezing or shortness of breath 18 g 2    ascorbic acid (VITAMIN C) 500 MG tablet Take 1 tablet (500 mg total) by mouth 2 (two) times a day 30 tablet 3    atorvastatin (LIPITOR) 20 mg tablet Take 1 tablet (20 mg total) by mouth daily Take half tab every other day x2 weeks then half tab daily x2 weeks & then full tab daily afterwards. 90 tablet 3    cholecalciferol (VITAMIN D3) 1,000 units tablet Take 2 tablets (2,000 Units total) by mouth daily 60 tablet 0    diphenhydrAMINE (BENADRYL) 50 MG tablet Take 1 tablet (50 mg total) by mouth every 8 (eight) hours as needed for itching 30 tablet 0    fluticasone (FLONASE) 50 mcg/act nasal spray 1 spray into each nostril daily 16 g 2    Fluticasone-Salmeterol (Advair) 100-50 mcg/dose inhaler Inhale 1 puff 2 (two) times a day Rinse mouth after use 180 blister 2    hyoscyamine (ANASPAZ,LEVSIN) 0.125 MG tablet Take 1 tablet (0.125 mg total) by mouth every 4 (four) hours as needed for cramping 60 tablet 5    losartan-hydrochlorothiazide (HYZAAR) 100-12.5 MG per tablet Take 1 tablet by mouth daily 90 tablet 2    Multiple Vitamins-Minerals (multivitamin with minerals) tablet Take 1 tablet by mouth daily 30 tablet 1    Omega-3 Fatty Acids (fish oil) 1,000 mg Take 1,000 mg by mouth daily      omeprazole (PriLOSEC) 40 MG capsule Take 1 capsule by mouth twice daily 180 capsule 0    ondansetron (ZOFRAN-ODT) 4 mg disintegrating tablet Take 1 tablet (4 mg total) by mouth every 6 (six) hours as needed for nausea or vomiting 60 tablet 1    cholestyramine (QUESTRAN) 4 g packet Take 1 packet (4 g total) by mouth daily at bedtime (Patient not taking: Reported on 10/17/2023) 30 packet 2    Loratadine 10 MG CAPS Take by mouth       No current facility-administered medications on file prior to visit. Social History     Socioeconomic History    Marital status:       Spouse name: Not on file    Number of children: Not on file    Years of education: Not on file    Highest education level: Not on file   Occupational History    Not on file   Tobacco Use    Smoking status: Former     Types: Cigarettes     Quit date: 2013     Years since quitting: 10.7    Smokeless tobacco: Never   Vaping Use    Vaping Use: Never used   Substance and Sexual Activity    Alcohol use: Yes     Comment: extremely rare social occasion    Drug use: Never    Sexual activity: Never   Other Topics Concern    Not on file   Social History Narrative    Not on file     Social Determinants of Health     Financial Resource Strain: Not on file   Food Insecurity: No Food Insecurity (5/18/2023)    Hunger Vital Sign     Worried About Running Out of Food in the Last Year: Never true     Ran Out of Food in the Last Year: Never true   Transportation Needs: No Transportation Needs (5/18/2023)    PRAPARE - Transportation     Lack of Transportation (Medical): No     Lack of Transportation (Non-Medical): No   Physical Activity: Not on file   Stress: Not on file   Social Connections: Not on file   Intimate Partner Violence: Not on file   Housing Stability: Low Risk  (5/18/2023)    Housing Stability Vital Sign     Unable to Pay for Housing in the Last Year: No     Number of Places Lived in the Last Year: 1     Unstable Housing in the Last Year: No         I have reviewed the past medical, surgical, social and family history, medications and allergies as documented in the EMR. Review of systems: ROS is negative other than that noted in the HPI. Constitutional: Negative for fatigue and fever. HENT: Negative for sore throat. Respiratory: Negative for shortness of breath. Cardiovascular: Negative for chest pain. Gastrointestinal: Negative for abdominal pain. Endocrine: Negative for cold intolerance and heat intolerance. Genitourinary: Negative for flank pain. Musculoskeletal: Negative for back pain. Skin: Negative for rash. Allergic/Immunologic: Negative for immunocompromised state. Neurological: Negative for dizziness. Psychiatric/Behavioral: Negative for agitation. Physical Exam:    Blood pressure 135/84, pulse 71, resp.  rate 16, height 5' 2" (1.575 m), weight 93.4 kg (206 lb), not currently breastfeeding. General/Constitutional: NAD, well developed, well nourished  HENT: Normocephalic, atraumatic  CV: Intact distal pulses, regular rate  Resp: No respiratory distress or labored breathing  Lymphatic: No lymphadenopathy palpated  Neuro: Alert and Oriented x 3, no focal deficits  Psych: Normal mood, normal affect, normal judgement, normal behavior  Skin: Warm, dry, no rashes, no erythema      Knee Exam (focused):  Visual inspection of the right knee demonstrates normal contour without atrophy. no previous incisions   There is no significant erythema or edema. No significant joint effusion   Range of motion is full from 0-130 degrees of flexion   Able to straight leg raise   (+) medial joint line tenderness, (-) lateral joint line tenderness  (+) medial Malaika's, (-) lateral Malaika's  1A Lachman exam, (-) posterior drawer  Stable to varus and valgus stress at both 0 and 30°  Patella tracks normally. No J sign. No apprehension. Translation is approximately 2 quadrants and is equal to the contralateral side  Patellar eversion is similar to the contralateral side    Examination of the patient's ipsilateral hip demonstrates full painless range of motion. No crepitus. LE NV Exam: +2 DP/PT pulses bilaterally  Sensation intact to light touch L2-S1 bilaterally     Bilateral hip ROM demonstrates no pain actively or passively    No calf tenderness to palpation bilaterally    Knee Imaging    X-rays of the right knee were reviewed, which demonstrate No osseous changes. I have reviewed the radiology report and agree with their impression. MRI of the right knee were reviewed, which demonstrate horizontal medial meniscus tear, patellofemoral degenerative changes. I have reviewed the radiology report and agree with their impression. Large joint arthrocentesis: R knee  Universal Protocol:  Consent: Verbal consent obtained.   Consent given by: patient  Time out: Immediately prior to procedure a "time out" was called to verify the correct patient, procedure, equipment, support staff and site/side marked as required.   Timeout called at: 10/17/2023 11:07 AM.  Patient understanding: patient states understanding of the procedure being performed  Patient consent: the patient's understanding of the procedure matches consent given  Site marked: the operative site was marked  Patient identity confirmed: verbally with patient  Supporting Documentation  Indications: pain and diagnostic evaluation   Procedure Details  Location: knee - R knee  Preparation: Patient was prepped and draped in the usual sterile fashion  Needle size: 22 G  Ultrasound guidance: no  Approach: anterolateral  Medications administered: 4 mL bupivacaine 0.25 %; 40 mg triamcinolone acetonide 40 mg/mL               Scribe Attestation      I,:  Dannie Degroot am acting as a scribe while in the presence of the attending physician.:       I,:  Lola Carbajal DO personally performed the services described in this documentation    as scribed in my presence.:

## 2023-11-07 ENCOUNTER — OFFICE VISIT (OUTPATIENT)
Dept: OBGYN CLINIC | Facility: CLINIC | Age: 58
End: 2023-11-07
Payer: COMMERCIAL

## 2023-11-07 VITALS
HEIGHT: 62 IN | WEIGHT: 206 LBS | RESPIRATION RATE: 17 BRPM | BODY MASS INDEX: 37.91 KG/M2 | HEART RATE: 76 BPM | SYSTOLIC BLOOD PRESSURE: 148 MMHG | DIASTOLIC BLOOD PRESSURE: 85 MMHG

## 2023-11-07 DIAGNOSIS — S83.241D ACUTE MEDIAL MENISCAL TEAR, RIGHT, SUBSEQUENT ENCOUNTER: Primary | ICD-10-CM

## 2023-11-07 DIAGNOSIS — M17.11 PRIMARY OSTEOARTHRITIS OF RIGHT KNEE: ICD-10-CM

## 2023-11-07 DIAGNOSIS — M17.11 OSTEOARTHRITIS OF RIGHT PATELLOFEMORAL JOINT: ICD-10-CM

## 2023-11-07 PROCEDURE — 99213 OFFICE O/P EST LOW 20 MIN: CPT | Performed by: STUDENT IN AN ORGANIZED HEALTH CARE EDUCATION/TRAINING PROGRAM

## 2023-11-07 NOTE — PATIENT INSTRUCTIONS
Intra-articular Hyaluronic Acid Injection  Intra-articular Hyaluronic Acid Injection Brands  There are several types of Intra-articular Hyaluronic Acid Injections which vary in brand, dosage, and frequency. There are single dose injections as well as a series of injections. Your provider will choose the injection brand and series based on clinical factors as well as what your insurance company prefers or covers. Buy and Bill vs. Specialty Pharmacy  Injections may be obtained in two ways:  Buy and Bill: The insurance is requiring the office to buy the injection medication and bill the patients insurance for both the injection medication and the office visit. Specialty Pharmacy: The insurance is requiring the office to order the medication from the insurances Specialty Pharmacy and the specialty pharmacy will bill the patients insurance directly for the injection medication. When a specialty pharmacy is used, the office cannot bill for the injection medication, the office can only bill for the office visit. (Examples of a specialty pharmacy include, but not limited to, [facility], [address], [address] [facility], [address] [place] [address]). Time Line Expectations  Your care team will work to ensure the injection(s) being ordered for you are authorized by your insurance and is obtained by the insurance plans preferred pharmacy. Your insurance plan may dictate the brand and dosage of the series. Due to the nature of obtaining an insurance authorization as well as working with the pharmacy, the authorization process may take up to 14 business days, sometimes longer if your insurance plan denies the injection authorization or if the pharmacy has delays. If your insurance plan denies the authorization our team will submit an appeal which may lengthen the wait time for the approval of your injection.     Our injection authorization team will be in contact with you throughout the injection authorization process, however, please note there may be times of silence while we wait for insurance and pharmacy updates. Your injection appointment(s) will be scheduled once our injection authorization team has received approval from your insurance plan and/or from the pharmacy. Please note: Specialty pharmacies are often delayed when obtaining authorizations and verifying benefits for injection medications. You may receive a phone call from the specialty pharmacy to authorize the medication; so it is important to accept calls from the specialty pharmacy to ensure your injections are not delayed.

## 2023-11-07 NOTE — PROGRESS NOTES
Ortho Sports Medicine Knee Follow Up Visit     Assesment:     59 y.o. female right knee horizontal tear of medial meniscus and patellofemoral osteoarthritis     Plan:    The patient's diagnosis and treatment were discussed at length today. We discussed no treatment, non-operative treatment, and operative treatment. Miguelito Feliciano presents to the office today for follow up evaluation right knee horizontal medial mensicus tear and patellofemoral osteoarthritis. The patient has tried and failed non operative treatment options in the form of OTC medications, using an assistive device and a steroid injection. The patient is unable to attend formal therapy as sh is out of visits due to bilateral hip replacements. While she was attending therapy for her hips they were incorporating knee exercises and she has been compliant with HEP without relief. The patient is unable to undergo surgical intervention at this time as she is unable to take time off of work. The patient recently started a new job. We discussed visco supplementation which she was agreeable to and an order was placed for this today. She was fitted and provided with a hinged knee brace she can use as needed. She will follow up once visco supplementation is approved. Conservative treatment:    OTC NSAIDS prn for pain. Hinged knee brace ordered. Order placed for visco supplementation. Imaging: All imaging from today was reviewed by myself and explained to the patient. Injection:    A viscosupplementation injection was ordered and will be given at a future visit. Surgery:     No surgery is recommended at this point, continue with conservative treatment plan as noted. Follow up:    No follow-ups on file. Chief Complaint   Patient presents with    Right Knee - Follow-up       History of Present Illness: The patient is returns for follow up of  right knee horizontal tear of medial meniscus .   Since the prior visit, She reports no improvement. The patient underwent a steroid injection at her last visit which provided her with appx 50% pain relief for only a minimal amount of time. She states her pain started to return over the past week. She notes constant pain to the medial aspect of her knee. She notes increased pain with ambulation, pivoting and side step. She is unable to attend therapy as she has used up all her therapy fr the year due to bilateral hip replacements. She also notes tightness about the knee and pain with knee flexion. She describes the pain as achy, throbbing and sharp. She also notes swelling. She has been taking Ibuprofen as needed for pain. She states her pain is a 7 out of 10 on the pain scale. Pain is located medial.     Pain is improved by rest.  Pain is aggravated by weight bearing, standing, pivoting on a planted foot, and side steps. Symptoms include catching and swelling. The patient has tried rest, NSAIDS, and injection.           Knee Surgical History:  None    Past Medical, Social and Family History:  Past Medical History:   Diagnosis Date    Arthritis     GERD (gastroesophageal reflux disease)     Hyperlipidemia     Hypertension     Irritable bowel syndrome     Low back pain     PONV (postoperative nausea and vomiting)      Past Surgical History:   Procedure Laterality Date    CATARACT EXTRACTION Bilateral      SECTION N/A     CHOLECYSTECTOMY      COLONOSCOPY      HERNIA REPAIR      HYSTERECTOMY      NASAL SEPTUM SURGERY      AL ARTHRP ACETBLR/PROX FEM PROSTC AGRFT/ALGRFT Right 2023    Procedure: ARTHROPLASTY HIP TOTAL;  Surgeon: Salma Solorio DO;  Location:  MAIN OR;  Service: Orthopedics    AL ARTHRP ACETBLR/PROX FEM PROSTC AGRFT/ALGRFT Left 2023    Procedure: ARTHROPLASTY HIP TOTAL;  Surgeon: Salma Solorio DO;  Location: [facility];  Service: Orthopedics    TONSILLECTOMY      UPPER GASTROINTESTINAL ENDOSCOPY       Allergies   Allergen Reactions    Ativan [Lorazepam] Myalgia     When taken  Along w/ cymbalta    Cymbalta [Duloxetine Hcl] Myalgia     When taken w/ ativan x 1 occurrence    Latex Rash    Amoxicillin Rash     Current Outpatient Medications on File Prior to Visit   Medication Sig Dispense Refill    albuterol (Proventil HFA) 90 mcg/act inhaler Inhale 2 puffs every 6 (six) hours as needed for wheezing or shortness of breath 18 g 2    ascorbic acid (VITAMIN C) 500 MG tablet Take 1 tablet (500 mg total) by mouth 2 (two) times a day 30 tablet 3    atorvastatin (LIPITOR) 20 mg tablet Take 1 tablet (20 mg total) by mouth daily Take half tab every other day x2 weeks then half tab daily x2 weeks & then full tab daily afterwards.  90 tablet 3    cholecalciferol (VITAMIN D3) 1,000 units tablet Take 2 tablets (2,000 Units total) by mouth daily 60 tablet 0    diphenhydrAMINE (BENADRYL) 50 MG tablet Take 1 tablet (50 mg total) by mouth every 8 (eight) hours as needed for itching 30 tablet 0    fluticasone (FLONASE) 50 mcg/act nasal spray 1 spray into each nostril daily 16 g 2    Fluticasone-Salmeterol (Advair) 100-50 mcg/dose inhaler Inhale 1 puff 2 (two) times a day Rinse mouth after use 180 blister 2    hyoscyamine (ANASPAZ,LEVSIN) 0.125 MG tablet Take 1 tablet (0.125 mg total) by mouth every 4 (four) hours as needed for cramping 60 tablet 5    Loratadine 10 MG CAPS Take by mouth      losartan-hydrochlorothiazide (HYZAAR) 100-12.5 MG per tablet Take 1 tablet by mouth daily 90 tablet 2    Multiple Vitamins-Minerals (multivitamin with minerals) tablet Take 1 tablet by mouth daily 30 tablet 1    Omega-3 Fatty Acids (fish oil) 1,000 mg Take 1,000 mg by mouth daily      omeprazole (PriLOSEC) 40 MG capsule Take 1 capsule by mouth twice daily 180 capsule 0    ondansetron (ZOFRAN-ODT) 4 mg disintegrating tablet Take 1 tablet (4 mg total) by mouth every 6 (six) hours as needed for nausea or vomiting 60 tablet 1    cholestyramine (QUESTRAN) 4 g packet Take 1 packet (4 g total) by mouth daily at bedtime (Patient not taking: Reported on 10/17/2023) 30 packet 2     No current facility-administered medications on file prior to visit. Social History     Socioeconomic History    Marital status:      Spouse name: Not on file    Number of children: Not on file    Years of education: Not on file    Highest education level: Not on file   Occupational History    Not on file   Tobacco Use    Smoking status: Former     Types: Cigarettes     Quit date: 2013     Years since quitting: 10.8    Smokeless tobacco: Never   Vaping Use    Vaping Use: Never used   Substance and Sexual Activity    Alcohol use: Yes     Comment: extremely rare social occasion    Drug use: Never    Sexual activity: Never   Other Topics Concern    Not on file   Social History Narrative    Not on file     Social Determinants of Health     Financial Resource Strain: Not on file   Food Insecurity: No Food Insecurity (5/18/2023)    Hunger Vital Sign     Worried About Running Out of Food in the Last Year: Never true     Ran Out of Food in the Last Year: Never true   Transportation Needs: No Transportation Needs (5/18/2023)    PRAPARE - Transportation     Lack of Transportation (Medical): No     Lack of Transportation (Non-Medical): No   Physical Activity: Not on file   Stress: Not on file   Social Connections: Not on file   Intimate Partner Violence: Not on file   Housing Stability: Low Risk  (5/18/2023)    Housing Stability Vital Sign     Unable to Pay for Housing in the Last Year: No     Number of Places Lived in the Last Year: 1     Unstable Housing in the Last Year: No         I have reviewed the past medical, surgical, social and family history, medications and allergies as documented in the EMR. Review of systems: ROS is negative other than that noted in the HPI. Constitutional: Negative for fatigue and fever. Physical Exam:    Blood pressure 148/85, pulse 76, resp.  rate 17, height 5' 2" (1.575 m), weight 93.4 kg (206 lb), not currently breastfeeding. General/Constitutional: NAD, well developed, well nourished  HENT: Normocephalic, atraumatic  CV: Intact distal pulses, regular rate  Resp: No respiratory distress or labored breathing  Lymphatic: No lymphadenopathy palpated  Neuro: Alert and Oriented x 3, no focal deficits  Psych: Normal mood, normal affect, normal judgement, normal behavior  Skin: Warm, dry, no rashes, no erythema      Knee Exam (focused):  Visual inspection of the right knee demonstrates normal contour without atrophy. No previous incisions   There is no significant erythema or edema. + effusion   Range of motion is full from 0-110 degrees of flexion   Able to straight leg raise   TTP medial joint line tenderness, no lateral joint line tenderness  + medial Malaika's, - lateral Malaika's  1A Lachman exam, - posterior drawer  Stable to varus and valgus stress at both 0 and 30°  Patella tracks normally. No J sign. No apprehension. Translation is approximately 2 quadrants and is equal to the contralateral side  Patellar eversion is similar to the contralateral side    Examination of the patient's ipsilateral hip demonstrates full painless range of motion. No crepitus.            LE NV Exam: +2 DP/PT pulses bilaterally  Sensation intact to light touch L2-S1 bilaterally    No calf tenderness to palpation bilaterally      Knee Imaging    No imaging was performed today      Scribe Attestation      I,:  April Smith MA am acting as a scribe while in the presence of the attending physician.:       I,:  Alexandro Christianson DO personally performed the services described in this documentation    as scribed in my presence.:

## 2023-11-24 ENCOUNTER — TELEPHONE (OUTPATIENT)
Age: 58
End: 2023-11-24

## 2023-11-24 DIAGNOSIS — K21.9 GASTROESOPHAGEAL REFLUX DISEASE WITHOUT ESOPHAGITIS: ICD-10-CM

## 2023-11-24 RX ORDER — OMEPRAZOLE 40 MG/1
40 CAPSULE, DELAYED RELEASE ORAL 2 TIMES DAILY
Qty: 180 CAPSULE | Refills: 0 | Status: SHIPPED | OUTPATIENT
Start: 2023-11-24

## 2023-11-24 NOTE — TELEPHONE ENCOUNTER
Yoselin Salcedor    Patient would like a call back, she would like to schedule an appt.     CB: [phone]

## 2023-12-11 ENCOUNTER — OFFICE VISIT (OUTPATIENT)
Dept: GASTROENTEROLOGY | Facility: CLINIC | Age: 58
End: 2023-12-11
Payer: COMMERCIAL

## 2023-12-11 VITALS
WEIGHT: 208 LBS | BODY MASS INDEX: 38.28 KG/M2 | DIASTOLIC BLOOD PRESSURE: 81 MMHG | SYSTOLIC BLOOD PRESSURE: 141 MMHG | HEIGHT: 62 IN

## 2023-12-11 DIAGNOSIS — K21.9 GASTROESOPHAGEAL REFLUX DISEASE WITHOUT ESOPHAGITIS: Primary | Chronic | ICD-10-CM

## 2023-12-11 DIAGNOSIS — K58.0 IRRITABLE BOWEL SYNDROME WITH DIARRHEA: ICD-10-CM

## 2023-12-11 DIAGNOSIS — Z12.11 SCREENING FOR COLON CANCER: ICD-10-CM

## 2023-12-11 DIAGNOSIS — K59.04 CHRONIC IDIOPATHIC CONSTIPATION: ICD-10-CM

## 2023-12-11 PROCEDURE — 99214 OFFICE O/P EST MOD 30 MIN: CPT | Performed by: INTERNAL MEDICINE

## 2023-12-11 RX ORDER — DICYCLOMINE HYDROCHLORIDE 10 MG/1
10 CAPSULE ORAL
Qty: 120 CAPSULE | Refills: 2 | Status: SHIPPED | OUTPATIENT
Start: 2023-12-11 | End: 2023-12-11 | Stop reason: SDUPTHER

## 2023-12-11 RX ORDER — DICYCLOMINE HYDROCHLORIDE 10 MG/1
10 CAPSULE ORAL 2 TIMES DAILY
Qty: 120 CAPSULE | Refills: 2 | Status: SHIPPED | OUTPATIENT
Start: 2023-12-11

## 2023-12-11 RX ORDER — LOPERAMIDE HYDROCHLORIDE 2 MG/1
2 CAPSULE ORAL 4 TIMES DAILY PRN
COMMUNITY

## 2023-12-11 NOTE — PATIENT INSTRUCTIONS
Change omeprazole to before breakfasst and dinner  Take gaviscon liquid as needed     Start bentyl/dicyclomine before breakfast and at bedtime.  Keep the levsin, you can use it as needed

## 2023-12-11 NOTE — PROGRESS NOTES
[facility] - Outpatient Follow-up Note  David Arellano 59 y.o. female MRN: [id number]  Encounter: [id number]    ASSESSMENT AND PLAN:      1. IBS-D   Diagnosed with IBS at age 11 with worsening symptoms over the past few years. Colonoscopy January 2023 negative for IBD and microscopic colitis    Symptoms uncontrolled despite Levsin. Will add Bentyl standing dose before bedtime and upon awakening  I recommend resuming Questran at a quarter dose    2. Gastroesophageal reflux disease without esophagitis  Upper endoscopy in January 2023 showed a small hiatal hernia and normal esophagus. Reflux symptoms persist despite acid suppression, symptoms have been out of proportion to endoscopic findings  Reviewed antireflux diet  She has been taking omeprazole before breakfast and bedtime, she move the bedtime dose to predinner. I recommended Gaviscon as needed    May benefit from Bravo pH probe or pH impedance testing if symptoms persist    3. Screening for colon cancer  Negative colonoscopy 2023, 10-year recall    Follow-up Appointment:   The patient will follow up in 2 months. She can call in the meantime with any questions or concerns. ______________________________________________________________________    Chief Complaint   Patient presents with    diarrhea      HPI:  Rea Cole is a 66-year-old female who presents for follow-up of IBS-D and GERD. Irritable bowel syndrome with diarrhea and cramping  She was last seen in 06/2023 to discuss GERD and IBS-D. In the last few months, she has been "feeling horrific." She has not eaten anything yet today as she is afraid that she will be in the bathroom for half an hour. She gets "hellacious" cramps and takes the cramping pill as if it was a vitamin every day. She describes the cramps are "worse than labor" and she can feel them coming on.  They are intermittent and last for 10 seconds to 1 minute and she can feel them going away. She has not been taking more than 1 cramping pill a day because she is too busy and once that happens, they usually do not come back again unless she eats something. She will go to [facility] with her son for breakfast and within 5 minutes, she will be in the bathroom for half an hour with diarrhea. She can occasionally eat something, and it will not affect her until the next day or night and other times it will affect her 1 minute later. She is currently trying to "psych herself out" of sitting on the toilet for half an hour due to intermittent cramping. She sometimes thinks she is done using the bathroom and will get up and wash her hands. She will start to walk out of the bathroom and experience the urge to sit back down. She sometimes must strain to make it come as she occasionally takes Imodium. It can happen with anything she eats including taking a sip of water and she is running to the bathroom. She took Imodium 30 minutes before they went out, but it did not help, and she was in the restroom for 45 minutes prior to going out to eat. She describes experiencing cramping and must immediately run to the bathroom. The cramping lasts until the "first strong bout" comes out and then she has diarrhea without cramping. She gets nausea and dry heaves and takes Zofran. Imodium seems to control the diarrhea, but it does not help the cramps. She takes the hyoscyamine "like a vitamin." She has never tried Bentyl. She also describes having diarrhea one day and the next day she will be constipated. She denies taking any over-the-counter medications for her symptoms except Imodium. Gastroesophageal reflux disease/hiatal hernia  She is "breaking through" her omeprazole. Over the past 1 month, she will be sitting at her work desk and must stretch and sit up straight or she feels nauseous. It happens every day and she feels that her lower GI tract symptoms are a contributing factor.  She has been getting heartburn at work and has been taking Tums "like candy." She describes feeling more symptomatic in her stomach and eventually "it attacks and goes all the way up to her ears" and is miserable. She eats yogurt and fruit for breakfast and occasionally cannot eat the fruit. She is trying to lose weight, but she cannot and thinks it is due to her hiatal hernia. She denies any pain when she pushes on her abdomen, but it feels uncomfortable. She denies dysphagia. She switched to a chewable multivitamin, but her Hyzaar gets stuck occasionally. She sits up straight, puts her neck back on her pillow, fills her mouth up with water, and then she just keeps drinking afterwards to swallow her medication. She has not tried Pepcid or Gaviscon. She will occasionally have breakthrough acid reflux at night that "keeps her up all night" and she sleeps in prone position. She takes omeprazole 40 mg 1 tablet in the morning and 1 tablet at night. She occasionally eats her dinner at lunchtime and will come home at 5:00 p.m. not hungry or will just eat a bowl of cereal. She has switched jobs, and her stress level has decreased. Torn meniscus  She has a torn meniscus and takes ibuprofen and Tylenol as needed. She had an injection, and the pain is minimal now. She is not allowed to exercise because of her knee. She is not interested in undergoing surgical intervention on her knee currently. Bilateral total hip arthroplasties  She is status post bilateral hip arthroplasties and is doing well. Dyspnea on exertion and lightheadedness  She gets out of breath climbing stairs and she gets lightheaded.  Her primary care provider is concerned and is planning on referring her to a cardiologist.       Historical Information   Past Medical History:   Diagnosis Date    Arthritis     GERD (gastroesophageal reflux disease)     Hyperlipidemia     Hypertension     Irritable bowel syndrome     Low back pain     PONV (postoperative nausea and vomiting)      Past Surgical History:   Procedure Laterality Date    CATARACT EXTRACTION Bilateral      SECTION N/A     CHOLECYSTECTOMY      COLONOSCOPY      HERNIA REPAIR      HYSTERECTOMY      NASAL SEPTUM SURGERY      OK ARTHRP ACETBLR/PROX FEM PROSTC AGRFT/ALGRFT Right 2023    Procedure: ARTHROPLASTY HIP TOTAL;  Surgeon: Asaf Hawkins DO;  Location: [facility];  Service: Orthopedics    OK ARTHRP ACETBLR/PROX FEM PROSTC AGRFT/ALGRFT Left 2023    Procedure: ARTHROPLASTY HIP TOTAL;  Surgeon: Asaf Hawkins DO;  Location: [facility];  Service: Orthopedics    TONSILLECTOMY      UPPER GASTROINTESTINAL ENDOSCOPY       Social History     Substance and Sexual Activity   Alcohol Use Yes    Comment: extremely rare social occasion     Social History     Substance and Sexual Activity   Drug Use Never     Social History     Tobacco Use   Smoking Status Former    Types: Cigarettes    Quit date:     Years since quitting: 10.9   Smokeless Tobacco Never     Family History   Problem Relation Age of Onset    Hypertension Mother     Skin cancer Mother     Stroke Father     No Known Problems Sister     Skin cancer Daughter     Cancer Daughter     No Known Problems Daughter     Ovarian cancer Daughter     Ovarian cancer Maternal Grandmother     No Known Problems Maternal Grandfather     No Known Problems Paternal Grandmother     No Known Problems Paternal Grandfather     Hypertension Son     Colon cancer Cousin     No Known Problems Maternal Aunt     No Known Problems Maternal Aunt     No Known Problems Maternal Aunt     No Known Problems Maternal Aunt     Colon polyps Neg Hx          Current Outpatient Medications:     albuterol (Proventil HFA) 90 mcg/act inhaler    ascorbic acid (VITAMIN C) 500 MG tablet    atorvastatin (LIPITOR) 20 mg tablet    cholecalciferol (VITAMIN D3) 1,000 units tablet    cholestyramine (QUESTRAN) 4 g packet    diphenhydrAMINE (BENADRYL) 50 MG tablet    fluticasone (FLONASE) 50 mcg/act nasal spray    Fluticasone-Salmeterol (Advair) 100-50 mcg/dose inhaler    hyoscyamine (ANASPAZ,LEVSIN) 0.125 MG tablet    Loratadine 10 MG CAPS    losartan-hydrochlorothiazide (HYZAAR) 100-12.5 MG per tablet    Multiple Vitamins-Minerals (multivitamin with minerals) tablet    Omega-3 Fatty Acids (fish oil) 1,000 mg    omeprazole (PriLOSEC) 40 MG capsule    ondansetron (ZOFRAN-ODT) 4 mg disintegrating tablet  Allergies   Allergen Reactions    Ativan [Lorazepam] Myalgia     When taken  Along w/ cymbalta    Cymbalta [Duloxetine Hcl] Myalgia     When taken w/ ativan x 1 occurrence    Latex Rash    Amoxicillin Rash     Reviewed medications and allergies and updated as indicated. Review of Systems:  The pertinent positive and negative findings are as noted in the HPI. PHYSICAL EXAM:    Blood pressure 141/81, height 5' 2" (1.575 m), weight 94.3 kg (208 lb), not currently breastfeeding. Body mass index is 38.04 kg/m². General Appearance: NAD, cooperative, alert. Eyes: Anicteric, PERRLA, EOMI.  ENT: Normocephalic, atraumatic, normal mucosa. Neck: Supple, symmetrical, trachea midline. Resp: Clear to auscultation bilaterally; no rales, rhonchi or wheezing; respirations unlabored. CV: S1 S2, regular rate and rhythm; no murmur, rub, or gallop. GI: Soft, non-tender, non-distended; normal bowel sounds; no masses, no organomegaly. She has tenderness noted on palpation of epigastric area. Rectal: Deferred. Musculoskeletal: No cyanosis, clubbing or edema. Normal ROM. Skin: No jaundice, rashes, or lesions. Heme/Lymph: No palpable cervical lymphadenopathy. Psych: Normal affect, good eye contact. Neuro: No gross deficits, AAOx3.     Lab Results:   Lab Results   Component Value Date    WBC 4.6 06/26/2023    HGB 12.0 06/26/2023    HCT 35.7 06/26/2023    MCV 86 06/26/2023     06/26/2023     Lab Results   Component Value Date    K 4.2 06/26/2023     06/26/2023    CO2 24 06/26/2023    BUN 14 06/26/2023    CREATININE 0.82 06/26/2023    GLUF 126 (H) 05/18/2023    CALCIUM 9.3 05/18/2023    AST 32 04/27/2023    ALT 27 04/27/2023    ALKPHOS 86 01/14/2023    EGFR 82 06/26/2023     Lab Results   Component Value Date    IRON 65 04/27/2023    TIBC 359 04/27/2023     Lab Results   Component Value Date    LIPASE 63 (L) 01/14/2023       Radiology Results:   No results found. I have personally reviewed results with the patient. Transcribed for Kaushik Mg DO, by Ricardo Mcneal on 12/11/23 at 8:28 AM. Powered by [employer].

## 2023-12-12 ENCOUNTER — OFFICE VISIT (OUTPATIENT)
Dept: FAMILY MEDICINE CLINIC | Facility: HOSPITAL | Age: 58
End: 2023-12-12
Payer: COMMERCIAL

## 2023-12-12 VITALS
BODY MASS INDEX: 38.35 KG/M2 | HEIGHT: 62 IN | TEMPERATURE: 97.1 F | SYSTOLIC BLOOD PRESSURE: 140 MMHG | DIASTOLIC BLOOD PRESSURE: 80 MMHG | WEIGHT: 208.4 LBS | HEART RATE: 72 BPM

## 2023-12-12 DIAGNOSIS — B00.1 HERPES LABIALIS: ICD-10-CM

## 2023-12-12 DIAGNOSIS — I10 PRIMARY HYPERTENSION: Chronic | ICD-10-CM

## 2023-12-12 DIAGNOSIS — R42 EPISODIC LIGHTHEADEDNESS: Primary | ICD-10-CM

## 2023-12-12 PROCEDURE — 99214 OFFICE O/P EST MOD 30 MIN: CPT | Performed by: NURSE PRACTITIONER

## 2023-12-12 RX ORDER — VALACYCLOVIR HYDROCHLORIDE 1 G/1
2000 TABLET, FILM COATED ORAL EVERY 12 HOURS
Qty: 4 TABLET | Refills: 0 | Status: SHIPPED | OUTPATIENT
Start: 2023-12-12 | End: 2023-12-13

## 2023-12-12 NOTE — PROGRESS NOTES
Assessment/Plan:     With normal neuro exam.   She reports lightheadedness vs dizziness although she does report worsening with quick head movements. Possible atypical vertigo presentation. Her BP was slightly elevated today and with report of frequent headaches some of this could be due to HTN. I advised she start checking BP twice daily. F/U before the end of the year(per pt request). If BP is controlled then consider vestibular therapy. Diagnoses and all orders for this visit:    Episodic lightheadedness    Primary hypertension    Herpes labialis  -     valACYclovir (VALTREX) 1,000 mg tablet; Take 2 tablets (2,000 mg total) by mouth every 12 (twelve) hours for 2 doses          Subjective:     Patient ID: Felicita Mcneal is a 59 y.o. female. Has on and off dizziness for over 1 year. She did see DR. Ana Burns who discussed sending her to cardiologist. Gets oob with waliking up steps. Describes as lightheaded. Today it is lasting all day which is unusual. Closing eyes and resting makes it go away. Will get lightheaded with quick movements. Has HA on left side reported as mild. Has been getting more headaches. Back of head. Does not check BP. Occasioanl palpitations and had holter whch was negative. No chest pain or pressure. No blurry or double vision. No N/T/weakness. No congestion, ear pain or fullness. Has allergies and takes Claritin daily. Has h/o vertigo in the 1990's. Denies any neck pain but does have h/o 9 neck injections. Has a cold sore and requesting valacyclovir. Review of Systems   Constitutional:  Negative for chills and fever. HENT:  Positive for mouth sores. Negative for congestion, ear pain, hearing loss, rhinorrhea, sinus pressure, sinus pain and tinnitus. Eyes:  Negative for visual disturbance. Respiratory:  Positive for shortness of breath. Cardiovascular:  Positive for palpitations. Negative for chest pain and leg swelling.    Gastrointestinal:  Negative for nausea and vomiting. Musculoskeletal:  Negative for neck pain. Neurological:  Positive for light-headedness and headaches. Negative for dizziness, syncope, speech difficulty, weakness and numbness. The following portions of the patient's history were reviewed and updated as appropriate: allergies, current medications, past family history, past medical history, past social history, past surgical history and problem list.    Objective:  Vitals:    12/12/23 1526   BP: 140/80   Pulse: 72   Temp: (!) 97.1 °F (36.2 °C)      Physical Exam  Vitals reviewed. Constitutional:       Appearance: Normal appearance. HENT:      Right Ear: Ear canal and external ear normal. Tympanic membrane is bulging. Tympanic membrane is not erythematous. Left Ear: Ear canal and external ear normal. Tympanic membrane is bulging. Tympanic membrane is not erythematous. Mouth/Throat:      Mouth: Mucous membranes are moist.      Pharynx: Oropharynx is clear. Eyes:      Extraocular Movements: Extraocular movements intact. Conjunctiva/sclera: Conjunctivae normal.      Pupils: Pupils are equal, round, and reactive to light. Neck:      Vascular: No carotid bruit. Cardiovascular:      Rate and Rhythm: Normal rate and regular rhythm. Heart sounds: Normal heart sounds. No murmur heard. Pulmonary:      Effort: Pulmonary effort is normal.      Breath sounds: Normal breath sounds. Lymphadenopathy:      Cervical: No cervical adenopathy. Skin:     General: Skin is warm and dry. Neurological:      Mental Status: She is alert and oriented to person, place, and time. Cranial Nerves: Cranial nerves 2-12 are intact. Motor: Motor function is intact. Coordination: Coordination is intact. Gait: Gait is intact. Deep Tendon Reflexes:      Reflex Scores:       Brachioradialis reflexes are 2+ on the right side and 2+ on the left side.        Patellar reflexes are 2+ on the right side and 2+ on the left side. Psychiatric:         Mood and Affect: Mood normal.         Behavior: Behavior normal.         Thought Content:  Thought content normal.         Judgment: Judgment normal.

## 2023-12-14 ENCOUNTER — TELEPHONE (OUTPATIENT)
Age: 58
End: 2023-12-14

## 2023-12-14 DIAGNOSIS — K59.04 CHRONIC IDIOPATHIC CONSTIPATION: ICD-10-CM

## 2023-12-14 DIAGNOSIS — K58.0 IRRITABLE BOWEL SYNDROME WITH DIARRHEA: ICD-10-CM

## 2023-12-14 DIAGNOSIS — Z12.11 SCREENING FOR COLON CANCER: ICD-10-CM

## 2023-12-14 DIAGNOSIS — K21.9 GASTROESOPHAGEAL REFLUX DISEASE WITHOUT ESOPHAGITIS: Chronic | ICD-10-CM

## 2023-12-14 NOTE — LETTER
December 11, 2023     Patient: Jeny Soto   YOB: 1965   Date of Visit: 12/11/2023       To Whom it May Concern:    Marlin Simpson was seen in my clinic on 12/11/2023. She  was seen today by our office. If you have any questions or concerns, please don't hesitate to call.          Sincerely,          Karen Salomon DO        CC: No Recipients likely hemodynamically mediated  will await renal recommendations   continue to monitor closely  IV furosemide 40 qd for now

## 2023-12-22 ENCOUNTER — TELEPHONE (OUTPATIENT)
Age: 58
End: 2023-12-22

## 2023-12-22 NOTE — TELEPHONE ENCOUNTER
Caller: Patient     Doctor: Faheem    Reason for call: Patient is calling stating she missed her visco injection due to a death in the family. She stated she needs an appt before 1/1 but Dr Mayen is [place]OO. Can another provider in [place] please provide the visco for her next week? 12/26 works best for her  Call back#: [phone]

## 2023-12-25 NOTE — PROGRESS NOTES
[facility]   Barbara DIANE    Assessment/Plan:      Diagnosis ICD-10-CM Associated Orders   1. Primary hypertension  I10 losartan-hydrochlorothiazide (HYZAAR) 100-25 MG per tablet      2. Chronic pain syndrome  G89.4       3. Urge incontinence of urine  N39.41 Ambulatory Referral to Urology        Adjust losartan/HCTZ to 100mg/25mg daily.   Keep blood pressure log and call in with results.   Continue to wok on kegels and consider pelvic floor PT  Referral to urology  Return in about 4 weeks (around 1/23/2024) for Recheck.  Patient may call or return to office with any questions or concerns.     ______________________________________________________________________  Subjective:     Patient ID: Roselyn Jose is a 58 y.o. female.  Roselyn Jose  Chief Complaint   Patient presents with    Follow-up       Started hydrating more after her last follow up which has improved her dizziness/lightheadedness.  Bp 132/84, however she hasn't taken her medications yet.  Has blood pressure monitor at home and states it still runs 140s/90s.   +BELLE, was doing PT for her hips which was improving her activity tolerance, but then tore her meniscus.  Has not completed CT scan lungs.  Feels her hiatal hernia is also contributing.  Holter monitor WNL.   Situational stress related to recent multiple deaths in her family    Urgency UI.  Had hysterectomy after 6 pregnancies.  Wearing a pad daily.              BMI Counseling: Body mass index is 37.86 kg/m². The BMI is above normal. Nutrition recommendations include encouraging healthy choices of fruits and vegetables, moderation in carbohydrate intake, increasing intake of lean protein, reducing intake of saturated and trans fat and reducing intake of cholesterol. Exercise recommendations include exercising 3-5 times per week and strength training exercises. Rationale for BMI follow-up plan is due to patient being overweight or obese.     Depression Screening and  Follow-up Plan: Patient was screened for depression during today's encounter. They screened negative with a PHQ-2 score of 0.        The following portions of the patient's history were reviewed and updated as appropriate: allergies, current medications, past family history, past medical history, past social history, past surgical history, and problem list.    Review of Systems   Constitutional: Negative.  Negative for activity change, appetite change, chills, fatigue and fever.   HENT: Negative.  Negative for congestion, ear pain, postnasal drip and sinus pain.    Eyes: Negative.    Respiratory: Negative.  Negative for cough and shortness of breath.         BELLE   Cardiovascular:  Positive for leg swelling. Negative for chest pain.   Gastrointestinal: Negative.  Negative for constipation and diarrhea.   Endocrine: Negative.    Genitourinary: Negative.  Negative for dysuria.   Musculoskeletal:  Positive for arthralgias and gait problem.   Skin: Negative.    Allergic/Immunologic: Negative.  Negative for immunocompromised state.   Neurological:  Negative for dizziness and light-headedness.   Hematological: Negative.    Psychiatric/Behavioral: Negative.           Objective:      Vitals:    12/26/23 0839   BP: 132/84   Pulse: 83   SpO2: 97%      Physical Exam  Vitals and nursing note reviewed.   Constitutional:       Appearance: Normal appearance. She is obese.   HENT:      Head: Normocephalic and atraumatic.      Right Ear: Tympanic membrane, ear canal and external ear normal.      Left Ear: Tympanic membrane, ear canal and external ear normal.      Nose: Nose normal.      Mouth/Throat:      Mouth: Mucous membranes are moist.      Pharynx: Oropharynx is clear.   Eyes:      Extraocular Movements: Extraocular movements intact.      Conjunctiva/sclera: Conjunctivae normal.      Pupils: Pupils are equal, round, and reactive to light.   Cardiovascular:      Rate and Rhythm: Normal rate and regular rhythm.      Pulses: Normal  "pulses.      Heart sounds: Normal heart sounds.   Pulmonary:      Effort: Pulmonary effort is normal.      Breath sounds: Normal breath sounds.   Abdominal:      General: Bowel sounds are normal.      Palpations: Abdomen is soft.   Musculoskeletal:         General: Normal range of motion.      Cervical back: Normal range of motion and neck supple.      Right lower leg: Edema present.      Left lower leg: Edema present.      Comments: Trace BLE   Skin:     General: Skin is warm and dry.   Neurological:      General: No focal deficit present.      Mental Status: She is alert and oriented to person, place, and time.      Gait: Gait abnormal.      Comments: Antalgic gait   Psychiatric:         Mood and Affect: Mood normal.         Behavior: Behavior normal.         Thought Content: Thought content normal.         Judgment: Judgment normal.           Portions of the record may have been created with voice recognition software. Occasional wrong word or \"sound alike\" substitutions may have occurred due to the inherent limitations of voice recognition software. Please review the chart carefully and recognize, using context, where substitutions/typographical errors may have occurred.     "

## 2023-12-26 ENCOUNTER — OFFICE VISIT (OUTPATIENT)
Dept: FAMILY MEDICINE CLINIC | Facility: HOSPITAL | Age: 58
End: 2023-12-26
Payer: COMMERCIAL

## 2023-12-26 VITALS
HEART RATE: 83 BPM | OXYGEN SATURATION: 97 % | DIASTOLIC BLOOD PRESSURE: 84 MMHG | WEIGHT: 207 LBS | BODY MASS INDEX: 37.86 KG/M2 | SYSTOLIC BLOOD PRESSURE: 132 MMHG

## 2023-12-26 DIAGNOSIS — I10 PRIMARY HYPERTENSION: Primary | Chronic | ICD-10-CM

## 2023-12-26 DIAGNOSIS — N39.41 URGE INCONTINENCE OF URINE: ICD-10-CM

## 2023-12-26 DIAGNOSIS — G89.4 CHRONIC PAIN SYNDROME: ICD-10-CM

## 2023-12-26 PROCEDURE — 99214 OFFICE O/P EST MOD 30 MIN: CPT | Performed by: NURSE PRACTITIONER

## 2023-12-26 RX ORDER — LOSARTAN POTASSIUM AND HYDROCHLOROTHIAZIDE 25; 100 MG/1; MG/1
1 TABLET ORAL DAILY
Qty: 30 TABLET | Refills: 5 | Status: SHIPPED | OUTPATIENT
Start: 2023-12-26

## 2023-12-26 NOTE — TELEPHONE ENCOUNTER
Jn Kenny to call us back to schedule an appt with Marlin Wells on Thurs 12/18 for Rt knee Synvisc One B/B

## 2023-12-26 NOTE — PATIENT INSTRUCTIONS
Adjust losartan/HCTZ to 100mg/25mg daily.   Keep blood pressure log and call in with results.   Continue to wok on kegels and consider pelvic floor PT  Referral to urology

## 2023-12-29 ENCOUNTER — TELEPHONE (OUTPATIENT)
Age: 58
End: 2023-12-29

## 2023-12-29 ENCOUNTER — TELEPHONE (OUTPATIENT)
Dept: OBGYN CLINIC | Facility: CLINIC | Age: 58
End: 2023-12-29

## 2023-12-29 NOTE — TELEPHONE ENCOUNTER
Caller: Patient    Doctor: Marlin Wells PA-C    Reason for call: Patient missed her appointment yesterday and is asking if you would be available today to give her the Visco injection.    Call back#: [phone]

## 2023-12-29 NOTE — TELEPHONE ENCOUNTER
Spoke with patient. Tried to schedule visco injection. Patient said new insurance change coming first of January and will call back once she knows more.

## 2024-01-02 ENCOUNTER — TELEPHONE (OUTPATIENT)
Dept: FAMILY MEDICINE CLINIC | Facility: HOSPITAL | Age: 59
End: 2024-01-02

## 2024-01-02 DIAGNOSIS — R42 DIZZINESS: ICD-10-CM

## 2024-01-02 DIAGNOSIS — I10 PRIMARY HYPERTENSION: Primary | Chronic | ICD-10-CM

## 2024-01-02 NOTE — TELEPHONE ENCOUNTER
Patients bp meds changed last week.    Patient has been feeling lightheaded, dizzy since.    Today bp is 145/89.    Pls call back.

## 2024-01-19 DIAGNOSIS — B00.1 HERPES LABIALIS: ICD-10-CM

## 2024-01-19 RX ORDER — VALACYCLOVIR HYDROCHLORIDE 1 G/1
2000 TABLET, FILM COATED ORAL EVERY 12 HOURS
Qty: 4 TABLET | Refills: 0 | Status: SHIPPED | OUTPATIENT
Start: 2024-01-19 | End: 2024-01-20

## 2024-01-19 NOTE — TELEPHONE ENCOUNTER
Patient called again  - she said the pharmacy might be closing early and she needs this script - they are monitoring the weather to decide when to close

## 2024-01-19 NOTE — TELEPHONE ENCOUNTER
Are you able to have a higher number of pills?  That way she can start as soon as she starts feeling it come on.

## 2024-01-26 ENCOUNTER — APPOINTMENT (OUTPATIENT)
Dept: RADIOLOGY | Facility: CLINIC | Age: 59
End: 2024-01-26
Payer: COMMERCIAL

## 2024-01-26 ENCOUNTER — OFFICE VISIT (OUTPATIENT)
Dept: OBGYN CLINIC | Facility: CLINIC | Age: 59
End: 2024-01-26
Payer: COMMERCIAL

## 2024-01-26 VITALS
HEIGHT: 62 IN | SYSTOLIC BLOOD PRESSURE: 112 MMHG | HEART RATE: 87 BPM | WEIGHT: 207 LBS | BODY MASS INDEX: 38.09 KG/M2 | DIASTOLIC BLOOD PRESSURE: 71 MMHG

## 2024-01-26 DIAGNOSIS — M54.16 RADICULOPATHY, LUMBAR REGION: Primary | ICD-10-CM

## 2024-01-26 DIAGNOSIS — Z47.1 AFTERCARE FOLLOWING RIGHT HIP JOINT REPLACEMENT SURGERY: ICD-10-CM

## 2024-01-26 DIAGNOSIS — Z96.641 AFTERCARE FOLLOWING RIGHT HIP JOINT REPLACEMENT SURGERY: ICD-10-CM

## 2024-01-26 PROCEDURE — 73502 X-RAY EXAM HIP UNI 2-3 VIEWS: CPT

## 2024-01-26 PROCEDURE — 99214 OFFICE O/P EST MOD 30 MIN: CPT | Performed by: STUDENT IN AN ORGANIZED HEALTH CARE EDUCATION/TRAINING PROGRAM

## 2024-01-26 NOTE — PROGRESS NOTES
Hip Follow up Office Note    Assessment:     1. Aftercare following right hip joint replacement surgery    2. Radiculopathy, lumbar region        Plan:     Problem List Items Addressed This Visit    None  Visit Diagnoses       Aftercare following right hip joint replacement surgery    -  Primary    Relevant Orders    XR hip/pelv 2-3 vws right if performed    Radiculopathy, lumbar region        Relevant Orders    Ambulatory referral to Spine & Pain Management             Plan:  58 y.o. female doing well 1 year s/p Right MAME from 1/18/23 and 8 months s/p Left MAME from 5/17/23.  Xrays of the bilateral hips reviewed today showing prosthesis in good position with no signs of loosening.  She has full range of motion of the bilateral hips on exam without pain.  Discussed that the left hip and leg pain that she is getting is secondary to her lumbar spine, as well as her numbness in the foot.  We will refer her to spine and pain for consideration of RITESH.  Follow up for her bilateral hips in 2-3 years.      Subjective:     Patient ID: Roselyn Jose is a 58 y.o. female.    Chief Complaint:  HPI:  Patient is a 58 y.o. female here today for annual post op evaluation of their right hip.  She is 1 year s/p Right MAME, DOS: 1/18/23.  She is also 8 months s/p Left MAME from 5/17/23. She states that her right hip is doing very well without any issues.  She has good range of motion of the hip.  Her main complaint today is pain in the lumbar spine that radiates down the left leg with associated numbness and tingling down the lower leg and into the foot.   She denies groin pain in the left hip. Overall she is pleased with her post operative progress.    Allergy:  Allergies   Allergen Reactions    Ativan [Lorazepam] Myalgia     When taken  Along w/ cymbalta    Cymbalta [Duloxetine Hcl] Myalgia     When taken w/ ativan x 1 occurrence    Latex Rash    Amoxicillin Rash     Medications:  all current active meds have been reviewed  Past  Medical History:  Past Medical History:   Diagnosis Date    Arthritis     GERD (gastroesophageal reflux disease)     Hyperlipidemia     Hypertension     Irritable bowel syndrome     Low back pain     PONV (postoperative nausea and vomiting)      Past Surgical History:  Past Surgical History:   Procedure Laterality Date    CATARACT EXTRACTION Bilateral      SECTION N/A     CHOLECYSTECTOMY      COLONOSCOPY      HERNIA REPAIR      HYSTERECTOMY      NASAL SEPTUM SURGERY      MD ARTHRP ACETBLR/PROX FEM PROSTC AGRFT/ALGRFT Right 2023    Procedure: ARTHROPLASTY HIP TOTAL;  Surgeon: Kurt Douglas DO;  Location: [facility];  Service: Orthopedics    MD ARTHRP ACETBLR/PROX FEM PROSTC AGRFT/ALGRFT Left 2023    Procedure: ARTHROPLASTY HIP TOTAL;  Surgeon: Kurt Douglas DO;  Location: [facility];  Service: Orthopedics    TONSILLECTOMY      UPPER GASTROINTESTINAL ENDOSCOPY       Family History:  Family History   Problem Relation Age of Onset    Hypertension Mother     Skin cancer Mother     Stroke Father     No Known Problems Sister     Hypertension Son     Skin cancer Daughter     Cancer Daughter     No Known Problems Daughter     Ovarian cancer Daughter     Ovarian cancer Maternal Grandmother     No Known Problems Maternal Grandfather     No Known Problems Paternal Grandmother     No Known Problems Paternal Grandfather     No Known Problems Maternal Aunt     No Known Problems Maternal Aunt     No Known Problems Maternal Aunt     No Known Problems Maternal Aunt     Colon cancer Cousin     Colon polyps Neg Hx      Social History:  Social History     Substance and Sexual Activity   Alcohol Use Yes    Comment: extremely rare social occasion     Social History     Substance and Sexual Activity   Drug Use Never     Social History     Tobacco Use   Smoking Status Former    Current packs/day: 0.00    Types: Cigarettes    Quit date:     Years since quittin.0   Smokeless Tobacco Never        "    ROS:  General: Per HPI  Skin: Negative, except if noted below  HEENT: Negative  Respiratory: Negative  Cardiovascular: Negative  Gastrointestinal: Negative  Urinary: Negative  Vascular: Negative  Musculoskeletal: Positive per HPI   Neurologic: Positive per HPI  Endocrine: Negative    Objective:  BP Readings from Last 1 Encounters:   01/26/24 112/71      Wt Readings from Last 1 Encounters:   01/26/24 93.9 kg (207 lb)        Respiratory:   non-labored respirations    Lymphatics:  no palpable lymph nodes    Gait and Station:   normal    Neurologic:   Alert and oriented times 3  Patient with normal sensation except as noted below  Deep tendon reflexes 2+ except as noted in MSK exam    Bilateral Lower Extremity:  Right Hip     Inspection: skin intact, well healed surgical incision    Range of Motion: full wo pain    - log roll    - Trendelenburg sign    Motor: 5/5 Q/HS/TA/GS/P    Pulses: 2+ DP / 2+ PT    SILT DP/SP/S/S/TN    Left Hip     Inspection: skin intact, well healed surgical incision    Range of Motion: full wo pain    - log roll    - Trendelenburg sign    Motor: 5/5 Q/HS/TA/GS/P    Pulses: 2+ DP / 2+ PT    SILT DP/SP/S/S/TN    Imaging:  My interpretation XR AP pelvis/ right hip: s/p press-fit total right hip arthroplasty, components in good position. No fracture or dislocation       BMI:   Estimated body mass index is 37.86 kg/m² as calculated from the following:    Height as of this encounter: 5' 2\" (1.575 m).    Weight as of this encounter: 93.9 kg (207 lb).  BSA:   Estimated body surface area is 1.94 meters squared as calculated from the following:    Height as of this encounter: 5' 2\" (1.575 m).    Weight as of this encounter: 93.9 kg (207 lb).           Scribe Attestation      I,:  Maryana Patel PA-C am acting as a scribe while in the presence of the attending physician.:       I,:  Kurt Douglas, DO personally performed the services described in this documentation    as scribed in my " presence.:

## 2024-02-07 ENCOUNTER — OFFICE VISIT (OUTPATIENT)
Dept: GASTROENTEROLOGY | Facility: CLINIC | Age: 59
End: 2024-02-07
Payer: COMMERCIAL

## 2024-02-07 VITALS
DIASTOLIC BLOOD PRESSURE: 66 MMHG | SYSTOLIC BLOOD PRESSURE: 140 MMHG | WEIGHT: 210.5 LBS | BODY MASS INDEX: 37.3 KG/M2 | HEIGHT: 63 IN

## 2024-02-07 DIAGNOSIS — Z12.11 SCREENING FOR COLON CANCER: ICD-10-CM

## 2024-02-07 DIAGNOSIS — K21.9 GASTROESOPHAGEAL REFLUX DISEASE WITHOUT ESOPHAGITIS: Primary | ICD-10-CM

## 2024-02-07 DIAGNOSIS — R13.19 ESOPHAGEAL DYSPHAGIA: ICD-10-CM

## 2024-02-07 DIAGNOSIS — K58.0 IRRITABLE BOWEL SYNDROME WITH DIARRHEA: ICD-10-CM

## 2024-02-07 DIAGNOSIS — K59.04 CHRONIC IDIOPATHIC CONSTIPATION: ICD-10-CM

## 2024-02-07 PROCEDURE — 99214 OFFICE O/P EST MOD 30 MIN: CPT | Performed by: INTERNAL MEDICINE

## 2024-02-07 RX ORDER — OMEPRAZOLE 40 MG/1
40 CAPSULE, DELAYED RELEASE ORAL 2 TIMES DAILY
Qty: 180 CAPSULE | Refills: 2 | Status: SHIPPED | OUTPATIENT
Start: 2024-02-07

## 2024-02-07 RX ORDER — DICYCLOMINE HYDROCHLORIDE 10 MG/1
10 CAPSULE ORAL
Qty: 120 CAPSULE | Refills: 2 | Status: SHIPPED | OUTPATIENT
Start: 2024-02-07

## 2024-02-07 RX ORDER — FAMOTIDINE 40 MG/1
40 TABLET, FILM COATED ORAL
Qty: 30 TABLET | Refills: 2 | Status: SHIPPED | OUTPATIENT
Start: 2024-02-07

## 2024-02-07 NOTE — PATIENT INSTRUCTIONS
For the reflux we will continue the omeprazole twice daily and add famotidine/Pepcid at bedtime.  I recommend beginning gentle exercise on the elliptical increasing as tolerated.  I feel that gradual weight loss will help the reflux and hiatal hernia and eventually had like to taper down on these medications.  We also discussed eventually planning and upper GI x-ray if the symptoms persist to assess reflux, hernia, and other reasons for swallowing issues.    We will increase the Bentyl/dicyclomine to 4 times daily before meals and bedtime with the goal of helping the abdominal cramping and diarrhea.  It is okay to use the Imodium intermittently if you need to.  We also discussed a trial of a dairy free diet to see if it is contributing.  If it shows sufficient benefit you might be able to handle small amounts of dairy with Lactaid pills before hand.

## 2024-02-07 NOTE — PROGRESS NOTES
[facility] - Outpatient Follow-up Note  Roselyn Jose 58 y.o. female MRN: [id number]  Encounter: [id number]    ASSESSMENT AND PLAN:      1. Gastroesophageal reflux disease without esophagitis  Upper endoscopy in January 2023 showed a small hiatal hernia and normal esophagus.  Reflux symptoms persist despite acid suppression, symptoms have been out of proportion to endoscopic findings  Continue antireflux diet including avoiding eating close to bedtime.  In the short-term we will continue omeprazole twice daily and I added famotidine at bedtime.  We had a long discussion about weight loss and she will start gradual exercise on the elliptical.  If symptoms persist we discussed a Bravo pH probe or pH impedance testing.  If there is good correlation we consider referral for hiatal hernia repair.  If there is no correlation she might benefit from amitriptyline    2. Esophageal dysphagia  Better after EGD with dilatation January 2023.  Still has intermittent dysphagia to pills.  If symptoms persist we discussed barium swallow or motility testing    3. Irritable bowel syndrome with diarrhea  Diagnosed with IBS at age 13 with worsening symptoms over the past few years.  Colonoscopy January 2023 negative for IBD and microscopic colitis  Poor symptom control with Levsin, better control with dicyclomine twice daily, we will increase to 4 times daily.  If symptoms persist we discussed amitriptyline    She will also try a lactose-free diet and monitor IBS symptoms.    4. Screening for colon cancer  Negative colonoscopy 2023, 10-year recall       Followup Appointment: 2 to 3 months  ______________________________________________________________________    Chief Complaint   Patient presents with    Follow-up    GERD     HPI: The patient presents for evaluation of ongoing reflux and IBS symptoms.    He was last seen in the office December 11.  At that visit we transition from Levsin to  dicyclomine twice daily.  This has improved but not resolved symptoms.  She is still has cramping multiple days a week.  This often occurs at work.  She has noticed that this happened shortly after eating her morning yogurt.  She will take an Imodium occasionally, if she takes it multiple days in a row she will have constipation.  She is unaware of any other specific trigger foods.    Symptoms have persisted.  She takes omeprazole twice daily.  She had Posta sauce last night and it kept her awake all night with regurgitation and burning.  She still has intermittent dysphagia to pills and crusty bread.    Historical Information   Past Medical History:   Diagnosis Date    Arthritis     GERD (gastroesophageal reflux disease)     Hyperlipidemia     Hypertension     Irritable bowel syndrome     Low back pain     PONV (postoperative nausea and vomiting)      Past Surgical History:   Procedure Laterality Date    CATARACT EXTRACTION Bilateral      SECTION N/A     CHOLECYSTECTOMY      COLONOSCOPY      HERNIA REPAIR      HYSTERECTOMY      NASAL SEPTUM SURGERY      MN ARTHRP ACETBLR/PROX FEM PROSTC AGRFT/ALGRFT Right 2023    Procedure: ARTHROPLASTY HIP TOTAL;  Surgeon: Kurt Douglas DO;  Location:  MAIN OR;  Service: Orthopedics    MN ARTHRP ACETBLR/PROX FEM PROSTC AGRFT/ALGRFT Left 2023    Procedure: ARTHROPLASTY HIP TOTAL;  Surgeon: Kurt Douglas DO;  Location: [facility];  Service: Orthopedics    TONSILLECTOMY      UPPER GASTROINTESTINAL ENDOSCOPY       Social History     Substance and Sexual Activity   Alcohol Use Yes    Comment: extremely rare social occasion     Social History     Substance and Sexual Activity   Drug Use Never     Social History     Tobacco Use   Smoking Status Former    Current packs/day: 0.00    Types: Cigarettes    Quit date:     Years since quittin.1   Smokeless Tobacco Never     Family History   Problem Relation Age of Onset    Hypertension Mother      "Skin cancer Mother     Stroke Father     No Known Problems Sister     Hypertension Son     Skin cancer Daughter     Cancer Daughter     No Known Problems Daughter     Ovarian cancer Daughter     Ovarian cancer Maternal Grandmother     No Known Problems Maternal Grandfather     No Known Problems Paternal Grandmother     No Known Problems Paternal Grandfather     No Known Problems Maternal Aunt     No Known Problems Maternal Aunt     No Known Problems Maternal Aunt     No Known Problems Maternal Aunt     Colon cancer Cousin     Colon polyps Neg Hx          Current Outpatient Medications:     albuterol (Proventil HFA) 90 mcg/act inhaler    ascorbic acid (VITAMIN C) 500 MG tablet    atorvastatin (LIPITOR) 20 mg tablet    cholecalciferol (VITAMIN D3) 1,000 units tablet    cholestyramine (QUESTRAN) 4 g packet    dicyclomine (BENTYL) 10 mg capsule    diphenhydrAMINE (BENADRYL) 50 MG tablet    famotidine (PEPCID) 40 MG tablet    fluticasone (FLONASE) 50 mcg/act nasal spray    Fluticasone-Salmeterol (Advair) 100-50 mcg/dose inhaler    loperamide (IMODIUM) 2 mg capsule    Loratadine 10 MG CAPS    losartan-hydrochlorothiazide (HYZAAR) 100-25 MG per tablet    Multiple Vitamins-Minerals (multivitamin with minerals) tablet    Omega-3 Fatty Acids (fish oil) 1,000 mg    omeprazole (PriLOSEC) 40 MG capsule    ondansetron (ZOFRAN-ODT) 4 mg disintegrating tablet    valACYclovir (VALTREX) 1,000 mg tablet  Allergies   Allergen Reactions    Ativan [Lorazepam] Myalgia     When taken  Along w/ cymbalta    Cymbalta [Duloxetine Hcl] Myalgia     When taken w/ ativan x 1 occurrence    Latex Rash    Amoxicillin Rash     Reviewed medications and allergies and updated as indicated    PHYSICAL EXAM:    Blood pressure 140/66, height 5' 3\" (1.6 m), weight 95.5 kg (210 lb 8 oz), not currently breastfeeding. Body mass index is 37.29 kg/m².  General Appearance: NAD, cooperative, alert  Eyes: Anicteric, conjunctiva pink  ENT:  Normocephalic, atraumatic, " normal mucosa.    Neck:  Supple, symmetrical, trachea midline  Resp:  Clear to auscultation bilaterally; no rales, rhonchi or wheezing; respirations unlabored   CV:  S1 S2, Regular rate and rhythm; no murmur, rub, or gallop.  GI:  Soft, non-tender, non-distended; normal bowel sounds; no masses, no organomegaly   Rectal: Deferred  Musculoskeletal: No cyanosis, clubbing or edema. Normal ROM.  Skin:  No jaundice, rashes, or lesions   Heme/Lymph: No palpable cervical lymphadenopathy  Psych: Normal affect, good eye contact  Neuro: No gross deficits, AAOx3    Lab Results:   Lab Results   Component Value Date    WBC 4.6 06/26/2023    HGB 12.0 06/26/2023    HCT 35.7 06/26/2023    MCV 86 06/26/2023     06/26/2023     Lab Results   Component Value Date    K 4.2 06/26/2023     06/26/2023    CO2 24 06/26/2023    BUN 14 06/26/2023    CREATININE 0.82 06/26/2023    GLUF 126 (H) 05/18/2023    CALCIUM 9.3 05/18/2023    AST 32 04/27/2023    ALT 27 04/27/2023    ALKPHOS 86 01/14/2023    EGFR 82 06/26/2023       Radiology Results:   XR hip/pelv 2-3 vws right if performed    Result Date: 1/26/2024  Narrative: RIGHT HIP INDICATION:   Aftercare following joint replacement surgery. Presence of right artificial hip joint. COMPARISON:  Comparison made with previous examination(s) dated (MR) 18-Sep-2023,(DX) 20-Aug-2023,(DX) 02-Jun-2023,(DX) 17-May-2023,(RF) 05-May-2023. VIEWS:  XR HIP/PELV 2-3 VWS RIGHT W PELVIS IF PERFORMED FINDINGS: There is no acute fracture or dislocation. Patient is status post total right hip prosthesis. Femoral and acetabular components are well aligned. No evidence of loosening or hardware failure. No associated fractures. No heterotopic bone formation. No lytic or blastic osseous lesion. Soft tissues are unremarkable. Degenerative changes pubic symphysis and visualized lower lumbar spine. Scoliosis to the left.     Impression: No significant change from prior study 6/2/2023. Satisfactory appearing right  total hip prosthesis as above with normal alignment. Degenerative changes and scoliosis in the visualized lower lumbar spine. Electronically signed: 01/26/2024 05:22 PM Adi Blackwood MD

## 2024-03-05 ENCOUNTER — OFFICE VISIT (OUTPATIENT)
Dept: PAIN MEDICINE | Facility: CLINIC | Age: 59
End: 2024-03-05
Payer: COMMERCIAL

## 2024-03-05 VITALS
TEMPERATURE: 97.6 F | HEIGHT: 64 IN | BODY MASS INDEX: 35.34 KG/M2 | SYSTOLIC BLOOD PRESSURE: 132 MMHG | DIASTOLIC BLOOD PRESSURE: 80 MMHG | WEIGHT: 207 LBS

## 2024-03-05 DIAGNOSIS — M54.50 CHRONIC LEFT-SIDED LOW BACK PAIN WITHOUT SCIATICA: Primary | ICD-10-CM

## 2024-03-05 DIAGNOSIS — M53.3 SACROILIAC DYSFUNCTION: ICD-10-CM

## 2024-03-05 DIAGNOSIS — G89.29 CHRONIC LEFT-SIDED LOW BACK PAIN WITHOUT SCIATICA: Primary | ICD-10-CM

## 2024-03-05 PROCEDURE — 99214 OFFICE O/P EST MOD 30 MIN: CPT | Performed by: ANESTHESIOLOGY

## 2024-03-05 NOTE — PROGRESS NOTES
Assessment  1. Chronic left-sided low back pain without sciatica    2. Sacroiliac dysfunction - Left        Plan    The patient's low back pain persists despite time, relative rest, activity modification and therapy. Based on the patient's symptoms and examination, I suspect that her pain is being generated by the sacroiliac joint. I will schedule Roselyn for intra-articular sacroiliac joint steroid injection under fluoroscopic guidance to address any inflammatory component of the pain. This would serve both diagnostic and hopefully therapeutic purposes. If the patient does not receive significant relief following the injection, it is possible that there is another pain source as the sacroiliac joint is a common pain referral site. It is also possible that the pain is being manifested by an extra-articular sacroiliac pain generator which would not be addressed with an intra-articular injection.    In addition we will obtain lumbar radiographs to rule out any other acute pathology that may be contribute her symptoms.    In the office today, we reviewed the nature of the patient's pathology in depth using diagrams and models. We discussed the approach I would use for the sacroiliac joint injection and provided literature for home review. The patient understands the risks associated with the procedure including bleeding, infection, tissue injury, allergic reaction decreased immunity secondary to steroid injection, and paralysis and provided written and verbal consent in the office today.    My impressions and treatment recommendations were discussed in detail with the patient who verbalized understanding and had no further questions.  Discharge instructions were provided. I personally saw and examined the patient and I agree with the above discussed plan of care.    This note is created using dictation transcription.  It may contain typographical errors, grammatical errors, improperly dictated words, background noise  and other errors.    Orders Placed This Encounter   Procedures    X-ray lumbar spine complete 4+ views     Standing Status:   Future     Standing Expiration Date:   3/5/2028     Scheduling Instructions:      Bring along any outside films relating to this procedure.           Order Specific Question:   Is the patient pregnant?     Answer:   Unknown    FL spine and pain procedure     Standing Status:   Future     Standing Expiration Date:   3/5/2028     Order Specific Question:   Reason for Exam:     Answer:   Left Si joint injection     Order Specific Question:   Is the patient pregnant?     Answer:   Unknown     Order Specific Question:   Anticoagulant hold needed?     Answer:   no     No orders of the defined types were placed in this encounter.      History of Present Illness    Roselyn Jose is a 58 y.o. female who presents with left-sided low back pain.  She unaware of any clips event denies any trauma or injury however she has undergone bilateral hip replacements over the past year.  Her pain is worse in the morning describes occasional sharp and throbbing and interferes with her daily living activities.  She denies any radiating pain or any weakness.  Denies any bowel bladder dysfunction.    I have personally reviewed and/or updated the patient's past medical history, past surgical history, family history, social history, current medications, allergies, and vital signs today.     Review of Systems   Constitutional:  Negative for fever and unexpected weight change.   HENT:  Negative for trouble swallowing.    Eyes:  Negative for visual disturbance.   Respiratory:  Negative for shortness of breath and wheezing.    Cardiovascular:  Negative for chest pain and palpitations.   Gastrointestinal:  Positive for nausea. Negative for constipation, diarrhea and vomiting.   Endocrine: Negative for cold intolerance, heat intolerance and polydipsia.   Genitourinary:  Negative for difficulty urinating and frequency.    Musculoskeletal:  Positive for gait problem. Negative for arthralgias, joint swelling (joint stiffness) and myalgias.        Decreased ROM   Skin:  Negative for rash.   Neurological:  Negative for dizziness, seizures, syncope, weakness (muscle weakness) and headaches.   Hematological:  Does not bruise/bleed easily.   Psychiatric/Behavioral:  Negative for dysphoric mood.    All other systems reviewed and are negative.      Patient Active Problem List   Diagnosis    Lumbar spondylosis    Bilateral hip pain    Greater trochanteric bursitis of both hips    Primary osteoarthritis of both hips    GERD (gastroesophageal reflux disease)    Chronic pain syndrome    Chronic bilateral low back pain without sciatica    Diffuse pain    Primary hypertension    Mixed hyperlipidemia    PONV (postoperative nausea and vomiting)    Nausea    Dysphagia    Status post right hip replacement    Chronic pain of right knee    Acute medial meniscal tear, right, subsequent encounter    Osteoarthritis of right patellofemoral joint    Urge incontinence of urine       Past Medical History:   Diagnosis Date    Arthritis     GERD (gastroesophageal reflux disease)     Hyperlipidemia     Hypertension     Irritable bowel syndrome     Low back pain     PONV (postoperative nausea and vomiting)        Past Surgical History:   Procedure Laterality Date    CATARACT EXTRACTION Bilateral      SECTION N/A     CHOLECYSTECTOMY      COLONOSCOPY      HERNIA REPAIR      HYSTERECTOMY      NASAL SEPTUM SURGERY      UT ARTHRP ACETBLR/PROX FEM PROSTC AGRFT/ALGRFT Right 2023    Procedure: ARTHROPLASTY HIP TOTAL;  Surgeon: Kurt Douglas DO;  Location: [facility];  Service: Orthopedics    UT ARTHRP ACETBLR/PROX FEM PROSTC AGRFT/ALGRFT Left 2023    Procedure: ARTHROPLASTY HIP TOTAL;  Surgeon: Kurt Douglas DO;  Location:  MAIN OR;  Service: Orthopedics    TONSILLECTOMY      UPPER GASTROINTESTINAL ENDOSCOPY         Family History    Problem Relation Age of Onset    Hypertension Mother     Skin cancer Mother     Stroke Father     No Known Problems Sister     Hypertension Son     Skin cancer Daughter     Cancer Daughter     No Known Problems Daughter     Ovarian cancer Daughter     Ovarian cancer Maternal Grandmother     No Known Problems Maternal Grandfather     No Known Problems Paternal Grandmother     No Known Problems Paternal Grandfather     No Known Problems Maternal Aunt     No Known Problems Maternal Aunt     No Known Problems Maternal Aunt     No Known Problems Maternal Aunt     Colon cancer Cousin     Colon polyps Neg Hx        Social History     Occupational History    Not on file   Tobacco Use    Smoking status: Former     Current packs/day: 0.00     Types: Cigarettes     Quit date:      Years since quittin.1    Smokeless tobacco: Never   Vaping Use    Vaping status: Never Used   Substance and Sexual Activity    Alcohol use: Yes     Comment: extremely rare social occasion    Drug use: Never    Sexual activity: Never       Current Outpatient Medications on File Prior to Visit   Medication Sig    albuterol (Proventil HFA) 90 mcg/act inhaler Inhale 2 puffs every 6 (six) hours as needed for wheezing or shortness of breath    ascorbic acid (VITAMIN C) 500 MG tablet Take 1 tablet (500 mg total) by mouth 2 (two) times a day    atorvastatin (LIPITOR) 20 mg tablet Take 1 tablet (20 mg total) by mouth daily Take half tab every other day x2 weeks then half tab daily x2 weeks & then full tab daily afterwards.    cholecalciferol (VITAMIN D3) 1,000 units tablet Take 2 tablets (2,000 Units total) by mouth daily    cholestyramine (QUESTRAN) 4 g packet Take 1 packet (4 g total) by mouth daily at bedtime    dicyclomine (BENTYL) 10 mg capsule Take 1 capsule (10 mg total) by mouth 4 (four) times a day (before meals and at bedtime)    diphenhydrAMINE (BENADRYL) 50 MG tablet Take 1 tablet (50 mg total) by mouth every 8 (eight) hours as needed  "for itching    famotidine (PEPCID) 40 MG tablet Take 1 tablet (40 mg total) by mouth daily at bedtime    fluticasone (FLONASE) 50 mcg/act nasal spray 1 spray into each nostril daily    Fluticasone-Salmeterol (Advair) 100-50 mcg/dose inhaler Inhale 1 puff 2 (two) times a day Rinse mouth after use    loperamide (IMODIUM) 2 mg capsule Take 2 mg by mouth 4 (four) times a day as needed for diarrhea    Loratadine 10 MG CAPS Take by mouth    losartan-hydrochlorothiazide (HYZAAR) 100-25 MG per tablet Take 1 tablet by mouth daily    Multiple Vitamins-Minerals (multivitamin with minerals) tablet Take 1 tablet by mouth daily    Omega-3 Fatty Acids (fish oil) 1,000 mg Take 1,000 mg by mouth daily    omeprazole (PriLOSEC) 40 MG capsule Take 1 capsule (40 mg total) by mouth 2 (two) times a day    ondansetron (ZOFRAN-ODT) 4 mg disintegrating tablet Take 1 tablet (4 mg total) by mouth every 6 (six) hours as needed for nausea or vomiting    valACYclovir (VALTREX) 1,000 mg tablet Take 2 tablets (2,000 mg total) by mouth every 12 (twelve) hours for 2 doses     No current facility-administered medications on file prior to visit.       Allergies   Allergen Reactions    Ativan [Lorazepam] Myalgia     When taken  Along w/ cymbalta    Cymbalta [Duloxetine Hcl] Myalgia     When taken w/ ativan x 1 occurrence    Latex Rash    Amoxicillin Rash       Physical Exam    /80 (BP Location: Left arm, Patient Position: Sitting, Cuff Size: Standard)   Temp 97.6 °F (36.4 °C)   Ht 5' 3.6\" (1.615 m)   Wt 93.9 kg (207 lb)   BMI 35.98 kg/m²     Constitutional: normal, well developed, well nourished, alert, in no distress and non-toxic and no overt pain behavior. and obese  Eyes: anicteric  HEENT: grossly intact  Neck: supple, symmetric, trachea midline and no masses   Pulmonary:even and unlabored  Cardiovascular:No edema or pitting edema present  Skin:Normal without rashes or lesions and well hydrated  Psychiatric:Mood and affect " appropriate  Neurologic:Cranial Nerves II-XII grossly intact  Musculoskeletal:normal, difficulty going from sitting to standing sitting position; no obvious skin lesions or erythema lumbar sacral spine; mild tenderness in lumbar paravertebrals, positive left sacroiliac tenderness no greater trochanteric tenderness bilateral; deep tendon reflexes are diminished but symmetrical bilateral patellar and achilles; no focal motor deficit appreciated lower limbs; positive Romero's maneuver on the left, positive pelvic tilt test on the left and positive canceling maneuver on the left.    Imaging  RIGHT HIP @  1-1-26-24       INDICATION:   Aftercare following joint replacement surgery. Presence of right artificial hip joint.      COMPARISON:  Comparison made with previous examination(s) dated (MR) 18-Sep-2023,(DX) 20-Aug-2023,(DX) 02-Jun-2023,(DX) 17-May-2023,(RF) 05-May-2023.     VIEWS:  XR HIP/PELV 2-3 VWS RIGHT W PELVIS IF PERFORMED      FINDINGS:     There is no acute fracture or dislocation.        Patient is status post total right hip prosthesis. Femoral and acetabular components are well aligned. No evidence of loosening or hardware failure. No associated fractures. No heterotopic bone formation.        No lytic or blastic osseous lesion.     Soft tissues are unremarkable.     Degenerative changes pubic symphysis and visualized lower lumbar spine. Scoliosis to the left.     IMPRESSION:        No significant change from prior study 6/2/2023. Satisfactory appearing right total hip prosthesis as above with normal alignment.  Degenerative changes and scoliosis in the visualized lower lumbar spine.    I have personally reviewed pertinent films in PACS.

## 2024-03-29 ENCOUNTER — APPOINTMENT (OUTPATIENT)
Dept: RADIOLOGY | Facility: CLINIC | Age: 59
End: 2024-03-29
Payer: COMMERCIAL

## 2024-03-29 DIAGNOSIS — M54.50 CHRONIC LEFT-SIDED LOW BACK PAIN WITHOUT SCIATICA: ICD-10-CM

## 2024-03-29 DIAGNOSIS — G89.29 CHRONIC LEFT-SIDED LOW BACK PAIN WITHOUT SCIATICA: ICD-10-CM

## 2024-03-29 PROCEDURE — 72110 X-RAY EXAM L-2 SPINE 4/>VWS: CPT

## 2024-04-05 ENCOUNTER — HOSPITAL ENCOUNTER (OUTPATIENT)
Dept: RADIOLOGY | Facility: CLINIC | Age: 59
Discharge: HOME | End: 2024-04-05
Payer: COMMERCIAL

## 2024-04-05 VITALS
OXYGEN SATURATION: 98 % | SYSTOLIC BLOOD PRESSURE: 128 MMHG | DIASTOLIC BLOOD PRESSURE: 82 MMHG | HEART RATE: 80 BPM | TEMPERATURE: 97 F | RESPIRATION RATE: 18 BRPM

## 2024-04-05 DIAGNOSIS — M53.3 SACROILIAC DYSFUNCTION: ICD-10-CM

## 2024-04-05 PROCEDURE — 27096 INJECT SACROILIAC JOINT: CPT | Performed by: ANESTHESIOLOGY

## 2024-04-05 RX ORDER — ROPIVACAINE HYDROCHLORIDE 2 MG/ML
1 INJECTION, SOLUTION EPIDURAL; INFILTRATION; PERINEURAL ONCE
Status: COMPLETED | OUTPATIENT
Start: 2024-04-05 | End: 2024-04-05

## 2024-04-05 RX ORDER — METHYLPREDNISOLONE ACETATE 80 MG/ML
80 INJECTION, SUSPENSION INTRA-ARTICULAR; INTRALESIONAL; INTRAMUSCULAR; PARENTERAL; SOFT TISSUE ONCE
Status: COMPLETED | OUTPATIENT
Start: 2024-04-05 | End: 2024-04-05

## 2024-04-05 RX ADMIN — IOHEXOL 0.2 ML: 300 INJECTION, SOLUTION INTRAVENOUS at 08:09

## 2024-04-05 RX ADMIN — METHYLPREDNISOLONE ACETATE 80 MG: 80 INJECTION, SUSPENSION INTRA-ARTICULAR; INTRALESIONAL; INTRAMUSCULAR; SOFT TISSUE at 08:10

## 2024-04-05 RX ADMIN — ROPIVACAINE HYDROCHLORIDE 1 ML: 2 INJECTION EPIDURAL; INFILTRATION; PERINEURAL at 08:10

## 2024-04-05 NOTE — H&P
History of Present Illness: The patient is a 58 y.o. female who presents with complaints of low back pain.    Past Medical History:   Diagnosis Date    Arthritis     GERD (gastroesophageal reflux disease)     Hyperlipidemia     Hypertension     Irritable bowel syndrome     Low back pain     PONV (postoperative nausea and vomiting)        Past Surgical History:   Procedure Laterality Date    CATARACT EXTRACTION Bilateral      SECTION N/A     CHOLECYSTECTOMY      COLONOSCOPY      HERNIA REPAIR      HYSTERECTOMY      NASAL SEPTUM SURGERY      ID ARTHRP ACETBLR/PROX FEM PROSTC AGRFT/ALGRFT Right 2023    Procedure: ARTHROPLASTY HIP TOTAL;  Surgeon: Kurt Douglas DO;  Location:  MAIN OR;  Service: Orthopedics    ID ARTHRP ACETBLR/PROX FEM PROSTC AGRFT/ALGRFT Left 2023    Procedure: ARTHROPLASTY HIP TOTAL;  Surgeon: Kurt Douglas DO;  Location:  MAIN OR;  Service: Orthopedics    TONSILLECTOMY      UPPER GASTROINTESTINAL ENDOSCOPY           Current Outpatient Medications:     albuterol (Proventil HFA) 90 mcg/act inhaler, Inhale 2 puffs every 6 (six) hours as needed for wheezing or shortness of breath, Disp: 18 g, Rfl: 2    ascorbic acid (VITAMIN C) 500 MG tablet, Take 1 tablet (500 mg total) by mouth 2 (two) times a day, Disp: 30 tablet, Rfl: 3    atorvastatin (LIPITOR) 20 mg tablet, Take 1 tablet (20 mg total) by mouth daily Take half tab every other day x2 weeks then half tab daily x2 weeks & then full tab daily afterwards., Disp: 90 tablet, Rfl: 3    cholecalciferol (VITAMIN D3) 1,000 units tablet, Take 2 tablets (2,000 Units total) by mouth daily, Disp: 60 tablet, Rfl: 0    cholestyramine (QUESTRAN) 4 g packet, Take 1 packet (4 g total) by mouth daily at bedtime, Disp: 30 packet, Rfl: 2    dicyclomine (BENTYL) 10 mg capsule, Take 1 capsule (10 mg total) by mouth 4 (four) times a day (before meals and at bedtime), Disp: 120 capsule, Rfl: 2    diphenhydrAMINE (BENADRYL) 50 MG tablet,  Take 1 tablet (50 mg total) by mouth every 8 (eight) hours as needed for itching, Disp: 30 tablet, Rfl: 0    famotidine (PEPCID) 40 MG tablet, Take 1 tablet (40 mg total) by mouth daily at bedtime, Disp: 30 tablet, Rfl: 2    fluticasone (FLONASE) 50 mcg/act nasal spray, 1 spray into each nostril daily, Disp: 16 g, Rfl: 2    Fluticasone-Salmeterol (Advair) 100-50 mcg/dose inhaler, Inhale 1 puff 2 (two) times a day Rinse mouth after use, Disp: 180 blister, Rfl: 2    loperamide (IMODIUM) 2 mg capsule, Take 2 mg by mouth 4 (four) times a day as needed for diarrhea, Disp: , Rfl:     Loratadine 10 MG CAPS, Take by mouth, Disp: , Rfl:     losartan-hydrochlorothiazide (HYZAAR) 100-25 MG per tablet, Take 1 tablet by mouth daily, Disp: 30 tablet, Rfl: 5    Multiple Vitamins-Minerals (multivitamin with minerals) tablet, Take 1 tablet by mouth daily, Disp: 30 tablet, Rfl: 1    Omega-3 Fatty Acids (fish oil) 1,000 mg, Take 1,000 mg by mouth daily, Disp: , Rfl:     omeprazole (PriLOSEC) 40 MG capsule, Take 1 capsule (40 mg total) by mouth 2 (two) times a day, Disp: 180 capsule, Rfl: 2    ondansetron (ZOFRAN-ODT) 4 mg disintegrating tablet, Take 1 tablet (4 mg total) by mouth every 6 (six) hours as needed for nausea or vomiting, Disp: 60 tablet, Rfl: 1    valACYclovir (VALTREX) 1,000 mg tablet, Take 2 tablets (2,000 mg total) by mouth every 12 (twelve) hours for 2 doses, Disp: 4 tablet, Rfl: 0    Current Facility-Administered Medications:     iohexol (OMNIPAQUE) 300 mg/mL injection 0.2 mL, 0.2 mL, Intra-articular, Once, Camilo Mccormick DO    methylPREDNISolone acetate (DEPO-MEDROL) injection 80 mg, 80 mg, Intra-articular, Once, Camilo Mccormick DO    ropivacaine (NAROPIN) injection 1 mL, 1 mL, Intra-articular, Once, Camilo Mccormick DO    Allergies   Allergen Reactions    Ativan [Lorazepam] Myalgia     When taken  Along w/ cymbalta    Cymbalta [Duloxetine Hcl] Myalgia     When taken w/ ativan x 1 occurrence    Latex Rash    Amoxicillin Rash        Physical Exam:   General: Awake, Alert, Oriented x 3, Mood and affect appropriate  Respiratory: Respirations even and unlabored  Cardiovascular: Peripheral pulses intact; no edema  Musculoskeletal Exam: Normal gait    ASA Score: III    Patient/Chart Verification  Patient ID Verified: Verbal  ID Band Applied: No  Consents Confirmed: Procedural, To be obtained in the Pre-Procedure area  Interval H&P(within 24 hr) Complete (required for Outpatients and Surgery Admit only): To be obtained in the Pre-Procedure area  Allergies Reviewed: Yes  Anticoag/NSAID held?: NA  Currently on antibiotics?: No  Pregnancy denied?: NA    Assessment:   1. Sacroiliac dysfunction - Left        Plan: Left Si joint injection

## 2024-04-05 NOTE — DISCHARGE INSTRUCTIONS

## 2024-04-12 ENCOUNTER — TELEPHONE (OUTPATIENT)
Dept: PAIN MEDICINE | Facility: CLINIC | Age: 59
End: 2024-04-12

## 2024-04-19 ENCOUNTER — OFFICE VISIT (OUTPATIENT)
Dept: PAIN MEDICINE | Facility: CLINIC | Age: 59
End: 2024-04-19
Payer: COMMERCIAL

## 2024-04-19 VITALS
SYSTOLIC BLOOD PRESSURE: 118 MMHG | HEART RATE: 78 BPM | TEMPERATURE: 97.7 F | WEIGHT: 204 LBS | HEIGHT: 64 IN | BODY MASS INDEX: 34.83 KG/M2 | DIASTOLIC BLOOD PRESSURE: 78 MMHG

## 2024-04-19 DIAGNOSIS — G57.02 PIRIFORMIS SYNDROME OF LEFT SIDE: ICD-10-CM

## 2024-04-19 DIAGNOSIS — M46.1 SACROILIITIS (HCC): ICD-10-CM

## 2024-04-19 DIAGNOSIS — M47.816 LUMBAR SPONDYLOSIS: Primary | ICD-10-CM

## 2024-04-19 PROCEDURE — 99213 OFFICE O/P EST LOW 20 MIN: CPT | Performed by: PHYSICIAN ASSISTANT

## 2024-04-19 NOTE — PROGRESS NOTES
Assessment:  1. Lumbar spondylosis    2. Sacroiliitis (HCC)    3. Piriformis syndrome of left side        Plan:  While the patient was in the office today, I did have a thorough conversation regarding their chronic pain syndrome, medication management, and treatment plan options.    The patient is able to report 80% reduction in pain with the left sacroiliac joint however that relief only lasted about 1 and half weeks followed by return of increased pain again.   I explained to the patient that we could repeat this injection however we would prefer to wait 3 months between injecting the same location with steroid.    She does have facet mediated pain in the lumbar regions with moderate to severe facet arthropathy seen on her recent x-ray.  She is a candidate for diagnostic lumbar medial branch blocks on the left at L3-5.  At this point the patient is highly nervous/anxious about this procedure.  I did provide the patient with educational literature on both medial branch blocks and radiofrequency ablations.  Patient states that she will consider and call us to schedule if this is something she is interested in.    The patient has been experiencing moderate to severe axial spine pain that is causing functional deficit.  The pain has been present for at least 3 months and is not improving with conservative care.  Currently the patient is not experiencing any radicular features nor neurogenic claudication.  Non-facet pathology has been ruled out on clinical evaluation.    Consider left piriformis injection.    Continue home exercise program.  Patient recently restarted her gym exercises.  We discussed the importance of a lifelong commitment to daily exercises geared toward lumbar core stretching and strengthening. The patient was agreeable and verbalized an understanding    The patient was advised to contact the office should their symptoms worsen in the interim. The patient was agreeable and verbalized an  understanding.        History of Present Illness:    The patient is a 58 y.o. female last seen on 2024 who presents for a follow up office visit in regards to chronic low back pain secondary to lumbar spondylosis and sacroiliitis.  The patient currently reports left-sided low back pain that she presently rates a 1 out of 10 and describes it as an occasional dull, aching and sharp pain with referred pain patterns into the left buttock.  On the patient underwent a left sacroiliac joint injection which reduced 80% of her pain complaints for a week and a half followed by return of increased pain again.  Patient denies any lower extremity radicular pain, paresthesias or weakness.  She states that today is a particularly bad day in terms of pain.  She has increased pain with prolonged standing, bending and walking.    I have personally reviewed and/or updated the patient's past medical history, past surgical history, family history, social history, current medications, allergies, and vital signs today.       Review of Systems:    Review of Systems   Respiratory:  Positive for shortness of breath.    Cardiovascular:  Negative for chest pain.   Gastrointestinal:  Positive for diarrhea and nausea. Negative for constipation and vomiting.   Musculoskeletal:  Positive for gait problem. Negative for arthralgias, joint swelling and myalgias.   Skin:  Negative for rash.   Neurological:  Positive for dizziness. Negative for seizures and weakness.   All other systems reviewed and are negative.        Past Medical History:   Diagnosis Date   • Arthritis    • GERD (gastroesophageal reflux disease)    • Hyperlipidemia    • Hypertension    • Irritable bowel syndrome    • Low back pain    • PONV (postoperative nausea and vomiting)        Past Surgical History:   Procedure Laterality Date   • CATARACT EXTRACTION Bilateral    •  SECTION N/A    • CHOLECYSTECTOMY     • COLONOSCOPY     • HERNIA REPAIR     • HYSTERECTOMY     •  NASAL SEPTUM SURGERY     • [place] ARTHRP ACETBLR/PROX FEM PROSTC AGRFT/ALGRFT Right 2023    Procedure: ARTHROPLASTY HIP TOTAL;  Surgeon: Kurt Douglas DO;  Location: [facility];  Service: Orthopedics   • [place] ARTHRP ACETBLR/PROX FEM PROSTC AGRFT/ALGRFT Left 2023    Procedure: ARTHROPLASTY HIP TOTAL;  Surgeon: Kurt Douglas DO;  Location: [facility];  Service: Orthopedics   • TONSILLECTOMY     • UPPER GASTROINTESTINAL ENDOSCOPY         Family History   Problem Relation Age of Onset   • Hypertension Mother    • Skin cancer Mother    • Stroke Father    • No Known Problems Sister    • Hypertension Son    • Skin cancer Daughter    • Cancer Daughter    • No Known Problems Daughter    • Ovarian cancer Daughter    • Ovarian cancer Maternal Grandmother    • No Known Problems Maternal Grandfather    • No Known Problems Paternal Grandmother    • No Known Problems Paternal Grandfather    • No Known Problems Maternal Aunt    • No Known Problems Maternal Aunt    • No Known Problems Maternal Aunt    • No Known Problems Maternal Aunt    • Colon cancer Cousin    • Colon polyps Neg Hx        Social History     Occupational History   • Not on file   Tobacco Use   • Smoking status: Former     Current packs/day: 0.00     Types: Cigarettes     Quit date:      Years since quittin.3   • Smokeless tobacco: Never   Vaping Use   • Vaping status: Never Used   Substance and Sexual Activity   • Alcohol use: Yes     Comment: extremely rare social occasion   • Drug use: Never   • Sexual activity: Never         Current Outpatient Medications:   •  albuterol (Proventil HFA) 90 mcg/act inhaler, Inhale 2 puffs every 6 (six) hours as needed for wheezing or shortness of breath, Disp: 18 g, Rfl: 2  •  ascorbic acid (VITAMIN C) 500 MG tablet, Take 1 tablet (500 mg total) by mouth 2 (two) times a day, Disp: 30 tablet, Rfl: 3  •  atorvastatin (LIPITOR) 20 mg tablet, Take 1 tablet (20 mg total) by mouth daily Take half tab every  other day x2 weeks then half tab daily x2 weeks & then full tab daily afterwards., Disp: 90 tablet, Rfl: 3  •  cholecalciferol (VITAMIN D3) 1,000 units tablet, Take 2 tablets (2,000 Units total) by mouth daily, Disp: 60 tablet, Rfl: 0  •  cholestyramine (QUESTRAN) 4 g packet, Take 1 packet (4 g total) by mouth daily at bedtime, Disp: 30 packet, Rfl: 2  •  dicyclomine (BENTYL) 10 mg capsule, Take 1 capsule (10 mg total) by mouth 4 (four) times a day (before meals and at bedtime), Disp: 120 capsule, Rfl: 2  •  diphenhydrAMINE (BENADRYL) 50 MG tablet, Take 1 tablet (50 mg total) by mouth every 8 (eight) hours as needed for itching, Disp: 30 tablet, Rfl: 0  •  famotidine (PEPCID) 40 MG tablet, Take 1 tablet (40 mg total) by mouth daily at bedtime, Disp: 30 tablet, Rfl: 2  •  fluticasone (FLONASE) 50 mcg/act nasal spray, 1 spray into each nostril daily, Disp: 16 g, Rfl: 2  •  Fluticasone-Salmeterol (Advair) 100-50 mcg/dose inhaler, Inhale 1 puff 2 (two) times a day Rinse mouth after use, Disp: 180 blister, Rfl: 2  •  loperamide (IMODIUM) 2 mg capsule, Take 2 mg by mouth 4 (four) times a day as needed for diarrhea, Disp: , Rfl:   •  Loratadine 10 MG CAPS, Take by mouth, Disp: , Rfl:   •  losartan-hydrochlorothiazide (HYZAAR) 100-25 MG per tablet, Take 1 tablet by mouth daily, Disp: 30 tablet, Rfl: 5  •  Multiple Vitamins-Minerals (multivitamin with minerals) tablet, Take 1 tablet by mouth daily, Disp: 30 tablet, Rfl: 1  •  Omega-3 Fatty Acids (fish oil) 1,000 mg, Take 1,000 mg by mouth daily, Disp: , Rfl:   •  omeprazole (PriLOSEC) 40 MG capsule, Take 1 capsule (40 mg total) by mouth 2 (two) times a day, Disp: 180 capsule, Rfl: 2  •  ondansetron (ZOFRAN-ODT) 4 mg disintegrating tablet, Take 1 tablet (4 mg total) by mouth every 6 (six) hours as needed for nausea or vomiting, Disp: 60 tablet, Rfl: 1  •  valACYclovir (VALTREX) 1,000 mg tablet, Take 2 tablets (2,000 mg total) by mouth every 12 (twelve) hours for 2 doses,  "Disp: 4 tablet, Rfl: 0    Allergies   Allergen Reactions   • Ativan [Lorazepam] Myalgia     When taken  Along w/ cymbalta   • Cymbalta [Duloxetine Hcl] Myalgia     When taken w/ ativan x 1 occurrence   • Latex Rash   • Amoxicillin Rash       Physical Exam:    /78 (BP Location: Left arm, Patient Position: Sitting, Cuff Size: Large)   Pulse 78   Temp 97.7 °F (36.5 °C)   Ht 5' 3.6\" (1.615 m)   Wt 92.5 kg (204 lb)   BMI 35.46 kg/m²     Constitutional:normal, well developed, well nourished, alert, in no distress and non-toxic and no overt pain behavior.  Eyes:anicteric  HEENT:grossly intact  Neck:supple, symmetric, trachea midline and no masses   Pulmonary:even and unlabored  Cardiovascular:No edema or pitting edema present  Skin:Normal without rashes or lesions and well hydrated  Psychiatric:Mood and affect appropriate  Neurologic:Cranial Nerves II-XII grossly intact  Musculoskeletal: Tenderness to palpation over the left lumbar facet joints, tender left piriformis      Imaging  No orders to display         No orders of the defined types were placed in this encounter.      "

## 2024-05-19 DIAGNOSIS — K21.9 GASTROESOPHAGEAL REFLUX DISEASE WITHOUT ESOPHAGITIS: ICD-10-CM

## 2024-05-19 RX ORDER — FAMOTIDINE 40 MG/1
40 TABLET, FILM COATED ORAL
Qty: 30 TABLET | Refills: 5 | Status: SHIPPED | OUTPATIENT
Start: 2024-05-19

## 2024-05-24 ENCOUNTER — OFFICE VISIT (OUTPATIENT)
Dept: FAMILY MEDICINE CLINIC | Facility: HOSPITAL | Age: 59
End: 2024-05-24
Payer: COMMERCIAL

## 2024-05-24 VITALS
WEIGHT: 207 LBS | BODY MASS INDEX: 36.68 KG/M2 | SYSTOLIC BLOOD PRESSURE: 120 MMHG | OXYGEN SATURATION: 98 % | DIASTOLIC BLOOD PRESSURE: 78 MMHG | HEART RATE: 78 BPM | HEIGHT: 63 IN

## 2024-05-24 DIAGNOSIS — Z11.59 NEED FOR HEPATITIS C SCREENING TEST: ICD-10-CM

## 2024-05-24 DIAGNOSIS — Z13.29 SCREENING FOR THYROID DISORDER: ICD-10-CM

## 2024-05-24 DIAGNOSIS — E16.2 HYPOGLYCEMIA: ICD-10-CM

## 2024-05-24 DIAGNOSIS — I10 PRIMARY HYPERTENSION: Primary | ICD-10-CM

## 2024-05-24 DIAGNOSIS — E66.1 CLASS 2 DRUG-INDUCED OBESITY WITH SERIOUS COMORBIDITY AND BODY MASS INDEX (BMI) OF 36.0 TO 36.9 IN ADULT: ICD-10-CM

## 2024-05-24 DIAGNOSIS — Z13.220 SCREENING FOR HYPERLIPIDEMIA: ICD-10-CM

## 2024-05-24 DIAGNOSIS — R42 EPISODIC LIGHTHEADEDNESS: ICD-10-CM

## 2024-05-24 LAB — GLUCOSE SERPL-MCNC: 90 MG/DL (ref 65–140)

## 2024-05-24 PROCEDURE — 99215 OFFICE O/P EST HI 40 MIN: CPT | Performed by: STUDENT IN AN ORGANIZED HEALTH CARE EDUCATION/TRAINING PROGRAM

## 2024-05-24 RX ORDER — LOSARTAN POTASSIUM AND HYDROCHLOROTHIAZIDE 25; 100 MG/1; MG/1
1 TABLET ORAL DAILY
Qty: 90 TABLET | Refills: 2 | Status: SHIPPED | OUTPATIENT
Start: 2024-05-24

## 2024-05-24 NOTE — PROGRESS NOTES
[facility]   Saray Lara DO    Assessment/Plan:      Diagnosis ICD-10-CM Associated Orders   1. Primary hypertension  I10 Hemoglobin A1C     Comprehensive metabolic panel     Ambulatory Referral to Endocrinology     Hemoglobin A1C     Comprehensive metabolic panel     losartan-hydrochlorothiazide (HYZAAR) 100-25 MG per tablet      2. Episodic lightheadedness  R42 Hemoglobin A1C     Comprehensive metabolic panel     Ambulatory Referral to Endocrinology     TSH, 3rd generation     T4, free     Hemoglobin A1C     Comprehensive metabolic panel     TSH, 3rd generation     T4, free      3. Hypoglycemia  E16.2 Hemoglobin A1C     Hemoglobin A1C     Fingerstick Glucose (POCT)      4. Need for hepatitis C screening test  Z11.59 Hepatitis C antibody     Hepatitis C antibody      5. Screening for thyroid disorder  Z13.29 TSH, 3rd generation     T4, free     TSH, 3rd generation     T4, free      6. Screening for hyperlipidemia  Z13.220 Lipid Panel with Direct LDL reflex     High sensitivity CRP     Lipid Panel with Direct LDL reflex     High sensitivity CRP      7. Class 2 drug-induced obesity with serious comorbidity and body mass index (BMI) of 36.0 to 36.9 in adult  E66.1 Lipid Panel with Direct LDL reflex    Z68.36 High sensitivity CRP     Hemoglobin A1C     TSH, 3rd generation     T4, free     Lipid Panel with Direct LDL reflex     High sensitivity CRP     Hemoglobin A1C     TSH, 3rd generation     T4, free        Due for annual & diagnostic labs. Ordered above.   Endo ref given. 1 pm glucose 90 after small meal at 8 am.   Check glucose at work or at home if feeling bad.   Fasting for one week.   Due to see eye doctor - gets dry eye in L.   Still due to see urology.   F/U with GI for hernia concerns - June appt.   Daughter has thyroid issues.   Return in about 8 weeks (around 7/19/2024) for F/U Chronic DX.  Patient may call or return to office with any questions or concerns.    ______________________________________________________________________  Subjective:     Patient ID: Roselyn Jose is a 58 y.o. female.  HPI  Roselyn Jose  Chief Complaint   Patient presents with   • Hypoglycemia     Pt passed out at work    • Irritable Bowel Syndrome   • Shaking   • Hernia     Pain        Feels like she gets very low blood sugar.   Became clammy.   Coworkers called EMS, but didn't go to ED. Declined.   3 weeks ago at work.   Sits for work, snacks on fruits to avoid or granola bars.     Gets shaky, hyperventilating, finger cramping. If too long without eating.   Keeps OJ around.     Weight down to 201 lbs. Going to the gym, elliptical, tread, & reclined bike.   Still pushing out upper abdomen, hiatal hernia.     Cottage cheese/ fruit - bloating already today, even water does it. Gets uncomfortable.   Ate at 7:45 am today.     EGD- 2023 - IMPRESSION:  Schatzki's ring, dilated to 48 and 56 Citizen of [place]  Small sliding hiatal hernia  Mild antral gastritis, biopsied for H. Pylori  Normal duodenum biopsied for celiac     RECOMMENDATION:  Endoscopist will call with biopsy results within 2 weeks  Office follow-up in about 2 months  If you experience sore throat from the dilation, gargle with warm salt water.  Continue same medications    Stomach issues near daily, diarrhea.   Can be flares multiple times a day.   Drinks a lot of water.   On questran & bentyl.     Not using inhalers much lately, didn't think they helped much, but now SOB again.   Does have smoking hx, 5 yrs without, smoked with beers or social events.     Was 210 last summer.  Wt Readings from Last 3 Encounters:   05/24/24 93.9 kg (207 lb)   04/19/24 92.5 kg (204 lb)   03/05/24 93.9 kg (207 lb)     Temp Readings from Last 3 Encounters:   04/19/24 97.7 °F (36.5 °C)   04/05/24 (!) 97 °F (36.1 °C) (Temporal)   03/05/24 97.6 °F (36.4 °C)     BP Readings from Last 3 Encounters:   05/24/24 120/78   04/19/24 118/78   04/05/24 128/82     Pulse  "Readings from Last 3 Encounters:   05/24/24 78   04/19/24 78   04/05/24 80        The following portions of the patient's history were reviewed and updated as appropriate: allergies, current medications, past medical history, and problem list.    Review of Systems   Constitutional:  Negative for chills and fever.   Eyes:  Positive for visual disturbance (at times).        Eye dry on L    Respiratory:  Positive for shortness of breath (chronic). Negative for cough.    Cardiovascular:  Positive for leg swelling (ankles). Negative for chest pain and palpitations.   Gastrointestinal:  Positive for diarrhea and nausea. Negative for vomiting.   Neurological:  Positive for dizziness, tremors (when feeling \"off\"), light-headedness and headaches (maybe due to vision - high stress at work).   Psychiatric/Behavioral:  The patient is nervous/anxious.        Objective:      Vitals:    05/24/24 1221   BP: 120/78   Pulse: 78   SpO2: 98%      Physical Exam  Vitals and nursing note reviewed.   Constitutional:       General: She is not in acute distress.     Appearance: Normal appearance. She is obese. She is not ill-appearing.   HENT:      Head: Normocephalic and atraumatic.   Eyes:      General: No scleral icterus.        Right eye: No discharge.         Left eye: No discharge.   Neck:      Comments: No thyroid nodules or thyromegaly.  Cardiovascular:      Rate and Rhythm: Normal rate and regular rhythm.      Pulses: Normal pulses.      Heart sounds: Normal heart sounds. No murmur heard.  Pulmonary:      Effort: Pulmonary effort is normal. No respiratory distress.      Breath sounds: Normal breath sounds. No stridor. No wheezing.   Musculoskeletal:      Cervical back: Normal range of motion and neck supple. No rigidity or tenderness.      Right lower leg: Edema (1 +) present.      Left lower leg: Edema (1+) present.   Lymphadenopathy:      Cervical: No cervical adenopathy.   Neurological:      Mental Status: She is alert and " "oriented to person, place, and time.      Gait: Gait normal.   Psychiatric:         Mood and Affect: Mood normal.         Behavior: Behavior normal.         Thought Content: Thought content normal.         Judgment: Judgment normal.         Portions of the record may have been created with voice recognition software. Occasional wrong word or \"sound alike\" substitutions may have occurred due to the inherent limitations of voice recognition software. Please review the chart carefully and recognize, using context, where substitutions/typographical errors may have occurred.     "

## 2024-06-04 ENCOUNTER — OFFICE VISIT (OUTPATIENT)
Dept: GASTROENTEROLOGY | Facility: CLINIC | Age: 59
End: 2024-06-04
Payer: COMMERCIAL

## 2024-06-04 VITALS
SYSTOLIC BLOOD PRESSURE: 102 MMHG | DIASTOLIC BLOOD PRESSURE: 78 MMHG | HEIGHT: 63 IN | WEIGHT: 207 LBS | BODY MASS INDEX: 36.68 KG/M2

## 2024-06-04 DIAGNOSIS — Z12.11 SCREENING FOR COLON CANCER: ICD-10-CM

## 2024-06-04 DIAGNOSIS — K59.04 CHRONIC IDIOPATHIC CONSTIPATION: ICD-10-CM

## 2024-06-04 DIAGNOSIS — K21.9 GASTROESOPHAGEAL REFLUX DISEASE WITHOUT ESOPHAGITIS: ICD-10-CM

## 2024-06-04 DIAGNOSIS — K58.0 IRRITABLE BOWEL SYNDROME WITH DIARRHEA: ICD-10-CM

## 2024-06-04 PROCEDURE — 99214 OFFICE O/P EST MOD 30 MIN: CPT | Performed by: INTERNAL MEDICINE

## 2024-06-04 RX ORDER — AMITRIPTYLINE HYDROCHLORIDE 10 MG/1
10 TABLET, FILM COATED ORAL
Qty: 30 TABLET | Refills: 5 | Status: SHIPPED | OUTPATIENT
Start: 2024-06-04

## 2024-06-04 RX ORDER — DICYCLOMINE HYDROCHLORIDE 10 MG/1
20 CAPSULE ORAL
Qty: 240 CAPSULE | Refills: 5 | Status: SHIPPED | OUTPATIENT
Start: 2024-06-04

## 2024-06-04 NOTE — PATIENT INSTRUCTIONS
I will prescribe Elavil/amitriptyline 10 mg at bedtime to help with irritable bowel with abdominal cramping and diarrhea.  It may take a few weeks to totally kick in.  If you experience grogginess with that that should fade within a few days.  It is given at bedtime to help combat this.    I will send in a new prescription for Bentyl/dicyclomine, you can take 2 pills 4 times daily when you are especially symptomatic.  If you are feeling good you just have to take 1 pill

## 2024-06-04 NOTE — PROGRESS NOTES
[facility] - Outpatient Follow-up Note  Roselyn Jose 58 y.o. female MRN: [id number]  Encounter: [id number]    ASSESSMENT AND PLAN:        1. Gastroesophageal reflux disease without esophagitis  Upper endoscopy in January 2023 showed a small hiatal hernia and normal esophagus.   At her last visit we continued omeprazole twice daily and added famotidine at bedtime with interval improvement.  She had additional improvement with exercise/weight loss but she has not been attending the gym lately after some deaths in the family, I encouraged her to return  If symptoms flare we discussed Bravo pH probe or pH impedance testing, she may benefit from fundoplication  If symptoms remain well-controlled we will work on medication tapering at her next visit    2.  Irritable bowel syndrome with diarrhea  Diagnosed with IBS at age 13 with worsening symptoms over the past few years.  Colonoscopy January 2023 negative for IBD and microscopic colitis  Worsening since last visit despite increasing the dicyclomine to 4 times daily  Discussed options.  We will add amitriptyline 10 Mg at bedtime, side effects discussed.  Explained that it may take several weeks for this to reach effectiveness, I increase the dicyclomine to 20 mg 4 times daily in the interim.   4. Screening for colon cancer  Negative colonoscopy 2023, 10-year recall       Followup Appointment: 2 to 3 months  ______________________________________________________________________    Chief Complaint   Patient presents with    Follow-up     Having severe cramping     Diarrhea     HPI: Patient presents for evaluation of progressive IBS-D symptoms, and chronic reflux complaints.  At her last visit we added famotidine to her twice daily omeprazole.  She feels that this really helped her symptoms.  She started exercising more regularly at the gym and lost about 10 pounds.  Reflux symptoms were much better.  Unfortunately due to multiple  deaths in the family she gave up on going to the gym and regained 6 pounds.  She denies dysphagia or other alarm symptoms.    Despite increasing dicyclomine to 4 times daily at her last visit she has severe cramping most days.  This typically happens while eating or within 30 minutes afterwards.  There are no specific food triggers.  She has severe abdominal cramping that she describes as bad or worse than her labor pains.  She then has diarrhea with alleviation of the cramping.  There is no blood or mucus.  There are no upper GI complaints.    Historical Information   Past Medical History:   Diagnosis Date    Arthritis     GERD (gastroesophageal reflux disease)     Hyperlipidemia     Hypertension     Irritable bowel syndrome     Low back pain     PONV (postoperative nausea and vomiting)     Syncope      Past Surgical History:   Procedure Laterality Date    CATARACT EXTRACTION Bilateral      SECTION N/A     CHOLECYSTECTOMY      COLONOSCOPY      HERNIA REPAIR      HYSTERECTOMY      NASAL SEPTUM SURGERY      [place] ARTHRP ACETBLR/PROX FEM PROSTC AGRFT/ALGRFT Right 2023    Procedure: ARTHROPLASTY HIP TOTAL;  Surgeon: Kurt Douglas DO;  Location: [facility];  Service: Orthopedics    [place] ARTHRP ACETBLR/PROX FEM PROSTC AGRFT/ALGRFT Left 2023    Procedure: ARTHROPLASTY HIP TOTAL;  Surgeon: Kurt Douglas DO;  Location: [facility];  Service: Orthopedics    TONSILLECTOMY      UPPER GASTROINTESTINAL ENDOSCOPY       Social History     Substance and Sexual Activity   Alcohol Use Yes    Comment: extremely rare social occasion     Social History     Substance and Sexual Activity   Drug Use Never     Social History     Tobacco Use   Smoking Status Former    Current packs/day: 0.00    Types: Cigarettes    Quit date:     Years since quittin.4   Smokeless Tobacco Never     Family History   Problem Relation Age of Onset    Hypertension Mother     Skin cancer Mother     Stroke Father     No Known  "Problems Sister     Hypertension Son     Skin cancer Daughter     Cancer Daughter     No Known Problems Daughter     Ovarian cancer Daughter     Ovarian cancer Maternal Grandmother     No Known Problems Maternal Grandfather     No Known Problems Paternal Grandmother     No Known Problems Paternal Grandfather     No Known Problems Maternal Aunt     No Known Problems Maternal Aunt     No Known Problems Maternal Aunt     No Known Problems Maternal Aunt     Colon cancer Cousin     Colon polyps Neg Hx          Current Outpatient Medications:     albuterol (Proventil HFA) 90 mcg/act inhaler    amitriptyline (ELAVIL) 10 mg tablet    ascorbic acid (VITAMIN C) 500 MG tablet    atorvastatin (LIPITOR) 20 mg tablet    cholecalciferol (VITAMIN D3) 1,000 units tablet    cholestyramine (QUESTRAN) 4 g packet    dicyclomine (BENTYL) 10 mg capsule    famotidine (PEPCID) 40 MG tablet    fluticasone (FLONASE) 50 mcg/act nasal spray    Fluticasone-Salmeterol (Advair) 100-50 mcg/dose inhaler    loperamide (IMODIUM) 2 mg capsule    Loratadine 10 MG CAPS    losartan-hydrochlorothiazide (HYZAAR) 100-25 MG per tablet    Multiple Vitamins-Minerals (multivitamin with minerals) tablet    Omega-3 Fatty Acids (fish oil) 1,000 mg    omeprazole (PriLOSEC) 40 MG capsule    ondansetron (ZOFRAN-ODT) 4 mg disintegrating tablet    valACYclovir (VALTREX) 1,000 mg tablet  Allergies   Allergen Reactions    Ativan [Lorazepam] Myalgia     When taken  Along w/ cymbalta    Cymbalta [Duloxetine Hcl] Myalgia     When taken w/ ativan x 1 occurrence    Latex Rash    Amoxicillin Rash     Reviewed medications and allergies and updated as indicated    PHYSICAL EXAM:    Blood pressure 102/78, height 5' 3\" (1.6 m), weight 93.9 kg (207 lb), not currently breastfeeding. Body mass index is 36.67 kg/m².  General Appearance: NAD, cooperative, alert  Eyes: Anicteric, conjunctiva pink  ENT:  Normocephalic, atraumatic, normal mucosa.    Neck:  Supple, symmetrical, trachea " midline  Resp:  Clear to auscultation bilaterally; no rales, rhonchi or wheezing; respirations unlabored   CV:  S1 S2, Regular rate and rhythm; no murmur, rub, or gallop.  GI:  Soft, non-tender, non-distended; normal bowel sounds; no masses, no organomegaly   Rectal: Deferred  Musculoskeletal: No cyanosis, clubbing or edema. Normal ROM.  Skin:  No jaundice, rashes, or lesions   Heme/Lymph: No palpable cervical lymphadenopathy  Psych: Normal affect, good eye contact  Neuro: No gross deficits, AAOx3    Lab Results:   Lab Results   Component Value Date    WBC 4.6 06/26/2023    HGB 12.0 06/26/2023    HCT 35.7 06/26/2023    MCV 86 06/26/2023     06/26/2023     Lab Results   Component Value Date    K 4.2 06/26/2023     06/26/2023    CO2 24 06/26/2023    BUN 14 06/26/2023    CREATININE 0.82 06/26/2023    GLUF 126 (H) 05/18/2023    CALCIUM 9.3 05/18/2023    AST 32 04/27/2023    ALT 27 04/27/2023    ALKPHOS 86 01/14/2023    EGFR 82 06/26/2023       Radiology Results:   No results found.

## 2024-06-16 LAB
ALBUMIN SERPL-MCNC: 4.2 G/DL (ref 3.8–4.9)
ALP SERPL-CCNC: 120 IU/L (ref 44–121)
ALT SERPL-CCNC: 18 IU/L (ref 0–32)
AST SERPL-CCNC: 23 IU/L (ref 0–40)
BILIRUB SERPL-MCNC: 0.4 MG/DL (ref 0–1.2)
BUN SERPL-MCNC: 17 MG/DL (ref 8–27)
BUN/CREAT SERPL: 22 (ref 12–28)
CALCIUM SERPL-MCNC: 9.7 MG/DL (ref 8.7–10.3)
CHLORIDE SERPL-SCNC: 102 MMOL/L (ref 96–106)
CHOLEST SERPL-MCNC: 177 MG/DL (ref 100–199)
CO2 SERPL-SCNC: 25 MMOL/L (ref 20–29)
CREAT SERPL-MCNC: 0.77 MG/DL (ref 0.57–1)
CRP SERPL HS-MCNC: 4.07 MG/L (ref 0–3)
EGFR: 88 ML/MIN/1.73
EST. AVERAGE GLUCOSE BLD GHB EST-MCNC: 108 MG/DL
GLOBULIN SER-MCNC: 2 G/DL (ref 1.5–4.5)
GLUCOSE SERPL-MCNC: 93 MG/DL (ref 70–99)
HBA1C MFR BLD: 5.4 % (ref 4.8–5.6)
HCV AB S/CO SERPL IA: NON REACTIVE
HDLC SERPL-MCNC: 52 MG/DL
LDLC SERPL CALC-MCNC: 100 MG/DL (ref 0–99)
LDLC/HDLC SERPL: 1.9 RATIO (ref 0–3.2)
POTASSIUM SERPL-SCNC: 3.9 MMOL/L (ref 3.5–5.2)
PROT SERPL-MCNC: 6.2 G/DL (ref 6–8.5)
SL AMB VLDL CHOLESTEROL CALC: 25 MG/DL (ref 5–40)
SODIUM SERPL-SCNC: 143 MMOL/L (ref 134–144)
T4 FREE SERPL-MCNC: 1.16 NG/DL (ref 0.82–1.77)
TRIGL SERPL-MCNC: 140 MG/DL (ref 0–149)
TSH SERPL DL<=0.005 MIU/L-ACNC: 2.01 UIU/ML (ref 0.45–4.5)

## 2024-07-22 ENCOUNTER — OFFICE VISIT (OUTPATIENT)
Dept: GASTROENTEROLOGY | Facility: CLINIC | Age: 59
End: 2024-07-22
Payer: COMMERCIAL

## 2024-07-22 VITALS
SYSTOLIC BLOOD PRESSURE: 136 MMHG | DIASTOLIC BLOOD PRESSURE: 76 MMHG | BODY MASS INDEX: 36.64 KG/M2 | WEIGHT: 206.8 LBS | HEIGHT: 63 IN

## 2024-07-22 DIAGNOSIS — K58.0 IRRITABLE BOWEL SYNDROME WITH DIARRHEA: ICD-10-CM

## 2024-07-22 DIAGNOSIS — K21.9 GASTROESOPHAGEAL REFLUX DISEASE WITHOUT ESOPHAGITIS: Primary | Chronic | ICD-10-CM

## 2024-07-22 PROCEDURE — 99214 OFFICE O/P EST MOD 30 MIN: CPT | Performed by: INTERNAL MEDICINE

## 2024-07-22 NOTE — PATIENT INSTRUCTIONS
Continue dicyclomine - ok to skip does on a good day     Add IBgard otc  once daily and monitor cramps and urgent stool

## 2024-07-22 NOTE — PROGRESS NOTES
[facility] - Outpatient Follow-up Note  Roselyn Jose 58 y.o. female MRN: [id number]  Encounter: [id number]    ASSESSMENT AND PLAN:      1. Gastroesophageal reflux disease without esophagitis  Upper endoscopy in January 2023 showed a small hiatal hernia and normal esophagus.   Symptoms better controlled with antireflux diet, twice daily PPI and H2 blocker at bedtime.  I recommended continuing weight loss and exercise efforts.  Continue to avoid food triggers like acidic foods.  Hopefully we can start tapering medication at her next visit, otherwise we will arrange Bravo pH probe or pH impedance testing to see if she would benefit from fundoplication.    2. Irritable bowel syndrome with diarrhea  Diagnosed with IBS at age 13 with worsening symptoms over the past few years.  Colonoscopy January 2023 negative for IBD and microscopic colitis  At her June visit we increased dicyclomine to 20 mg 4 times daily which is helping the cramping.  She occasionally feels constipated.  I added amitriptyline but she stopped it after 1 dose due to fatigue    She will continue to observe for food triggers, avoiding dairy and other stressors.  She will continue to work on stress and anxiety management  I recommended adding IBgard, hopefully will provide additional relief of abdominal cramping without inducing constipation.    4. Screening for colon cancer  Negative colonoscopy 2023, 10-year recall     Followup Appointment: 3 to 4 months  ______________________________________________________________________    Chief Complaint   Patient presents with    Follow up medication     HPI: The patient presents for follow-up on GERD and IBS.  She was last seen in the office in June.  At that visit we started amitriptyline but she stopped it after 1 dose due to fatigue that lasted 48 hours.  She reports she is sensitive to many medications.    We increased the dicyclomine to 20 mg 4 times daily which is  to help with the cramping to a great degree.  She does not move her bowels every day since this increase.  She denies any nausea or vomiting.  She is only had 2 breakthrough reflux episodes with acidic foods.  She is gone back to the gym but is only going once a week due to issues with her right meniscus and the left plantar fascia.  She has a lot of stress since her parents passed away.    Historical Information   Past Medical History:   Diagnosis Date    Arthritis     GERD (gastroesophageal reflux disease)     Hyperlipidemia     Hypertension     Irritable bowel syndrome     Low back pain     PONV (postoperative nausea and vomiting)     Syncope      Past Surgical History:   Procedure Laterality Date    CATARACT EXTRACTION Bilateral      SECTION N/A     CHOLECYSTECTOMY      COLONOSCOPY      HERNIA REPAIR      HYSTERECTOMY      NASAL SEPTUM SURGERY      MI ARTHRP ACETBLR/PROX FEM PROSTC AGRFT/ALGRFT Right 2023    Procedure: ARTHROPLASTY HIP TOTAL;  Surgeon: Kurt Douglas DO;  Location:  MAIN OR;  Service: Orthopedics    MI ARTHRP ACETBLR/PROX FEM PROSTC AGRFT/ALGRFT Left 2023    Procedure: ARTHROPLASTY HIP TOTAL;  Surgeon: Kurt Douglas DO;  Location:  MAIN OR;  Service: Orthopedics    TONSILLECTOMY      UPPER GASTROINTESTINAL ENDOSCOPY       Social History     Substance and Sexual Activity   Alcohol Use Yes    Comment: extremely rare social occasion     Social History     Substance and Sexual Activity   Drug Use Never     Social History     Tobacco Use   Smoking Status Former    Current packs/day: 0.00    Types: Cigarettes    Quit date:     Years since quittin.5   Smokeless Tobacco Never     Family History   Problem Relation Age of Onset    Hypertension Mother     Skin cancer Mother     Stroke Father     No Known Problems Sister     Hypertension Son     Skin cancer Daughter     Cancer Daughter     No Known Problems Daughter     Ovarian cancer Daughter     Ovarian cancer  "Maternal Grandmother     No Known Problems Maternal Grandfather     No Known Problems Paternal Grandmother     No Known Problems Paternal Grandfather     No Known Problems Maternal Aunt     No Known Problems Maternal Aunt     No Known Problems Maternal Aunt     No Known Problems Maternal Aunt     Colon cancer Cousin     Colon polyps Neg Hx          Current Outpatient Medications:     albuterol (Proventil HFA) 90 mcg/act inhaler    amitriptyline (ELAVIL) 10 mg tablet    ascorbic acid (VITAMIN C) 500 MG tablet    atorvastatin (LIPITOR) 20 mg tablet    cholecalciferol (VITAMIN D3) 1,000 units tablet    cholestyramine (QUESTRAN) 4 g packet    dicyclomine (BENTYL) 10 mg capsule    famotidine (PEPCID) 40 MG tablet    fluticasone (FLONASE) 50 mcg/act nasal spray    Fluticasone-Salmeterol (Advair) 100-50 mcg/dose inhaler    loperamide (IMODIUM) 2 mg capsule    Loratadine 10 MG CAPS    losartan-hydrochlorothiazide (HYZAAR) 100-25 MG per tablet    Multiple Vitamins-Minerals (multivitamin with minerals) tablet    Omega-3 Fatty Acids (fish oil) 1,000 mg    omeprazole (PriLOSEC) 40 MG capsule    ondansetron (ZOFRAN-ODT) 4 mg disintegrating tablet    valACYclovir (VALTREX) 1,000 mg tablet  Allergies   Allergen Reactions    Ativan [Lorazepam] Myalgia     When taken  Along w/ cymbalta    Cymbalta [Duloxetine Hcl] Myalgia     When taken w/ ativan x 1 occurrence    Latex Rash    Amoxicillin Rash     Reviewed medications and allergies and updated as indicated    PHYSICAL EXAM:    Blood pressure 136/76, height 5' 3\" (1.6 m), weight 93.8 kg (206 lb 12.8 oz), not currently breastfeeding. Body mass index is 36.63 kg/m².  General Appearance: NAD, cooperative, alert  Eyes: Anicteric, conjunctiva pink  ENT:  Normocephalic, atraumatic, normal mucosa.    Neck:  Supple, symmetrical, trachea midline  Resp:  Clear to auscultation bilaterally; no rales, rhonchi or wheezing; respirations unlabored   CV:  S1 S2, Regular rate and rhythm; no murmur, " rub, or gallop.  GI:  Soft, non-tender, non-distended; normal bowel sounds; no masses, no organomegaly   Rectal: Deferred  Musculoskeletal: No cyanosis, clubbing or edema. Normal ROM.  Skin:  No jaundice, rashes, or lesions   Heme/Lymph: No palpable cervical lymphadenopathy  Psych: Normal affect, good eye contact  Neuro: No gross deficits, AAOx3    Lab Results:   Lab Results   Component Value Date    WBC 4.6 06/26/2023    HGB 12.0 06/26/2023    HCT 35.7 06/26/2023    MCV 86 06/26/2023     06/26/2023     Lab Results   Component Value Date    K 3.9 06/15/2024     06/15/2024    CO2 25 06/15/2024    BUN 17 06/15/2024    CREATININE 0.77 06/15/2024    GLUF 126 (H) 05/18/2023    CALCIUM 9.3 05/18/2023    AST 23 06/15/2024    ALT 18 06/15/2024    ALKPHOS 86 01/14/2023    EGFR 88 06/15/2024       Radiology Results:   No results found.

## 2024-08-29 NOTE — PROGRESS NOTES
Ambulatory Visit  Name: Roselyn Jose      : 1965      MRN: [id number]  Encounter Provider: Jose Gatica MD  Encounter Date: 2024   Encounter department: [facility] [place]    Assessment & Plan   1. Urge incontinence of urine  Assessment & Plan:  We had a nice visit today, she is concerned by her urge incontinence, it does sound like she has some minor stress component of incontinence as well.  Negative urinalysis today, she is emptying completely with a post residual urine volume of 20 mL.    I did speak with her about limiting spicy food and acidic foods as well as alcohol and caffeine for behavioral changes to decrease her symptoms.  She can also look into potentially coming off of hydrochlorothiazide given that this can cause urgency and frequency and some of our patients.    Do recommend that she taper off of her vitamin C as acidic urine can irritate the bladder as well and worsen symptoms.    She is bothered by wearing a pad she does not wish to undergo medical therapies or procedural therapies at this current time.    She will see us back on an as-needed basis.  If she wishes in the future we can try antimuscarinic or beta 3 agonist medications with consideration of an third line therapies for urge incontinence in the future as needed  Orders:  -     Ambulatory Referral to Urology  -     POCT urine dip  -     POCT Measure PVR  2. Stress incontinence in female  -     Ambulatory Referral to Urogynecology; Future  3. Cystocele with prolapse  -     Ambulatory Referral to Urogynecology; Future          History of Present Illness     Roselyn Jose is a 58 y.o. female who presents for evaluation of urgency incontinence of urine.  Ongoing for quite some time.  Also with some nocturia.    Currently undergoing management with gastroenterology, attempting to come off of some of her medications per her report    PVR is 20 mL    Urinalysis is negative for infection    In  "terms of constipation she denies this    The following portions of the patient's history were reviewed and updated as appropriate: allergies, current medications, past family history, past medical history, past social history, past surgical history and problem list.      Review of Systems   Constitutional: Negative.    HENT: Negative.     Eyes: Negative.    Respiratory: Negative.     Cardiovascular: Negative.    Gastrointestinal: Negative.    Endocrine: Negative.    Genitourinary: Negative.    Musculoskeletal: Negative.    Skin: Negative.    Allergic/Immunologic: Negative.    Neurological: Negative.    Hematological: Negative.    Psychiatric/Behavioral: Negative.           Objective     /82 (BP Location: Left arm, Patient Position: Sitting, Cuff Size: Adult)   Pulse 85   Ht 5' 3\" (1.6 m)   Wt 94.1 kg (207 lb 6.4 oz)   SpO2 98%   BMI 36.74 kg/m²   Physical Exam  Vitals reviewed. Exam conducted with a chaperone present.   Constitutional:       General: She is not in acute distress.     Appearance: Normal appearance. She is well-developed. She is obese. She is not ill-appearing, toxic-appearing or diaphoretic.   HENT:      Head: Normocephalic and atraumatic.      Nose: Nose normal.   Eyes:      General: No scleral icterus.        Right eye: No discharge.         Left eye: No discharge.      Pupils: Pupils are equal, round, and reactive to light.   Neck:      Thyroid: No thyromegaly.      Trachea: No tracheal deviation.   Cardiovascular:      Rate and Rhythm: Normal rate and regular rhythm.   Pulmonary:      Effort: Pulmonary effort is normal. No respiratory distress.   Abdominal:      General: There is no distension.      Palpations: There is no mass.      Tenderness: There is no abdominal tenderness.   Genitourinary:     Comments: Some age-related change of the vaginal mucosa, urethral hypermobility is present with demonstrable stress incontinence, she is also seen to have anterior compartment weakness with " a cystocele.  I referred her to Dr Jin for consideration of mid urethral sling placement and pelvic organ prolapse repair  Musculoskeletal:         General: No deformity or signs of injury.      Cervical back: Normal range of motion and neck supple.   Lymphadenopathy:      Cervical: No cervical adenopathy.   Skin:     Coloration: Skin is not jaundiced or pale.      Findings: No erythema or rash.   Neurological:      Mental Status: She is alert and oriented to person, place, and time.      Cranial Nerves: No cranial nerve deficit.      Sensory: No sensory deficit.      Motor: No abnormal muscle tone.      Coordination: Coordination normal.   Psychiatric:         Mood and Affect: Mood normal.         Behavior: Behavior normal.         Thought Content: Thought content normal.         Judgment: Judgment normal.       Results    Lab Results   Component Value Date    CALCIUM 9.3 05/18/2023    K 3.9 06/15/2024    CO2 25 06/15/2024     06/15/2024    BUN 17 06/15/2024    CREATININE 0.77 06/15/2024     Lab Results   Component Value Date    WBC 4.6 06/26/2023    HGB 12.0 06/26/2023    HCT 35.7 06/26/2023    MCV 86 06/26/2023     06/26/2023       Office Urine Dip  Recent Results (from the past 1 hour(s))   POCT urine dip    Collection Time: 08/30/24  2:36 PM   Result Value Ref Range    LEUKOCYTE ESTERASE,UA trace     NITRITE,UA neg     SL AMB POCT UROBILINOGEN 0.2     POCT URINE PROTEIN neg      PH,UA 5.0     BLOOD,UA neg     SPECIFIC GRAVITY,UA 1.015     KETONES,UA neg     BILIRUBIN,UA neg     GLUCOSE, UA neg      COLOR,UA yellow     CLARITY,UA clear    POCT Measure PVR    Collection Time: 08/30/24  2:39 PM   Result Value Ref Range    POST-VOID RESIDUAL VOLUME, ML POC 20 mL   ]    Administrative Statements

## 2024-08-30 ENCOUNTER — CONSULT (OUTPATIENT)
Dept: UROLOGY | Facility: HOSPITAL | Age: 59
End: 2024-08-30
Payer: COMMERCIAL

## 2024-08-30 VITALS
OXYGEN SATURATION: 98 % | DIASTOLIC BLOOD PRESSURE: 82 MMHG | WEIGHT: 207.4 LBS | SYSTOLIC BLOOD PRESSURE: 126 MMHG | BODY MASS INDEX: 36.75 KG/M2 | HEIGHT: 63 IN | HEART RATE: 85 BPM

## 2024-08-30 DIAGNOSIS — N81.4 CYSTOCELE WITH PROLAPSE: ICD-10-CM

## 2024-08-30 DIAGNOSIS — N39.3 STRESS INCONTINENCE IN FEMALE: ICD-10-CM

## 2024-08-30 DIAGNOSIS — N39.41 URGE INCONTINENCE OF URINE: Primary | ICD-10-CM

## 2024-08-30 LAB
POST-VOID RESIDUAL VOLUME, ML POC: 20 ML
SL AMB  POCT GLUCOSE, UA: NORMAL
SL AMB LEUKOCYTE ESTERASE,UA: NORMAL
SL AMB POCT BILIRUBIN,UA: NORMAL
SL AMB POCT BLOOD,UA: NORMAL
SL AMB POCT CLARITY,UA: CLEAR
SL AMB POCT COLOR,UA: YELLOW
SL AMB POCT KETONES,UA: NORMAL
SL AMB POCT NITRITE,UA: NORMAL
SL AMB POCT PH,UA: 5
SL AMB POCT SPECIFIC GRAVITY,UA: 1.01
SL AMB POCT URINE PROTEIN: NORMAL
SL AMB POCT UROBILINOGEN: 0.2

## 2024-08-30 PROCEDURE — 51798 US URINE CAPACITY MEASURE: CPT | Performed by: UROLOGY

## 2024-08-30 PROCEDURE — 81002 URINALYSIS NONAUTO W/O SCOPE: CPT | Performed by: UROLOGY

## 2024-08-30 PROCEDURE — 99204 OFFICE O/P NEW MOD 45 MIN: CPT | Performed by: UROLOGY

## 2024-08-30 NOTE — ASSESSMENT & PLAN NOTE
We had a nice visit today, she is concerned by her urge incontinence, it does sound like she has some minor stress component of incontinence as well.  Negative urinalysis today, she is emptying completely with a post residual urine volume of 20 mL.    I did speak with her about limiting spicy food and acidic foods as well as alcohol and caffeine for behavioral changes to decrease her symptoms.  She can also look into potentially coming off of hydrochlorothiazide given that this can cause urgency and frequency and some of our patients.    Do recommend that she taper off of her vitamin C as acidic urine can irritate the bladder as well and worsen symptoms.    She is bothered by wearing a pad she does not wish to undergo medical therapies or procedural therapies at this current time.    She will see us back on an as-needed basis.  If she wishes in the future we can try antimuscarinic or beta 3 agonist medications with consideration of an third line therapies for urge incontinence in the future as needed

## 2024-10-08 ENCOUNTER — TELEPHONE (OUTPATIENT)
Age: 59
End: 2024-10-08

## 2024-10-08 NOTE — TELEPHONE ENCOUNTER
Caller: Roselyn     Doctor: Jace    Reason for call: Patient calling stating billing dept told her to call us in regards to bills she has received after procedure please advise patient very upset.     Call back#: [phone]    Email sent to Administrators

## 2024-11-08 ENCOUNTER — TELEPHONE (OUTPATIENT)
Age: 59
End: 2024-11-08

## 2024-11-08 NOTE — TELEPHONE ENCOUNTER
Patient is requesting an insurance referral for the following specialty:      Test Name / Order Name:     DX Code: H43.13    Date Of Service: 11/8    Location/Facility Name/Address/Phone #:   [facility],   [address] [place] [address]    Location / Facility NPI: [id number]    Best Phone # To Reach The Patient: [phone]    Pt is at her appt now and had new insurance which is now updated in chart; was told by ophthalmology office she needed an insurance referral with new plan. Please advise.

## 2024-11-11 ENCOUNTER — APPOINTMENT (OUTPATIENT)
Dept: LAB | Facility: HOSPITAL | Age: 59
End: 2024-11-11
Payer: COMMERCIAL

## 2024-11-11 ENCOUNTER — HOSPITAL ENCOUNTER (OUTPATIENT)
Dept: RADIOLOGY | Age: 59
Discharge: HOME | End: 2024-11-11
Payer: COMMERCIAL

## 2024-11-11 ENCOUNTER — PATIENT MESSAGE (OUTPATIENT)
Age: 59
End: 2024-11-11

## 2024-11-11 ENCOUNTER — OFFICE VISIT (OUTPATIENT)
Dept: FAMILY MEDICINE CLINIC | Facility: HOSPITAL | Age: 59
End: 2024-11-11
Payer: COMMERCIAL

## 2024-11-11 VITALS
TEMPERATURE: 98 F | HEART RATE: 84 BPM | BODY MASS INDEX: 36.21 KG/M2 | HEIGHT: 63 IN | SYSTOLIC BLOOD PRESSURE: 134 MMHG | DIASTOLIC BLOOD PRESSURE: 90 MMHG | WEIGHT: 204.4 LBS | OXYGEN SATURATION: 98 %

## 2024-11-11 DIAGNOSIS — R42 DIZZINESS: ICD-10-CM

## 2024-11-11 DIAGNOSIS — R00.2 PALPITATIONS: ICD-10-CM

## 2024-11-11 DIAGNOSIS — H43.399 VITREOUS FLOATERS, UNSPECIFIED LATERALITY: Primary | ICD-10-CM

## 2024-11-11 DIAGNOSIS — Z01.818 OTHER SPECIFIED PRE-OPERATIVE EXAMINATION: Primary | ICD-10-CM

## 2024-11-11 DIAGNOSIS — F41.9 ANXIETY: ICD-10-CM

## 2024-11-11 DIAGNOSIS — Z12.31 SCREENING MAMMOGRAM FOR BREAST CANCER: Primary | ICD-10-CM

## 2024-11-11 DIAGNOSIS — R55 SYNCOPE, UNSPECIFIED SYNCOPE TYPE: ICD-10-CM

## 2024-11-11 DIAGNOSIS — F43.21 GRIEF: ICD-10-CM

## 2024-11-11 DIAGNOSIS — Z01.818 PREOP EXAMINATION: ICD-10-CM

## 2024-11-11 DIAGNOSIS — R51.9 NONINTRACTABLE EPISODIC HEADACHE, UNSPECIFIED HEADACHE TYPE: ICD-10-CM

## 2024-11-11 DIAGNOSIS — H53.9 VISUAL DISTURBANCE: ICD-10-CM

## 2024-11-11 DIAGNOSIS — E78.2 MIXED HYPERLIPIDEMIA: ICD-10-CM

## 2024-11-11 DIAGNOSIS — R06.09 DYSPNEA ON EXERTION: ICD-10-CM

## 2024-11-11 LAB
ANION GAP SERPL CALCULATED.3IONS-SCNC: 7 MMOL/L (ref 4–13)
BUN SERPL-MCNC: 11 MG/DL (ref 5–25)
CALCIUM SERPL-MCNC: 9.5 MG/DL (ref 8.4–10.2)
CHLORIDE SERPL-SCNC: 105 MMOL/L (ref 96–108)
CO2 SERPL-SCNC: 29 MMOL/L (ref 21–32)
CREAT SERPL-MCNC: 0.82 MG/DL (ref 0.6–1.3)
GFR SERPL CREATININE-BSD FRML MDRD: 77 ML/MIN/1.73SQ M
GLUCOSE P FAST SERPL-MCNC: 91 MG/DL (ref 65–99)
POTASSIUM SERPL-SCNC: 3.6 MMOL/L (ref 3.5–5.3)
SODIUM SERPL-SCNC: 141 MMOL/L (ref 135–147)

## 2024-11-11 PROCEDURE — 80048 BASIC METABOLIC PNL TOTAL CA: CPT

## 2024-11-11 PROCEDURE — 70498 CT ANGIOGRAPHY NECK: CPT

## 2024-11-11 PROCEDURE — 36415 COLL VENOUS BLD VENIPUNCTURE: CPT

## 2024-11-11 PROCEDURE — 99214 OFFICE O/P EST MOD 30 MIN: CPT

## 2024-11-11 PROCEDURE — 70496 CT ANGIOGRAPHY HEAD: CPT

## 2024-11-11 RX ORDER — ATORVASTATIN CALCIUM 20 MG/1
20 TABLET, FILM COATED ORAL DAILY
Qty: 90 TABLET | Refills: 3 | Status: SHIPPED | OUTPATIENT
Start: 2024-11-11

## 2024-11-11 RX ORDER — HYDROXYZINE HYDROCHLORIDE 10 MG/1
10 TABLET, FILM COATED ORAL EVERY 6 HOURS PRN
Qty: 30 TABLET | Refills: 0 | Status: SHIPPED | OUTPATIENT
Start: 2024-11-11

## 2024-11-11 RX ADMIN — IOHEXOL 85 ML: 350 INJECTION, SOLUTION INTRAVENOUS at 14:03

## 2024-11-11 NOTE — ASSESSMENT & PLAN NOTE
Orders:    atorvastatin (LIPITOR) 20 mg tablet; Take 1 tablet (20 mg total) by mouth daily Take half tab every other day x2 weeks then half tab daily x2 weeks & then full tab daily afterwards.

## 2024-11-11 NOTE — PROGRESS NOTES
Ambulatory Visit  Name: Roselyn Jose      : 1965      MRN: [id number]  Encounter Provider: Maxime Barroso MD  Encounter Date: 2024   Encounter department: [facility] [address]    Assessment & Plan  Screening mammogram for breast cancer    Orders:    Mammo screening bilateral w 3d and cad; Future    Dyspnea on exertion  Currently asymptomatic, regular heart rhythm, advised to go straight to ED for any cardiac symptoms of chest pain or palpitations associated with dizziness or dyspnea.  ASAP cardiology referral placed along with EKG, 24-hour Holter monitoring, and CTA head and neck stat  Orders:    Ambulatory Referral to Cardiology; Future    CTA head and neck w wo contrast; Future    ECG 12 lead; Future    Holter monitor; Future    Dizziness  See dyspnea on exertion, currently asymptomatic, normal neuroexam  Orders:    Ambulatory Referral to Cardiology; Future    CTA head and neck w wo contrast; Future    ECG 12 lead; Future    Nonintractable episodic headache, unspecified headache type    Orders:    Ambulatory Referral to Neurology; Future    CTA head and neck w wo contrast; Future    Anxiety    Orders:    Ambulatory referral to Psych Services; Future    hydrOXYzine HCL (ATARAX) 10 mg tablet; Take 1 tablet (10 mg total) by mouth every 6 (six) hours as needed for anxiety    Grief    Orders:    Ambulatory referral to Psych Services; Future    Mixed hyperlipidemia    Orders:    atorvastatin (LIPITOR) 20 mg tablet; Take 1 tablet (20 mg total) by mouth daily Take half tab every other day x2 weeks then half tab daily x2 weeks & then full tab daily afterwards.    Preop examination  Stat BMP placed for CTA  Orders:    Basic metabolic panel; Future    Palpitations    Orders:    Holter monitor; Future    Syncope, unspecified syncope type  Addendum: Spoke with patient by phone on , she mention an incident that she forgot about at work this past  where she passed out and  "was evaluated by ENT and refused to go to the hospital.  Denies any recent syncope or near syncope  Orders:    Echo complete w/ contrast if indicated; Future    Visual disturbance  Reports intermittent floaters and sensation of being \"underwater\", denies eye pain, recently saw ophthalmologist who told her everything looked normal but that she should follow-up with her PCP promptly to be worked up for cardiovascular causes.  CTA head neck stat placed          History of Present Illness     HPI  Month of dizziness, eye floaters bilateral R>L, recent ophthalmology workup neg they said to go to PCP to discuss cardio causes.  Dizziness with bending over, dyspnea with exersion.  Palpitations, anxiety, no CP.  Tearful of mothers death 9/23/23.  Eye floating and \"underwater\" sensation with bending over.  New onset headache.  Currently asymptomatic        Review of Systems   Constitutional:  Negative for chills and fever.   Eyes:  Positive for visual disturbance. Negative for pain and discharge.   Respiratory:  Positive for shortness of breath (Intermittent). Negative for wheezing.    Cardiovascular:  Positive for palpitations. Negative for chest pain.   Neurological:  Positive for dizziness, light-headedness and headaches. Negative for tremors, syncope, facial asymmetry, speech difficulty, weakness and numbness.   Psychiatric/Behavioral:  Negative for confusion. The patient is nervous/anxious.            Objective     /90   Pulse 84   Temp 98 °F (36.7 °C)   Ht 5' 3\" (1.6 m)   Wt 92.7 kg (204 lb 6.4 oz)   SpO2 98%   BMI 36.21 kg/m²     Physical Exam  Vitals and nursing note reviewed.   Constitutional:       General: She is not in acute distress.     Appearance: She is well-developed.   HENT:      Head: Normocephalic and atraumatic.   Eyes:      General: No scleral icterus.        Right eye: No discharge.         Left eye: No discharge.      Extraocular Movements: Extraocular movements intact.      " Conjunctiva/sclera: Conjunctivae normal.      Pupils: Pupils are equal, round, and reactive to light.   Cardiovascular:      Rate and Rhythm: Normal rate and regular rhythm.      Heart sounds: Normal heart sounds. No murmur heard.  Pulmonary:      Effort: Pulmonary effort is normal. No respiratory distress.      Breath sounds: Normal breath sounds.   Musculoskeletal:      Right lower leg: No edema.      Left lower leg: No edema.   Skin:     Capillary Refill: Capillary refill takes less than 2 seconds.   Neurological:      Mental Status: She is alert and oriented to person, place, and time.      Cranial Nerves: No cranial nerve deficit.   Psychiatric:         Mood and Affect: Mood normal.         Behavior: Behavior normal.

## 2024-11-12 ENCOUNTER — NURSE TRIAGE (OUTPATIENT)
Age: 59
End: 2024-11-12

## 2024-11-12 ENCOUNTER — TELEPHONE (OUTPATIENT)
Age: 59
End: 2024-11-12

## 2024-11-12 LAB
ATRIAL RATE: 71 BPM
P AXIS: 24 DEGREES
PR INTERVAL: 178 MS
QRS AXIS: 7 DEGREES
QRSD INTERVAL: 80 MS
QT INTERVAL: 386 MS
QTC INTERVAL: 420 MS
T WAVE AXIS: 23 DEGREES
VENTRICULAR RATE: 71 BPM

## 2024-11-12 PROCEDURE — 93010 ELECTROCARDIOGRAM REPORT: CPT | Performed by: INTERNAL MEDICINE

## 2024-11-12 NOTE — TELEPHONE ENCOUNTER
Received a call from patient to schedule a new patient apt. She is an urgent referral from her PCP. She wants the [place] office. She was referred by her PCP due to recent vision changes in the right eye that corrected with reported palpitation and occasional BELLE.     The patient was ordered a CT scan, blood work and an EKG by her PCP as well as an ECHO and an extended holter monitor today.    Attempted to find the patient an urgent new patient apt at the [facility] and was not successful. She was not willing to go to any other location. Advised patient that I would send the office a message to schedule her.

## 2024-11-12 NOTE — TELEPHONE ENCOUNTER
"Received a call from patient to schedule a new patient cardiology apt at the [facility]. She was referred by her PCP due to recent vision changes right eye that corrected with reported palpitation and occasional BELLE. The patient was ordered a CT scan, blood work and an EKG by her PCP as well as an ECHO and an extended holter monitor today.    Attempted to find the patient an urgent new patient apt at the [facility] and was not successful. Advised patient that I would send the office a message and to expect a call from them to schedule.    Answer Assessment - Initial Assessment Questions  1. REASON FOR CALL: \"What is your main concern right now?\"      Pt is an \"urgent referral\" for palpitations and possible BELLE or anxiety    3. SEVERITY: \"How bad is the BELLE?\"      mild    5. OTHER SYMPTOMS: \"Do you have any other new symptoms?\"      Occasional palpitations    6. TREATMENTS AND RESPONSE: \"What have you done so far to try to make this better? What medicines have you used?\"      Pt reports SOB worse with exertion and anxiety.    Protocols used: No Protocol Available-Adult-[place]    "

## 2024-11-13 ENCOUNTER — RESULTS FOLLOW-UP (OUTPATIENT)
Dept: FAMILY MEDICINE CLINIC | Facility: HOSPITAL | Age: 59
End: 2024-11-13

## 2024-11-13 NOTE — TELEPHONE ENCOUNTER
----- Message from Maxime Bundy MD sent at 11/13/2024  1:40 PM EST -----  Please inform patient of EKG results which show normal rhythm with possible left atrial enlargement, to further evaluate that left upper chamber of her heart, I recommend that she gets the cardiac echocardiogram which she already has scheduled for next week, and follow-up with cardiology and make an appointment to see a cardiologist, referral already placed although I do not see that she is scheduled that yet.  Thanks  ----- Message -----  From: Yehuda Ris Results In  Sent: 11/12/2024   5:18 PM EST  To: Maxime Bundy MD

## 2024-11-25 ENCOUNTER — OFFICE VISIT (OUTPATIENT)
Dept: FAMILY MEDICINE CLINIC | Facility: HOSPITAL | Age: 59
End: 2024-11-25
Payer: COMMERCIAL

## 2024-11-25 VITALS
BODY MASS INDEX: 36.32 KG/M2 | HEIGHT: 63 IN | OXYGEN SATURATION: 96 % | WEIGHT: 205 LBS | DIASTOLIC BLOOD PRESSURE: 90 MMHG | SYSTOLIC BLOOD PRESSURE: 128 MMHG | HEART RATE: 96 BPM

## 2024-11-25 DIAGNOSIS — H53.8 BLURRED VISION, RIGHT EYE: Primary | ICD-10-CM

## 2024-11-25 DIAGNOSIS — H43.391 FLOATERS IN VISUAL FIELD, RIGHT: ICD-10-CM

## 2024-11-25 DIAGNOSIS — R51.9 NONINTRACTABLE EPISODIC HEADACHE, UNSPECIFIED HEADACHE TYPE: ICD-10-CM

## 2024-11-25 PROCEDURE — 99214 OFFICE O/P EST MOD 30 MIN: CPT

## 2024-11-25 NOTE — PROGRESS NOTES
"Name: Roselyn Jose      : 1965      MRN: [id number]  Encounter Provider: Maxime Barroso MD  Encounter Date: 2024   Encounter department: [facility] CARE [address]  :  Assessment & Plan  Blurred vision, right eye  Continued symptoms, etiology unclear, per recent ophthalmology evaluation, no clear explanation on her evaluation, she recommended follow-up with PCP.  Recent CTA head and neck nonconcerning.  Spoke with neuroradiologist on-call who suggested an MRI of the orbits with and without contrast for further evaluation and assessment of the optic nerve.  Neuroexam negative today, cranial nerves grossly intact.  Patient asymptomatic at time of visit.  Advised to complete cardiovascular workup including echo and cardiology follow-up    Orders:    MRI orbits wo and w contrast; Future    Floaters in visual field, right    Orders:    MRI orbits wo and w contrast; Future    Nonintractable episodic headache, unspecified headache type  Intermittent, denies photo or phonophobia, recommended treating with both Motrin and Tylenol as needed.  Neurology referral reprinted              History of Present Illness     HPI  Continuted visional symptoms, feels like \"under water\" in right eye, blurred with floaters, seconds to minutes at a time, a few times a day when it happens, every day to every other day, non-painful.  Provoked by leaning forward.  Symptoms evaluated by ophthalmologist (see scanned note from ) who recommended follow-up with PCP for cardiovascular causes.      Review of Systems   Eyes:  Positive for visual disturbance. Negative for photophobia, pain, discharge, redness and itching.   Respiratory:  Negative for shortness of breath.    Cardiovascular:  Negative for chest pain and palpitations.   Neurological:  Positive for headaches. Negative for dizziness, facial asymmetry, speech difficulty, weakness, light-headedness and numbness.          Objective   /90   " "Pulse 96   Ht 5' 3\" (1.6 m)   Wt 93 kg (205 lb)   SpO2 96%   BMI 36.31 kg/m²      Physical Exam  Constitutional:       General: She is not in acute distress.  Eyes:      General: No scleral icterus.        Right eye: No discharge.         Left eye: No discharge.      Extraocular Movements: Extraocular movements intact.      Conjunctiva/sclera: Conjunctivae normal.      Pupils: Pupils are equal, round, and reactive to light.   Cardiovascular:      Rate and Rhythm: Normal rate and regular rhythm.      Heart sounds: Normal heart sounds. No murmur heard.  Neurological:      General: No focal deficit present.      Mental Status: She is alert and oriented to person, place, and time.      Cranial Nerves: No cranial nerve deficit.   Psychiatric:         Mood and Affect: Mood normal.         Behavior: Behavior normal.         "

## 2024-11-26 ENCOUNTER — HOSPITAL ENCOUNTER (OUTPATIENT)
Dept: NON INVASIVE DIAGNOSTICS | Age: 59
Discharge: HOME | End: 2024-11-26
Payer: COMMERCIAL

## 2024-11-26 VITALS
HEART RATE: 76 BPM | BODY MASS INDEX: 36.32 KG/M2 | DIASTOLIC BLOOD PRESSURE: 70 MMHG | WEIGHT: 205 LBS | HEIGHT: 63 IN | SYSTOLIC BLOOD PRESSURE: 126 MMHG

## 2024-11-26 DIAGNOSIS — R06.09 DYSPNEA ON EXERTION: ICD-10-CM

## 2024-11-26 DIAGNOSIS — R55 SYNCOPE, UNSPECIFIED SYNCOPE TYPE: ICD-10-CM

## 2024-11-26 DIAGNOSIS — R00.2 PALPITATIONS: ICD-10-CM

## 2024-11-26 LAB
AORTIC ROOT: 2.8 CM
APICAL FOUR CHAMBER EJECTION FRACTION: 69 %
ASCENDING AORTA: 3.3 CM
BSA FOR ECHO PROCEDURE: 1.95 M2
DOP CALC LVOT AREA: 3.14 CM2
DOP CALC LVOT DIAMETER: 2 CM
E WAVE DECELERATION TIME: 186 MS
E/A RATIO: 0.62
FRACTIONAL SHORTENING: 47 (ref 28–44)
INTERVENTRICULAR SEPTUM IN DIASTOLE (PARASTERNAL SHORT AXIS VIEW): 0.8 CM
INTERVENTRICULAR SEPTUM: 0.8 CM (ref 0.6–1.1)
LAAS-AP2: 16.3 CM2
LAAS-AP4: 16.9 CM2
LEFT ATRIUM AREA SYSTOLE SINGLE PLANE A4C: 15.6 CM2
LEFT ATRIUM SIZE: 4.1 CM
LEFT ATRIUM VOLUME (MOD BIPLANE): 46 ML
LEFT ATRIUM VOLUME INDEX (MOD BIPLANE): 23.6 ML/M2
LEFT INTERNAL DIMENSION IN SYSTOLE: 2.4 CM (ref 2.1–4)
LEFT VENTRICULAR INTERNAL DIMENSION IN DIASTOLE: 4.5 CM (ref 3.5–6)
LEFT VENTRICULAR POSTERIOR WALL IN END DIASTOLE: 0.9 CM
LEFT VENTRICULAR STROKE VOLUME: 75 ML
LVSV (TEICH): 75 ML
MV E'TISSUE VEL-SEP: 6 CM/S
MV PEAK A VEL: 0.63 M/S
MV PEAK E VEL: 39 CM/S
MV STENOSIS PRESSURE HALF TIME: 54 MS
MV VALVE AREA P 1/2 METHOD: 4.07
RIGHT ATRIUM AREA SYSTOLE A4C: 13.3 CM2
RIGHT VENTRICLE ID DIMENSION: 3.2 CM
SL CV LEFT ATRIUM LENGTH A2C: 4.9 CM
SL CV LV EF: 55
SL CV PED ECHO LEFT VENTRICLE DIASTOLIC VOLUME (MOD BIPLANE) 2D: 95 ML
SL CV PED ECHO LEFT VENTRICLE SYSTOLIC VOLUME (MOD BIPLANE) 2D: 20 ML
TRICUSPID ANNULAR PLANE SYSTOLIC EXCURSION: 2.1 CM

## 2024-11-26 PROCEDURE — 93225 XTRNL ECG REC<48 HRS REC: CPT

## 2024-11-26 PROCEDURE — 93306 TTE W/DOPPLER COMPLETE: CPT | Performed by: INTERNAL MEDICINE

## 2024-11-26 PROCEDURE — 93226 XTRNL ECG REC<48 HR SCAN A/R: CPT

## 2024-11-26 PROCEDURE — 93306 TTE W/DOPPLER COMPLETE: CPT

## 2024-11-26 NOTE — PATIENT COMMUNICATION
No response received from pt via [employer].Second outreach attempted and unsuccessful.Message left for patient to call back if interested in services.     Referral is being closed at this time.

## 2025-02-23 DIAGNOSIS — R06.02 SOB (SHORTNESS OF BREATH): ICD-10-CM

## 2025-02-23 DIAGNOSIS — R06.09 DOE (DYSPNEA ON EXERTION): ICD-10-CM

## 2025-02-24 RX ORDER — FLUTICASONE PROPIONATE AND SALMETEROL 100; 50 UG/1; UG/1
POWDER RESPIRATORY (INHALATION) 2 TIMES DAILY
Qty: 60 BLISTER | Refills: 5 | Status: SHIPPED | OUTPATIENT
Start: 2025-02-24

## 2025-03-22 ENCOUNTER — NURSE TRIAGE (OUTPATIENT)
Dept: OTHER | Facility: OTHER | Age: 60
End: 2025-03-22

## 2025-03-22 NOTE — TELEPHONE ENCOUNTER
"FOLLOW UP: pt going to call back if further assistance is needed.    Pt was told that if she is SOB at rest or feeling worse she should go to UC/ED.    REASON FOR CONVERSATION: Medication Problem    SYMPTOMS: SOB for a week. Allergies are bad.    OTHER: Pt is out of advair and albuterol. Pt does not have insurance until end of may. Pt would like a cheaper alternative to advair called in.    Spoke with on call provider. Pt should try to have rx transferred from [facility] to [facility] and use a goodrx coupon to help with cost. Pt can also call and find another long acting alternative to the advair. Pt will call pharmacies and call back if needed.    DISPOSITION: No disposition on file.        Answer Assessment - Initial Assessment Questions  1. NAME of MEDICINE: \"What medicine(s) are you calling about?\"      Pt is prescribed advair and albuterol but is out of meds. Pt would like cheaper alternative prescribed because her new insurance doesn't start until the end of may/beginning of June.    2. QUESTION: \"What is your question?\" (e.g., double dose of medicine, side effect)      Can another med be prescribed?    3. PRESCRIBER: \"Who prescribed the medicine?\" Reason: if prescribed by specialist, call should be referred to that group.      [facility] [address]    4. SYMPTOMS: \"Do you have any symptoms?\" If Yes, ask: \"What symptoms are you having?\"  \"How bad are the symptoms (e.g., mild, moderate, severe)      Allergies have been bad. Pt has been taking Claritin.    Denies wheezing. Does have some SOB. Has been a week of feeling this way. Denies CP.    Protocols used: Medication Question Call-Adult-AH    "

## 2025-03-22 NOTE — TELEPHONE ENCOUNTER
"Regarding: Shortness of breath  ----- Message from Marta JACQUES sent at 3/22/2025  8:28 AM EDT -----  Pt stated, \" I wanted to know if I could be prescribed a medication that is cheaper, I am having shortness of breath and I am out of my inhaler. I do not have insurance and would like something prescribed that is  less than 100$.\"    "

## 2025-04-20 DIAGNOSIS — I10 PRIMARY HYPERTENSION: ICD-10-CM

## 2025-04-21 RX ORDER — LOSARTAN POTASSIUM AND HYDROCHLOROTHIAZIDE 25; 100 MG/1; MG/1
1 TABLET ORAL DAILY
Qty: 90 TABLET | Refills: 1 | Status: SHIPPED | OUTPATIENT
Start: 2025-04-21

## 2025-06-01 ENCOUNTER — OFFICE VISIT (OUTPATIENT)
Dept: URGENT CARE | Facility: CLINIC | Age: 60
End: 2025-06-01

## 2025-06-01 VITALS
SYSTOLIC BLOOD PRESSURE: 124 MMHG | HEART RATE: 101 BPM | TEMPERATURE: 98.3 F | DIASTOLIC BLOOD PRESSURE: 82 MMHG | HEIGHT: 63 IN | WEIGHT: 205 LBS | OXYGEN SATURATION: 98 % | RESPIRATION RATE: 18 BRPM | BODY MASS INDEX: 36.32 KG/M2

## 2025-06-01 DIAGNOSIS — J01.00 ACUTE NON-RECURRENT MAXILLARY SINUSITIS: Primary | ICD-10-CM

## 2025-06-01 PROCEDURE — 99213 OFFICE O/P EST LOW 20 MIN: CPT

## 2025-06-01 RX ORDER — BENZONATATE 200 MG/1
200 CAPSULE ORAL 3 TIMES DAILY PRN
Qty: 20 CAPSULE | Refills: 0 | Status: SHIPPED | OUTPATIENT
Start: 2025-06-01

## 2025-06-01 RX ORDER — DOXYCYCLINE 100 MG/1
100 CAPSULE ORAL EVERY 12 HOURS SCHEDULED
Qty: 14 CAPSULE | Refills: 0 | Status: SHIPPED | OUTPATIENT
Start: 2025-06-01 | End: 2025-06-08

## 2025-06-01 NOTE — PROGRESS NOTES
[facility]        NAME: Roselyn Jose is a 59 y.o. female  : 1965    MRN: [id number]  DATE: 2025  TIME: 11:41 AM    Assessment and Plan   Acute non-recurrent maxillary sinusitis [J01.00]  1. Acute non-recurrent maxillary sinusitis  benzonatate (TESSALON) 200 MG capsule    doxycycline hyclate (VIBRAMYCIN) 100 mg capsule            Patient Instructions     Take doxycycline as prescribed.  Tessalon as needed as directed for cough.    For nasal/sinus congestion you can try steam, warm compresses, saline nasal spray, Neti pot, nasal steroid (Flonase, Nasocort), or nasal decongestant (Afrin - for 3 days only).    For cough you can take an over-the-counter expectorant such as plain Robitussion or Mucinex. A spoonful of honey at bedtime may also be helpful.    For cold symptoms with high blood pressure take Coricidin cough/cold.     For sore throat you can use Cepacol lozenges, do warm salt water gargles, drink warm water with lemon or herbal teas, or use an over-the-counter throat spray (Chloraseptic).    You can take ibuprofen/Motrin and acetaminophen/Tylenol as needed for pain, fever, body aches. Do not take ibuprofen/Motrin/Advil if you have a history of heart disease, bleeding ulcers, or if you take blood thinners.     Drink plenty of fluids to stay hydrated. Airborne or Emergen-C for extra vitamin C and zinc.    Follow up with your PCP in 3-5 days for persistent symptoms.    Go to the ER if symptoms worsen.     If tests are performed, our office will contact you with results only if changes need to made to the care plan discussed with you at the visit. You can review your full results on [facility].      Chief Complaint     Chief Complaint   Patient presents with    Cold Like Symptoms     Pt reports fever with onset Monday night 100.8 F with resolution on Friday. C/o nasal congestion, drainage, bilateral ear irritation, h/a, loose stool, store throat, decreased appetite, and  "fatigue. Negative at home Covid test yesterday.          History of Present Illness       59-year-old female presenting with cold-like symptoms x 6 days.  Patient reports congestion and cough.  States symptoms worsened 2 days ago with low-grade fever, sinus pressure, bilateral ear discomfort, headache, sore throat, and diarrhea.  Appetite is decreased.  Feels fatigued.  Not sleeping well.  Using Vicks VapoRub, no other OTC medications tried patient states \"I am sensitive.\" Home Covid test negative.        Review of Systems   Review of Systems   Constitutional:  Positive for fatigue and fever.   HENT:  Positive for congestion, ear pain, sinus pressure and sore throat. Negative for ear discharge.    Eyes:  Negative for discharge and redness.   Respiratory:  Positive for cough and wheezing. Negative for shortness of breath.    Cardiovascular:  Negative for chest pain and palpitations.   Gastrointestinal:  Positive for diarrhea. Negative for abdominal pain, nausea and vomiting.   Skin:  Negative for rash.   Neurological:  Positive for headaches. Negative for dizziness and light-headedness.         Current Medications     Current Medications[1]    Current Allergies     Allergies as of 06/01/2025 - Reviewed 06/01/2025   Allergen Reaction Noted    Ativan [lorazepam] Myalgia 05/10/2020    Cymbalta [duloxetine hcl] Myalgia 05/10/2020    Latex Rash 06/24/2022    Amoxicillin Rash 04/06/2022            The following portions of the patient's history were reviewed and updated as appropriate: allergies, current medications, past family history, past medical history, past social history, past surgical history and problem list.     Past Medical History[2]    Past Surgical History[3]    Family History[4]      Medications have been verified.        Objective   /82 (BP Location: Left arm, Patient Position: Standing, Cuff Size: Standard)   Pulse 101   Temp 98.3 °F (36.8 °C) (Tympanic)   Resp 18   Ht 5' 3\" (1.6 m)   Wt 93 kg " (205 lb)   SpO2 98%   BMI 36.31 kg/m²        Physical Exam     Physical Exam  Vitals and nursing note reviewed.   Constitutional:       General: She is not in acute distress.     Appearance: She is not ill-appearing, toxic-appearing or diaphoretic.   HENT:      Head: Normocephalic and atraumatic.      Comments: Maxillary sinus TTP     Right Ear: Tympanic membrane, ear canal and external ear normal.      Left Ear: Tympanic membrane, ear canal and external ear normal.      Nose: Congestion and rhinorrhea present.      Mouth/Throat:      Mouth: Mucous membranes are moist.      Pharynx: Oropharynx is clear.     Eyes:      Conjunctiva/sclera: Conjunctivae normal.       Cardiovascular:      Rate and Rhythm: Normal rate and regular rhythm.      Pulses: Normal pulses.      Heart sounds: Normal heart sounds.   Pulmonary:      Effort: Pulmonary effort is normal.      Breath sounds: Normal breath sounds. No wheezing, rhonchi or rales.     Musculoskeletal:         General: Normal range of motion.      Cervical back: Normal range of motion and neck supple.     Skin:     General: Skin is warm and dry.      Capillary Refill: Capillary refill takes less than 2 seconds.     Neurological:      Mental Status: She is alert and oriented to person, place, and time.                        [1]   Current Outpatient Medications:     albuterol (Proventil HFA) 90 mcg/act inhaler, Inhale 2 puffs every 6 (six) hours as needed for wheezing or shortness of breath, Disp: 18 g, Rfl: 2    atorvastatin (LIPITOR) 20 mg tablet, Take 1 tablet (20 mg total) by mouth daily Take half tab every other day x2 weeks then half tab daily x2 weeks & then full tab daily afterwards., Disp: 90 tablet, Rfl: 3    benzonatate (TESSALON) 200 MG capsule, Take 1 capsule (200 mg total) by mouth 3 (three) times a day as needed for cough, Disp: 20 capsule, Rfl: 0    cholecalciferol (VITAMIN D3) 1,000 units tablet, Take 2 tablets (2,000 Units total) by mouth daily, Disp: 60  tablet, Rfl: 0    doxycycline hyclate (VIBRAMYCIN) 100 mg capsule, Take 1 capsule (100 mg total) by mouth every 12 (twelve) hours for 7 days, Disp: 14 capsule, Rfl: 0    Fluticasone-Salmeterol (Advair) 100-50 mcg/dose inhaler, INHALE 1 DOSE BY MOUTH TWICE DAILY RINSE MOUTH AFTER USE, Disp: 60 blister, Rfl: 5    loperamide (IMODIUM) 2 mg capsule, Take 2 mg by mouth as needed in the morning and 2 mg as needed at noon and 2 mg as needed in the evening and 2 mg as needed before bedtime for diarrhea., Disp: , Rfl:     Loratadine 10 MG CAPS, Take by mouth, Disp: , Rfl:     losartan-hydrochlorothiazide (HYZAAR) 100-25 MG per tablet, Take 1 tablet by mouth once daily, Disp: 90 tablet, Rfl: 1    Multiple Vitamins-Minerals (multivitamin with minerals) tablet, Take 1 tablet by mouth daily, Disp: 30 tablet, Rfl: 1    Omega-3 Fatty Acids (fish oil) 1,000 mg, Take 1,000 mg by mouth in the morning., Disp: , Rfl:     omeprazole (PriLOSEC) 40 MG capsule, Take 1 capsule (40 mg total) by mouth 2 (two) times a day, Disp: 180 capsule, Rfl: 2    ondansetron (ZOFRAN-ODT) 4 mg disintegrating tablet, Take 1 tablet (4 mg total) by mouth every 6 (six) hours as needed for nausea or vomiting, Disp: 60 tablet, Rfl: 1    amitriptyline (ELAVIL) 10 mg tablet, Take 1 tablet (10 mg total) by mouth daily at bedtime (Patient not taking: Reported on 11/11/2024), Disp: 30 tablet, Rfl: 5    ascorbic acid (VITAMIN C) 500 MG tablet, Take 1 tablet (500 mg total) by mouth 2 (two) times a day (Patient not taking: Reported on 6/1/2025), Disp: 30 tablet, Rfl: 3    cholestyramine (QUESTRAN) 4 g packet, Take 1 packet (4 g total) by mouth daily at bedtime (Patient not taking: Reported on 6/1/2025), Disp: 30 packet, Rfl: 2    dicyclomine (BENTYL) 10 mg capsule, Take 2 capsules (20 mg total) by mouth 4 (four) times a day (before meals and at bedtime) (Patient not taking: Reported on 6/1/2025), Disp: 240 capsule, Rfl: 5    famotidine (PEPCID) 40 MG tablet, TAKE 1  TABLET BY MOUTH ONCE DAILY AT BEDTIME (Patient not taking: Reported on 2024), Disp: 30 tablet, Rfl: 5    fluticasone (FLONASE) 50 mcg/act nasal spray, 1 spray into each nostril daily (Patient not taking: Reported on 2024), Disp: 16 g, Rfl: 2    hydrOXYzine HCL (ATARAX) 10 mg tablet, Take 1 tablet (10 mg total) by mouth every 6 (six) hours as needed for anxiety (Patient not taking: Reported on 2025), Disp: 30 tablet, Rfl: 0    valACYclovir (VALTREX) 1,000 mg tablet, Take 2 tablets (2,000 mg total) by mouth every 12 (twelve) hours for 2 doses (Patient not taking: Reported on 2024), Disp: 4 tablet, Rfl: 0  [2]   Past Medical History:  Diagnosis Date    Arthritis     GERD (gastroesophageal reflux disease)     Hyperlipidemia     Hypertension     Irritable bowel syndrome     Low back pain     PONV (postoperative nausea and vomiting)     Syncope    [3]   Past Surgical History:  Procedure Laterality Date    CATARACT EXTRACTION Bilateral      SECTION N/A     CHOLECYSTECTOMY      COLONOSCOPY  2023    HERNIA REPAIR      HYSTERECTOMY      NASAL SEPTUM SURGERY      GA ARTHRP ACETBLR/PROX FEM PROSTC AGRFT/ALGRFT Right 2023    Procedure: ARTHROPLASTY HIP TOTAL;  Surgeon: Kurt Douglas DO;  Location:  MAIN OR;  Service: Orthopedics    GA ARTHRP ACETBLR/PROX FEM PROSTC AGRFT/ALGRFT Left 2023    Procedure: ARTHROPLASTY HIP TOTAL;  Surgeon: Kurt Douglas DO;  Location:  MAIN OR;  Service: Orthopedics    TONSILLECTOMY      UPPER GASTROINTESTINAL ENDOSCOPY  2023   [4]   Family History  Problem Relation Name Age of Onset    Hypertension Mother      Skin cancer Mother      Stroke Father      No Known Problems Sister      Hypertension Son      Skin cancer Daughter      Cancer Daughter      No Known Problems Daughter      Ovarian cancer Daughter      Ovarian cancer Maternal Grandmother      No Known Problems Maternal Grandfather      No Known Problems Paternal  Grandmother      No Known Problems Paternal Grandfather      No Known Problems Maternal Aunt      No Known Problems Maternal Aunt      No Known Problems Maternal Aunt      No Known Problems Maternal Aunt      Colon cancer Cousin      Colon polyps Neg Hx

## 2025-06-09 ENCOUNTER — OFFICE VISIT (OUTPATIENT)
Dept: FAMILY MEDICINE CLINIC | Facility: HOSPITAL | Age: 60
End: 2025-06-09
Payer: COMMERCIAL

## 2025-06-09 VITALS
HEIGHT: 63 IN | DIASTOLIC BLOOD PRESSURE: 78 MMHG | BODY MASS INDEX: 37.56 KG/M2 | OXYGEN SATURATION: 97 % | HEART RATE: 84 BPM | SYSTOLIC BLOOD PRESSURE: 118 MMHG | WEIGHT: 212 LBS

## 2025-06-09 DIAGNOSIS — R06.02 SOB (SHORTNESS OF BREATH): ICD-10-CM

## 2025-06-09 DIAGNOSIS — I10 PRIMARY HYPERTENSION: ICD-10-CM

## 2025-06-09 DIAGNOSIS — Z12.31 SCREENING MAMMOGRAM FOR BREAST CANCER: ICD-10-CM

## 2025-06-09 DIAGNOSIS — J01.00 ACUTE NON-RECURRENT MAXILLARY SINUSITIS: Primary | ICD-10-CM

## 2025-06-09 DIAGNOSIS — R11.2 NAUSEA AND VOMITING, UNSPECIFIED VOMITING TYPE: ICD-10-CM

## 2025-06-09 PROCEDURE — 99214 OFFICE O/P EST MOD 30 MIN: CPT | Performed by: STUDENT IN AN ORGANIZED HEALTH CARE EDUCATION/TRAINING PROGRAM

## 2025-06-09 RX ORDER — ALBUTEROL SULFATE 90 UG/1
2 INHALANT RESPIRATORY (INHALATION) EVERY 6 HOURS PRN
Qty: 18 G | Refills: 2 | Status: SHIPPED | OUTPATIENT
Start: 2025-06-09

## 2025-06-09 RX ORDER — ONDANSETRON 4 MG/1
4 TABLET, ORALLY DISINTEGRATING ORAL EVERY 6 HOURS PRN
Qty: 60 TABLET | Refills: 1 | Status: SHIPPED | OUTPATIENT
Start: 2025-06-09

## 2025-06-09 RX ORDER — METHYLPREDNISOLONE 4 MG/1
TABLET ORAL
Qty: 21 EACH | Refills: 0 | Status: SHIPPED | OUTPATIENT
Start: 2025-06-09 | End: 2025-06-18 | Stop reason: ALTCHOICE

## 2025-06-09 NOTE — ASSESSMENT & PLAN NOTE
Stable, on current meds, no changes.   BP Readings from Last 3 Encounters:   06/09/25 118/78   06/01/25 124/82   11/26/24 126/70

## 2025-06-09 NOTE — PROGRESS NOTES
Name: Roselyn Jose      : 1965      MRN: [id number]  Encounter Provider: Saray Lara DO  Encounter Date: 2025   Encounter department: [facility] PRIMARY CARE [address]  :  Assessment & Plan  Acute non-recurrent maxillary sinusitis  Completed doxycycline.   Sinusitis improved, but now in chest with deep cough, worsened likely by PND also.   Very reactive airway, coughing nonstop during conversation.   Doesn't do well with codeine for cough, daughter allergic to it. Nervous to try.   OTC options reviewed with patient. Adequate rest, proper water hydration, Vit C, D, Zinc, tea with honey, mucinex, cough drops, steam shower, nasal rinses, etc.   Orders:  •  methylPREDNISolone 4 MG tablet therapy pack; Use as directed on package    SOB (shortness of breath)  Refill due, she has run out, needs it while sick & coughing.   Orders:  •  albuterol (Proventil HFA) 90 mcg/act inhaler; Inhale 2 puffs every 6 (six) hours as needed for wheezing or shortness of breath    Nausea and vomiting, unspecified vomiting type  Refill due worse with coughing.   Nausea 24 hrs a day right now. Overdue to see GI.   Orders:  •  ondansetron (ZOFRAN-ODT) 4 mg disintegrating tablet; Take 1 tablet (4 mg total) by mouth every 6 (six) hours as needed for nausea or vomiting    Primary hypertension  Stable, on current meds, no changes.   BP Readings from Last 3 Encounters:   25 118/78   25 124/82   24 126/70        Screening mammogram for breast cancer  Reordered for her new insurance.   Orders:  •  Mammo screening bilateral w 3d and cad; Future      Depression Screening and Follow-up Plan: Patient was screened for depression during today's encounter. They screened negative with a PHQ-2 score of 0.        History of Present Illness   Chief Complaint   Patient presents with   • Cough     Phlegm   Completed ABX yesterday UC       • Headache     Given doxycycline 7d. Completed, but not better.   Tessalon  "perles - make her too drowsy, has to use only at night.     Still bad cough.   Throat not sore, ears not itchy.   Nose finally drying a little, had run thru tons of boxes of tissues.   Not allergic to steroids.     Using inhaler each morning - advair. Can't be without it.   Ran out of albuterol   Lowest pulse ox 93% at home    Was off of meds due to being out of work.   Didn't have insurance.   Down to only BP meds & lipitor, prilosec.    Review of Systems   Constitutional:  Positive for appetite change (diminished), fatigue and fever (sick since memorial day - 100.8 at the high). Negative for chills and diaphoresis.   Respiratory:  Positive for cough, shortness of breath and wheezing (occas).         Rib pain from coughing & along sternum with deep breaths   Cardiovascular:  Negative for chest pain and palpitations.   Gastrointestinal:  Positive for diarrhea (soft, loose, worse since abx) and nausea. Negative for anal bleeding, blood in stool and vomiting.   Neurological:  Positive for dizziness (swith standing at times), light-headedness (w coughing) and headaches (better some).     Objective   /78   Pulse 84   Ht 5' 3\" (1.6 m)   Wt 96.2 kg (212 lb)   SpO2 97%   BMI 37.55 kg/m²      Physical Exam  Vitals and nursing note reviewed.   Constitutional:       General: She is not in acute distress.     Appearance: Normal appearance. She is well-developed. She is obese. She is not ill-appearing.   HENT:      Head: Normocephalic and atraumatic.      Right Ear: Tympanic membrane, ear canal and external ear normal. There is no impacted cerumen.      Left Ear: Tympanic membrane, ear canal and external ear normal. There is no impacted cerumen.      Nose: Nose normal. No congestion or rhinorrhea.      Mouth/Throat:      Mouth: Mucous membranes are moist.      Pharynx: Oropharynx is clear. No oropharyngeal exudate or posterior oropharyngeal erythema.     Eyes:      General: No scleral icterus.        Right eye: No " discharge.         Left eye: No discharge.      Conjunctiva/sclera: Conjunctivae normal.     Neck:      Comments: No thyroid nodules or thyromegaly.  Cardiovascular:      Rate and Rhythm: Normal rate and regular rhythm.      Pulses: Normal pulses.      Heart sounds: Normal heart sounds. No murmur heard.  Pulmonary:      Effort: Pulmonary effort is normal. No respiratory distress.      Breath sounds: Rhonchi present. No wheezing.      Comments: Hoarse voice, dry hacking cough conversational cough & dyspnea    Musculoskeletal:         General: Normal range of motion.      Cervical back: Normal range of motion and neck supple. No rigidity or tenderness.   Lymphadenopathy:      Cervical: No cervical adenopathy.     Skin:     General: Skin is warm and dry.      Capillary Refill: Capillary refill takes less than 2 seconds.      Findings: No erythema or rash.     Neurological:      Mental Status: She is alert and oriented to person, place, and time.      Gait: Gait normal.     Psychiatric:         Mood and Affect: Mood normal.         Behavior: Behavior normal.         Thought Content: Thought content normal.         Judgment: Judgment normal.

## 2025-06-16 ENCOUNTER — NURSE TRIAGE (OUTPATIENT)
Dept: OTHER | Facility: OTHER | Age: 60
End: 2025-06-16

## 2025-06-16 ENCOUNTER — NURSE TRIAGE (OUTPATIENT)
Age: 60
End: 2025-06-16

## 2025-06-16 ENCOUNTER — HOSPITAL ENCOUNTER (EMERGENCY)
Facility: HOSPITAL | Age: 60
Discharge: HOME | End: 2025-06-17
Attending: EMERGENCY MEDICINE | Admitting: EMERGENCY MEDICINE
Payer: COMMERCIAL

## 2025-06-16 DIAGNOSIS — R42 POSTURAL DIZZINESS WITH PRESYNCOPE: Primary | ICD-10-CM

## 2025-06-16 DIAGNOSIS — R19.7 DIARRHEA: ICD-10-CM

## 2025-06-16 DIAGNOSIS — R10.84 GENERALIZED ABDOMINAL PAIN: ICD-10-CM

## 2025-06-16 DIAGNOSIS — I49.3 PREMATURE VENTRICULAR CONTRACTIONS: ICD-10-CM

## 2025-06-16 DIAGNOSIS — R55 POSTURAL DIZZINESS WITH PRESYNCOPE: Primary | ICD-10-CM

## 2025-06-16 LAB
ALBUMIN SERPL BCG-MCNC: 4 G/DL (ref 3.5–5)
ALP SERPL-CCNC: 87 U/L (ref 34–104)
ALT SERPL W P-5'-P-CCNC: 25 U/L (ref 7–52)
ANION GAP SERPL CALCULATED.3IONS-SCNC: 9 MMOL/L (ref 4–13)
AST SERPL W P-5'-P-CCNC: 21 U/L (ref 13–39)
ATRIAL RATE: 89 BPM
BACTERIA UR QL AUTO: ABNORMAL /HPF
BASOPHILS # BLD AUTO: 0.03 THOUSANDS/ÂΜL (ref 0–0.1)
BASOPHILS NFR BLD AUTO: 0 % (ref 0–1)
BILIRUB SERPL-MCNC: 0.72 MG/DL (ref 0.2–1)
BILIRUB UR QL STRIP: NEGATIVE
BUN SERPL-MCNC: 23 MG/DL (ref 5–25)
CALCIUM SERPL-MCNC: 8.9 MG/DL (ref 8.4–10.2)
CARDIAC TROPONIN I PNL SERPL HS: <2 NG/L (ref ?–50)
CHLORIDE SERPL-SCNC: 102 MMOL/L (ref 96–108)
CLARITY UR: CLEAR
CO2 SERPL-SCNC: 27 MMOL/L (ref 21–32)
COLOR UR: YELLOW
CREAT SERPL-MCNC: 0.8 MG/DL (ref 0.6–1.3)
EOSINOPHIL # BLD AUTO: 0.18 THOUSAND/ÂΜL (ref 0–0.61)
EOSINOPHIL NFR BLD AUTO: 2 % (ref 0–6)
ERYTHROCYTE [DISTWIDTH] IN BLOOD BY AUTOMATED COUNT: 13.3 % (ref 11.6–15.1)
GFR SERPL CREATININE-BSD FRML MDRD: 79 ML/MIN/1.73SQ M
GLUCOSE SERPL-MCNC: 99 MG/DL (ref 65–140)
GLUCOSE UR STRIP-MCNC: NEGATIVE MG/DL
HCT VFR BLD AUTO: 39.4 % (ref 34.8–46.1)
HGB BLD-MCNC: 13.4 G/DL (ref 11.5–15.4)
HGB UR QL STRIP.AUTO: NEGATIVE
IMM GRANULOCYTES # BLD AUTO: 0.07 THOUSAND/UL (ref 0–0.2)
IMM GRANULOCYTES NFR BLD AUTO: 1 % (ref 0–2)
KETONES UR STRIP-MCNC: NEGATIVE MG/DL
LEUKOCYTE ESTERASE UR QL STRIP: ABNORMAL
LYMPHOCYTES # BLD AUTO: 1.96 THOUSANDS/ÂΜL (ref 0.6–4.47)
LYMPHOCYTES NFR BLD AUTO: 18 % (ref 14–44)
MAGNESIUM SERPL-MCNC: 2.3 MG/DL (ref 1.9–2.7)
MCH RBC QN AUTO: 30.4 PG (ref 26.8–34.3)
MCHC RBC AUTO-ENTMCNC: 34 G/DL (ref 31.4–37.4)
MCV RBC AUTO: 89 FL (ref 82–98)
MONOCYTES # BLD AUTO: 0.91 THOUSAND/ÂΜL (ref 0.17–1.22)
MONOCYTES NFR BLD AUTO: 8 % (ref 4–12)
NEUTROPHILS # BLD AUTO: 7.7 THOUSANDS/ÂΜL (ref 1.85–7.62)
NEUTS SEG NFR BLD AUTO: 71 % (ref 43–75)
NITRITE UR QL STRIP: NEGATIVE
NON-SQ EPI CELLS URNS QL MICRO: ABNORMAL /HPF
NRBC BLD AUTO-RTO: 0 /100 WBCS
P AXIS: 25 DEGREES
PH UR STRIP.AUTO: 5 [PH]
PLATELET # BLD AUTO: 331 THOUSANDS/UL (ref 149–390)
PMV BLD AUTO: 11.2 FL (ref 8.9–12.7)
POTASSIUM SERPL-SCNC: 3.7 MMOL/L (ref 3.5–5.3)
PR INTERVAL: 158 MS
PROT SERPL-MCNC: 6.6 G/DL (ref 6.4–8.4)
PROT UR STRIP-MCNC: NEGATIVE MG/DL
QRS AXIS: 25 DEGREES
QRSD INTERVAL: 88 MS
QT INTERVAL: 376 MS
QTC INTERVAL: 457 MS
RBC # BLD AUTO: 4.41 MILLION/UL (ref 3.81–5.12)
RBC #/AREA URNS AUTO: ABNORMAL /HPF
SODIUM SERPL-SCNC: 138 MMOL/L (ref 135–147)
SP GR UR STRIP.AUTO: >=1.03 (ref 1–1.03)
T WAVE AXIS: 22 DEGREES
UROBILINOGEN UR STRIP-ACNC: <2 MG/DL
VENTRICULAR RATE: 89 BPM
WBC # BLD AUTO: 10.85 THOUSAND/UL (ref 4.31–10.16)
WBC #/AREA URNS AUTO: ABNORMAL /HPF

## 2025-06-16 PROCEDURE — 83735 ASSAY OF MAGNESIUM: CPT | Performed by: EMERGENCY MEDICINE

## 2025-06-16 PROCEDURE — 93010 ELECTROCARDIOGRAM REPORT: CPT | Performed by: INTERNAL MEDICINE

## 2025-06-16 PROCEDURE — 80053 COMPREHEN METABOLIC PANEL: CPT | Performed by: EMERGENCY MEDICINE

## 2025-06-16 PROCEDURE — 96360 HYDRATION IV INFUSION INIT: CPT

## 2025-06-16 PROCEDURE — 99285 EMERGENCY DEPT VISIT HI MDM: CPT | Performed by: EMERGENCY MEDICINE

## 2025-06-16 PROCEDURE — 84484 ASSAY OF TROPONIN QUANT: CPT | Performed by: EMERGENCY MEDICINE

## 2025-06-16 PROCEDURE — 36415 COLL VENOUS BLD VENIPUNCTURE: CPT | Performed by: EMERGENCY MEDICINE

## 2025-06-16 PROCEDURE — 93005 ELECTROCARDIOGRAM TRACING: CPT

## 2025-06-16 PROCEDURE — 81001 URINALYSIS AUTO W/SCOPE: CPT | Performed by: EMERGENCY MEDICINE

## 2025-06-16 PROCEDURE — 99284 EMERGENCY DEPT VISIT MOD MDM: CPT

## 2025-06-16 PROCEDURE — 85025 COMPLETE CBC W/AUTO DIFF WBC: CPT | Performed by: EMERGENCY MEDICINE

## 2025-06-16 RX ADMIN — SODIUM CHLORIDE 1000 ML: 0.9 INJECTION, SOLUTION INTRAVENOUS at 22:00

## 2025-06-16 NOTE — Clinical Note
Roselyn Jose was seen and treated in our emergency department on 6/16/2025.                Diagnosis:     Roselyn  may return to work on return date.    She may return on this date: 06/18/2025         If you have any questions or concerns, please don't hesitate to call.      Angel Luis Langford, DO    ______________________________           _______________          _______________  Hospital Representative                              Date                                Time

## 2025-06-16 NOTE — TELEPHONE ENCOUNTER
"Regarding: Severe Abdominal Pain today at work, almost passed out in Bathroom, Sweaty, Clammy, Hot.  ----- Message from Sobia SUH sent at 6/16/2025  7:12 PM EDT -----  \" I have been waiting for a call back from my office all day, I was told that my Doctor would call me back. 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

## 2025-06-16 NOTE — TELEPHONE ENCOUNTER
REASON FOR CONVERSATION: Abdominal Cramping    SYMPTOMS: abd cramps, diarrhea, sweaty, clammy, HR in 40s but feels like heart is racing, nausea    OTHER HEALTH INFORMATION: --    PROTOCOL DISPOSITION: Go to ED Now    CARE ADVICE PROVIDED: go to ED    PRACTICE FOLLOW-UP: please follow up with patient tomorrow, thank you!            Sick for almost 4 weeks. Has been on abx for sinus infection and cough suppressant. Was no better so went to PCP. They gave her steroid. Saturday started having cramps in lower abdomen. Happened multiple times Saturday, then once Sunday. Pt went to work today and pt started feeling cramps again. Pt reports that the pain became severe she couldn't move. Also felt sweaty and clammy, nauseous. Once pain passed a little she went to the bathroom. Pt was unable to have bm even though she felt like she needed to. Pt still felt sweaty and clammy, so she sat back down. Pt couldn't sit still and had the urge to go to the bathroom again and finally was able to have a bm. Pt reports her lips and face were white.     Pt thought maybe it was because of the prednisone, or maybe her BG was lower. Pt then ate an orange and did feel a little better. Pt then went home and started having cramps and nausea again. Pt took zofran when she got home and it did help. Pt had another few bms that were very large. Pt still feeling very cold. Temp was 97.4. Pt had bm and was incontinent of stool because she couldn't make it to the bathroom on time. Pt now has had few episodes of diarrhea. Reports that she feels like HR is elevated.  HR 78, O2 sat 95 % with at home pulse ox. HR while on phone is in the upper 40s or low 50s, and O2 sat is 97%. Pt has gel nails so was told to move pulse ox so that its on sides of fingers. Pulse is varying. Pt asked to take her radial pulse while on phone. Pulse is 45-46 by manual palpation.    Pt reports this used to happen to her, but she was taking GI meds at that time. This time around  "it is much worse, it was never this bad before.      Reason for Disposition   [1] SEVERE pain AND [2] age > 60 years    Answer Assessment - Initial Assessment Questions  1. LOCATION: \"Where does it hurt?\"       Very low abdomen, pelvic area    2. RADIATION: \"Does the pain shoot anywhere else?\" (e.g., chest, back)      Radiates up towards belly button but mainly is low    3. ONSET: \"When did the pain begin?\" (e.g., minutes, hours or days ago)       Saturday    4. SUDDEN: \"Gradual or sudden onset?\"      All of a sudden    5. PATTERN \"Does the pain come and go, or is it constant?\"      Always there but intensity changes     6. SEVERITY: \"How bad is the pain?\"  (e.g., Scale 1-10; mild, moderate, or severe)      2-3/10    7. RECURRENT SYMPTOM: \"Have you ever had this type of stomach pain before?\" If Yes, ask: \"When was the last time?\" and \"What happened that time?\"       Yes but a while ago.    8. CAUSE: \"What do you think is causing the stomach pain?\"     Unsure if related to prednisone, or IBS, or BG level being low    10. OTHER SYMPTOMS: \"Do you have any other symptoms?\" (e.g., back pain, diarrhea, fever, urination pain, vomiting)     Diarrhea, nausea    Protocols used: Abdominal Pain - Female-Adult-[facility]    "

## 2025-06-16 NOTE — TELEPHONE ENCOUNTER
"      REASON FOR CONVERSATION: Diarrhea    SYMPTOMS: severe cramping and diarrhea    OTHER HEALTH INFORMATION: patient was on prednisone for sinusitis, one episode of cramping Saturday followed by regular bowel movement, took last prednisone Sunday, two bouts of cramping and regular bowel movements. Today, no prednisone, multiple bouts of cramping. First with regular bowel movements but now severe diarrhea.  Cramping is relieved after bowel movement. Patient also has zero energy and just wants to sleep. Does not feel she needed to go to ED.     PROTOCOL DISPOSITION: Callback by PCP Today    CARE ADVICE PROVIDED: continue to monitor, hydrate.  Patient asked if this is result of finishing her prednisone. Also asking if Imodium is advisable.    PRACTICE FOLLOW-UP: Please follow up with patient.      Reason for Disposition   Recent antibiotic therapy (i.e., within last 2 months) and diarrhea present > 3 days since antibiotic was stopped    Answer Assessment - Initial Assessment Questions  1. DIARRHEA SEVERITY: \"How bad is the diarrhea?\" \"How many more stools have you had in the past 24 hours than normal?\"       2  2. ONSET: \"When did the diarrhea begin?\"       This morning  3. STOOL DESCRIPTION:  \"How loose or watery is the diarrhea?\" \"What is the stool color?\" \"Is there any blood or mucous in the stool?\"      watery  4. VOMITING: \"Are you also vomiting?\" If Yes, ask: \"How many times in the past 24 hours?\"       Nausea but no vomiting  5. ABDOMEN PAIN: \"Are you having any abdomen pain?\" If Yes, ask: \"What does it feel like?\" (e.g., crampy, dull, intermittent, constant)       Severe cramping prior to diarrhea  6. ABDOMEN PAIN SEVERITY: If present, ask: \"How bad is the pain?\"  (e.g., Scale 1-10; mild, moderate, or severe)      10  7. ORAL INTAKE: If vomiting, \"Have you been able to drink liquids?\" \"How much liquids have you had in the past 24 hours?\"      minimal  8. HYDRATION: \"Any signs of dehydration?\" (e.g., dry mouth " "[not just dry lips], too weak to stand, dizziness, new weight loss) \"When did you last urinate?\"      no  9. EXPOSURE: \"Have you traveled to a foreign country recently?\" \"Have you been exposed to anyone with diarrhea?\" \"Could you have eaten any food that was spoiled?\"      unknown  10. ANTIBIOTIC USE: \"Are you taking antibiotics now or have you taken antibiotics in the past 2 months?\"        2 weeks ago  11. OTHER SYMPTOMS: \"Do you have any other symptoms?\" (e.g., fever, blood in stool)        no    Protocols used: Diarrhea-Adult-[place]    "

## 2025-06-17 ENCOUNTER — NURSE TRIAGE (OUTPATIENT)
Age: 60
End: 2025-06-17

## 2025-06-17 VITALS
HEART RATE: 78 BPM | RESPIRATION RATE: 17 BRPM | DIASTOLIC BLOOD PRESSURE: 62 MMHG | WEIGHT: 212 LBS | OXYGEN SATURATION: 95 % | SYSTOLIC BLOOD PRESSURE: 131 MMHG | BODY MASS INDEX: 37.56 KG/M2 | TEMPERATURE: 99.1 F | HEIGHT: 63 IN

## 2025-06-17 DIAGNOSIS — Z12.11 SCREENING FOR COLON CANCER: ICD-10-CM

## 2025-06-17 DIAGNOSIS — K21.9 GASTROESOPHAGEAL REFLUX DISEASE WITHOUT ESOPHAGITIS: ICD-10-CM

## 2025-06-17 DIAGNOSIS — K58.0 IRRITABLE BOWEL SYNDROME WITH DIARRHEA: ICD-10-CM

## 2025-06-17 DIAGNOSIS — K59.04 CHRONIC IDIOPATHIC CONSTIPATION: ICD-10-CM

## 2025-06-17 DIAGNOSIS — R19.7 DIARRHEA, UNSPECIFIED TYPE: Primary | ICD-10-CM

## 2025-06-17 LAB
2HR DELTA HS TROPONIN: >1 NG/L
CARDIAC TROPONIN I PNL SERPL HS: 3 NG/L (ref ?–50)

## 2025-06-17 RX ORDER — DICYCLOMINE HYDROCHLORIDE 10 MG/1
10 CAPSULE ORAL 4 TIMES DAILY PRN
Qty: 120 CAPSULE | Refills: 1 | Status: SHIPPED | OUTPATIENT
Start: 2025-06-17

## 2025-06-17 NOTE — DISCHARGE INSTRUCTIONS
Eat frequent small meals including complex carbohydrates and proteins.  Avoid too much sugar and simple carbohydrates.  Keep well-hydrated.    Continue present medications.    Call number below to be seen by the cardiologist this week.    Contact your PCP tomorrow for follow-up.

## 2025-06-17 NOTE — TELEPHONE ENCOUNTER
"REASON FOR CONVERSATION: Abdominal Pain    SYMPTOMS: Intermittent lower abdominal pain \"5-20\"/10 since 06/14; constant urge to defecate; diarrhea soon after eating with 2 episodes today; \"extreme nausea\"    OTHER HEALTH INFORMATION: Pt requests script for dicyclomine ASAP. She states without this medication she will be unable to go to work tomorrow.    PROTOCOL DISPOSITION: Schedule Urgent Visit (overriding See Today in Office)    CARE ADVICE PROVIDED: Urgent OV scheduled with  09/19. Pt does not want to see an AP at this time. Pt can only schedule office visits on Friday's after 1 PM.    PRACTICE FOLLOW-UP: Please advise.         Reason for Disposition   MODERATE pain (e.g., interferes with normal activities that comes and goes (cramps) lasts > 24 hours  (Exception: Pain with Vomiting or Diarrhea - see that Protocol.)    Answer Assessment - Initial Assessment Questions  1. LOCATION: \"Where does it hurt?\"       Lower abdomen  2. RADIATION: \"Does the pain shoot anywhere else?\" (e.g., chest, back)      Denies   3. ONSET: \"When did the pain begin?\" (e.g., minutes, hours or days ago)       06/14  5. PATTERN \"Does the pain come and go, or is it constant?\"      Intermittent   6. SEVERITY: \"How bad is the pain?\"  (e.g., Scale 1-10; mild, moderate, or severe)      5-20/10  7. RECURRENT SYMPTOM: \"Have you ever had this type of stomach pain before?\" If Yes, ask: \"When was the last time?\" and \"What happened that time?\"       chronic  8. CAUSE: \"What do you think is causing the stomach pain?\"      unsure  9. RELIEVING/AGGRAVATING FACTORS: \"What makes it better or worse?\" (e.g., antacids, bending or twisting motion, bowel movement)      Better after BM  10. OTHER SYMPTOMS: \"Do you have any other symptoms?\" (e.g., back pain, diarrhea, fever, urination pain, vomiting)        Diarrhea, poor appetite, constant urge to defecate, extremely nauseous    Protocols used: Abdominal Pain - Female-Adult-[place]    "

## 2025-06-17 NOTE — ED PROVIDER NOTES
Time reflects when diagnosis was documented in both MDM as applicable and the Disposition within this note       Time User Action Codes Description Comment    6/17/2025 12:59 AM Angel Luis Langford Add [R42,  R55] Postural dizziness with presyncope     6/17/2025 12:59 AM Angel Luis Langford Add [R10.84] Generalized abdominal pain     6/17/2025 12:59 AM Angel Luis Langford Add [R19.7] Diarrhea     6/17/2025  1:06 AM Angel Luis Langford Add [I49.3] Premature ventricular contractions           ED Disposition       ED Disposition   Discharge    Condition   Stable    Date/Time   Tue Jun 17, 2025 12:59 AM    Comment   Roselyn Jose discharge to home/self care.                   Assessment & Plan       Medical Decision Making  59-year-old female with colicky abdominal pain, nonbloody diarrhea and presyncopal episodes.  She has had syncopal episodes in the past.  Likely vasovagal.  Will check electrolytes, fluid status (although looks euhydrated), cardiac monitor/EKG/troponin to rule ACS or dysrhythmia, infection.  No signs of sepsis or focal neurologic deficits..    Amount and/or Complexity of Data Reviewed  Labs: ordered.  ECG/medicine tests: ordered and independent interpretation performed.        ED Course as of 06/18/25 1111   Tue Jun 17, 2025   0058 Repeat orthostatic testing negative.  Ambulatory without difficulty; hungry.  Stable for discharge.       Medications   sodium chloride 0.9 % bolus 1,000 mL (0 mL Intravenous Stopped 6/16/25 2300)       ED Risk Strat Scores   HEART Risk Score      Flowsheet Row Most Recent Value   Heart Score Risk Calculator    History 0 Filed at: 06/17/2025 0108   ECG 0 Filed at: 06/17/2025 0108   Age 1 Filed at: 06/17/2025 0108   Risk Factors 1 Filed at: 06/17/2025 0108   Troponin 0 Filed at: 06/17/2025 0108   HEART Score 2 Filed at: 06/17/2025 0108          HEART Risk Score      Flowsheet Row Most Recent Value   Heart Score Risk Calculator    History 0 Filed at: 06/17/2025 0108   ECG 0  "Filed at: 06/17/2025 0108   Age 1 Filed at: 06/17/2025 0108   Risk Factors 1 Filed at: 06/17/2025 0108   Troponin 0 Filed at: 06/17/2025 0108   HEART Score 2 Filed at: 06/17/2025 0108                      No data recorded        SBIRT 22yo+      Flowsheet Row Most Recent Value   Initial Alcohol Screen: US AUDIT-C     1. How often do you have a drink containing alcohol? 0 Filed at: 06/16/2025 2053   2. How many drinks containing alcohol do you have on a typical day you are drinking?  0 Filed at: 06/16/2025 2053   3a. Male UNDER 65: How often do you have five or more drinks on one occasion? 0 Filed at: 06/16/2025 2053   3b. FEMALE Any Age, or MALE 65+: How often do you have 4 or more drinks on one occassion? 0 Filed at: 06/16/2025 2053   Audit-C Score 0 Filed at: 06/16/2025 2053   NANCY: How many times in the past year have you...    Used an illegal drug or used a prescription medication for non-medical reasons? Never Filed at: 06/16/2025 2053                            History of Present Illness       Chief Complaint   Patient presents with    Medical Problem     States, \"I've been sick for a month, I've been on abxs.\" Also c/o abd cramps and diarrhea. Pt stating her heart rate was in the 40's at home.       Past Medical History[1]   Past Surgical History[2]   Family History[3]   Social History[4]   E-Cigarette/Vaping    E-Cigarette Use Never User       E-Cigarette/Vaping Substances    Nicotine No     THC No     CBD No     Flavoring No     Other No     Unknown No       I have reviewed and agree with the history as documented.     59-year-old female with history of hypertension, hyperlipidemia, syncope, personal history of hypoglycemic episodes completed course of doxycycline about 2 weeks ago for presumed sinusitis.  She did not feel better.  She completed a course of oral steroids 2 days ago.  She felt much better.  She has had intermittent increase and decrease in appetite this past week.  She at times feels very " energetic and at times very tired.  Today at work she had intermittent lower abdominal cramping associated with diaphoresis, lightheadedness, pallor and feeling of near syncope.  There was no chest pain at the time.  She believes there were some palpitations with this.  This went on for several hours.  She passed some large nonbloody stool earlier in the day followed by multiple episodes of nonbloody diarrhea afterwards.  She got home and slept for 2 hours.  Awoke very cold and felt that she had the chills.  She says she has had history of colitis in the past.  Denies recent fever, chest pain, dysuria, foreign travel or any other change in medication.  Denies recent stimulant use.        Review of Systems   Constitutional:  Positive for chills and diaphoresis. Negative for fever and unexpected weight change.   HENT:  Positive for congestion.    Respiratory:  Negative for cough and shortness of breath.    Cardiovascular:  Positive for palpitations. Negative for chest pain and leg swelling.   Gastrointestinal:  Positive for abdominal pain and diarrhea. Negative for blood in stool and vomiting.   Genitourinary:  Negative for dysuria and flank pain.   Musculoskeletal:  Negative for neck stiffness.   Skin:  Negative for rash.   Neurological:  Positive for light-headedness. Negative for dizziness, syncope and headaches.           Objective       ED Triage Vitals   Temperature Pulse Blood Pressure Respirations SpO2 Patient Position - Orthostatic VS   06/16/25 2036 06/16/25 2036 06/16/25 2036 06/16/25 2036 06/16/25 2036 06/16/25 2148   99.1 °F (37.3 °C) 96 139/75 18 97 % Lying - Orthostatic VS      Temp Source Heart Rate Source BP Location FiO2 (%) Pain Score    06/16/25 2036 06/16/25 2036 06/16/25 2036 -- 06/16/25 2036    Oral Monitor Left arm  No Pain      Vitals      Date and Time Temp Pulse SpO2 Resp BP Pain Score FACES Pain Rating User   06/17/25 0100 -- 78 95 % 17 131/62 -- -- CC   06/16/25 2332 -- 88 96 % 19 121/64  -- --    06/16/25 2330 -- 83 97 % 19 131/64 -- --    06/16/25 2329 -- 78 96 % 17 137/67 -- --    06/16/25 2200 -- 87 97 % 14 124/67 -- --    06/16/25 2153 -- 92 -- -- 141/63 -- --    06/16/25 2150 -- 83 -- -- 139/63 -- --    06/16/25 2148 -- 78 -- -- 129/58 -- --    06/16/25 2054 -- -- -- -- -- No Pain --    06/16/25 2045 -- 98 96 % 20 139/75 -- --    06/16/25 2036 99.1 °F (37.3 °C) 96 97 % 18 139/75 No Pain -- AD            Physical Exam  Vitals and nursing note reviewed.   Constitutional:       General: She is not in acute distress.     Appearance: She is well-developed. She is obese. She is not ill-appearing or diaphoretic.   HENT:      Head: Normocephalic and atraumatic.      Right Ear: External ear normal.      Left Ear: External ear normal.      Nose: Nose normal.      Mouth/Throat:      Mouth: Mucous membranes are moist.      Pharynx: Oropharynx is clear.     Eyes:      General: No scleral icterus.     Extraocular Movements: Extraocular movements intact.      Conjunctiva/sclera: Conjunctivae normal.      Pupils: Pupils are equal, round, and reactive to light.     Neck:      Vascular: No carotid bruit.     Cardiovascular:      Rate and Rhythm: Normal rate and regular rhythm.      Pulses: Normal pulses.      Heart sounds: Normal heart sounds.   Pulmonary:      Effort: Pulmonary effort is normal. No respiratory distress.      Breath sounds: Normal breath sounds.   Abdominal:      General: There is no distension.      Palpations: Abdomen is soft. There is no mass.      Tenderness: There is abdominal tenderness (Mild, Diffuse). There is no guarding or rebound.      Hernia: No hernia is present.     Musculoskeletal:         General: No tenderness. Normal range of motion.      Cervical back: Neck supple. No tenderness.      Right lower leg: No edema.      Left lower leg: No edema.     Skin:     General: Skin is warm and dry.      Capillary Refill: Capillary refill takes less than 2 seconds.       Coloration: Skin is not jaundiced.      Findings: No rash.     Neurological:      General: No focal deficit present.      Mental Status: She is alert and oriented to person, place, and time. Mental status is at baseline.      Cranial Nerves: No cranial nerve deficit.      Sensory: No sensory deficit.      Motor: No weakness.      Coordination: Coordination normal.      Gait: Gait normal.      Deep Tendon Reflexes: Reflexes are normal and symmetric.     Psychiatric:         Mood and Affect: Mood normal.         Behavior: Behavior normal.         Results Reviewed       Procedure Component Value Units Date/Time    HS Troponin I 2hr [[id number]]  (Normal) Collected: 06/16/25 2342    Lab Status: Final result Specimen: Blood from Hand, Left Updated: 06/17/25 0019     hs TnI 2hr 3 ng/L      Delta 2hr hsTnI >1 ng/L     Urine Microscopic [[id number]]  (Abnormal) Collected: 06/16/25 2202    Lab Status: Final result Specimen: Urine, Clean Catch Updated: 06/16/25 2223     RBC, UA 0-1 /hpf      WBC, UA 0-1 /hpf      Epithelial Cells Occasional /hpf      Bacteria, UA Moderate /hpf     UA (URINE) with reflex to Scope [[id number]]  (Abnormal) Collected: 06/16/25 2202    Lab Status: Final result Specimen: Urine, Clean Catch Updated: 06/16/25 2215     Color, UA Yellow     Clarity, UA Clear     Specific Gravity, UA >=1.030     pH, UA 5.0     Leukocytes, UA Large     Nitrite, UA Negative     Protein, UA Negative mg/dl      Glucose, UA Negative mg/dl      Ketones, UA Negative mg/dl      Urobilinogen, UA <2.0 mg/dl      Bilirubin, UA Negative     Occult Blood, UA Negative    HS Troponin 0hr (reflex protocol) [[id number]]  (Normal) Collected: 06/16/25 2100    Lab Status: Final result Specimen: Blood from Arm, Right Updated: 06/16/25 2129     hs TnI 0hr <2 ng/L     Comprehensive metabolic panel [[id number]] Collected: 06/16/25 2100    Lab Status: Final result Specimen: Blood from Arm, Right Updated: 06/16/25 2122     Sodium 138 mmol/L       Potassium 3.7 mmol/L      Chloride 102 mmol/L      CO2 27 mmol/L      ANION GAP 9 mmol/L      BUN 23 mg/dL      Creatinine 0.80 mg/dL      Glucose 99 mg/dL      Calcium 8.9 mg/dL      AST 21 U/L      ALT 25 U/L      Alkaline Phosphatase 87 U/L      Total Protein 6.6 g/dL      Albumin 4.0 g/dL      Total Bilirubin 0.72 mg/dL      eGFR 79 ml/min/1.73sq m     Narrative:      National [employer] guidelines for Chronic Kidney Disease (CKD):     Stage 1 with normal or high GFR (GFR > 90 mL/min/1.73 square meters)    Stage 2 Mild CKD (GFR = 60-89 mL/min/1.73 square meters)    Stage 3A Moderate CKD (GFR = 45-59 mL/min/1.73 square meters)    Stage 3B Moderate CKD (GFR = 30-44 mL/min/1.73 square meters)    Stage 4 Severe CKD (GFR = 15-29 mL/min/1.73 square meters)    Stage 5 End Stage CKD (GFR <15 mL/min/1.73 square meters)  Note: GFR calculation is accurate only with a steady state creatinine    Magnesium [[id number]]  (Normal) Collected: 06/16/25 2100    Lab Status: Final result Specimen: Blood from Arm, Right Updated: 06/16/25 2122     Magnesium 2.3 mg/dL     CBC and differential [[id number]]  (Abnormal) Collected: 06/16/25 2100    Lab Status: Final result Specimen: Blood from Arm, Right Updated: 06/16/25 2107     WBC 10.85 Thousand/uL      RBC 4.41 Million/uL      Hemoglobin 13.4 g/dL      Hematocrit 39.4 %      MCV 89 fL      MCH 30.4 pg      MCHC 34.0 g/dL      RDW 13.3 %      MPV 11.2 fL      Platelets 331 Thousands/uL      nRBC 0 /100 WBCs      Segmented % 71 %      Immature Grans % 1 %      Lymphocytes % 18 %      Monocytes % 8 %      Eosinophils Relative 2 %      Basophils Relative 0 %      Absolute Neutrophils 7.70 Thousands/µL      Absolute Immature Grans 0.07 Thousand/uL      Absolute Lymphocytes 1.96 Thousands/µL      Absolute Monocytes 0.91 Thousand/µL      Eosinophils Absolute 0.18 Thousand/µL      Basophils Absolute 0.03 Thousands/µL             No orders to display       ECG 12 Lead  Documentation Only    Date/Time: 6/16/2025 8:43 PM    Performed by: Angel Luis Langford DO  Authorized by: Angel Luis Langford DO    ECG reviewed by me, the ED Provider: yes    Patient location:  ED  Previous ECG:     Previous ECG:  Compared to current    Similarity:  Changes noted    Comparison to cardiac monitor: Yes    Rate:     ECG rate assessment: normal    Rhythm:     Rhythm: sinus rhythm    Ectopy:     Ectopy: PVCs      PVCs:  Frequent and unifocal  QRS:     QRS axis:  Normal    QRS intervals:  Normal  Conduction:     Conduction: normal    ST segments:     ST segments:  Normal  T waves:     T waves: normal        ED Medication and Procedure Management   Prior to Admission Medications   Prescriptions Last Dose Informant Patient Reported? Taking?   Fluticasone-Salmeterol (Advair) 100-50 mcg/dose inhaler   No No   Sig: INHALE 1 DOSE BY MOUTH TWICE DAILY RINSE MOUTH AFTER USE   Loratadine 10 MG CAPS  Self Yes No   Sig: Take by mouth   Multiple Vitamins-Minerals (multivitamin with minerals) tablet  Self No No   Sig: Take 1 tablet by mouth daily   Omega-3 Fatty Acids (fish oil) 1,000 mg  Self Yes No   Sig: Take 1,000 mg by mouth in the morning.   albuterol (Proventil HFA) 90 mcg/act inhaler   No No   Sig: Inhale 2 puffs every 6 (six) hours as needed for wheezing or shortness of breath   amitriptyline (ELAVIL) 10 mg tablet  Self No No   Sig: Take 1 tablet (10 mg total) by mouth daily at bedtime   Patient not taking: Reported on 11/11/2024   atorvastatin (LIPITOR) 20 mg tablet   No No   Sig: Take 1 tablet (20 mg total) by mouth daily Take half tab every other day x2 weeks then half tab daily x2 weeks & then full tab daily afterwards.   benzonatate (TESSALON) 200 MG capsule   No No   Sig: Take 1 capsule (200 mg total) by mouth 3 (three) times a day as needed for cough   cholecalciferol (VITAMIN D3) 1,000 units tablet  Self No No   Sig: Take 2 tablets (2,000 Units total) by mouth daily   cholestyramine (QUESTRAN) 4 g  packet  Self No No   Sig: Take 1 packet (4 g total) by mouth daily at bedtime   Patient not taking: Reported on 6/1/2025   loperamide (IMODIUM) 2 mg capsule  Self Yes No   Sig: Take 2 mg by mouth as needed in the morning and 2 mg as needed at noon and 2 mg as needed in the evening and 2 mg as needed before bedtime for diarrhea.   losartan-hydrochlorothiazide (HYZAAR) 100-25 MG per tablet   No No   Sig: Take 1 tablet by mouth once daily   methylPREDNISolone 4 MG tablet therapy pack   No No   Sig: Use as directed on package   omeprazole (PriLOSEC) 40 MG capsule  Self No No   Sig: Take 1 capsule (40 mg total) by mouth 2 (two) times a day   ondansetron (ZOFRAN-ODT) 4 mg disintegrating tablet   No No   Sig: Take 1 tablet (4 mg total) by mouth every 6 (six) hours as needed for nausea or vomiting   valACYclovir (VALTREX) 1,000 mg tablet  Self No No   Sig: Take 2 tablets (2,000 mg total) by mouth every 12 (twelve) hours for 2 doses   Patient not taking: Reported on 11/11/2024      Facility-Administered Medications: None     Discharge Medication List as of 6/17/2025  1:10 AM        CONTINUE these medications which have NOT CHANGED    Details   albuterol (Proventil HFA) 90 mcg/act inhaler Inhale 2 puffs every 6 (six) hours as needed for wheezing or shortness of breath, Starting Mon 6/9/2025, Normal      amitriptyline (ELAVIL) 10 mg tablet Take 1 tablet (10 mg total) by mouth daily at bedtime, Starting Tue 6/4/2024, Normal      atorvastatin (LIPITOR) 20 mg tablet Take 1 tablet (20 mg total) by mouth daily Take half tab every other day x2 weeks then half tab daily x2 weeks & then full tab daily afterwards., Starting Mon 11/11/2024, Normal      benzonatate (TESSALON) 200 MG capsule Take 1 capsule (200 mg total) by mouth 3 (three) times a day as needed for cough, Starting Sun 6/1/2025, Normal      cholecalciferol (VITAMIN D3) 1,000 units tablet Take 2 tablets (2,000 Units total) by mouth daily, Starting Fri 11/25/2022, Normal       cholestyramine (QUESTRAN) 4 g packet Take 1 packet (4 g total) by mouth daily at bedtime, Starting Wed 1/11/2023, Normal      Fluticasone-Salmeterol (Advair) 100-50 mcg/dose inhaler INHALE 1 DOSE BY MOUTH TWICE DAILY RINSE MOUTH AFTER USE, Starting Mon 2/24/2025, Normal      loperamide (IMODIUM) 2 mg capsule Take 2 mg by mouth as needed in the morning and 2 mg as needed at noon and 2 mg as needed in the evening and 2 mg as needed before bedtime for diarrhea., Historical Med      Loratadine 10 MG CAPS Take by mouth, Historical Med      losartan-hydrochlorothiazide (HYZAAR) 100-25 MG per tablet Take 1 tablet by mouth once daily, Starting Mon 4/21/2025, Normal      methylPREDNISolone 4 MG tablet therapy pack Use as directed on package, Normal      Multiple Vitamins-Minerals (multivitamin with minerals) tablet Take 1 tablet by mouth daily, Starting Fri 3/24/2023, Normal      Omega-3 Fatty Acids (fish oil) 1,000 mg Take 1,000 mg by mouth in the morning., Historical Med      omeprazole (PriLOSEC) 40 MG capsule Take 1 capsule (40 mg total) by mouth 2 (two) times a day, Starting Wed 2/7/2024, Normal      ondansetron (ZOFRAN-ODT) 4 mg disintegrating tablet Take 1 tablet (4 mg total) by mouth every 6 (six) hours as needed for nausea or vomiting, Starting Mon 6/9/2025, Normal      valACYclovir (VALTREX) 1,000 mg tablet Take 2 tablets (2,000 mg total) by mouth every 12 (twelve) hours for 2 doses, Starting Fri 1/19/2024, Until Fri 8/30/2024, Normal      dicyclomine (BENTYL) 10 mg capsule Take 2 capsules (20 mg total) by mouth 4 (four) times a day (before meals and at bedtime), Starting Tue 6/4/2024, Normal             ED SEPSIS DOCUMENTATION   Time reflects when diagnosis was documented in both MDM as applicable and the Disposition within this note       Time User Action Codes Description Comment    6/17/2025 12:59 AM Angel Luis Langford Add [R42,  R55] Postural dizziness with presyncope     6/17/2025 12:59 AM Angel Luis Langford  Add [R10.84] Generalized abdominal pain     2025 12:59 AM Angel Luis Langford Add [R19.7] Diarrhea     2025  1:06 AM Angel Luis Langford Add [I49.3] Premature ventricular contractions                      [1]   Past Medical History:  Diagnosis Date    Arthritis     GERD (gastroesophageal reflux disease)     Hyperlipidemia     Hypertension     Irritable bowel syndrome     Low back pain     PONV (postoperative nausea and vomiting)     Syncope    [2]   Past Surgical History:  Procedure Laterality Date    CATARACT EXTRACTION Bilateral      SECTION N/A     CHOLECYSTECTOMY      COLONOSCOPY  2023    HERNIA REPAIR      HYSTERECTOMY      NASAL SEPTUM SURGERY      GA ARTHRP ACETBLR/PROX FEM PROSTC AGRFT/ALGRFT Right 2023    Procedure: ARTHROPLASTY HIP TOTAL;  Surgeon: Kurt Douglas DO;  Location: [facility];  Service: Orthopedics    GA ARTHRP ACETBLR/PROX FEM PROSTC AGRFT/ALGRFT Left 2023    Procedure: ARTHROPLASTY HIP TOTAL;  Surgeon: Kurt Douglas DO;  Location: [facility];  Service: Orthopedics    TONSILLECTOMY      UPPER GASTROINTESTINAL ENDOSCOPY  2023   [3]   Family History  Problem Relation Name Age of Onset    Hypertension Mother      Skin cancer Mother      Stroke Father      No Known Problems Sister      Hypertension Son      Skin cancer Daughter      Cancer Daughter      No Known Problems Daughter      Ovarian cancer Daughter      Ovarian cancer Maternal Grandmother      No Known Problems Maternal Grandfather      No Known Problems Paternal Grandmother      No Known Problems Paternal Grandfather      No Known Problems Maternal Aunt      No Known Problems Maternal Aunt      No Known Problems Maternal Aunt      No Known Problems Maternal Aunt      Colon cancer Cousin      Colon polyps Neg Hx     [4]   Social History  Tobacco Use    Smoking status: Former     Current packs/day: 0.00     Types: Cigarettes     Quit date:      Years since quittin.4     Smokeless tobacco: Never   Vaping Use    Vaping status: Never Used   Substance Use Topics    Alcohol use: Not Currently     Comment: extremely rare social occasion    Drug use: Never        Angel Luis Langford DO  06/18/25 1111

## 2025-06-18 ENCOUNTER — OFFICE VISIT (OUTPATIENT)
Dept: FAMILY MEDICINE CLINIC | Facility: HOSPITAL | Age: 60
End: 2025-06-18
Payer: COMMERCIAL

## 2025-06-18 ENCOUNTER — HOSPITAL ENCOUNTER (OUTPATIENT)
Dept: CT IMAGING | Facility: HOSPITAL | Age: 60
Discharge: HOME | End: 2025-06-18
Attending: NURSE PRACTITIONER
Payer: COMMERCIAL

## 2025-06-18 VITALS
SYSTOLIC BLOOD PRESSURE: 108 MMHG | BODY MASS INDEX: 37.55 KG/M2 | OXYGEN SATURATION: 98 % | WEIGHT: 212 LBS | TEMPERATURE: 98.9 F | HEART RATE: 72 BPM | DIASTOLIC BLOOD PRESSURE: 64 MMHG

## 2025-06-18 DIAGNOSIS — K21.9 GASTROESOPHAGEAL REFLUX DISEASE WITHOUT ESOPHAGITIS: Chronic | ICD-10-CM

## 2025-06-18 DIAGNOSIS — K44.9 HIATAL HERNIA: ICD-10-CM

## 2025-06-18 DIAGNOSIS — R19.7 DIARRHEA OF PRESUMED INFECTIOUS ORIGIN: Primary | ICD-10-CM

## 2025-06-18 DIAGNOSIS — R06.02 SOB (SHORTNESS OF BREATH): ICD-10-CM

## 2025-06-18 DIAGNOSIS — R19.7 DIARRHEA OF PRESUMED INFECTIOUS ORIGIN: ICD-10-CM

## 2025-06-18 PROCEDURE — 74177 CT ABD & PELVIS W/CONTRAST: CPT

## 2025-06-18 PROCEDURE — 99214 OFFICE O/P EST MOD 30 MIN: CPT | Performed by: NURSE PRACTITIONER

## 2025-06-18 PROCEDURE — 71260 CT THORAX DX C+: CPT

## 2025-06-18 RX ADMIN — IOHEXOL 100 ML: 350 INJECTION, SOLUTION INTRAVENOUS at 17:36

## 2025-06-18 NOTE — ASSESSMENT & PLAN NOTE
History of chronic sinusitis, GERD, hiatal hernia as well as IBS.  Was seen in urgent care 6/1 for sinusitis and provided doxycycline and Tessalon Perles.  COVID-negative per home test at that time.  Was not checked for RSV nor flu.  Had follow-up with Dr. Lara on 6/9/2025 with improvement of sinusitis however now with chest congestion and cough and presumed worsened PND.  Declined stronger cough medication and was agreeable to Medrol Dosepak along with OTC options.  Reported improvement of sinusitis (however persistent cough with sensation of chest congestion).  Continues use of Claritin, Advair, albuterol, tessalon perles.  Was taking ibuprofen for symptoms- last dose yesterday  - ER on 6/16/2025 for acute onset of lower abdominal cramping with diarrhea post meals,  diaphoresis dizziness and presyncopal sensations.  Associated fatigue and malaise with occasional chills.  Electrolytes were stable, did have leukocytosis with elevated neutrophils without overt cause.  UA likely contaminated.  Patient called her GI physician who ordered Bentyl which she states is helping her pain and stool cultures which are not yet completed.  Hasn't been using imodium nor questran as this causes her constipation with her IBS.    - Today presents with ongoing intermittent chills with variable appetite but is trying to push fluids.  Associated fatigue and malaise.  Nasal congestion postnasal drip and rhinorrhea.  Throat irritation from coughing.  BELLE with coughing and sensation of chest congestion.  Chronic intermittent palpitations (noted to have sinus rhythm with PVCs in ER.) lower abdominal pain with watery/loose yellow diarrhea without mucoid/melena appearance.  Ongoing nausea with use of Zofran 3-4 times daily.  No vomiting.  Dizziness with coughing spells and ongoing sinus/tension headache.  Able to sleep at night  -Mildly ill in appearance.  Nasal congestion with clear rhinorrhea and swollen turbinates.  + PND without  cervical adenopathy present.  Lung sounds with slightly diminished bases and persistent dry cough.  Generalized abdominal tenderness concentrated in epigastric as well as bilateral lower quadrants.  Moist mucous membranes.  -With history of colitis and ongoing respiratory symptoms we will check CT C/A/P.  Requested she complete stool cultures as ordered by GI and continue all supportive treatment at this point in time.

## 2025-06-18 NOTE — PROGRESS NOTES
[facility]   Barbara DIANE    Assessment/Plan:   1. Diarrhea of presumed infectious origin  Assessment & Plan:  History of chronic sinusitis, GERD, hiatal hernia as well as IBS.  Was seen in urgent care 6/1 for sinusitis and provided doxycycline and Tessalon Perles.  COVID-negative per home test at that time.  Was not checked for RSV nor flu.  Had follow-up with Dr. Lara on 6/9/2025 with improvement of sinusitis however now with chest congestion and cough and presumed worsened PND.  Declined stronger cough medication and was agreeable to Medrol Dosepak along with OTC options.  Reported improvement of sinusitis (however persistent cough with sensation of chest congestion).  Continues use of Claritin, Advair, albuterol, tessalon perles.  Was taking ibuprofen for symptoms- last dose yesterday  - ER on 6/16/2025 for acute onset of lower abdominal cramping with diarrhea post meals,  diaphoresis dizziness and presyncopal sensations.  Associated fatigue and malaise with occasional chills.  Electrolytes were stable, did have leukocytosis with elevated neutrophils without overt cause.  UA likely contaminated.  Patient called her GI physician who ordered Bentyl which she states is helping her pain and stool cultures which are not yet completed.  Hasn't been using imodium nor questran as this causes her constipation with her IBS.    - Today presents with ongoing intermittent chills with variable appetite but is trying to push fluids.  Associated fatigue and malaise.  Nasal congestion postnasal drip and rhinorrhea.  Throat irritation from coughing.  BELLE with coughing and sensation of chest congestion.  Chronic intermittent palpitations (noted to have sinus rhythm with PVCs in ER.) lower abdominal pain with watery/loose yellow diarrhea without mucoid/melena appearance.  Ongoing nausea with use of Zofran 3-4 times daily.  No vomiting.  Dizziness with coughing spells and ongoing sinus/tension headache.   Able to sleep at night  -Mildly ill in appearance.  Nasal congestion with clear rhinorrhea and swollen turbinates.  + PND without cervical adenopathy present.  Lung sounds with slightly diminished bases and persistent dry cough.  Generalized abdominal tenderness concentrated in epigastric as well as bilateral lower quadrants.  Moist mucous membranes.  -With history of colitis and ongoing respiratory symptoms we will check CT C/A/P.  Requested she complete stool cultures as ordered by GI and continue all supportive treatment at this point in time.  Orders:  -     CT chest abdomen pelvis w contrast; Future; Expected date: 06/18/2025  2. SOB (shortness of breath)  -     CT chest abdomen pelvis w contrast; Future; Expected date: 06/18/2025  3. Hiatal hernia  -     CT chest abdomen pelvis w contrast; Future; Expected date: 06/18/2025  4. Gastroesophageal reflux disease without esophagitis      Obtain stool cultures as ordered by GI.  Continue to hold imodium  Chest/abdomen  CT scan   No follow-ups on file.  Patient may call or return to office with any questions or concerns.     ______________________________________________________________________  Subjective:     Patient ID: Roselyn Jose is a 59 y.o. female.  Roselyn Jose  Chief Complaint   Patient presents with    Follow-up     ED follow up. Ongoing cough, requesting cxr.     Per A/P                 The following portions of the patient's history were reviewed and updated as appropriate: allergies, current medications, past family history, past medical history, past social history, past surgical history, and problem list.    Review of Systems   Constitutional:  Positive for chills and fatigue. Negative for activity change, appetite change, fever and unexpected weight change.        Occasional chills with appetite variable but pushing oral intake.    HENT:  Positive for congestion, postnasal drip and rhinorrhea. Negative for ear pain, sinus pressure, sinus  pain and sneezing.         Throat irritation from cough.    Eyes: Negative.  Negative for discharge and itching.   Respiratory:  Positive for cough and shortness of breath. Negative for chest tightness.         BELLE with coughing with sensation of chest congestion   Cardiovascular:  Positive for palpitations. Negative for chest pain and leg swelling.        Ongoing chronic intermittent palpitations.  Noted to have SR with PVCs   Gastrointestinal:  Positive for abdominal pain, diarrhea and nausea. Negative for constipation and vomiting.        Lower abdominal pain since Saturday.   Watery/loose yellow diarrhea without mucoid/blood   Ongoing nausea with use of zofran 3-4times daily.    Endocrine: Negative.    Genitourinary: Negative.  Negative for dysuria, hematuria and vaginal bleeding.   Musculoskeletal:  Positive for myalgias. Negative for arthralgias.   Skin: Negative.    Allergic/Immunologic: Positive for environmental allergies. Negative for immunocompromised state.   Neurological:  Positive for dizziness and headaches. Negative for light-headedness.        Was presyncopal on Monday.   HA sinus/tension  Coughing spells causing dizziness.   Hematological: Negative.    Psychiatric/Behavioral: Negative.  Negative for sleep disturbance.          Objective:      Vitals:    06/18/25 1352   BP: 108/64   Pulse: 72   Temp: 98.9 °F (37.2 °C)   SpO2: 98%      Physical Exam  Vitals and nursing note reviewed.   Constitutional:       General: She is awake.      Appearance: Normal appearance. She is ill-appearing.   HENT:      Head: Normocephalic and atraumatic.      Right Ear: Tympanic membrane, ear canal and external ear normal. No tenderness. No middle ear effusion. No mastoid tenderness.      Left Ear: Tympanic membrane, ear canal and external ear normal. No tenderness.  No middle ear effusion. No mastoid tenderness.      Nose: Congestion and rhinorrhea present. Rhinorrhea is clear.      Right Turbinates: Swollen.      Left  "Turbinates: Swollen.      Right Sinus: No maxillary sinus tenderness or frontal sinus tenderness.      Left Sinus: No maxillary sinus tenderness or frontal sinus tenderness.      Mouth/Throat:      Mouth: Mucous membranes are moist.      Pharynx: Oropharynx is clear. Postnasal drip present. No oropharyngeal exudate or uvula swelling.     Eyes:      Extraocular Movements: Extraocular movements intact.      Conjunctiva/sclera: Conjunctivae normal.      Pupils: Pupils are equal, round, and reactive to light.       Cardiovascular:      Rate and Rhythm: Normal rate and regular rhythm.      Pulses: Normal pulses.      Heart sounds: Normal heart sounds. No murmur heard.  Pulmonary:      Effort: Pulmonary effort is normal.      Breath sounds: Normal breath sounds. No wheezing, rhonchi or rales.      Comments: Slightly diminished bases with dry persistent cough  Abdominal:      General: Bowel sounds are increased.      Palpations: Abdomen is soft.      Tenderness: There is abdominal tenderness in the right lower quadrant, epigastric area and left lower quadrant. There is no guarding or rebound.      Hernia: A hernia is present.     Musculoskeletal:         General: Normal range of motion.      Cervical back: Normal range of motion and neck supple.      Right lower leg: No edema.      Left lower leg: No edema.   Lymphadenopathy:      Cervical: No cervical adenopathy.     Skin:     General: Skin is warm and dry.      Coloration: Skin is pale.     Neurological:      General: No focal deficit present.      Mental Status: She is alert and oriented to person, place, and time.      Gait: Gait is intact.     Psychiatric:         Mood and Affect: Mood normal.         Behavior: Behavior normal. Behavior is cooperative.         Thought Content: Thought content normal.         Judgment: Judgment normal.           Portions of the record may have been created with voice recognition software. Occasional wrong word or \"sound alike\" " substitutions may have occurred due to the inherent limitations of voice recognition software. Please review the chart carefully and recognize, using context, where substitutions/typographical errors may have occurred.

## 2025-06-19 ENCOUNTER — RESULTS FOLLOW-UP (OUTPATIENT)
Dept: FAMILY MEDICINE CLINIC | Facility: HOSPITAL | Age: 60
End: 2025-06-19

## 2025-06-20 DIAGNOSIS — Z80.1 FAMILY HISTORY OF LUNG CANCER: ICD-10-CM

## 2025-06-20 DIAGNOSIS — R91.1 PULMONARY NODULE: Primary | ICD-10-CM

## 2025-06-20 DIAGNOSIS — Z87.891 FORMER SMOKER: ICD-10-CM

## 2025-07-07 ENCOUNTER — CONSULT (OUTPATIENT)
Age: 60
End: 2025-07-07
Payer: COMMERCIAL

## 2025-07-07 VITALS
TEMPERATURE: 98.4 F | BODY MASS INDEX: 37.56 KG/M2 | HEART RATE: 96 BPM | SYSTOLIC BLOOD PRESSURE: 130 MMHG | DIASTOLIC BLOOD PRESSURE: 78 MMHG | WEIGHT: 212 LBS | OXYGEN SATURATION: 98 % | HEIGHT: 63 IN

## 2025-07-07 DIAGNOSIS — Z87.891 FORMER SMOKER: ICD-10-CM

## 2025-07-07 DIAGNOSIS — J47.9 BRONCHIECTASIS WITHOUT COMPLICATION (HCC): Primary | ICD-10-CM

## 2025-07-07 DIAGNOSIS — Z80.1 FAMILY HISTORY OF LUNG CANCER: ICD-10-CM

## 2025-07-07 DIAGNOSIS — R91.1 PULMONARY NODULE: ICD-10-CM

## 2025-07-07 PROCEDURE — 99204 OFFICE O/P NEW MOD 45 MIN: CPT | Performed by: INTERNAL MEDICINE

## 2025-07-07 NOTE — LETTER
July 7, 2025     Patient: Roselyn Jose  YOB: 1965  Date of Visit: 7/7/2025      To Whom it May Concern:    Roselyn Jose is under my professional care. Roselyn was seen in my office on 7/7/2025. Roselyn may return to work on 7/8/25.    If you have any questions or concerns, please don't hesitate to call.         Sincerely,          Hayder Wasserman DO        CC: No Recipients

## 2025-07-07 NOTE — LETTER
July 8, 2025     Saray Lara DO  [facility] [address] [address]    Patient: Roselyn Jose   YOB: 1965   Date of Visit: 7/7/2025       Dear Dr. Saray Lara, :    Thank you for referring Roselyn Jose to me for evaluation. Below are my notes for this consultation.    If you have questions, please do not hesitate to call me. I look forward to following your patient along with you.         Sincerely,        Hayder Wasserman DO        CC: No Recipients    Hayder Wasserman DO  7/8/2025  3:23 PM  Sign when Signing Visit  Pulmonary Consultation   Roselyn Jose 59 y.o. female MRN: [id number]      Reason for consultation: Pulmonary nodule, family history of lung cancer.      Requesting physician: Barbara DIANE    Assessment/Plan  59 y.o. F with PMHx of GERD, IBS, hyperlipidemia, low back pain who comes in for abnormal CT chest.    1.  Abnormal CT chest - Ovoid 7 mm pulmonary nodule in the right lower lobe along the major fissure, series 3 image 93, indeterminate though suggestive of a lymph node no tracheal or endobronchial lesion. Strong history of cancer in her family.  Former smoker 1/2 ppd x 15 years.       -   Repeat CT chest in 6 months due to her family history of malignancy.  She is very nervous to extend CT out to 1 year and would like it followed sooner than that.      2.  Recurrent bronchitis/pneumonia in her youth - mild bronchiectasis/bronchiolectasis on CT       -  Check immunoglobulins, hypersenstivity pneumonitis panel, northeast panel.       -  She has noted benefit with Advair 100 Daily.  I recommended she start using Advair BID.       -  Follow repeat CT as above      History of Present Illness  HPI:  Roselyn Jose is a 59 y.o. female with PMHx as below who comes in for evaluation of pulmonary nodule.   There was a CT performed which showed a pulmonary nodule.  This occurred around a recent bronchitis which then caused her concern as  she has a family history with multiple people with cancer.  She has a history of her mom who had melanoma that metastasized to lung as well.  She did smoker 1/2 ppd for 15 years.   She quit several years ago.  She has been using Advair Daily.  She has been dealing with irritating cough and has noted that the advair has calmed it some.   She states that it is less frequent but still present.  She notes dyspnea on exertion including walking on a level ground, slight hill or up stairs.      ROS:   Review of Systems   Constitutional:  Positive for fatigue.   HENT: Negative.     Eyes: Negative.    Respiratory:  Positive for chest tightness, shortness of breath and wheezing.    Cardiovascular: Negative.    Gastrointestinal: Negative.    Endocrine: Negative.    Genitourinary: Negative.    Musculoskeletal: Negative.    Allergic/Immunologic: Negative.    Hematological: Negative.    Psychiatric/Behavioral: Negative.         Historical Information  Past Medical History[1]  Past Surgical History[2]  Family History[3]  Social History     Socioeconomic History   • Marital status:      Spouse name: Not on file   • Number of children: Not on file   • Years of education: Not on file   • Highest education level: Not on file   Occupational History   • Not on file   Tobacco Use   • Smoking status: Former     Current packs/day: 0.50     Average packs/day: 0.5 packs/day for 49.5 years (24.8 ttl pk-yrs)     Types: Cigarettes     Start date: 1976   • Smokeless tobacco: Never   • Tobacco comments:     Pt states she stopped and started smoking 1976 -2013   Vaping Use   • Vaping status: Never Used   Substance and Sexual Activity   • Alcohol use: Not Currently     Comment: extremely rare social occasion   • Drug use: Never   • Sexual activity: Never   Other Topics Concern   • Not on file   Social History Narrative   • Not on file     Social Drivers of Health     Financial Resource Strain: Not on file   Food Insecurity: No Food  Insecurity (5/18/2023)    Hunger Vital Sign    • Worried About Running Out of Food in the Last Year: Never true    • Ran Out of Food in the Last Year: Never true   Transportation Needs: No Transportation Needs (5/18/2023)    PRAPARE - Transportation    • Lack of Transportation (Medical): No    • Lack of Transportation (Non-Medical): No   Physical Activity: Not on file   Stress: Not on file   Social Connections: Not on file   Intimate Partner Violence: Not on file   Housing Stability: Low Risk  (5/18/2023)    Housing Stability Vital Sign    • Unable to Pay for Housing in the Last Year: No    • Number of Places Lived in the Last Year: 1    • Unstable Housing in the Last Year: No       Occupational History: Dental assistant     Meds/Allergies  Allergies[4]    Home medications:  Prior to Admission medications    Medication Sig Start Date End Date Taking? Authorizing Provider   albuterol (Proventil HFA) 90 mcg/act inhaler Inhale 2 puffs every 6 (six) hours as needed for wheezing or shortness of breath 6/9/25  Yes Saray Lara DO   atorvastatin (LIPITOR) 20 mg tablet Take 1 tablet (20 mg total) by mouth daily Take half tab every other day x2 weeks then half tab daily x2 weeks & then full tab daily afterwards. 11/11/24  Yes Maxime Bundy MD   cholecalciferol (VITAMIN D3) 1,000 units tablet Take 2 tablets (2,000 Units total) by mouth daily  Patient taking differently: Take 1,000 Units by mouth daily 11/25/22  Yes Maryana Patel PA-C   dicyclomine (BENTYL) 10 mg capsule Take 1 capsule (10 mg total) by mouth 4 (four) times a day as needed (abdominal pain/spasm/cramping) 6/17/25  Yes Rcahana Rasheed PA-C   Fluticasone-Salmeterol (Advair) 100-50 mcg/dose inhaler INHALE 1 DOSE BY MOUTH TWICE DAILY RINSE MOUTH AFTER USE 2/24/25  Yes Saray Lara DO   loperamide (IMODIUM) 2 mg capsule Take 2 mg by mouth as needed in the morning and 2 mg as needed at noon and 2 mg as needed in the evening and 2 mg as needed  "before bedtime for diarrhea.   Yes Historical Provider, MD   Loratadine 10 MG CAPS Take by mouth   Yes Historical Provider, MD   losartan-hydrochlorothiazide (HYZAAR) 100-25 MG per tablet Take 1 tablet by mouth once daily 4/21/25  Yes Saray Lara DO   Multiple Vitamins-Minerals (multivitamin with minerals) tablet Take 1 tablet by mouth daily 3/24/23  Yes Maryana Patel PA-C   Omega-3 Fatty Acids (fish oil) 1,000 mg Take 1,000 mg by mouth in the morning.   Yes Historical Provider, MD   omeprazole (PriLOSEC) 40 MG capsule Take 1 capsule (40 mg total) by mouth 2 (two) times a day 2/7/24  Yes Kentrell Ross DO   ondansetron (ZOFRAN-ODT) 4 mg disintegrating tablet Take 1 tablet (4 mg total) by mouth every 6 (six) hours as needed for nausea or vomiting 6/9/25  Yes Saray Lara DO   amitriptyline (ELAVIL) 10 mg tablet Take 1 tablet (10 mg total) by mouth daily at bedtime  Patient not taking: Reported on 11/11/2024 6/4/24   Kentrell Ross DO   benzonatate (TESSALON) 200 MG capsule Take 1 capsule (200 mg total) by mouth 3 (three) times a day as needed for cough  Patient not taking: Reported on 7/7/2025 6/1/25   BENEDICTO Bergman   valACYclovir (VALTREX) 1,000 mg tablet Take 2 tablets (2,000 mg total) by mouth every 12 (twelve) hours for 2 doses  Patient not taking: Reported on 11/11/2024 1/19/24 8/30/24  Saray Lara DO       Vitals:   Blood pressure 130/78, pulse 96, temperature 98.4 °F (36.9 °C), temperature source Tympanic Core, height 5' 3\" (1.6 m), weight 96.2 kg (212 lb), SpO2 98%, not currently breastfeeding., RA, Body mass index is 37.55 kg/m².       Physical Exam  General: Pleasant, Awake alert and oriented x 3, conversant without conversational dyspnea, NAD, normal affect  HEENT:  PERRL, Sclera noninjected, nonicteric OU, Nares patent,  no craniofacial abnormalities, Mucous membranes, moist, no oral lesions, normal dentition, Mallampati class 4  NECK:  Trachea midline, no accessory muscle use, no " stridor, no cervical or supraclavicular adenopathy, JVP not elevated  CARDIAC: Reg, single s1/S2, no m/r/g  PULM: CTA bilaterally no wheezing, rhonchi or rales  ABD: Normoactive bowel sounds, soft nontender, nondistended, no rebound, no rigidity, no guarding  EXT: No cyanosis, no clubbing, no edema, normal capillary refill  NEURO: no focal neurologic deficits, AAOx3, moving all extremities appropriately    Labs: I have personally reviewed pertinent lab results.  Lab Results   Component Value Date    WBC 10.85 (H) 06/16/2025    HGB 13.4 06/16/2025    HCT 39.4 06/16/2025    MCV 89 06/16/2025     06/16/2025      Lab Results   Component Value Date    CALCIUM 8.9 06/16/2025    K 3.7 06/16/2025    CO2 27 06/16/2025     06/16/2025    BUN 23 06/16/2025    CREATININE 0.80 06/16/2025       PFTs:  The most recent pulmonary function tests were reviewed.  7/2023  Results:  FEV1/FVC Ratio: 84 %  Forced Vital Capacity: 2.58 L    86 % predicted  FEV1: 2.15 L91 % predicted     After administration of bronchodilator   FVC: 3.06 L, 102% predicted, 18% change  FEV1: 2.48 L, 105% predicted, 15% change     Lung volumes by body plethysmography:   Total Lung Capacity 112 % predicted   Residual volume 136 % predicted     DLCO corrected for patients hemoglobin level: 101 %     Interpretation:  No obstructive airflow defect on spirometry  There is significant airway response with the administration of bronchodilator per ATS standards  Normal TLC with increased residual volume indicative of air trapping  Normal diffusion capacity  Flow volume loop is normal appearing    Imaging  I personally reviewed the images on the PAC system pertinent to today's visit  CT chest abdomen and pelvis 6/18/25  IMPRESSION:  No evidence of acute process in the chest, abdomen or pelvis.  Indeterminate solid pulmonary nodule measuring 7 mm. In a patient of unknown risk level with a solid nodule of 6-8 mm, recommend CT at 6-12 months. In a low-risk  patient, then consider CT at 18-24 months. In a high-risk patient, if the nodule is stable   at 6-12 months, recommend CT at 18-24 months.    CT abdomen 12/4/22  LOWER CHEST:  Small hiatal hernia noted.  No other clinically significant abnormality identified in the visualized lower chest.    Cardiac:  Echo  Left Ventricle Left ventricular cavity size is normal. Wall thickness is normal. The left ventricular ejection fraction is 55%. Systolic function is normal. Wall motion is normal. Diastolic function is mildly abnormal, consistent with grade I (abnormal) relaxation.   Right Ventricle Right ventricular cavity size is normal. Systolic function is normal. Wall thickness is normal.   Left Atrium The atrium is normal in size.   Right Atrium The atrium is normal in size.   Aortic Valve The aortic valve is trileaflet. The leaflets are not thickened. The leaflets are not calcified. The leaflets exhibit normal mobility. There is no evidence of regurgitation. The aortic valve has no significant stenosis.   Mitral Valve Mitral valve structure is normal.  There is trace regurgitation. There is no evidence of stenosis.   Tricuspid Valve Tricuspid valve structure is normal. There is mild regurgitation. There is no evidence of stenosis.   Pulmonic Valve Pulmonic valve structure is normal. There is mild regurgitation. There is no evidence of stenosis.   Ascending Aorta The aortic root is normal in size.   IVC/SVC The inferior vena cava is normal in size.   Pericardium There is no pericardial effusion. The pericardium is normal in appearance.       Sleep studies:  None                                       Hayder Wasserman DO  [facility] Sleep Physician         [1]   Past Medical History:  Diagnosis Date   • Arthritis    • GERD (gastroesophageal reflux disease)    • Hyperlipidemia    • Hypertension    • Irritable bowel syndrome    • Low back pain    • PONV (postoperative nausea and vomiting)    • Syncope    [2]   Past  Surgical History:  Procedure Laterality Date   • CATARACT EXTRACTION Bilateral    •  SECTION N/A    • CHOLECYSTECTOMY     • COLONOSCOPY  2023   • HERNIA REPAIR     • HYSTERECTOMY     • NASAL SEPTUM SURGERY     • PA ARTHRP ACETBLR/PROX FEM PROSTC AGRFT/ALGRFT Right 2023    Procedure: ARTHROPLASTY HIP TOTAL;  Surgeon: Kurt Douglas DO;  Location: [facility] OR;  Service: Orthopedics   • PA ARTHRP ACETBLR/PROX FEM PROSTC AGRFT/ALGRFT Left 2023    Procedure: ARTHROPLASTY HIP TOTAL;  Surgeon: Kurt Douglas DO;  Location: [facility];  Service: Orthopedics   • TONSILLECTOMY     • UPPER GASTROINTESTINAL ENDOSCOPY  2023   [3]   Family History  Problem Relation Name Age of Onset   • Hypertension Mother     • Skin cancer Mother     • Stroke Father     • No Known Problems Sister     • Hypertension Son     • Skin cancer Daughter     • Cancer Daughter     • No Known Problems Daughter     • Ovarian cancer Daughter     • Ovarian cancer Maternal Grandmother     • No Known Problems Maternal Grandfather     • No Known Problems Paternal Grandmother     • No Known Problems Paternal Grandfather     • No Known Problems Maternal Aunt     • No Known Problems Maternal Aunt     • No Known Problems Maternal Aunt     • No Known Problems Maternal Aunt     • Colon cancer Cousin     • Colon polyps Neg Hx     [4]   Allergies  Allergen Reactions   • Ativan [Lorazepam] Myalgia     When taken  Along w/ cymbalta   • Cymbalta [Duloxetine Hcl] Myalgia     When taken w/ ativan x 1 occurrence   • Latex Rash   • Amoxicillin Rash

## 2025-07-08 NOTE — PROGRESS NOTES
Pulmonary Consultation   Roselyn Jose 59 y.o. female MRN: [id number]      Reason for consultation: Pulmonary nodule, family history of lung cancer.      Requesting physician: Barbara DIANE    Assessment/Plan  59 y.o. F with PMHx of GERD, IBS, hyperlipidemia, low back pain who comes in for abnormal CT chest.    1.  Abnormal CT chest - Ovoid 7 mm pulmonary nodule in the right lower lobe along the major fissure, series 3 image 93, indeterminate though suggestive of a lymph node no tracheal or endobronchial lesion. Strong history of cancer in her family.  Former smoker 1/2 ppd x 15 years.       -   Repeat CT chest in 6 months due to her family history of malignancy.  She is very nervous to extend CT out to 1 year and would like it followed sooner than that.      2.  Recurrent bronchitis/pneumonia in her youth - mild bronchiectasis/bronchiolectasis on CT       -  Check immunoglobulins, hypersenstivity pneumonitis panel, northeast panel.       -  She has noted benefit with Advair 100 Daily.  I recommended she start using Advair BID.       -  Follow repeat CT as above      History of Present Illness   HPI:  Roselyn Jose is a 59 y.o. female with PMHx as below who comes in for evaluation of pulmonary nodule.   There was a CT performed which showed a pulmonary nodule.  This occurred around a recent bronchitis which then caused her concern as she has a family history with multiple people with cancer.  She has a history of her mom who had melanoma that metastasized to lung as well.  She did smoker 1/2 ppd for 15 years.   She quit several years ago.  She has been using Advair Daily.  She has been dealing with irritating cough and has noted that the advair has calmed it some.   She states that it is less frequent but still present.  She notes dyspnea on exertion including walking on a level ground, slight hill or up stairs.      ROS:   Review of Systems   Constitutional:  Positive for fatigue.   HENT: Negative.      Eyes: Negative.    Respiratory:  Positive for chest tightness, shortness of breath and wheezing.    Cardiovascular: Negative.    Gastrointestinal: Negative.    Endocrine: Negative.    Genitourinary: Negative.    Musculoskeletal: Negative.    Allergic/Immunologic: Negative.    Hematological: Negative.    Psychiatric/Behavioral: Negative.         Historical Information   Past Medical History[1]  Past Surgical History[2]  Family History[3]  Social History     Socioeconomic History    Marital status:      Spouse name: Not on file    Number of children: Not on file    Years of education: Not on file    Highest education level: Not on file   Occupational History    Not on file   Tobacco Use    Smoking status: Former     Current packs/day: 0.50     Average packs/day: 0.5 packs/day for 49.5 years (24.8 ttl pk-yrs)     Types: Cigarettes     Start date: 1976    Smokeless tobacco: Never    Tobacco comments:     Pt states she stopped and started smoking 1976 -2013   Vaping Use    Vaping status: Never Used   Substance and Sexual Activity    Alcohol use: Not Currently     Comment: extremely rare social occasion    Drug use: Never    Sexual activity: Never   Other Topics Concern    Not on file   Social History Narrative    Not on file     Social Drivers of Health     Financial Resource Strain: Not on file   Food Insecurity: No Food Insecurity (5/18/2023)    Hunger Vital Sign     Worried About Running Out of Food in the Last Year: Never true     Ran Out of Food in the Last Year: Never true   Transportation Needs: No Transportation Needs (5/18/2023)    PRAPARE - Transportation     Lack of Transportation (Medical): No     Lack of Transportation (Non-Medical): No   Physical Activity: Not on file   Stress: Not on file   Social Connections: Not on file   Intimate Partner Violence: Not on file   Housing Stability: Low Risk  (5/18/2023)    Housing Stability Vital Sign     Unable to Pay for Housing in the Last Year: No     Number  of Places Lived in the Last Year: 1     Unstable Housing in the Last Year: No       Occupational History: Dental assistant     Meds/Allergies   Allergies[4]    Home medications:  Prior to Admission medications    Medication Sig Start Date End Date Taking? Authorizing Provider   albuterol (Proventil HFA) 90 mcg/act inhaler Inhale 2 puffs every 6 (six) hours as needed for wheezing or shortness of breath 6/9/25  Yes Saray Lara DO   atorvastatin (LIPITOR) 20 mg tablet Take 1 tablet (20 mg total) by mouth daily Take half tab every other day x2 weeks then half tab daily x2 weeks & then full tab daily afterwards. 11/11/24  Yes Maxime Bundy MD   cholecalciferol (VITAMIN D3) 1,000 units tablet Take 2 tablets (2,000 Units total) by mouth daily  Patient taking differently: Take 1,000 Units by mouth daily 11/25/22  Yes Maryana Patel PA-C   dicyclomine (BENTYL) 10 mg capsule Take 1 capsule (10 mg total) by mouth 4 (four) times a day as needed (abdominal pain/spasm/cramping) 6/17/25  Yes Rachana Rasheed PA-C   Fluticasone-Salmeterol (Advair) 100-50 mcg/dose inhaler INHALE 1 DOSE BY MOUTH TWICE DAILY RINSE MOUTH AFTER USE 2/24/25  Yes Saray Lara DO   loperamide (IMODIUM) 2 mg capsule Take 2 mg by mouth as needed in the morning and 2 mg as needed at noon and 2 mg as needed in the evening and 2 mg as needed before bedtime for diarrhea.   Yes Historical Provider, MD   Loratadine 10 MG CAPS Take by mouth   Yes Historical Provider, MD   losartan-hydrochlorothiazide (HYZAAR) 100-25 MG per tablet Take 1 tablet by mouth once daily 4/21/25  Yes Saray Lara DO   Multiple Vitamins-Minerals (multivitamin with minerals) tablet Take 1 tablet by mouth daily 3/24/23  Yes Maryana Patel PA-C   Omega-3 Fatty Acids (fish oil) 1,000 mg Take 1,000 mg by mouth in the morning.   Yes Historical Provider, MD   omeprazole (PriLOSEC) 40 MG capsule Take 1 capsule (40 mg total) by mouth 2 (two) times a day 2/7/24  Yes Kentrell  "DO Vandana   ondansetron (ZOFRAN-ODT) 4 mg disintegrating tablet Take 1 tablet (4 mg total) by mouth every 6 (six) hours as needed for nausea or vomiting 6/9/25  Yes Saray Lara DO   amitriptyline (ELAVIL) 10 mg tablet Take 1 tablet (10 mg total) by mouth daily at bedtime  Patient not taking: Reported on 11/11/2024 6/4/24   Kentrell Ross DO   benzonatate (TESSALON) 200 MG capsule Take 1 capsule (200 mg total) by mouth 3 (three) times a day as needed for cough  Patient not taking: Reported on 7/7/2025 6/1/25   BENEDICTO Bergman   valACYclovir (VALTREX) 1,000 mg tablet Take 2 tablets (2,000 mg total) by mouth every 12 (twelve) hours for 2 doses  Patient not taking: Reported on 11/11/2024 1/19/24 8/30/24  Saray Lara DO       Vitals:   Blood pressure 130/78, pulse 96, temperature 98.4 °F (36.9 °C), temperature source Tympanic Core, height 5' 3\" (1.6 m), weight 96.2 kg (212 lb), SpO2 98%, not currently breastfeeding., RA, Body mass index is 37.55 kg/m².       Physical Exam  General: Pleasant, Awake alert and oriented x 3, conversant without conversational dyspnea, NAD, normal affect  HEENT:  PERRL, Sclera noninjected, nonicteric OU, Nares patent,  no craniofacial abnormalities, Mucous membranes, moist, no oral lesions, normal dentition, Mallampati class 4  NECK:  Trachea midline, no accessory muscle use, no stridor, no cervical or supraclavicular adenopathy, JVP not elevated  CARDIAC: Reg, single s1/S2, no m/r/g  PULM: CTA bilaterally no wheezing, rhonchi or rales  ABD: Normoactive bowel sounds, soft nontender, nondistended, no rebound, no rigidity, no guarding  EXT: No cyanosis, no clubbing, no edema, normal capillary refill  NEURO: no focal neurologic deficits, AAOx3, moving all extremities appropriately    Labs: I have personally reviewed pertinent lab results.  Lab Results   Component Value Date    WBC 10.85 (H) 06/16/2025    HGB 13.4 06/16/2025    HCT 39.4 06/16/2025    MCV 89 06/16/2025     " 06/16/2025      Lab Results   Component Value Date    CALCIUM 8.9 06/16/2025    K 3.7 06/16/2025    CO2 27 06/16/2025     06/16/2025    BUN 23 06/16/2025    CREATININE 0.80 06/16/2025       PFTs:  The most recent pulmonary function tests were reviewed.  7/2023  Results:  FEV1/FVC Ratio: 84 %  Forced Vital Capacity: 2.58 L    86 % predicted  FEV1: 2.15 L91 % predicted     After administration of bronchodilator   FVC: 3.06 L, 102% predicted, 18% change  FEV1: 2.48 L, 105% predicted, 15% change     Lung volumes by body plethysmography:   Total Lung Capacity 112 % predicted   Residual volume 136 % predicted     DLCO corrected for patients hemoglobin level: 101 %     Interpretation:  No obstructive airflow defect on spirometry  There is significant airway response with the administration of bronchodilator per ATS standards  Normal TLC with increased residual volume indicative of air trapping  Normal diffusion capacity  Flow volume loop is normal appearing    Imaging  I personally reviewed the images on the PAC system pertinent to today's visit  CT chest abdomen and pelvis 6/18/25  IMPRESSION:  No evidence of acute process in the chest, abdomen or pelvis.  Indeterminate solid pulmonary nodule measuring 7 mm. In a patient of unknown risk level with a solid nodule of 6-8 mm, recommend CT at 6-12 months. In a low-risk patient, then consider CT at 18-24 months. In a high-risk patient, if the nodule is stable   at 6-12 months, recommend CT at 18-24 months.    CT abdomen 12/4/22  LOWER CHEST:  Small hiatal hernia noted.  No other clinically significant abnormality identified in the visualized lower chest.    Cardiac:  Echo  Left Ventricle Left ventricular cavity size is normal. Wall thickness is normal. The left ventricular ejection fraction is 55%. Systolic function is normal. Wall motion is normal. Diastolic function is mildly abnormal, consistent with grade I (abnormal) relaxation.   Right Ventricle Right ventricular  cavity size is normal. Systolic function is normal. Wall thickness is normal.   Left Atrium The atrium is normal in size.   Right Atrium The atrium is normal in size.   Aortic Valve The aortic valve is trileaflet. The leaflets are not thickened. The leaflets are not calcified. The leaflets exhibit normal mobility. There is no evidence of regurgitation. The aortic valve has no significant stenosis.   Mitral Valve Mitral valve structure is normal.  There is trace regurgitation. There is no evidence of stenosis.   Tricuspid Valve Tricuspid valve structure is normal. There is mild regurgitation. There is no evidence of stenosis.   Pulmonic Valve Pulmonic valve structure is normal. There is mild regurgitation. There is no evidence of stenosis.   Ascending Aorta The aortic root is normal in size.   IVC/SVC The inferior vena cava is normal in size.   Pericardium There is no pericardial effusion. The pericardium is normal in appearance.       Sleep studies:  None                                       Hayder Wasserman DO  [facility] Sleep Physician         [1]   Past Medical History:  Diagnosis Date    Arthritis     GERD (gastroesophageal reflux disease)     Hyperlipidemia     Hypertension     Irritable bowel syndrome     Low back pain     PONV (postoperative nausea and vomiting)     Syncope    [2]   Past Surgical History:  Procedure Laterality Date    CATARACT EXTRACTION Bilateral      SECTION N/A     CHOLECYSTECTOMY      COLONOSCOPY  2023    HERNIA REPAIR      HYSTERECTOMY      NASAL SEPTUM SURGERY      NE ARTHRP ACETBLR/PROX FEM PROSTC AGRFT/ALGRFT Right 2023    Procedure: ARTHROPLASTY HIP TOTAL;  Surgeon: Kurt Douglas DO;  Location:  MAIN OR;  Service: Orthopedics    NE ARTHRP ACETBLR/PROX FEM PROSTC AGRFT/ALGRFT Left 2023    Procedure: ARTHROPLASTY HIP TOTAL;  Surgeon: Kurt Douglas DO;  Location:  MAIN OR;  Service: Orthopedics    TONSILLECTOMY      UPPER  GASTROINTESTINAL ENDOSCOPY  01/11/2023   [3]   Family History  Problem Relation Name Age of Onset    Hypertension Mother      Skin cancer Mother      Stroke Father      No Known Problems Sister      Hypertension Son      Skin cancer Daughter      Cancer Daughter      No Known Problems Daughter      Ovarian cancer Daughter      Ovarian cancer Maternal Grandmother      No Known Problems Maternal Grandfather      No Known Problems Paternal Grandmother      No Known Problems Paternal Grandfather      No Known Problems Maternal Aunt      No Known Problems Maternal Aunt      No Known Problems Maternal Aunt      No Known Problems Maternal Aunt      Colon cancer Cousin      Colon polyps Neg Hx     [4]   Allergies  Allergen Reactions    Ativan [Lorazepam] Myalgia     When taken  Along w/ cymbalta    Cymbalta [Duloxetine Hcl] Myalgia     When taken w/ ativan x 1 occurrence    Latex Rash    Amoxicillin Rash

## 2025-07-16 LAB
ASPERGILLUS FUMAGATUS IGG: NEGATIVE
AUREOBASIDIUM PULLULANS IGG: NEGATIVE
BASOPHILS # BLD AUTO: 0 X10E3/UL (ref 0–0.2)
BASOPHILS NFR BLD AUTO: 1 %
EOSINOPHIL # BLD AUTO: 0.1 X10E3/UL (ref 0–0.4)
EOSINOPHIL NFR BLD AUTO: 3 %
ERYTHROCYTE [DISTWIDTH] IN BLOOD BY AUTOMATED COUNT: 13.6 % (ref 11.7–15.4)
HCT VFR BLD AUTO: 40.5 % (ref 34–46.6)
HGB BLD-MCNC: 13.5 G/DL (ref 11.1–15.9)
IGA SERPL-MCNC: 62 MG/DL (ref 87–352)
IGG SERPL-MCNC: 755 MG/DL (ref 586–1602)
IGM SERPL-MCNC: 142 MG/DL (ref 26–217)
IMM GRANULOCYTES # BLD: 0 X10E3/UL (ref 0–0.1)
IMM GRANULOCYTES NFR BLD: 0 %
LACEYELLA SACCHARI AB SER QL: NEGATIVE
LYMPHOCYTES # BLD AUTO: 1.2 X10E3/UL (ref 0.7–3.1)
LYMPHOCYTES NFR BLD AUTO: 23 %
MCH RBC QN AUTO: 30.8 PG (ref 26.6–33)
MCHC RBC AUTO-ENTMCNC: 33.3 G/DL (ref 31.5–35.7)
MCV RBC AUTO: 93 FL (ref 79–97)
MONOCYTES # BLD AUTO: 0.6 X10E3/UL (ref 0.1–0.9)
MONOCYTES NFR BLD AUTO: 11 %
NEUTROPHILS # BLD AUTO: 3.2 X10E3/UL (ref 1.4–7)
NEUTROPHILS NFR BLD AUTO: 62 %
PIGEON SERUM IGG: NEGATIVE
PLATELET # BLD AUTO: 309 X10E3/UL (ref 150–450)
RBC # BLD AUTO: 4.38 X10E6/UL (ref 3.77–5.28)
S RECTIVIRGULA AB SER QL ID: NEGATIVE
T VULGARIS AB SER QL ID: NEGATIVE
WBC # BLD AUTO: 5.2 X10E3/UL (ref 3.4–10.8)

## 2025-07-17 DIAGNOSIS — D80.2 IGA DEFICIENCY (HCC): Primary | ICD-10-CM

## 2025-07-18 PROBLEM — R19.7 DIARRHEA OF PRESUMED INFECTIOUS ORIGIN: Status: RESOLVED | Noted: 2025-06-18 | Resolved: 2025-07-18

## 2025-07-18 NOTE — PROGRESS NOTES
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 We will have her see immunology.  We can then consider increasing advair dose to help wit symptom burden.

## 2025-07-25 ENCOUNTER — APPOINTMENT (OUTPATIENT)
Dept: CT IMAGING | Facility: HOSPITAL | Age: 60
End: 2025-07-25
Attending: INTERNAL MEDICINE
Payer: COMMERCIAL

## 2025-07-25 ENCOUNTER — HOSPITAL ENCOUNTER (OUTPATIENT)
Dept: PULMONOLOGY | Facility: HOSPITAL | Age: 60
End: 2025-07-25
Attending: INTERNAL MEDICINE
Payer: COMMERCIAL

## 2025-07-25 DIAGNOSIS — J47.9 BRONCHIECTASIS WITHOUT COMPLICATION (HCC): ICD-10-CM

## 2025-07-25 DIAGNOSIS — R91.1 PULMONARY NODULE: ICD-10-CM

## 2025-07-25 DIAGNOSIS — Z87.891 FORMER SMOKER: ICD-10-CM

## 2025-07-25 PROCEDURE — 94729 DIFFUSING CAPACITY: CPT

## 2025-07-25 PROCEDURE — 94060 EVALUATION OF WHEEZING: CPT | Performed by: INTERNAL MEDICINE

## 2025-07-25 PROCEDURE — 94726 PLETHYSMOGRAPHY LUNG VOLUMES: CPT

## 2025-07-25 PROCEDURE — 94760 N-INVAS EAR/PLS OXIMETRY 1: CPT

## 2025-07-25 PROCEDURE — 94726 PLETHYSMOGRAPHY LUNG VOLUMES: CPT | Performed by: INTERNAL MEDICINE

## 2025-07-25 PROCEDURE — 94060 EVALUATION OF WHEEZING: CPT

## 2025-07-25 PROCEDURE — 94729 DIFFUSING CAPACITY: CPT | Performed by: INTERNAL MEDICINE

## 2025-07-25 RX ORDER — ALBUTEROL SULFATE 0.83 MG/ML
SOLUTION RESPIRATORY (INHALATION)
Status: COMPLETED
Start: 2025-07-25 | End: 2025-07-25

## 2025-07-25 RX ORDER — ALBUTEROL SULFATE 0.83 MG/ML
2.5 SOLUTION RESPIRATORY (INHALATION) ONCE
Status: COMPLETED | OUTPATIENT
Start: 2025-07-25 | End: 2025-07-25

## 2025-07-25 RX ADMIN — ALBUTEROL SULFATE 2.5 MG: 2.5 SOLUTION RESPIRATORY (INHALATION) at 15:06

## 2025-07-25 RX ADMIN — ALBUTEROL SULFATE 2.5 MG: 0.83 SOLUTION RESPIRATORY (INHALATION) at 15:06

## 2025-08-04 ENCOUNTER — TELEPHONE (OUTPATIENT)
Age: 60
End: 2025-08-04

## 2025-08-22 ENCOUNTER — TELEPHONE (OUTPATIENT)
Dept: GASTROENTEROLOGY | Facility: CLINIC | Age: 60
End: 2025-08-22

## (undated) DEVICE — SYRINGE 30ML LL

## (undated) DEVICE — CHLORAPREP HI-LITE 26ML ORANGE

## (undated) DEVICE — THE SIMPULSE SOLO SYSTEM WITH ULTREX RETRACTABLE SPLASH SHIELD TIP: Brand: SIMPULSE SOLO

## (undated) DEVICE — ELECTRODE BLADE MOD  E-Z CLEAN 6.5IN -0014M

## (undated) DEVICE — SUT VICRYL 1 CP-1 27 IN J268H

## (undated) DEVICE — PAD GROUNDING ADULT

## (undated) DEVICE — ADHESIVE SKIN HIGH VISCOSITY EXOFIN 1ML

## (undated) DEVICE — INTENDED FOR TISSUE SEPARATION, AND OTHER PROCEDURES THAT REQUIRE A SHARP SURGICAL BLADE TO PUNCTURE OR CUT.: Brand: BARD-PARKER SAFETY BLADES SIZE 10, STERILE

## (undated) DEVICE — GLOVE INDICATOR PI UNDERGLOVE SZ 6.5 BLUE

## (undated) DEVICE — 450 ML BOTTLE OF 0.05% CHLORHEXIDINE GLUCONATE IN 99.95% STERILE WATER FOR IRRIGATION, USP AND APPLICATOR.: Brand: IRRISEPT ANTIMICROBIAL WOUND LAVAGE

## (undated) DEVICE — SUT VICRYL 2-0 CT-1 27 IN J259H

## (undated) DEVICE — IMPERVIOUS STOCKINETTE: Brand: DEROYAL

## (undated) DEVICE — GOWN,SLEEVE,STERILE,W/CSR WRAP,1/P: Brand: MEDLINE

## (undated) DEVICE — PACK MAJOR ORTHO W/SPLITS PBDS

## (undated) DEVICE — ASTOUND STANDARD SURGICAL GOWN, XXL: Brand: CONVERTORS

## (undated) DEVICE — SPONGE SCRUB 4 PCT CHLORHEXIDINE

## (undated) DEVICE — TIBURON HIP DRAPE WITH POUCHES: Brand: CONVERTORS

## (undated) DEVICE — PENCIL ELECTROSURG E-Z CLEAN -0035H

## (undated) DEVICE — HOOD: Brand: FLYTE, SURGICOOL

## (undated) DEVICE — 3M™ STERI-DRAPE™ U-DRAPE 1015: Brand: STERI-DRAPE™

## (undated) DEVICE — SUT STRATAFIX SPIRAL 3-0 PGA/PCL 30 X 30 CM SXMD2B408

## (undated) DEVICE — SUT VICRYL 0 CT-1 27 IN J260H

## (undated) DEVICE — DRESSING MEPILEX AG BORDER POST-OP 4 X 8 IN

## (undated) DEVICE — COBAN 6 IN STERILE

## (undated) DEVICE — DRESSING MEPILEX AG BORDER POST-OP 4 X 12 IN

## (undated) DEVICE — 3M™ IOBAN™ 2 ANTIMICROBIAL INCISE DRAPE 6650EZ: Brand: IOBAN™ 2

## (undated) DEVICE — ANTIBACTERIAL VIOLET BRAIDED (POLYGLACTIN 910), SYNTHETIC ABSORBABLE SURGICAL SUTURE: Brand: COATED VICRYL

## (undated) DEVICE — HOOD WITH PEEL AWAY FACE SHIELD: Brand: T7PLUS

## (undated) DEVICE — NEEDLE 18 G X 1 1/2

## (undated) DEVICE — PLUMEPEN PRO 10FT

## (undated) DEVICE — MEDI-VAC YANKAUER SUCTION HANDLE W/BULBOUS AND CONTROL VENT: Brand: CARDINAL HEALTH

## (undated) DEVICE — YELLOW BOAT

## (undated) DEVICE — BETHLEHEM TOTAL HIP, KIT: Brand: CARDINAL HEALTH

## (undated) DEVICE — SYRINGE 3ML LL

## (undated) DEVICE — SUT STRATAFIX SPIRAL 1-0 PDO 36 X 36 CM SXPD2B405

## (undated) DEVICE — DRESSING MEPILEX AG BORDER 4 X 8 IN

## (undated) DEVICE — SCD SEQUENTIAL COMPRESSION COMFORT SLEEVE LARGE KNEE LENGTH: Brand: KENDALL SCD

## (undated) DEVICE — GLOVE INDICATOR PI UNDERGLOVE SZ 8.5 BLUE

## (undated) DEVICE — HOOD: Brand: T7PLUS

## (undated) DEVICE — SCD SEQUENTIAL COMPRESSION COMFORT SLEEVE MEDIUM KNEE LENGTH: Brand: KENDALL SCD

## (undated) DEVICE — HEAVY DUTY TABLE COVER: Brand: CONVERTORS

## (undated) DEVICE — DRESSING MEPILEX AG BORDER 4 X 12 IN